# Patient Record
Sex: MALE | Race: WHITE | Employment: OTHER | URBAN - METROPOLITAN AREA
[De-identification: names, ages, dates, MRNs, and addresses within clinical notes are randomized per-mention and may not be internally consistent; named-entity substitution may affect disease eponyms.]

---

## 2017-02-03 RX ORDER — CANDESARTAN 4 MG/1
TABLET ORAL
Qty: 90 TAB | Refills: 0 | Status: SHIPPED | OUTPATIENT
Start: 2017-02-03 | End: 2017-05-02 | Stop reason: SDUPTHER

## 2017-03-06 RX ORDER — TRAZODONE HYDROCHLORIDE 50 MG/1
TABLET ORAL
Qty: 60 TAB | Refills: 0 | Status: SHIPPED | OUTPATIENT
Start: 2017-03-06 | End: 2017-05-30 | Stop reason: SDUPTHER

## 2017-05-03 RX ORDER — CANDESARTAN 4 MG/1
TABLET ORAL
Qty: 90 TAB | Refills: 0 | Status: SHIPPED | OUTPATIENT
Start: 2017-05-03 | End: 2017-08-01 | Stop reason: SDUPTHER

## 2017-05-30 RX ORDER — TRAZODONE HYDROCHLORIDE 50 MG/1
50-100 TABLET ORAL
Qty: 60 TAB | Refills: 0 | Status: SHIPPED | OUTPATIENT
Start: 2017-05-30 | End: 2017-08-01 | Stop reason: SDUPTHER

## 2017-05-30 NOTE — TELEPHONE ENCOUNTER
Orders Placed This Encounter    traZODone (DESYREL) 50 mg tablet     Sig: Take 1-2 Tabs by mouth nightly as needed for Sleep. Dispense:  60 Tab     Refill:  0     The above medication refills were approved via verbal order by Dr. Sarah Vincent III.

## 2017-06-20 RX ORDER — AMLODIPINE BESYLATE 10 MG/1
TABLET ORAL
Qty: 90 TAB | Refills: 0 | Status: SHIPPED | OUTPATIENT
Start: 2017-06-20 | End: 2017-08-31 | Stop reason: SDUPTHER

## 2017-08-02 RX ORDER — CANDESARTAN 4 MG/1
TABLET ORAL
Qty: 90 TAB | Refills: 0 | Status: SHIPPED | OUTPATIENT
Start: 2017-08-02 | End: 2017-09-27 | Stop reason: SDUPTHER

## 2017-08-02 RX ORDER — TRAZODONE HYDROCHLORIDE 50 MG/1
TABLET ORAL
Qty: 60 TAB | Refills: 0 | Status: SHIPPED | OUTPATIENT
Start: 2017-08-02 | End: 2017-09-27 | Stop reason: SDUPTHER

## 2017-08-31 RX ORDER — TERAZOSIN 10 MG/1
CAPSULE ORAL
Qty: 90 CAP | Refills: 4 | Status: SHIPPED | OUTPATIENT
Start: 2017-08-31 | End: 2018-11-26 | Stop reason: SDUPTHER

## 2017-08-31 RX ORDER — AMLODIPINE BESYLATE 10 MG/1
TABLET ORAL
Qty: 90 TAB | Refills: 0 | Status: SHIPPED | OUTPATIENT
Start: 2017-08-31 | End: 2017-12-03 | Stop reason: SDUPTHER

## 2017-09-27 RX ORDER — CANDESARTAN 4 MG/1
TABLET ORAL
Qty: 90 TAB | Refills: 0 | Status: SHIPPED | OUTPATIENT
Start: 2017-09-27 | End: 2017-10-16 | Stop reason: SDUPTHER

## 2017-09-27 RX ORDER — TRAZODONE HYDROCHLORIDE 50 MG/1
TABLET ORAL
Qty: 60 TAB | Refills: 0 | Status: SHIPPED | OUTPATIENT
Start: 2017-09-27 | End: 2017-11-28 | Stop reason: SDUPTHER

## 2017-10-16 ENCOUNTER — OFFICE VISIT (OUTPATIENT)
Dept: INTERNAL MEDICINE CLINIC | Age: 72
End: 2017-10-16

## 2017-10-16 VITALS
HEART RATE: 73 BPM | WEIGHT: 254 LBS | HEIGHT: 73 IN | RESPIRATION RATE: 18 BRPM | SYSTOLIC BLOOD PRESSURE: 110 MMHG | DIASTOLIC BLOOD PRESSURE: 70 MMHG | TEMPERATURE: 98.5 F | BODY MASS INDEX: 33.66 KG/M2 | OXYGEN SATURATION: 97 %

## 2017-10-16 DIAGNOSIS — J40 BRONCHITIS: Primary | ICD-10-CM

## 2017-10-16 RX ORDER — DIPHENHYDRAMINE HCL 25 MG
25 CAPSULE ORAL
COMMUNITY
End: 2018-09-06 | Stop reason: ALTCHOICE

## 2017-10-16 RX ORDER — AZITHROMYCIN 250 MG/1
TABLET, FILM COATED ORAL
Qty: 6 TAB | Refills: 0 | Status: SHIPPED | OUTPATIENT
Start: 2017-10-16 | End: 2018-09-06 | Stop reason: ALTCHOICE

## 2017-10-16 NOTE — PROGRESS NOTES
HISTORY OF PRESENT ILLNESS  Armen Rivero is a 81 y.o. male. HPI Returned from hunting trip in [facility]) last night. His roommate was sick and coughing the entire week and was diagnosed with pneumonia on return home. Patient reports that for the past  2-3 days ago he has developed chest and nasal congestion, with a minimally productive cough. He denies fevers or shortness of breath. He also denies sore throat, sinus pain or ear pain. He has not taken any over the counter medications. He is a non smoker. Past Medical History:   Diagnosis Date    Arthritis     BPH (benign prostatic hypertrophy) 7/18/2011    Cancer [facility])     prostate cancer    Chronic pain right    knee    ED (erectile dysfunction)     GERD (gastroesophageal reflux disease)     HTN (hypertension), benign     OA (osteoarthritis) of knee 7/18/2011    BOTH KNEES    Unspecified essential hypertension 7/18/2011     Current Outpatient Prescriptions on File Prior to Visit   Medication Sig Dispense Refill    traZODone (DESYREL) 50 mg tablet TAKE 1-2 TABLETS BY MOUTH NIGHTLY AS NEEDED FOR SLEEP 60 Tab 0    terazosin (HYTRIN) 10 mg capsule TAKE ONE CAPSULE BY MOUTH ONCE DAILY 90 Cap 4    amLODIPine (NORVASC) 10 mg tablet TAKE ONE TABLET BY MOUTH ONCE DAILY 90 Tab 0    candesartan (ATACAND) 4 mg tablet TAKE ONE TABLET BY MOUTH ONCE DAILY 90 Tab 0    ibuprofen (ADVIL) 200 mg tablet Take  by mouth. No current facility-administered medications on file prior to visit. Allergies   Allergen Reactions    Barbiturates Anxiety     Hyper         ROS  Per HPI  Physical Exam  Visit Vitals    /70 (BP 1 Location: Right arm, BP Patient Position: Sitting)    Pulse 73    Temp 98.5 °F (36.9 °C) (Oral)    Resp 18    Ht 6' 0.5\" (1.842 m)    Wt 254 lb (115.2 kg)    SpO2 97%    BMI 33.98 kg/m2   Patient is in no apparent distress   HEENT: normocephalic, extraocular movements intact, conjunctiva clear  Oropharynx: clear.  No erythema, exudate, or drainage  Nose: no drainage  Sinuses: nontender  Ears: tympanic membrane's and canals normal.  No erythema, fluid, drainage  Neck: no lymphadenopathy  Heart[de-identified] normal rate, regular rhythm, normal S1, S2, no murmurs, rubs, clicks or gallops. Chest: clear to auscultation, no wheezes, rales or rhonchi,   Extremities: no edema    ASSESSMENT and PLAN  Bronchitis Likely viral, but patient is very concerned as his hunting roommate was diagnosed with pneumonia. Will Rx with CATHY cisneros as directed  Mucinex DM as directed  Call or return to clinic if these symptoms worsen or fail to improve as anticipated. Follow-up Disposition:  Return if symptoms worsen or fail to improve. Advised to call back or return to office if symptoms worsen/change/persist.  Discussed expected course/resolution/complications of diagnosis in detail with patient. Medication risks/benefits/costs/interactions/alternatives discussed with patient. He was given an after visit summary which includes diagnoses, current medications, & vitals. He expressed understanding with the diagnosis and plan.

## 2017-10-16 NOTE — PATIENT INSTRUCTIONS
CATHY cisneros as directed  Mucinex DM twice daily   Call or return to clinic if these symptoms worsen or fail to improve as anticipated.

## 2017-10-16 NOTE — PROGRESS NOTES
Returned from [facility]) hunting trip last night, roommate from trip had pneumonia. Patient c/o of chest congestion, cough, nasal congestion, started a couple days ago.

## 2017-10-16 NOTE — MR AVS SNAPSHOT
Visit Information Date & Time Provider Department Dept. Phone Encounter #  
 10/16/2017 10:15 AM Hernan Crooks, [address] [facility] [phone] [id number] Upcoming Health Maintenance Date Due Hepatitis C Screening 1945 MEDICARE YEARLY EXAM 5/28/2012 COLONOSCOPY 7/18/2013 INFLUENZA AGE 9 TO ADULT 8/1/2017 GLAUCOMA SCREENING Q2Y 4/22/2018 DTaP/Tdap/Td series (2 - Td) 8/9/2026 Allergies as of 10/16/2017  Review Complete On: 10/16/2017 By: Sita Lam RN Severity Noted Reaction Type Reactions Barbiturates  01/12/2010    Anxiety Hyper Current Immunizations  Reviewed on 8/10/2016 Name Date Influenza High Dose Vaccine PF 10/17/2016, 9/28/2015 Pneumococcal Conjugate (PCV-13) 8/9/2016 Pneumococcal Polysaccharide (PPSV-23) 2/15/2013 Tdap 8/9/2016 Zoster 2/2/2012 Not reviewed this visit You Were Diagnosed With   
  
 Codes Comments Bronchitis    -  Primary ICD-10-CM: W51 ICD-9-CM: 918 Vitals BP Pulse Temp Resp Height(growth percentile) Weight(growth percentile) 110/70 (BP 1 Location: Right arm, BP Patient Position: Sitting) 73 98.5 °F (36.9 °C) (Oral) 18 6' 0.5\" (1.842 m) 254 lb (115.2 kg) SpO2 BMI Smoking Status 97% 33.98 kg/m2 Never Smoker BMI and BSA Data Body Mass Index Body Surface Area 33.98 kg/m 2 2.43 m 2 Preferred Pharmacy Pharmacy Name Phone [facility] [address] [address] Your Updated Medication List  
  
   
This list is accurate as of: 10/16/17 10:50 AM.  Always use your most recent med list. ADVIL 200 mg tablet Generic drug:  ibuprofen Take  by mouth. amLODIPine 10 mg tablet Commonly known as:  Viviane Citron TAKE ONE TABLET BY MOUTH ONCE DAILY  
  
 azithromycin 250 mg tablet Commonly known as:  Bucky Congress Take 2 tablets today, then take 1 tablet daily. BENADRYL 25 mg capsule Generic drug:  diphenhydrAMINE Take 25 mg by mouth every six (6) hours as needed. candesartan 4 mg tablet Commonly known as:  ATACAND TAKE ONE TABLET BY MOUTH ONCE DAILY  
  
 terazosin 10 mg capsule Commonly known as:  HYTRIN  
TAKE ONE CAPSULE BY MOUTH ONCE DAILY  
  
 traZODone 50 mg tablet Commonly known as:  DESYREL  
TAKE 1-2 TABLETS BY MOUTH NIGHTLY AS NEEDED FOR SLEEP Prescriptions Sent to Pharmacy Refills  
 azithromycin (ZITHROMAX) 250 mg tablet 0 Sig: Take 2 tablets today, then take 1 tablet daily. Class: Normal  
 Pharmacy: [facility] [address], [facility] [address] Ph #: [phone] Patient Instructions Z amelia as directed Mucinex DM twice daily Call or return to clinic if these symptoms worsen or fail to improve as anticipated. Introducing [facility]! Dear Erin Dire: 
Thank you for requesting a [employer] account. Our records indicate that you already have an active [employer] account. You can access your account anytime at [email or web address] Did you know that you can access your hospital and ER discharge instructions at any time in [employer]? You can also review all of your test results from your hospital stay or ER visit. Additional Information If you have questions, please visit the Frequently Asked Questions section of the [employer] website at [email or web address] Remember, [employer] is NOT to be used for urgent needs. For medical emergencies, dial 911. Now available from your iPhone and Android! Please provide this summary of care documentation to your next provider. Your primary care clinician is listed as Dat [id number] If you have any questions after today's visit, please call [phone].

## 2017-11-28 RX ORDER — TRAZODONE HYDROCHLORIDE 50 MG/1
TABLET ORAL
Qty: 60 TAB | Refills: 0 | Status: SHIPPED | OUTPATIENT
Start: 2017-11-28 | End: 2018-01-27 | Stop reason: SDUPTHER

## 2017-12-03 RX ORDER — AMLODIPINE BESYLATE 10 MG/1
TABLET ORAL
Qty: 90 TAB | Refills: 0 | Status: SHIPPED | OUTPATIENT
Start: 2017-12-03 | End: 2018-06-12 | Stop reason: SDUPTHER

## 2018-01-28 RX ORDER — TRAZODONE HYDROCHLORIDE 50 MG/1
TABLET ORAL
Qty: 60 TAB | Refills: 0 | Status: SHIPPED | OUTPATIENT
Start: 2018-01-28 | End: 2018-03-24 | Stop reason: SDUPTHER

## 2018-01-29 RX ORDER — CANDESARTAN 4 MG/1
TABLET ORAL
Qty: 90 TAB | Refills: 0 | Status: SHIPPED | OUTPATIENT
Start: 2018-01-29 | End: 2018-05-14 | Stop reason: SDUPTHER

## 2018-03-25 RX ORDER — TRAZODONE HYDROCHLORIDE 50 MG/1
TABLET ORAL
Qty: 60 TAB | Refills: 0 | Status: SHIPPED | OUTPATIENT
Start: 2018-03-25 | End: 2018-05-29 | Stop reason: SDUPTHER

## 2018-05-14 RX ORDER — CANDESARTAN 4 MG/1
TABLET ORAL
Qty: 90 TAB | Refills: 0 | Status: SHIPPED | OUTPATIENT
Start: 2018-05-14 | End: 2018-08-10 | Stop reason: SDUPTHER

## 2018-05-29 RX ORDER — TRAZODONE HYDROCHLORIDE 50 MG/1
TABLET ORAL
Qty: 60 TAB | Refills: 0 | Status: SHIPPED | OUTPATIENT
Start: 2018-05-29 | End: 2018-07-19 | Stop reason: SDUPTHER

## 2018-06-01 LAB — EF %, EXTERNAL: NORMAL

## 2018-06-12 RX ORDER — AMLODIPINE BESYLATE 10 MG/1
TABLET ORAL
Qty: 90 TAB | Refills: 0 | Status: SHIPPED | OUTPATIENT
Start: 2018-06-12 | End: 2018-09-07 | Stop reason: SDUPTHER

## 2018-07-19 RX ORDER — TRAZODONE HYDROCHLORIDE 50 MG/1
TABLET ORAL
Qty: 60 TAB | Refills: 0 | Status: SHIPPED | OUTPATIENT
Start: 2018-07-19 | End: 2018-09-19 | Stop reason: SDUPTHER

## 2018-08-10 DIAGNOSIS — I10 ESSENTIAL HYPERTENSION: Primary | ICD-10-CM

## 2018-08-10 RX ORDER — CANDESARTAN 4 MG/1
4 TABLET ORAL DAILY
Qty: 90 TAB | Refills: 0 | Status: SHIPPED | OUTPATIENT
Start: 2018-08-10 | End: 2018-09-06 | Stop reason: SDUPTHER

## 2018-08-10 NOTE — LETTER
8/10/2018 9:53 AM 
 
Mr. Rory Hill [address] [phone] Dear Mr. Mariana Webster missed you! Please call our office at [phone] and schedule a follow up appointment for your continued care. Sincerely, Brittany Mckeon MD

## 2018-08-10 NOTE — TELEPHONE ENCOUNTER
Chief Complaint   Patient presents with    Medication Refill     Last Appointment with Dr. Roper Paget:  Visit date not found  No future appointments. Patient LOV w/ partner Dr. Lexy Mancilla 10/16/17. LOV w/ PCP 6/9/2016. Patient is OVERDUE for f/u with PCP. Letter mailed. Orders Placed This Encounter    candesartan (ATACAND) 4 mg tablet     Sig: Take 1 Tab by mouth daily.  Indications: hypertension     Dispense:  90 Tab     Refill:  0

## 2018-09-06 ENCOUNTER — OFFICE VISIT (OUTPATIENT)
Dept: INTERNAL MEDICINE CLINIC | Age: 73
End: 2018-09-06

## 2018-09-06 VITALS
HEIGHT: 73 IN | BODY MASS INDEX: 31.14 KG/M2 | WEIGHT: 235 LBS | TEMPERATURE: 98.2 F | DIASTOLIC BLOOD PRESSURE: 72 MMHG | RESPIRATION RATE: 12 BRPM | HEART RATE: 76 BPM | OXYGEN SATURATION: 97 % | SYSTOLIC BLOOD PRESSURE: 114 MMHG

## 2018-09-06 DIAGNOSIS — N40.1 BENIGN PROSTATIC HYPERPLASIA WITH NOCTURIA: ICD-10-CM

## 2018-09-06 DIAGNOSIS — K40.91 UNILATERAL RECURRENT INGUINAL HERNIA WITHOUT OBSTRUCTION OR GANGRENE: ICD-10-CM

## 2018-09-06 DIAGNOSIS — R35.1 BENIGN PROSTATIC HYPERPLASIA WITH NOCTURIA: ICD-10-CM

## 2018-09-06 DIAGNOSIS — E78.2 MIXED HYPERLIPIDEMIA: ICD-10-CM

## 2018-09-06 DIAGNOSIS — I10 ESSENTIAL HYPERTENSION: Primary | ICD-10-CM

## 2018-09-07 RX ORDER — AMLODIPINE BESYLATE 10 MG/1
TABLET ORAL
Qty: 90 TAB | Refills: 0 | Status: SHIPPED | OUTPATIENT
Start: 2018-09-07 | End: 2018-12-04 | Stop reason: SDUPTHER

## 2018-09-07 NOTE — PROGRESS NOTES
HPI: 
Armen Rivero is a 72y.o. year old male who is here for a routine visit: 
 
Here for several issues. He has a questionable left inguinal hernia. He is noted over the last few months intermittent sharp pain there seems to be mostly when he is exerting himself particularly with intercourse. He has had some issues with ongoing erectile dysfunction and has been seeing urology for that. Wanted to discuss the possibility of a penile prosthesis. He is also been followed by cardiology recently. He has had a stress test and echocardiogram that were normal.  He has lost about 30 pounds intentionally and has had some episodes of lightheadedness or dizziness when standing. No chest pains or shortness of breath. His blood pressure has been on the low side. Past Medical History:  
Diagnosis Date  Arthritis  BPH (benign prostatic hypertrophy) 7/18/2011  Cancer [facility])   
 prostate cancer  Chronic pain right  
 knee  ED (erectile dysfunction)  GERD (gastroesophageal reflux disease)  HTN (hypertension), benign  OA (osteoarthritis) of knee 7/18/2011 BOTH KNEES  
 Unspecified essential hypertension 7/18/2011 Past Surgical History:  
Procedure Laterality Date  HX ACL RECONSTRUCTION    
 right x2  
[address] [place] [address]  HX GI  1/2015 Drainage of hemorrhoid  HX HERNIA REPAIR    
 left and right,  ventral  
 HX HIP REPLACEMENT  2000  
 left  HX HIP REPLACEMENT  2000  
 left  HX KNEE ARTHROSCOPY    
 right x3  
 HX ORTHOPAEDIC  6/2000 LEFT HIP REPLACEMENT  
 HX ORTHOPAEDIC  08/05/2014 Right TKR - Dr. Ty [facility]  HX PROSTATECTOMY prostate removed 11/7/2011  HX TONSILLECTOMY Prior to Admission medications Medication Sig Start Date End Date Taking?  Authorizing Provider  
traZODone (DESYREL) 50 mg tablet TAKE 1 TO 2 TABLETS BY MOUTH ONCE DAILY IN THE EVENING AS NEEDED FOR SLEEP 7/19/18  Yes Jerry Dahl MD  
 amLODIPine (NORVASC) 10 mg tablet TAKE ONE TABLET BY MOUTH ONCE DAILY 6/12/18  Yes Leander Rajan III, MD  
terazosin (HYTRIN) 10 mg capsule TAKE ONE CAPSULE BY MOUTH ONCE DAILY 8/31/17  Yes Leander Rajan III, MD  
candesartan (ATACAND) 4 mg tablet TAKE ONE TABLET BY MOUTH ONCE DAILY 8/11/16  Yes Leander Rajan III, MD  
ibuprofen (ADVIL) 200 mg tablet Take  by mouth. Yes Historical Provider Social History Social History  Marital status:  Spouse name: N/A  
 Number of children: N/A  
 Years of education: N/A Occupational History  Not on file. Social History Main Topics  Smoking status: Never Smoker  Smokeless tobacco: Never Used  Alcohol use No  
 Drug use: No  
 Sexual activity: Yes  
  Partners: Female Birth control/ protection: None Other Topics Concern  Not on file Social History Narrative ROS Per HPI Visit Vitals  /72  Pulse 76  Temp 98.2 °F (36.8 °C)  Resp 12  Ht 6' 0.5\" (1.842 m)  Wt 235 lb (106.6 kg)  SpO2 97%  BMI 31.43 kg/m2 Physical Exam  
Physical Examination: General appearance - alert, well appearing, and in no distress Chest - clear to auscultation, no wheezes, rales or rhonchi, symmetric air entry Heart - normal rate and regular rhythm Abdomen - soft, nontender, nondistended, no masses or organomegaly  Male -is tenderness with? Lateral hernia in the left groin. No redness or warmth. No mass. Extremities - peripheral pulses normal, no pedal edema, no clubbing or cyanosis Assessment/Plan: 
Diagnoses and all orders for this visit: 1. Essential hypertension  -appears tightly controlled. At this point will have him monitor his blood pressures and consider reducing his amlodipine if he has orthostasis. 2. Benign prostatic hyperplasia with nocturia -as well as treatment for prostate cancer.   At this point he will discuss prosthesis with urology but am supportive of that. 3. Mixed hyperlipidemia -had recent labs done with cardiology and will obtain those. 4. Unilateral recurrent inguinal hernia without obstruction or gangrene -refer to surgery for an evaluation prior to prosthesis consideration. 
-     REFERRAL TO SURGERY Follow-up Disposition: 
Return for Wellness Visit. Advised him to call back or return to office if symptoms worsen/change/persist. 
Discussed expected course/resolution/complications of diagnosis in detail with patient. Medication risks/benefits/costs/interactions/alternatives discussed with patient. He was given an after visit summary which includes diagnoses, current medications, & vitals. He expressed understanding with the diagnosis and plan.

## 2018-09-19 RX ORDER — TRAZODONE HYDROCHLORIDE 50 MG/1
TABLET ORAL
Qty: 60 TAB | Refills: 0 | Status: SHIPPED | OUTPATIENT
Start: 2018-09-19 | End: 2018-11-15 | Stop reason: SDUPTHER

## 2018-09-24 ENCOUNTER — OFFICE VISIT (OUTPATIENT)
Dept: SURGERY | Age: 73
End: 2018-09-24

## 2018-09-24 VITALS
WEIGHT: 235 LBS | BODY MASS INDEX: 31.83 KG/M2 | DIASTOLIC BLOOD PRESSURE: 80 MMHG | RESPIRATION RATE: 16 BRPM | SYSTOLIC BLOOD PRESSURE: 130 MMHG | OXYGEN SATURATION: 97 % | TEMPERATURE: 98.2 F | HEIGHT: 72 IN | HEART RATE: 67 BPM

## 2018-09-24 DIAGNOSIS — K40.91 RECURRENT LEFT INGUINAL HERNIA: Primary | ICD-10-CM

## 2018-09-24 NOTE — MR AVS SNAPSHOT
[address] [facility] [address] [id number] [phone] Patient: Daphney Ferrer MRN:  [id number] Visit Information Date & Time Provider Department Dept. Phone Encounter #  
 9/24/2018  3:20 PM Hero Ya, [address] [facility] [address] [phone] [id number] Your Appointments 1/11/2019  2:30 PM  
PHYSICAL with Jacqueline Ren MD  
[facility] [address] [facility]) Appt Note: cpe  
 [address] [facility] [address]  
[address] [facility] [address] Upcoming Health Maintenance Date Due Hepatitis C Screening 1945 Shingrix Vaccine Age 50> (1 of 2) 5/28/1997 COLONOSCOPY 7/18/2013 MEDICARE YEARLY EXAM 3/14/2018 GLAUCOMA SCREENING Q2Y 4/22/2018 Influenza Age 5 to Adult 8/1/2018 DTaP/Tdap/Td series (2 - Td) 8/9/2026 Allergies as of 9/24/2018  Review Complete On: 9/24/2018 By: Hero Ya MD  
  
 Severity Noted Reaction Type Reactions Barbiturates  01/12/2010    Anxiety Hyper Current Immunizations  Reviewed on 8/10/2016 Name Date Influenza High Dose Vaccine PF 10/17/2016, 9/28/2015 Pneumococcal Conjugate (PCV-13) 8/9/2016 Pneumococcal Polysaccharide (PPSV-23) 2/15/2013 Tdap 8/9/2016 Zoster 2/2/2012 Not reviewed this visit You Were Diagnosed With   
  
 Codes Comments Recurrent left inguinal hernia    -  Primary ICD-10-CM: K40.91 
ICD-9-CM: 550.91 Vitals BP Pulse Temp Resp Height(growth percentile) Weight(growth percentile) 130/80 (BP 1 Location: Left arm, BP Patient Position: Sitting) 67 98.2 °F (36.8 °C) (Oral) 16 6' 0.05\" (1.83 m) 235 lb (106.6 kg) SpO2 BMI Smoking Status 97% 31.83 kg/m2 Never Smoker BMI and BSA Data Body Mass Index Body Surface Area  
 31.83 kg/m 2 2.33 m 2 Preferred Pharmacy Pharmacy Name Phone [address] [facility], [address] [place] [address]  [phone] Your Updated Medication List  
  
   
This list is accurate as of 9/24/18  4:24 PM.  Always use your most recent med list. ADVIL 200 mg tablet Generic drug:  ibuprofen Take  by mouth. amLODIPine 10 mg tablet Commonly known as:  Wynne Royals TAKE 1 TABLET BY MOUTH ONCE DAILY  
  
 candesartan 4 mg tablet Commonly known as:  ATACAND TAKE ONE TABLET BY MOUTH ONCE DAILY  
  
 terazosin 10 mg capsule Commonly known as:  HYTRIN  
TAKE ONE CAPSULE BY MOUTH ONCE DAILY  
  
 traZODone 50 mg tablet Commonly known as:  DESYREL  
TAKE 1 TO 2 TABLETS BY MOUTH ONCE DAILY IN THE EVENING AS NEEDED FOR SLEEP Introducing \[facility]\"" [facility]! Dear Kasey Lopez: 
Thank you for requesting a [employer] account. Our records indicate that you already have an active [employer] account. You can access your account anytime at [email or web address] Did you know that you can access your hospital and ER discharge instructions at any time in [employer]? You can also review all of your test results from your hospital stay or ER visit. Additional Information If you have questions, please visit the Frequently Asked Questions section of the [employer] website at [email or web address] Remember, [employer] is NOT to be used for urgent needs. For medical emergencies, dial 911. Now available from your iPhone and Android! Please provide this summary of care documentation to your next provider. Your primary care clinician is listed as Dat [id number] If you have any questions after today's visit, please call [phone].

## 2018-09-24 NOTE — PROGRESS NOTES
Surgery Consult    Subjective:        Abhishek Wick is a 72 y.o.  male who was referred by Dr Cornelius Curtis III for evaluation of recurrent inguinal hernia. Once every now and then, if he is straining really hard, he can feel the hernia radiate towards his abdomen (he pointed to his left side). The pt is unsure whether or not the hernias have been getting bigger or not. The times when he is particularly straining (sexually), he can feel the left inguinal hernia the most.  It isn't painful per se but he definitely feels like it should be checked out professionally. 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 Additionally, another physician told him about getting an inflatable penile implant and the pt inquired about whether getting a hernia repair at this point would interfere with that implant. SHx:  The pt had an aggressive prostatectomy. The first hernia was repaired about 16-18 years ago--Dr Renu Constantino did the repair who has since retired.       Chief Complaint   Patient presents with    Inguinal Hernia     possible recurrent left inguinal hernia       Patient Active Problem List    Diagnosis Date Noted    Recurrent left inguinal hernia 09/24/2018    Advance directive on file 06/09/2016    Primary localized osteoarthrosis, lower leg 08/04/2014    Prostate cancer ([facility] [address] 02/15/2013    Essential hypertension 07/18/2011    Benign prostatic hyperplasia 07/18/2011    OA (osteoarthritis) of knee 07/18/2011     Past Medical History:   Diagnosis Date    Arthritis     BPH (benign prostatic hypertrophy) 7/18/2011    Cancer (HCC)     prostate cancer    Chronic pain right    knee    ED (erectile dysfunction)     GERD (gastroesophageal reflux disease)     HTN (hypertension), benign     OA (osteoarthritis) of knee 7/18/2011    BOTH KNEES    Unspecified essential hypertension 7/18/2011      Past Surgical History:   Procedure Laterality Date    HX ACL RECONSTRUCTION      right x2    HX APPENDECTOMY  1975    HX GI  1/2015    Drainage of hemorrhoid    HX HERNIA REPAIR      left and right,  ventral    HX HIP REPLACEMENT  2000    left    HX HIP REPLACEMENT  2000    left    HX KNEE ARTHROSCOPY      right x3    HX ORTHOPAEDIC  6/2000    LEFT HIP REPLACEMENT    HX ORTHOPAEDIC  08/05/2014    Right TKR - Dr. Otilia Ya    HX PROSTATECTOMY      prostate removed 11/7/2011    HX TONSILLECTOMY        Social History   Substance Use Topics    Smoking status: Never Smoker    Smokeless tobacco: Never Used    Alcohol use No      Family History   Problem Relation Age of Onset    Bleeding Prob Father      clotting disorder    Cancer Brother      colon CA 1/2 brother    Thyroid Disease Daughter     Heart Failure Mother     Heart Disease Mother     Anesth Problems Neg Hx       Current Outpatient Prescriptions   Medication Sig    traZODone (DESYREL) 50 mg tablet TAKE 1 TO 2 TABLETS BY MOUTH ONCE DAILY IN THE EVENING AS NEEDED FOR SLEEP    amLODIPine (NORVASC) 10 mg tablet TAKE 1 TABLET BY MOUTH ONCE DAILY    terazosin (HYTRIN) 10 mg capsule TAKE ONE CAPSULE BY MOUTH ONCE DAILY    candesartan (ATACAND) 4 mg tablet TAKE ONE TABLET BY MOUTH ONCE DAILY    ibuprofen (ADVIL) 200 mg tablet Take  by mouth. No current facility-administered medications for this visit. Allergies   Allergen Reactions    Barbiturates Anxiety     Hyper          Review of Systems:    A comprehensive review of systems was negative except for that written in the History of Present Illness.     Objective:     Visit Vitals    /80 (BP 1 Location: Left arm, BP Patient Position: Sitting)    Pulse 67    Temp 98.2 °F (36.8 °C) (Oral)    Resp 16    Ht 6' 0.05\" (1.83 m)    Wt 235 lb (106.6 kg)    SpO2 97%    BMI 31.83 kg/m2         Physical Exam:  General appearance: alert, cooperative, no distress, appears stated age  Head: Normocephalic, without obvious abnormality, atraumatic  Neurologic: Grossly normal  Male genitalia: Has a small bulge in the upper aspect of his previous repair. It may be an early recurrence of an indirect inguinal hernia but it is easily reducible. Assessment:       ICD-10-CM ICD-9-CM    1. Recurrent left inguinal hernia K40.91 550.91          Plan:     Pt will keep an eye on things and f/u if it gets to a point where it starts to interfere with his ADLs. Written by tucker Pelayo, as dictated by Dale Chacon MD.    Total face to face time with patient: 12 minutes. Greater than 50% of the time was spent in counseling. Signed By: Dale Chacon MD     September 24, 2018

## 2018-09-24 NOTE — PROGRESS NOTES
1. Have you been to the ER, urgent care clinic since your last visit? Hospitalized since your last visit? No    2. Have you seen or consulted any other health care providers outside of the [address] [place] [address] since your last visit? Include any pap smears or colon screening.  No

## 2018-11-05 DIAGNOSIS — I10 ESSENTIAL HYPERTENSION: ICD-10-CM

## 2018-11-05 RX ORDER — CANDESARTAN 4 MG/1
TABLET ORAL
Qty: 90 TAB | Refills: 0 | Status: SHIPPED | OUTPATIENT
Start: 2018-11-05 | End: 2018-12-17 | Stop reason: SDUPTHER

## 2018-11-15 RX ORDER — TRAZODONE HYDROCHLORIDE 50 MG/1
TABLET ORAL
Qty: 60 TAB | Refills: 0 | Status: SHIPPED | OUTPATIENT
Start: 2018-11-15 | End: 2019-01-10 | Stop reason: SDUPTHER

## 2018-11-26 RX ORDER — TERAZOSIN 10 MG/1
CAPSULE ORAL
Qty: 90 CAP | Refills: 4 | Status: SHIPPED | OUTPATIENT
Start: 2018-11-26 | End: 2020-02-17 | Stop reason: SDUPTHER

## 2018-12-04 RX ORDER — AMLODIPINE BESYLATE 10 MG/1
TABLET ORAL
Qty: 90 TAB | Refills: 0 | Status: SHIPPED | OUTPATIENT
Start: 2018-12-04 | End: 2019-03-05 | Stop reason: SDUPTHER

## 2018-12-17 DIAGNOSIS — I10 ESSENTIAL HYPERTENSION: ICD-10-CM

## 2018-12-17 RX ORDER — CANDESARTAN 4 MG/1
TABLET ORAL
Qty: 90 TAB | Refills: 0 | Status: SHIPPED | OUTPATIENT
Start: 2018-12-17 | End: 2019-01-11 | Stop reason: SDUPTHER

## 2019-01-10 RX ORDER — TRAZODONE HYDROCHLORIDE 50 MG/1
TABLET ORAL
Qty: 60 TAB | Refills: 0 | Status: SHIPPED | OUTPATIENT
Start: 2019-01-10 | End: 2019-03-09 | Stop reason: SDUPTHER

## 2019-01-11 ENCOUNTER — OFFICE VISIT (OUTPATIENT)
Dept: INTERNAL MEDICINE CLINIC | Age: 74
End: 2019-01-11

## 2019-01-11 VITALS
RESPIRATION RATE: 14 BRPM | TEMPERATURE: 97.8 F | OXYGEN SATURATION: 97 % | SYSTOLIC BLOOD PRESSURE: 117 MMHG | HEIGHT: 71 IN | WEIGHT: 230 LBS | BODY MASS INDEX: 32.2 KG/M2 | DIASTOLIC BLOOD PRESSURE: 76 MMHG | HEART RATE: 65 BPM

## 2019-01-11 DIAGNOSIS — Z12.5 PROSTATE CANCER SCREENING: ICD-10-CM

## 2019-01-11 DIAGNOSIS — E78.2 MIXED HYPERLIPIDEMIA: ICD-10-CM

## 2019-01-11 DIAGNOSIS — Z13.5 GLAUCOMA SCREENING: ICD-10-CM

## 2019-01-11 DIAGNOSIS — I10 ESSENTIAL HYPERTENSION: ICD-10-CM

## 2019-01-11 DIAGNOSIS — Z00.00 MEDICARE ANNUAL WELLNESS VISIT, SUBSEQUENT: Primary | ICD-10-CM

## 2019-01-11 DIAGNOSIS — Z11.59 NEED FOR HEPATITIS C SCREENING TEST: ICD-10-CM

## 2019-01-11 DIAGNOSIS — C61 PROSTATE CANCER (HCC): ICD-10-CM

## 2019-01-12 NOTE — PROGRESS NOTES
This is the Subsequent Medicare Annual Wellness Exam, performed 12 months or more after the Initial AWV or the last Subsequent AWV I have reviewed the patient's medical history in detail and updated the computerized patient record. As well as a follow-up of his health issues. He is been generally doing very well. He is planning to remarry in the next few months. He has seen the urologist for follow-up. Is considering a penile prosthesis. Denies headaches or dizziness. Denies chest pains or shortness of breath. Did not has cough or wheeze. Denies any change in bowel or bladder habits. History Past Medical History:  
Diagnosis Date  Arthritis  BPH (benign prostatic hypertrophy) 7/18/2011  Cancer [facility])   
 prostate cancer  Chronic pain right  
 knee  ED (erectile dysfunction)  GERD (gastroesophageal reflux disease)  HTN (hypertension), benign  OA (osteoarthritis) of knee 7/18/2011 BOTH KNEES  
 Unspecified essential hypertension 7/18/2011 Past Surgical History:  
Procedure Laterality Date  HX ACL RECONSTRUCTION    
 right x2  
[facility]  HX GI  1/2015 Drainage of hemorrhoid  HX HERNIA REPAIR    
 left and right,  ventral  
 HX HIP REPLACEMENT  2000  
 left  HX HIP REPLACEMENT  2000  
 left  HX KNEE ARTHROSCOPY    
 right x3  
 HX ORTHOPAEDIC  6/2000 LEFT HIP REPLACEMENT  
 HX ORTHOPAEDIC  08/05/2014 Right TKR - Dr. Bayron Ray  HX PROSTATECTOMY prostate removed 11/7/2011  HX TONSILLECTOMY Current Outpatient Medications Medication Sig Dispense Refill  varicella-zoster recombinant, PF, (SHINGRIX, PF,) 50 mcg/0.5 mL susr injection 0.5 mL by IntraMUSCular route once for 1 dose.  0.5 mL 1  
 traZODone (DESYREL) 50 mg tablet TAKE 1 TO 2 TABLETS BY MOUTH ONCE DAILY IN THE EVENING AS NEEDED FOR SLEEP 60 Tab 0  
 amLODIPine (NORVASC) 10 mg tablet TAKE 1 TABLET BY MOUTH ONCE DAILY 90 Tab 0  
  terazosin (HYTRIN) 10 mg capsule TAKE ONE CAPSULE BY MOUTH ONCE DAILY 90 Cap 4  
 candesartan (ATACAND) 4 mg tablet TAKE ONE TABLET BY MOUTH ONCE DAILY 90 Tab 0  ibuprofen (ADVIL) 200 mg tablet Take  by mouth. Allergies Allergen Reactions  Barbiturates Anxiety Hyper Family History Problem Relation Age of Onset  Bleeding Prob Father   
     clotting disorder  Cancer Brother   
     colon CA 1/2 brother  Thyroid Disease Daughter  Heart Failure Mother  Heart Disease Mother  Anesth Problems Neg Hx Social History Tobacco Use  Smoking status: Never Smoker  Smokeless tobacco: Never Used Substance Use Topics  Alcohol use: No  
 
Patient Active Problem List  
Diagnosis Code  Essential hypertension I10  Benign prostatic hyperplasia N40.0  OA (osteoarthritis) of knee M17.10  Prostate cancer ([facility] [address] C61  
 Primary localized osteoarthrosis, lower leg M17.10  Advance directive on file T13.2  Recurrent left inguinal hernia K40.91  
ROS - Per HPI Physical Examination: General appearance - alert, well appearing, and in no distress Eyes - pupils equal and reactive, extraocular eye movements intact Ears - bilateral TM's and external ear canals normal 
Nose - normal and patent, no erythema, discharge or polyps Mouth - mucous membranes moist, pharynx normal without lesions Neck - supple, no significant adenopathy Lymphatics - no palpable lymphadenopathy, no hepatosplenomegaly Chest - clear to auscultation, no wheezes, rales or rhonchi, symmetric air entry Heart - normal rate, regular rhythm, normal S1, S2, no murmurs, rubs, clicks or gallops Abdomen - soft, nontender, nondistended, no masses or organomegaly Back exam - full range of motion, no tenderness, palpable spasm or pain on motion Neurological - alert, oriented, normal speech, no focal findings or movement disorder noted Musculoskeletal - no joint tenderness, deformity or swelling Extremities - peripheral pulses normal, no pedal edema, no clubbing or cyanosis Depression Risk Factor Screening: PHQ over the last two weeks 1/11/2019 Little interest or pleasure in doing things Not at all Feeling down, depressed, irritable, or hopeless Not at all Total Score PHQ 2 0 Alcohol Risk Factor Screening: You do not drink alcohol or very rarely. Functional Ability and Level of Safety:  
Hearing Loss Hearing is good. Activities of Daily Living The home contains: no safety equipment. Patient does total self care Fall Risk Fall Risk Assessment, last 12 mths 1/11/2019 Able to walk? Yes Fall in past 12 months? No  
 
 
Abuse Screen Patient is not abused Cognitive Screening Evaluation of Cognitive Function: 
Has your family/caregiver stated any concerns about your memory: no 
 
 
Patient Care Team  
Patient Care Team: 
Mis Pierson MD as PCP - General Arabella Byers MD as Physician (Urology) Merritt Ham RN as Ambulatory Care Navigator Tj Curtis MD (Ophthalmology) Sudha Driscoll MD (General Surgery) Assessment/Plan Education and counseling provided: 
Are appropriate based on today's review and evaluation End-of-Life planning (with patient's consent) Prostate cancer screening tests (PSA, covered annually) Diabetes screening test 
 
Diagnoses and all orders for this visit: 1. Essential hypertension -blood pressure well controlled. Continue current meds. 2. Prostate cancer ([address] -in remission. Will check PSA for follow-up. -     PSA SCREENING (SCREENING) 3. Mixed hyperlipidemia -diet controlled. Check labs to be sure this is stable. -     CBC WITH AUTOMATED DIFF 
-     METABOLIC PANEL, COMPREHENSIVE 
-     LIPID PANEL 
-     TSH RFX ON ABNORMAL TO FREE T4 
 
4. Prostate cancer screening -     PSA SCREENING (SCREENING) 5. Need for hepatitis C screening test 
-     HEPATITIS C AB 
 
6. Glaucoma screening -     REFERRAL TO OPHTHALMOLOGY 7. Medicare annual wellness visit, subsequent Other orders 
-     varicella-zoster recombinant, PF, (SHINGRIX, PF,) 50 mcg/0.5 mL susr injection; 0.5 mL by IntraMUSCular route once for 1 dose. Follow-up in 12 months and as needed. Health Maintenance Due Topic Date Due  
 Hepatitis C Screening  1945  Shingrix Vaccine Age 50> (1 of 2) 05/28/1997  MEDICARE YEARLY EXAM  03/14/2018  GLAUCOMA SCREENING Q2Y  04/22/2018  Influenza Age 5 to Adult  08/01/2018

## 2019-01-12 NOTE — PATIENT INSTRUCTIONS
Medicare Wellness Visit, Male The best way to live healthy is to have a lifestyle where you eat a well-balanced diet, exercise regularly, limit alcohol use, and quit all forms of tobacco/nicotine, if applicable. Regular preventive services are another way to keep healthy. Preventive services (vaccines, screening tests, monitoring & exams) can help personalize your care plan, which helps you manage your own care. Screening tests can find health problems at the earliest stages, when they are easiest to treat. [facility] follows the current, evidence-based guidelines published by the [facility] ([facility]) when recommending preventive services for our patients. Because we follow these guidelines, sometimes recommendations change over time as research supports it. (For example, a prostate screening blood test is no longer routinely recommended for men with no symptoms.) Of course, you and your doctor may decide to screen more often for some diseases, based on your risk and co-morbidities (chronic disease you are already diagnosed with). Preventive services for you include: - Medicare offers their members a free annual wellness visit, which is time for you and your primary care provider to discuss and plan for your preventive service needs. Take advantage of this benefit every year! 
-All adults over age 72 should receive the recommended pneumonia vaccines. Current USPSTF guidelines recommend a series of two vaccines for the best pneumonia protection.  
-All adults should have a flu vaccine yearly and an ECG.  All adults age 61 and older should receive a shingles vaccine once in their lifetime.   
-All adults age 38-68 who are overweight should have a diabetes screening test once every three years.  
-Other screening tests & preventive services for persons with diabetes include: an eye exam to screen for diabetic retinopathy, a kidney function test, a foot exam, and stricter control over your cholesterol.  
-Cardiovascular screening for adults with routine risk involves an electrocardiogram (ECG) at intervals determined by the provider.  
-Colorectal cancer screening should be done for adults age 54-65 with no increased risk factors for colorectal cancer. There are a number of acceptable methods of screening for this type of cancer. Each test has its own benefits and drawbacks. Discuss with your provider what is most appropriate for you during your annual wellness visit. The different tests include: colonoscopy (considered the best screening method), a fecal occult blood test, a fecal DNA test, and sigmoidoscopy. 
-All adults born between [facility] should be screened once for Hepatitis C. 
-An Abdominal Aortic Aneurysm (AAA) Screening is recommended for men age 73-68 who has ever smoked in their lifetime. Here is a list of your current Health Maintenance items (your personalized list of preventive services) with a due date: 
Health Maintenance Due Topic Date Due  
 Hepatitis C Test  1945  Shingles Vaccine (1 of 2) 05/28/1997 [employer] Annual Well Visit  03/14/2018  Glaucoma Screening   04/22/2018  Flu Vaccine  08/01/2018

## 2019-01-17 ENCOUNTER — HOSPITAL ENCOUNTER (OUTPATIENT)
Dept: LAB | Age: 74
Discharge: HOME | End: 2019-01-17
Payer: MEDICARE

## 2019-01-17 PROCEDURE — 84443 ASSAY THYROID STIM HORMONE: CPT

## 2019-01-17 PROCEDURE — 84153 ASSAY OF PSA TOTAL: CPT

## 2019-01-17 PROCEDURE — 36415 COLL VENOUS BLD VENIPUNCTURE: CPT

## 2019-01-17 PROCEDURE — 80061 LIPID PANEL: CPT

## 2019-01-17 PROCEDURE — 80053 COMPREHEN METABOLIC PANEL: CPT

## 2019-01-17 PROCEDURE — 86803 HEPATITIS C AB TEST: CPT

## 2019-01-17 PROCEDURE — 85025 COMPLETE CBC W/AUTO DIFF WBC: CPT

## 2019-01-18 LAB
ALBUMIN SERPL-MCNC: 4 G/DL (ref 3.5–4.8)
ALBUMIN/GLOB SERPL: 2 {RATIO} (ref 1.2–2.2)
ALP SERPL-CCNC: 48 IU/L (ref 39–117)
ALT SERPL-CCNC: 14 IU/L (ref 0–44)
AST SERPL-CCNC: 13 IU/L (ref 0–40)
BASOPHILS # BLD AUTO: 0.1 X10E3/UL (ref 0–0.2)
BASOPHILS NFR BLD AUTO: 1 %
BILIRUB SERPL-MCNC: 1.2 MG/DL (ref 0–1.2)
BUN SERPL-MCNC: 20 MG/DL (ref 8–27)
BUN/CREAT SERPL: 16 (ref 10–24)
CALCIUM SERPL-MCNC: 8.8 MG/DL (ref 8.6–10.2)
CHLORIDE SERPL-SCNC: 106 MMOL/L (ref 96–106)
CHOLEST SERPL-MCNC: 193 MG/DL (ref 100–199)
CO2 SERPL-SCNC: 23 MMOL/L (ref 20–29)
CREAT SERPL-MCNC: 1.28 MG/DL (ref 0.76–1.27)
EOSINOPHIL # BLD AUTO: 0.1 X10E3/UL (ref 0–0.4)
EOSINOPHIL NFR BLD AUTO: 2 %
ERYTHROCYTE [DISTWIDTH] IN BLOOD BY AUTOMATED COUNT: 13.2 % (ref 12.3–15.4)
GLOBULIN SER CALC-MCNC: 2 G/DL (ref 1.5–4.5)
GLUCOSE SERPL-MCNC: 101 MG/DL (ref 65–99)
HCT VFR BLD AUTO: 43 % (ref 37.5–51)
HCV AB S/CO SERPL IA: <0.1 S/CO RATIO (ref 0–0.9)
HDLC SERPL-MCNC: 56 MG/DL
HGB BLD-MCNC: 14.1 G/DL (ref 13–17.7)
IMM GRANULOCYTES # BLD AUTO: 0 X10E3/UL (ref 0–0.1)
IMM GRANULOCYTES NFR BLD AUTO: 0 %
LDLC SERPL CALC-MCNC: 125 MG/DL (ref 0–99)
LYMPHOCYTES # BLD AUTO: 0.8 X10E3/UL (ref 0.7–3.1)
LYMPHOCYTES NFR BLD AUTO: 15 %
MCH RBC QN AUTO: 31.2 PG (ref 26.6–33)
MCHC RBC AUTO-ENTMCNC: 32.8 G/DL (ref 31.5–35.7)
MCV RBC AUTO: 95 FL (ref 79–97)
MONOCYTES # BLD AUTO: 0.4 X10E3/UL (ref 0.1–0.9)
MONOCYTES NFR BLD AUTO: 8 %
NEUTROPHILS # BLD AUTO: 4 X10E3/UL (ref 1.4–7)
NEUTROPHILS NFR BLD AUTO: 74 %
PLATELET # BLD AUTO: 168 X10E3/UL (ref 150–379)
POTASSIUM SERPL-SCNC: 3.8 MMOL/L (ref 3.5–5.2)
PROT SERPL-MCNC: 6 G/DL (ref 6–8.5)
PSA SERPL-MCNC: 13.8 NG/ML (ref 0–4)
RBC # BLD AUTO: 4.52 X10E6/UL (ref 4.14–5.8)
SODIUM SERPL-SCNC: 144 MMOL/L (ref 134–144)
TRIGL SERPL-MCNC: 58 MG/DL (ref 0–149)
TSH SERPL DL<=0.005 MIU/L-ACNC: 2.01 UIU/ML (ref 0.45–4.5)
VLDLC SERPL CALC-MCNC: 12 MG/DL (ref 5–40)
WBC # BLD AUTO: 5.3 X10E3/UL (ref 3.4–10.8)

## 2019-02-04 ENCOUNTER — HOSPITAL ENCOUNTER (OUTPATIENT)
Dept: NUCLEAR MEDICINE | Age: 74
Discharge: HOME | End: 2019-02-04
Attending: UROLOGY
Payer: MEDICARE

## 2019-02-04 ENCOUNTER — HOSPITAL ENCOUNTER (OUTPATIENT)
Dept: CT IMAGING | Age: 74
Discharge: HOME | End: 2019-02-04
Attending: UROLOGY
Payer: MEDICARE

## 2019-02-04 DIAGNOSIS — C61 MALIGNANT NEOPLASM OF PROSTATE (HCC): ICD-10-CM

## 2019-02-04 DIAGNOSIS — R97.20 ELEVATED PROSTATE SPECIFIC ANTIGEN (PSA): ICD-10-CM

## 2019-02-04 PROCEDURE — 74177 CT ABD & PELVIS W/CONTRAST: CPT

## 2019-02-04 PROCEDURE — 74011636320 HC RX REV CODE- 636/320: Performed by: UROLOGY

## 2019-02-04 PROCEDURE — 74011000258 HC RX REV CODE- 258: Performed by: UROLOGY

## 2019-02-04 PROCEDURE — 78306 BONE IMAGING WHOLE BODY: CPT

## 2019-02-04 RX ORDER — SODIUM CHLORIDE 0.9 % (FLUSH) 0.9 %
10 SYRINGE (ML) INJECTION
Status: COMPLETED | OUTPATIENT
Start: 2019-02-04 | End: 2019-02-04

## 2019-02-04 RX ADMIN — IOPAMIDOL 100 ML: 755 INJECTION, SOLUTION INTRAVENOUS at 12:30

## 2019-02-04 RX ADMIN — Medication 10 ML: at 12:30

## 2019-02-04 RX ADMIN — SODIUM CHLORIDE 100 ML: 900 INJECTION, SOLUTION INTRAVENOUS at 12:30

## 2019-02-12 ENCOUNTER — OFFICE VISIT (OUTPATIENT)
Dept: INTERNAL MEDICINE CLINIC | Age: 74
End: 2019-02-12

## 2019-02-12 VITALS
OXYGEN SATURATION: 98 % | RESPIRATION RATE: 18 BRPM | BODY MASS INDEX: 31.92 KG/M2 | HEIGHT: 71 IN | SYSTOLIC BLOOD PRESSURE: 96 MMHG | TEMPERATURE: 98.1 F | DIASTOLIC BLOOD PRESSURE: 60 MMHG | WEIGHT: 228 LBS | HEART RATE: 68 BPM

## 2019-02-12 DIAGNOSIS — C61 PROSTATE CANCER METASTATIC TO INTRAPELVIC LYMPH NODE (HCC): ICD-10-CM

## 2019-02-12 DIAGNOSIS — C77.5 PROSTATE CANCER METASTATIC TO INTRAPELVIC LYMPH NODE (HCC): ICD-10-CM

## 2019-02-12 DIAGNOSIS — J40 BRONCHITIS: Primary | ICD-10-CM

## 2019-02-12 RX ORDER — AZITHROMYCIN 250 MG/1
250 TABLET, FILM COATED ORAL SEE ADMIN INSTRUCTIONS
Qty: 6 TAB | Refills: 0 | Status: SHIPPED | OUTPATIENT
Start: 2019-02-12 | End: 2019-02-17

## 2019-02-12 RX ORDER — GUAIFENESIN AND DEXTROMETHORPHAN HYDROBROMIDE 1200; 60 MG/1; MG/1
1 TABLET, EXTENDED RELEASE ORAL AS NEEDED
COMMUNITY
Start: 2019-02-12 | End: 2020-06-02

## 2019-02-12 RX ORDER — BICALUTAMIDE 50 MG/1
50 TABLET, FILM COATED ORAL DAILY
COMMUNITY
End: 2020-06-01

## 2019-02-13 NOTE — PROGRESS NOTES
HPI: 
Jaja Wilson is a 72y.o. year old male who is here for a routine visit: 
 
Resents here with several issues. He is recently been diagnosed with recurrent prostate cancer after finding a PSA to be elevated. CT scan of the abdomen revealed abnormal pelvic lymph nodes. Bone scan was done revealing no evidence of osseous metastasis. There was some suggestion on the CT scan that he might have some bone metastases. He is scheduled to see urology tomorrow for follow-up. He has started Casodex and will get Lupron tomorrow. He is quite concerned about the potential side effects of this year. He is also still considering a penile prosthesis. Over the last few days he has developed an upper respiratory infection. He has cough productive of green sputum without any shortness of breath or wheeze. No fevers or chills. No nausea or vomiting. No change in bowel or bladder habits. Past Medical History:  
Diagnosis Date  Arthritis  BPH (benign prostatic hypertrophy) 7/18/2011  Cancer [facility])   
 prostate cancer  Chronic pain right  
 knee  ED (erectile dysfunction)  GERD (gastroesophageal reflux disease)  HTN (hypertension), benign  OA (osteoarthritis) of knee 7/18/2011 BOTH KNEES  
 Unspecified essential hypertension 7/18/2011 Past Surgical History:  
Procedure Laterality Date  HX ACL RECONSTRUCTION    
 right x2  
[address] [place] [address]  HX GI  1/2015 Drainage of hemorrhoid  HX HERNIA REPAIR    
 left and right,  ventral  
 HX HIP REPLACEMENT  2000  
 left  HX HIP REPLACEMENT  2000  
 left  HX KNEE ARTHROSCOPY    
 right x3  
 HX ORTHOPAEDIC  6/2000 LEFT HIP REPLACEMENT  
 HX ORTHOPAEDIC  08/05/2014 Right TKR - Dr. Dilia Sanchez  HX PROSTATECTOMY prostate removed 11/7/2011  HX TONSILLECTOMY Prior to Admission medications Medication Sig Start Date End Date Taking? Authorizing Provider bicalutamide (CASODEX) 50 mg tablet Take 50 mg by mouth daily. Yes Provider, Historical  
azithromycin (ZITHROMAX) 250 mg tablet Take 1 Tab by mouth See Admin Instructions for 5 days. 2/12/19 2/17/19 Yes Sean Mccord MD  
dextromethorphan-guaiFENesin [facility] DM) 60-1,200 mg Tb12 Take 1 Tab by mouth two (2) times daily as needed. 2/12/19  Yes Sean Mccord MD  
traZODone (DESYREL) 50 mg tablet TAKE 1 TO 2 TABLETS BY MOUTH ONCE DAILY IN THE EVENING AS NEEDED FOR SLEEP 1/10/19  Yes Dav Leung III, MD  
amLODIPine (NORVASC) 10 mg tablet TAKE 1 TABLET BY MOUTH ONCE DAILY 12/4/18  Yes Sean Mccord MD  
terazosin (HYTRIN) 10 mg capsule TAKE ONE CAPSULE BY MOUTH ONCE DAILY 11/26/18  Yes Sean Mccord MD  
candesartan (ATACAND) 4 mg tablet TAKE ONE TABLET BY MOUTH ONCE DAILY 8/11/16  Yes Dav Leung III, MD  
ibuprofen (ADVIL) 200 mg tablet Take  by mouth. Yes Provider, Historical  
 
 
Social History Socioeconomic History  Marital status:  Spouse name: Not on file  Number of children: Not on file  Years of education: Not on file  Highest education level: Not on file Social Needs  Financial resource strain: Not on file  Food insecurity - worry: Not on file  Food insecurity - inability: Not on file  Transportation needs - medical: Not on file  Transportation needs - non-medical: Not on file Occupational History  Not on file Tobacco Use  Smoking status: Never Smoker  Smokeless tobacco: Never Used Substance and Sexual Activity  Alcohol use: No  
 Drug use: No  
 Sexual activity: Yes  
  Partners: Female Birth control/protection: None Other Topics Concern  Not on file Social History Narrative  Not on file ROS Per HPI Visit Vitals BP 96/60 Pulse 68 Temp 98.1 °F (36.7 °C) (Oral) Resp 18 Ht 5' 11\" (1.803 m) Wt 228 lb (103.4 kg) SpO2 98% BMI 31.80 kg/m² Physical Exam  
 Physical Examination: General appearance - alert, well appearing, and in no distress Ears - bilateral TM's and external ear canals normal 
Nose - normal and patent, no erythema, discharge or polyps Mouth - mucous membranes moist, pharynx normal without lesions Neck - supple, no significant adenopathy Chest - clear to auscultation, no wheezes, rales or rhonchi, symmetric air entry Heart - normal rate, regular rhythm, normal S1, S2, no murmurs, rubs, clicks or gallops Abdomen - soft, nontender, nondistended, no masses or organomegaly Assessment/Plan: 
Diagnoses and all orders for this visit: 1. Bronchitis -we will treat with Mucinex DM as well as antibiotics. He will force fluids and let me know if not improving. 2. Prostate cancer metastatic to intrapelvic lymph node ([facility] [address] -recurrent. At this point I explained to him that any treatment option he had is likely to have some side effects. We did discuss the most common ones associated with Lupron and Casodex today. He will also discuss that tomorrow with his urologist. 
 
3.  EDhe will see his ED doctor to discuss the prosthesis. Other orders 
-     azithromycin (ZITHROMAX) 250 mg tablet; Take 1 Tab by mouth See Admin Instructions for 5 days. Follow-up Disposition: 
Return if symptoms worsen or fail to improve. Advised him to call back or return to office if symptoms worsen/change/persist. 
Discussed expected course/resolution/complications of diagnosis in detail with patient. Medication risks/benefits/costs/interactions/alternatives discussed with patient. He was given an after visit summary which includes diagnoses, current medications, & vitals. He expressed understanding with the diagnosis and plan.

## 2019-03-05 RX ORDER — AMLODIPINE BESYLATE 10 MG/1
TABLET ORAL
Qty: 90 TAB | Refills: 0 | Status: SHIPPED | OUTPATIENT
Start: 2019-03-05 | End: 2020-06-01

## 2019-03-10 RX ORDER — TRAZODONE HYDROCHLORIDE 50 MG/1
TABLET ORAL
Qty: 60 TAB | Refills: 0 | Status: SHIPPED | OUTPATIENT
Start: 2019-03-10 | End: 2019-05-13 | Stop reason: SDUPTHER

## 2019-05-13 DIAGNOSIS — I10 ESSENTIAL HYPERTENSION: ICD-10-CM

## 2019-05-13 RX ORDER — CANDESARTAN 4 MG/1
TABLET ORAL
Qty: 90 TAB | Refills: 0 | Status: SHIPPED | OUTPATIENT
Start: 2019-05-13 | End: 2019-08-03 | Stop reason: SDUPTHER

## 2019-05-13 RX ORDER — TRAZODONE HYDROCHLORIDE 50 MG/1
TABLET ORAL
Qty: 60 TAB | Refills: 0 | Status: SHIPPED | OUTPATIENT
Start: 2019-05-13 | End: 2019-07-03 | Stop reason: SDUPTHER

## 2019-07-03 RX ORDER — TRAZODONE HYDROCHLORIDE 50 MG/1
TABLET ORAL
Qty: 60 TAB | Refills: 0 | Status: SHIPPED | OUTPATIENT
Start: 2019-07-03 | End: 2019-09-01 | Stop reason: SDUPTHER

## 2019-08-03 DIAGNOSIS — I10 ESSENTIAL HYPERTENSION: ICD-10-CM

## 2019-08-04 RX ORDER — CANDESARTAN 4 MG/1
TABLET ORAL
Qty: 90 TAB | Refills: 0 | Status: SHIPPED | OUTPATIENT
Start: 2019-08-04 | End: 2019-11-04 | Stop reason: SDUPTHER

## 2019-09-02 RX ORDER — TRAZODONE HYDROCHLORIDE 50 MG/1
TABLET ORAL
Qty: 60 TAB | Refills: 0 | Status: SHIPPED | OUTPATIENT
Start: 2019-09-02 | End: 2019-11-04 | Stop reason: SDUPTHER

## 2019-11-04 DIAGNOSIS — I10 ESSENTIAL HYPERTENSION: ICD-10-CM

## 2019-11-04 RX ORDER — CANDESARTAN 4 MG/1
TABLET ORAL
Qty: 90 TAB | Refills: 0 | Status: SHIPPED | OUTPATIENT
Start: 2019-11-04 | End: 2020-02-03

## 2019-11-04 RX ORDER — TRAZODONE HYDROCHLORIDE 50 MG/1
TABLET ORAL
Qty: 60 TAB | Refills: 0 | Status: SHIPPED | OUTPATIENT
Start: 2019-11-04 | End: 2020-02-24

## 2020-02-03 DIAGNOSIS — I10 ESSENTIAL HYPERTENSION: ICD-10-CM

## 2020-02-03 RX ORDER — CANDESARTAN 4 MG/1
TABLET ORAL
Qty: 90 TAB | Refills: 0 | Status: SHIPPED | OUTPATIENT
Start: 2020-02-03 | End: 2020-04-24

## 2020-02-17 RX ORDER — TERAZOSIN 10 MG/1
10 CAPSULE ORAL DAILY
Qty: 90 CAP | Refills: 0 | Status: SHIPPED | OUTPATIENT
Start: 2020-02-17 | End: 2020-05-11

## 2020-02-17 NOTE — TELEPHONE ENCOUNTER
Orders Placed This Encounter    terazosin (HYTRIN) 10 mg capsule     Sig: Take 1 Cap by mouth daily. Dispense:  90 Cap     Refill:  0     The above orders were approved via [facility] per Dr. Rema Dubon III.

## 2020-02-24 RX ORDER — TRAZODONE HYDROCHLORIDE 50 MG/1
TABLET ORAL
Qty: 60 TAB | Refills: 0 | Status: SHIPPED | OUTPATIENT
Start: 2020-02-24 | End: 2020-04-24

## 2020-04-24 DIAGNOSIS — I10 ESSENTIAL HYPERTENSION: ICD-10-CM

## 2020-04-24 RX ORDER — TRAZODONE HYDROCHLORIDE 50 MG/1
TABLET ORAL
Qty: 60 TAB | Refills: 0 | Status: SHIPPED | OUTPATIENT
Start: 2020-04-24 | End: 2020-06-23

## 2020-04-24 RX ORDER — CANDESARTAN 4 MG/1
TABLET ORAL
Qty: 90 TAB | Refills: 0 | Status: SHIPPED | OUTPATIENT
Start: 2020-04-24 | End: 2020-06-01 | Stop reason: SDUPTHER

## 2020-05-11 RX ORDER — TERAZOSIN 10 MG/1
CAPSULE ORAL
Qty: 90 CAP | Refills: 0 | Status: SHIPPED | OUTPATIENT
Start: 2020-05-11 | End: 2020-08-04 | Stop reason: SDUPTHER

## 2020-05-26 ENCOUNTER — TELEPHONE (OUTPATIENT)
Dept: INTERNAL MEDICINE CLINIC | Age: 75
End: 2020-05-26

## 2020-05-29 NOTE — PERIOP NOTES
PATIENT CALLED AND MADE AWARE OF COVID-19 TESTING NEEDED TO BE DONE WITHIN 96 HOURS OF SURGERY. COVID-19 TESTING APPOINTMENT MADE FOR PATIENT. PATIENT INSTRUCTED ON SELF QUARANTINE BETWEEN TESTING AND ARRIVAL TIME DAY OF SURGERY. PT ALSO INFORMED TO NOT USE ANY NASAL SPRAYS OR NASAL OINTMENTS PRIOR TO NASAL SWAB.

## 2020-06-01 ENCOUNTER — OFFICE VISIT (OUTPATIENT)
Dept: INTERNAL MEDICINE CLINIC | Age: 75
End: 2020-06-01

## 2020-06-01 VITALS
HEIGHT: 71 IN | WEIGHT: 232 LBS | DIASTOLIC BLOOD PRESSURE: 88 MMHG | TEMPERATURE: 98.2 F | SYSTOLIC BLOOD PRESSURE: 152 MMHG | BODY MASS INDEX: 32.48 KG/M2 | OXYGEN SATURATION: 97 % | HEART RATE: 63 BPM | RESPIRATION RATE: 20 BRPM

## 2020-06-01 DIAGNOSIS — I10 ESSENTIAL HYPERTENSION: ICD-10-CM

## 2020-06-01 DIAGNOSIS — M17.12 ARTHRITIS OF KNEE, LEFT: Primary | ICD-10-CM

## 2020-06-01 DIAGNOSIS — Z01.818 PREOPERATIVE GENERAL PHYSICAL EXAMINATION: ICD-10-CM

## 2020-06-01 RX ORDER — AMLODIPINE BESYLATE 5 MG/1
5 TABLET ORAL DAILY
Qty: 30 TAB | Refills: 1 | Status: SHIPPED | OUTPATIENT
Start: 2020-06-01 | End: 2020-07-22

## 2020-06-01 NOTE — PROGRESS NOTES
Preoperative Evaluation    Date of Exam: 2020    Byron Freire is a 70 y.o. male (:1945) who presents for preoperative evaluation. Procedure/Surgery:Left Knee Replacement  Date of Procedure/Surgery: 2020  Surgeon: Gerson Benson MD  [facility]urgical [facility]  Primary Physician: Brianda Brown MD  Latex Allergy: no    Problem List:     Patient Active Problem List    Diagnosis Date Noted    Prostate cancer metastatic to intrapelvic lymph node ([facility] [address] 2019    Recurrent left inguinal hernia 2018    Advance directive on file 2016    Primary localized osteoarthrosis, lower leg 2014    Prostate cancer ([facility] [address] 02/15/2013    Essential hypertension 2011    Benign prostatic hyperplasia 2011    OA (osteoarthritis) of knee 2011     Medical History:     Past Medical History:   Diagnosis Date    Arthritis     BPH (benign prostatic hypertrophy) 2011    Cancer ([facility] [address]     prostate cancer    Chronic pain right    knee    ED (erectile dysfunction)     GERD (gastroesophageal reflux disease)     HTN (hypertension), benign     OA (osteoarthritis) of knee 2011    BOTH KNEES    Unspecified essential hypertension 2011     Allergies: Allergies   Allergen Reactions    Barbiturates Anxiety     Hyper        Medications:     Current Outpatient Medications   Medication Sig    leuprolide acetate (LUPRON DEPOT, 6 MONTH, IM) by IntraMUSCular route.  amLODIPine (NORVASC) 5 mg tablet Take 1 Tab by mouth daily.  terazosin (HYTRIN) 10 mg capsule Take 1 capsule by mouth once daily    traZODone (DESYREL) 50 mg tablet TAKE 1 TO 2 TABLETS BY MOUTH ONCE DAILY IN THE EVENING AS NEEDED FOR SLEEP    dextromethorphan-guaiFENesin (MUCINEX DM) 60-1,200 mg Tb12 Take 1 Tab by mouth two (2) times daily as needed.     candesartan (ATACAND) 4 mg tablet TAKE ONE TABLET BY MOUTH ONCE DAILY    ibuprofen (ADVIL) 200 mg tablet Take 400 mg by mouth every six (6) hours as needed for Pain. No current facility-administered medications for this visit. Surgical History:     Past Surgical History:   Procedure Laterality Date    HX ACL RECONSTRUCTION      right x2    HX APPENDECTOMY  1975    HX GI  1/2015    Drainage of hemorrhoid    HX HERNIA REPAIR      left and right,  ventral    HX HIP REPLACEMENT  2000    left    HX HIP REPLACEMENT  2000    left    HX KNEE ARTHROSCOPY      right x3    HX ORTHOPAEDIC  6/2000    LEFT HIP REPLACEMENT    HX ORTHOPAEDIC  08/05/2014    Right TKR - Dr. Rajendra Fernandes      prostate removed 11/7/2011    HX TONSILLECTOMY       Social History:     Social History     Socioeconomic History    Marital status:      Spouse name: Not on file    Number of children: Not on file    Years of education: Not on file    Highest education level: Not on file   Tobacco Use    Smoking status: Never Smoker    Smokeless tobacco: Never Used   Substance and Sexual Activity    Alcohol use: No    Drug use: No    Sexual activity: Yes     Partners: Female     Birth control/protection: None       Recent use of: No recent use of aspirin (ASA), NSAIDS or steroids    Anesthesia Complications: None  History of abnormal bleeding : None  History of Blood Transfusions: yes  Health Care Directive or Living Will: yes    REVIEW OF SYSTEMS:  A comprehensive review of systems was negative except for: Respiratory: positive for negative  Cardiovascular: positive for none  Musculoskeletal: positive for left knee pain.     EXAM:   Visit Vitals  /88   Pulse 63   Temp 98.2 °F (36.8 °C)   Resp 20   Ht 5' 11\" (1.803 m)   Wt 232 lb (105.2 kg)   SpO2 97%   BMI 32.36 kg/m²     General appearance - alert, well appearing, and in no distress  Ears - bilateral TM's and external ear canals normal  Mouth - mucous membranes moist, pharynx normal without lesions  Neck - supple, no significant adenopathy  Lymphatics - no palpable lymphadenopathy, no hepatosplenomegaly  Chest - clear to auscultation, no wheezes, rales or rhonchi, symmetric air entry  Heart - normal rate and regular rhythm  Abdomen - soft, nontender, nondistended, no masses or organomegaly  Neurological - alert, oriented, normal speech, no focal findings or movement disorder noted  Musculoskeletal - abnormal exam of left knee with crepitus and mild swelling. Extremities - peripheral pulses normal, no pedal edema, no clubbing or cyanosis      DIAGNOSTICS:   1. EKG: EKG FINDINGS - Pending  2. CXR: Pending  3. Labs: Pending    IMPRESSION:   Diagnoses and all orders for this visit:    1. Arthritis of knee, left - OK for surgery    2. Preoperative general physical examination    3. Essential hypertension - Will restart smaller dose of the norvasc and see how the BP is doing. Other orders  -     amLODIPine (NORVASC) 5 mg tablet; Take 1 Tab by mouth daily. Advised him to call back or return to office if symptoms worsen/change/persist.  Discussed expected course/resolution/complications of diagnosis in detail with patient. Medication risks/benefits/costs/interactions/alternatives discussed with patient. He was given an after visit summary which includes diagnoses, current medications, & vitals. He expressed understanding with the diagnosis and plan.      No contraindications to planned surgery    Ophelia Delgado MD   6/1/2020

## 2020-06-02 ENCOUNTER — HOSPITAL ENCOUNTER (OUTPATIENT)
Dept: PREADMISSION TESTING | Age: 75
Discharge: HOME | End: 2020-06-02
Payer: MEDICARE

## 2020-06-02 VITALS
HEIGHT: 72 IN | HEART RATE: 60 BPM | DIASTOLIC BLOOD PRESSURE: 90 MMHG | WEIGHT: 226.25 LBS | BODY MASS INDEX: 30.65 KG/M2 | TEMPERATURE: 97.5 F | SYSTOLIC BLOOD PRESSURE: 167 MMHG

## 2020-06-02 LAB
ABO + RH BLD: NORMAL
ANION GAP SERPL CALC-SCNC: 7 MMOL/L (ref 5–15)
APPEARANCE UR: CLEAR
BACTERIA URNS QL MICRO: NEGATIVE /HPF
BILIRUB UR QL: NEGATIVE
BLOOD GROUP ANTIBODIES SERPL: NORMAL
BUN SERPL-MCNC: 30 MG/DL (ref 6–20)
BUN/CREAT SERPL: 21 (ref 12–20)
CALCIUM SERPL-MCNC: 8.9 MG/DL (ref 8.5–10.1)
CHLORIDE SERPL-SCNC: 108 MMOL/L (ref 97–108)
CO2 SERPL-SCNC: 27 MMOL/L (ref 21–32)
COLOR UR: NORMAL
CREAT SERPL-MCNC: 1.44 MG/DL (ref 0.7–1.3)
EPITH CASTS URNS QL MICRO: NORMAL /LPF
ERYTHROCYTE [DISTWIDTH] IN BLOOD BY AUTOMATED COUNT: 12.2 % (ref 11.5–14.5)
EST. AVERAGE GLUCOSE BLD GHB EST-MCNC: 114 MG/DL
GLUCOSE SERPL-MCNC: 102 MG/DL (ref 65–100)
GLUCOSE UR STRIP.AUTO-MCNC: NEGATIVE MG/DL
HBA1C MFR BLD: 5.6 % (ref 4–5.6)
HCT VFR BLD AUTO: 37.8 % (ref 36.6–50.3)
HGB BLD-MCNC: 12.4 G/DL (ref 12.1–17)
HGB UR QL STRIP: NEGATIVE
HYALINE CASTS URNS QL MICRO: NORMAL /LPF (ref 0–5)
INR PPP: 1 (ref 0.9–1.1)
KETONES UR QL STRIP.AUTO: NEGATIVE MG/DL
LEUKOCYTE ESTERASE UR QL STRIP.AUTO: NEGATIVE
MCH RBC QN AUTO: 30.5 PG (ref 26–34)
MCHC RBC AUTO-ENTMCNC: 32.8 G/DL (ref 30–36.5)
MCV RBC AUTO: 93.1 FL (ref 80–99)
NITRITE UR QL STRIP.AUTO: NEGATIVE
NRBC # BLD: 0 K/UL (ref 0–0.01)
NRBC BLD-RTO: 0 PER 100 WBC
PH UR STRIP: 5.5 [PH] (ref 5–8)
PLATELET # BLD AUTO: 135 K/UL (ref 150–400)
PMV BLD AUTO: 11.4 FL (ref 8.9–12.9)
POTASSIUM SERPL-SCNC: 4.3 MMOL/L (ref 3.5–5.1)
PROT UR STRIP-MCNC: NEGATIVE MG/DL
PROTHROMBIN TIME: 10.6 SEC (ref 9–11.1)
RBC # BLD AUTO: 4.06 M/UL (ref 4.1–5.7)
RBC #/AREA URNS HPF: NORMAL /HPF (ref 0–5)
SODIUM SERPL-SCNC: 142 MMOL/L (ref 136–145)
SP GR UR REFRACTOMETRY: 1.02 (ref 1–1.03)
SPECIMEN EXP DATE BLD: NORMAL
UA: UC IF INDICATED,UAUC: NORMAL
UROBILINOGEN UR QL STRIP.AUTO: 0.2 EU/DL (ref 0.2–1)
WBC # BLD AUTO: 5.4 K/UL (ref 4.1–11.1)
WBC URNS QL MICRO: NORMAL /HPF (ref 0–4)

## 2020-06-02 PROCEDURE — 36415 COLL VENOUS BLD VENIPUNCTURE: CPT

## 2020-06-02 PROCEDURE — 93005 ELECTROCARDIOGRAM TRACING: CPT

## 2020-06-02 PROCEDURE — 85027 COMPLETE CBC AUTOMATED: CPT

## 2020-06-02 PROCEDURE — 81001 URINALYSIS AUTO W/SCOPE: CPT

## 2020-06-02 PROCEDURE — 83036 HEMOGLOBIN GLYCOSYLATED A1C: CPT

## 2020-06-02 PROCEDURE — 85610 PROTHROMBIN TIME: CPT

## 2020-06-02 PROCEDURE — 86900 BLOOD TYPING SEROLOGIC ABO: CPT

## 2020-06-02 PROCEDURE — 80048 BASIC METABOLIC PNL TOTAL CA: CPT

## 2020-06-03 LAB
ATRIAL RATE: 54 BPM
BACTERIA SPEC CULT: NORMAL
BACTERIA SPEC CULT: NORMAL
CALCULATED P AXIS, ECG09: 21 DEGREES
CALCULATED R AXIS, ECG10: -35 DEGREES
CALCULATED T AXIS, ECG11: -12 DEGREES
DIAGNOSIS, 93000: NORMAL
P-R INTERVAL, ECG05: 210 MS
Q-T INTERVAL, ECG07: 482 MS
QRS DURATION, ECG06: 92 MS
QTC CALCULATION (BEZET), ECG08: 457 MS
SERVICE CMNT-IMP: NORMAL
VENTRICULAR RATE, ECG03: 54 BPM

## 2020-06-04 RX ORDER — ACETAMINOPHEN 500 MG
1000 TABLET ORAL ONCE
Status: CANCELLED | OUTPATIENT
Start: 2020-06-04 | End: 2020-06-04

## 2020-06-04 RX ORDER — PREGABALIN 75 MG/1
75 CAPSULE ORAL ONCE
Status: CANCELLED | OUTPATIENT
Start: 2020-06-04 | End: 2020-06-04

## 2020-06-04 RX ORDER — CEFAZOLIN SODIUM/WATER 2 G/20 ML
2 SYRINGE (ML) INTRAVENOUS ONCE
Status: CANCELLED | OUTPATIENT
Start: 2020-06-09 | End: 2020-06-09

## 2020-06-04 RX ORDER — CELECOXIB 200 MG/1
200 CAPSULE ORAL ONCE
Status: CANCELLED | OUTPATIENT
Start: 2020-06-04 | End: 2020-06-04

## 2020-06-04 NOTE — PERIOP NOTES
Left message on voicemail for Vanna Verdugo at Dr. Kaia Sheppardk office and notified her nasal swab resulted + for MSSA. Results faxed to Dr. Kaia Sheppardk office.

## 2020-06-05 ENCOUNTER — HOSPITAL ENCOUNTER (OUTPATIENT)
Dept: PREADMISSION TESTING | Age: 75
Discharge: HOME | End: 2020-06-05
Payer: MEDICARE

## 2020-06-05 DIAGNOSIS — Z20.822 ENCOUNTER FOR LABORATORY TESTING FOR COVID-19 VIRUS: ICD-10-CM

## 2020-06-05 PROCEDURE — 87635 SARS-COV-2 COVID-19 AMP PRB: CPT

## 2020-06-06 LAB — SARS-COV-2, COV2NT: NOT DETECTED

## 2020-06-09 ENCOUNTER — HOSPITAL ENCOUNTER (OUTPATIENT)
Age: 75
Setting detail: OBSERVATION
Discharge: HOME HEALTH | End: 2020-06-10
Attending: ORTHOPAEDIC SURGERY | Admitting: ORTHOPAEDIC SURGERY
Payer: MEDICARE

## 2020-06-09 ENCOUNTER — ANESTHESIA (OUTPATIENT)
Dept: SURGERY | Age: 75
End: 2020-06-09
Payer: MEDICARE

## 2020-06-09 ENCOUNTER — ANESTHESIA EVENT (OUTPATIENT)
Dept: SURGERY | Age: 75
End: 2020-06-09
Payer: MEDICARE

## 2020-06-09 DIAGNOSIS — M17.12 LOCALIZED OSTEOARTHRITIS OF LEFT KNEE: Primary | ICD-10-CM

## 2020-06-09 LAB
GLUCOSE BLD STRIP.AUTO-MCNC: 103 MG/DL (ref 65–100)
SERVICE CMNT-IMP: ABNORMAL

## 2020-06-09 PROCEDURE — 74011250636 HC RX REV CODE- 250/636: Performed by: NURSE ANESTHETIST, CERTIFIED REGISTERED

## 2020-06-09 PROCEDURE — 77030000032 HC CUF TRNQT ZIMM -B: Performed by: ORTHOPAEDIC SURGERY

## 2020-06-09 PROCEDURE — 74011000250 HC RX REV CODE- 250: Performed by: ANESTHESIOLOGY

## 2020-06-09 PROCEDURE — 97116 GAIT TRAINING THERAPY: CPT

## 2020-06-09 PROCEDURE — 99218 HC RM OBSERVATION: CPT

## 2020-06-09 PROCEDURE — 77030031139 HC SUT VCRL2 J&J -A: Performed by: ORTHOPAEDIC SURGERY

## 2020-06-09 PROCEDURE — 74011250636 HC RX REV CODE- 250/636: Performed by: ORTHOPAEDIC SURGERY

## 2020-06-09 PROCEDURE — 74011250636 HC RX REV CODE- 250/636: Performed by: ANESTHESIOLOGY

## 2020-06-09 PROCEDURE — 77030018673: Performed by: ORTHOPAEDIC SURGERY

## 2020-06-09 PROCEDURE — 82962 GLUCOSE BLOOD TEST: CPT

## 2020-06-09 PROCEDURE — 77030033067 HC SUT PDO STRATFX SPIR J&J -B: Performed by: ORTHOPAEDIC SURGERY

## 2020-06-09 PROCEDURE — 77030039497 HC CST PAD STERILE CHCS -A: Performed by: ORTHOPAEDIC SURGERY

## 2020-06-09 PROCEDURE — 76060000036 HC ANESTHESIA 2.5 TO 3 HR: Performed by: ORTHOPAEDIC SURGERY

## 2020-06-09 PROCEDURE — 77030040922 HC BLNKT HYPOTHRM STRY -A

## 2020-06-09 PROCEDURE — 74011000250 HC RX REV CODE- 250: Performed by: PHYSICIAN ASSISTANT

## 2020-06-09 PROCEDURE — 74011250637 HC RX REV CODE- 250/637: Performed by: PHYSICIAN ASSISTANT

## 2020-06-09 PROCEDURE — 97161 PT EVAL LOW COMPLEX 20 MIN: CPT

## 2020-06-09 PROCEDURE — 77030002933 HC SUT MCRYL J&J -A: Performed by: ORTHOPAEDIC SURGERY

## 2020-06-09 PROCEDURE — 77030010507 HC ADH SKN DERMBND J&J -B: Performed by: ORTHOPAEDIC SURGERY

## 2020-06-09 PROCEDURE — 77030041397 HC DRSG PRIMASEAL AG MDII -B: Performed by: ORTHOPAEDIC SURGERY

## 2020-06-09 PROCEDURE — 77030040361 HC SLV COMPR DVT MDII -B

## 2020-06-09 PROCEDURE — 77030005513 HC CATH URETH FOL11 MDII -B: Performed by: ORTHOPAEDIC SURGERY

## 2020-06-09 PROCEDURE — 77030027138 HC INCENT SPIROMETER -A

## 2020-06-09 PROCEDURE — 77030007866 HC KT SPN ANES BBMI -B: Performed by: ANESTHESIOLOGY

## 2020-06-09 PROCEDURE — 74011000250 HC RX REV CODE- 250: Performed by: NURSE ANESTHETIST, CERTIFIED REGISTERED

## 2020-06-09 PROCEDURE — 77030011640 HC PAD GRND REM COVD -A: Performed by: ORTHOPAEDIC SURGERY

## 2020-06-09 PROCEDURE — 76010000172 HC OR TIME 2.5 TO 3 HR INTENSV-TIER 1: Performed by: ORTHOPAEDIC SURGERY

## 2020-06-09 PROCEDURE — 77030028907 HC WRP KNEE WO BGS SOLM -B

## 2020-06-09 PROCEDURE — 97530 THERAPEUTIC ACTIVITIES: CPT

## 2020-06-09 PROCEDURE — 77030018846 HC SOL IRR STRL H20 ICUM -A: Performed by: ORTHOPAEDIC SURGERY

## 2020-06-09 PROCEDURE — C1776 JOINT DEVICE (IMPLANTABLE): HCPCS | Performed by: ORTHOPAEDIC SURGERY

## 2020-06-09 PROCEDURE — C1713 ANCHOR/SCREW BN/BN,TIS/BN: HCPCS | Performed by: ORTHOPAEDIC SURGERY

## 2020-06-09 PROCEDURE — 74011250637 HC RX REV CODE- 250/637: Performed by: ORTHOPAEDIC SURGERY

## 2020-06-09 PROCEDURE — 77030006835 HC BLD SAW SAG STRY -B: Performed by: ORTHOPAEDIC SURGERY

## 2020-06-09 PROCEDURE — 77030012935 HC DRSG AQUACEL BMS -B: Performed by: ORTHOPAEDIC SURGERY

## 2020-06-09 PROCEDURE — 74011250636 HC RX REV CODE- 250/636: Performed by: PHYSICIAN ASSISTANT

## 2020-06-09 PROCEDURE — 77030018836 HC SOL IRR NACL ICUM -A: Performed by: ORTHOPAEDIC SURGERY

## 2020-06-09 PROCEDURE — 77030003601 HC NDL NRV BLK BBMI -A

## 2020-06-09 PROCEDURE — 76210000006 HC OR PH I REC 0.5 TO 1 HR: Performed by: ORTHOPAEDIC SURGERY

## 2020-06-09 PROCEDURE — 74011000250 HC RX REV CODE- 250: Performed by: ORTHOPAEDIC SURGERY

## 2020-06-09 PROCEDURE — 77030014077 HC TOWER MX CEM J&J -C: Performed by: ORTHOPAEDIC SURGERY

## 2020-06-09 DEVICE — DOMED TRI-PEG PATELLA, 38X9MM, E-PLUS
Type: IMPLANTABLE DEVICE | Site: KNEE | Status: FUNCTIONAL
Brand: DJO SURGICAL

## 2020-06-09 DEVICE — SMARTSET GMV HIGH PERFORMANCE GENTAMICIN MEDIUM VISCOSITY BONE CEMENT 40G
Type: IMPLANTABLE DEVICE | Site: KNEE | Status: FUNCTIONAL
Brand: SMARTSET

## 2020-06-09 DEVICE — TOTAL KNEE REPLACEMENT KIT PRIMARY E-PLUS: Type: IMPLANTABLE DEVICE | Status: FUNCTIONAL

## 2020-06-09 DEVICE — DJO EMPOWR KNEETM, FIN BP, NP, 10L
Type: IMPLANTABLE DEVICE | Site: KNEE | Status: FUNCTIONAL
Brand: DJO SURGICAL

## 2020-06-09 DEVICE — EMPOWR 3D KNEETM FEMUR, NP, 10L
Type: IMPLANTABLE DEVICE | Site: KNEE | Status: FUNCTIONAL
Brand: DJO SURGICAL

## 2020-06-09 DEVICE — EMPOWR 3D KNEETM INS, 10L 14MM, VE
Type: IMPLANTABLE DEVICE | Site: KNEE | Status: FUNCTIONAL
Brand: DJO SURGICAL

## 2020-06-09 RX ORDER — CEFAZOLIN SODIUM/WATER 2 G/20 ML
2 SYRINGE (ML) INTRAVENOUS ONCE
Status: COMPLETED | OUTPATIENT
Start: 2020-06-09 | End: 2020-06-09

## 2020-06-09 RX ORDER — KETAMINE HYDROCHLORIDE 10 MG/ML
INJECTION, SOLUTION INTRAMUSCULAR; INTRAVENOUS AS NEEDED
Status: DISCONTINUED | OUTPATIENT
Start: 2020-06-09 | End: 2020-06-09 | Stop reason: HOSPADM

## 2020-06-09 RX ORDER — ACETAMINOPHEN 500 MG
500 TABLET ORAL
Status: DISCONTINUED | OUTPATIENT
Start: 2020-06-09 | End: 2020-06-10 | Stop reason: HOSPADM

## 2020-06-09 RX ORDER — SODIUM CHLORIDE, SODIUM LACTATE, POTASSIUM CHLORIDE, CALCIUM CHLORIDE 600; 310; 30; 20 MG/100ML; MG/100ML; MG/100ML; MG/100ML
25 INJECTION, SOLUTION INTRAVENOUS CONTINUOUS
Status: DISCONTINUED | OUTPATIENT
Start: 2020-06-09 | End: 2020-06-09 | Stop reason: HOSPADM

## 2020-06-09 RX ORDER — ONDANSETRON 2 MG/ML
4 INJECTION INTRAMUSCULAR; INTRAVENOUS AS NEEDED
Status: DISCONTINUED | OUTPATIENT
Start: 2020-06-09 | End: 2020-06-09 | Stop reason: HOSPADM

## 2020-06-09 RX ORDER — MORPHINE SULFATE 10 MG/ML
2 INJECTION, SOLUTION INTRAMUSCULAR; INTRAVENOUS
Status: DISCONTINUED | OUTPATIENT
Start: 2020-06-09 | End: 2020-06-09 | Stop reason: HOSPADM

## 2020-06-09 RX ORDER — AMLODIPINE BESYLATE 5 MG/1
5 TABLET ORAL
Status: DISCONTINUED | OUTPATIENT
Start: 2020-06-09 | End: 2020-06-10 | Stop reason: HOSPADM

## 2020-06-09 RX ORDER — CELECOXIB 200 MG/1
200 CAPSULE ORAL ONCE
Status: COMPLETED | OUTPATIENT
Start: 2020-06-09 | End: 2020-06-09

## 2020-06-09 RX ORDER — HYDROMORPHONE HYDROCHLORIDE 1 MG/ML
0.2 INJECTION, SOLUTION INTRAMUSCULAR; INTRAVENOUS; SUBCUTANEOUS
Status: DISCONTINUED | OUTPATIENT
Start: 2020-06-09 | End: 2020-06-09 | Stop reason: HOSPADM

## 2020-06-09 RX ORDER — SODIUM CHLORIDE 0.9 % (FLUSH) 0.9 %
5-40 SYRINGE (ML) INJECTION EVERY 8 HOURS
Status: DISCONTINUED | OUTPATIENT
Start: 2020-06-09 | End: 2020-06-10 | Stop reason: HOSPADM

## 2020-06-09 RX ORDER — MIDAZOLAM HYDROCHLORIDE 1 MG/ML
1 INJECTION, SOLUTION INTRAMUSCULAR; INTRAVENOUS AS NEEDED
Status: DISCONTINUED | OUTPATIENT
Start: 2020-06-09 | End: 2020-06-09 | Stop reason: HOSPADM

## 2020-06-09 RX ORDER — DEXAMETHASONE SODIUM PHOSPHATE 4 MG/ML
INJECTION, SOLUTION INTRA-ARTICULAR; INTRALESIONAL; INTRAMUSCULAR; INTRAVENOUS; SOFT TISSUE AS NEEDED
Status: DISCONTINUED | OUTPATIENT
Start: 2020-06-09 | End: 2020-06-09 | Stop reason: HOSPADM

## 2020-06-09 RX ORDER — DOXAZOSIN 2 MG/1
10 TABLET ORAL
Status: DISCONTINUED | OUTPATIENT
Start: 2020-06-09 | End: 2020-06-10 | Stop reason: HOSPADM

## 2020-06-09 RX ORDER — OXYCODONE HYDROCHLORIDE 5 MG/1
2.5 TABLET ORAL
Status: DISCONTINUED | OUTPATIENT
Start: 2020-06-09 | End: 2020-06-10 | Stop reason: HOSPADM

## 2020-06-09 RX ORDER — TRAZODONE HYDROCHLORIDE 50 MG/1
50 TABLET ORAL
Status: DISCONTINUED | OUTPATIENT
Start: 2020-06-09 | End: 2020-06-10 | Stop reason: HOSPADM

## 2020-06-09 RX ORDER — ASPIRIN 81 MG/1
81 TABLET ORAL 2 TIMES DAILY
Qty: 60 TAB | Refills: 0 | Status: SHIPPED | OUTPATIENT
Start: 2020-06-09 | End: 2021-01-01

## 2020-06-09 RX ORDER — SODIUM CHLORIDE 0.9 % (FLUSH) 0.9 %
5-40 SYRINGE (ML) INJECTION EVERY 8 HOURS
Status: DISCONTINUED | OUTPATIENT
Start: 2020-06-09 | End: 2020-06-09 | Stop reason: HOSPADM

## 2020-06-09 RX ORDER — TRANEXAMIC ACID 100 MG/ML
INJECTION, SOLUTION INTRAVENOUS AS NEEDED
Status: DISCONTINUED | OUTPATIENT
Start: 2020-06-09 | End: 2020-06-09 | Stop reason: HOSPADM

## 2020-06-09 RX ORDER — OXYCODONE HYDROCHLORIDE 5 MG/1
5 TABLET ORAL
Status: DISCONTINUED | OUTPATIENT
Start: 2020-06-09 | End: 2020-06-10 | Stop reason: HOSPADM

## 2020-06-09 RX ORDER — CANDESARTAN 4 MG/1
4 TABLET ORAL
Status: DISCONTINUED | OUTPATIENT
Start: 2020-06-09 | End: 2020-06-09

## 2020-06-09 RX ORDER — HYDROMORPHONE HYDROCHLORIDE 1 MG/ML
0.5 INJECTION, SOLUTION INTRAMUSCULAR; INTRAVENOUS; SUBCUTANEOUS
Status: ACTIVE | OUTPATIENT
Start: 2020-06-09 | End: 2020-06-10

## 2020-06-09 RX ORDER — ACETAMINOPHEN 325 MG/1
650 TABLET ORAL ONCE
Status: DISCONTINUED | OUTPATIENT
Start: 2020-06-09 | End: 2020-06-09 | Stop reason: HOSPADM

## 2020-06-09 RX ORDER — AMOXICILLIN 250 MG
1 CAPSULE ORAL
Qty: 60 TAB | Refills: 0 | Status: SHIPPED | OUTPATIENT
Start: 2020-06-09 | End: 2021-01-01 | Stop reason: ALTCHOICE

## 2020-06-09 RX ORDER — ACETAMINOPHEN 500 MG
500 TABLET ORAL EVERY 4 HOURS
Qty: 100 TAB | Refills: 0 | Status: SHIPPED
Start: 2020-06-09 | End: 2021-01-01 | Stop reason: ALTCHOICE

## 2020-06-09 RX ORDER — PREGABALIN 75 MG/1
75 CAPSULE ORAL ONCE
Status: COMPLETED | OUTPATIENT
Start: 2020-06-09 | End: 2020-06-09

## 2020-06-09 RX ORDER — HYDROXYZINE HYDROCHLORIDE 10 MG/1
10 TABLET, FILM COATED ORAL
Status: DISCONTINUED | OUTPATIENT
Start: 2020-06-09 | End: 2020-06-10 | Stop reason: HOSPADM

## 2020-06-09 RX ORDER — CEFAZOLIN SODIUM/WATER 2 G/20 ML
2 SYRINGE (ML) INTRAVENOUS EVERY 8 HOURS
Status: COMPLETED | OUTPATIENT
Start: 2020-06-09 | End: 2020-06-10

## 2020-06-09 RX ORDER — SODIUM CHLORIDE 9 MG/ML
25 INJECTION, SOLUTION INTRAVENOUS CONTINUOUS
Status: DISCONTINUED | OUTPATIENT
Start: 2020-06-09 | End: 2020-06-09 | Stop reason: HOSPADM

## 2020-06-09 RX ORDER — FENTANYL CITRATE 50 UG/ML
50 INJECTION, SOLUTION INTRAMUSCULAR; INTRAVENOUS AS NEEDED
Status: COMPLETED | OUTPATIENT
Start: 2020-06-09 | End: 2020-06-09

## 2020-06-09 RX ORDER — ASPIRIN 81 MG/1
81 TABLET ORAL 2 TIMES DAILY
Status: DISCONTINUED | OUTPATIENT
Start: 2020-06-09 | End: 2020-06-10 | Stop reason: HOSPADM

## 2020-06-09 RX ORDER — FACIAL-BODY WIPES
10 EACH TOPICAL DAILY PRN
Status: DISCONTINUED | OUTPATIENT
Start: 2020-06-11 | End: 2020-06-10 | Stop reason: HOSPADM

## 2020-06-09 RX ORDER — ROPIVACAINE HYDROCHLORIDE 5 MG/ML
INJECTION, SOLUTION EPIDURAL; INFILTRATION; PERINEURAL
Status: COMPLETED | OUTPATIENT
Start: 2020-06-09 | End: 2020-06-09

## 2020-06-09 RX ORDER — LIDOCAINE HYDROCHLORIDE 10 MG/ML
0.1 INJECTION, SOLUTION EPIDURAL; INFILTRATION; INTRACAUDAL; PERINEURAL AS NEEDED
Status: DISCONTINUED | OUTPATIENT
Start: 2020-06-09 | End: 2020-06-09 | Stop reason: HOSPADM

## 2020-06-09 RX ORDER — SODIUM CHLORIDE 0.9 % (FLUSH) 0.9 %
5-40 SYRINGE (ML) INJECTION AS NEEDED
Status: DISCONTINUED | OUTPATIENT
Start: 2020-06-09 | End: 2020-06-10 | Stop reason: HOSPADM

## 2020-06-09 RX ORDER — NALOXONE HYDROCHLORIDE 0.4 MG/ML
0.4 INJECTION, SOLUTION INTRAMUSCULAR; INTRAVENOUS; SUBCUTANEOUS AS NEEDED
Status: DISCONTINUED | OUTPATIENT
Start: 2020-06-09 | End: 2020-06-10 | Stop reason: HOSPADM

## 2020-06-09 RX ORDER — SODIUM CHLORIDE 9 MG/ML
125 INJECTION, SOLUTION INTRAVENOUS CONTINUOUS
Status: DISPENSED | OUTPATIENT
Start: 2020-06-09 | End: 2020-06-10

## 2020-06-09 RX ORDER — SODIUM CHLORIDE 0.9 % (FLUSH) 0.9 %
5-40 SYRINGE (ML) INJECTION AS NEEDED
Status: DISCONTINUED | OUTPATIENT
Start: 2020-06-09 | End: 2020-06-09 | Stop reason: HOSPADM

## 2020-06-09 RX ORDER — PROPOFOL 10 MG/ML
INJECTION, EMULSION INTRAVENOUS AS NEEDED
Status: DISCONTINUED | OUTPATIENT
Start: 2020-06-09 | End: 2020-06-09 | Stop reason: HOSPADM

## 2020-06-09 RX ORDER — DIPHENHYDRAMINE HYDROCHLORIDE 50 MG/ML
12.5 INJECTION, SOLUTION INTRAMUSCULAR; INTRAVENOUS AS NEEDED
Status: DISCONTINUED | OUTPATIENT
Start: 2020-06-09 | End: 2020-06-09 | Stop reason: HOSPADM

## 2020-06-09 RX ORDER — ONDANSETRON 2 MG/ML
4 INJECTION INTRAMUSCULAR; INTRAVENOUS
Status: ACTIVE | OUTPATIENT
Start: 2020-06-09 | End: 2020-06-10

## 2020-06-09 RX ORDER — PROPOFOL 10 MG/ML
INJECTION, EMULSION INTRAVENOUS
Status: DISCONTINUED | OUTPATIENT
Start: 2020-06-09 | End: 2020-06-09 | Stop reason: HOSPADM

## 2020-06-09 RX ORDER — ONDANSETRON 2 MG/ML
INJECTION INTRAMUSCULAR; INTRAVENOUS AS NEEDED
Status: DISCONTINUED | OUTPATIENT
Start: 2020-06-09 | End: 2020-06-09 | Stop reason: HOSPADM

## 2020-06-09 RX ORDER — LIDOCAINE HYDROCHLORIDE 20 MG/ML
INJECTION, SOLUTION EPIDURAL; INFILTRATION; INTRACAUDAL; PERINEURAL AS NEEDED
Status: DISCONTINUED | OUTPATIENT
Start: 2020-06-09 | End: 2020-06-09 | Stop reason: HOSPADM

## 2020-06-09 RX ORDER — MIDAZOLAM HYDROCHLORIDE 1 MG/ML
0.5 INJECTION, SOLUTION INTRAMUSCULAR; INTRAVENOUS
Status: DISCONTINUED | OUTPATIENT
Start: 2020-06-09 | End: 2020-06-09 | Stop reason: HOSPADM

## 2020-06-09 RX ORDER — BUPIVACAINE HYDROCHLORIDE 5 MG/ML
INJECTION, SOLUTION EPIDURAL; INTRACAUDAL
Status: COMPLETED | OUTPATIENT
Start: 2020-06-09 | End: 2020-06-09

## 2020-06-09 RX ORDER — GLYCOPYRROLATE 0.2 MG/ML
INJECTION INTRAMUSCULAR; INTRAVENOUS AS NEEDED
Status: DISCONTINUED | OUTPATIENT
Start: 2020-06-09 | End: 2020-06-09 | Stop reason: HOSPADM

## 2020-06-09 RX ORDER — ACETAMINOPHEN 500 MG
1000 TABLET ORAL ONCE
Status: COMPLETED | OUTPATIENT
Start: 2020-06-09 | End: 2020-06-09

## 2020-06-09 RX ORDER — AMOXICILLIN 250 MG
1 CAPSULE ORAL 2 TIMES DAILY
Status: DISCONTINUED | OUTPATIENT
Start: 2020-06-09 | End: 2020-06-10 | Stop reason: HOSPADM

## 2020-06-09 RX ORDER — POLYETHYLENE GLYCOL 3350 17 G/17G
17 POWDER, FOR SOLUTION ORAL DAILY
Status: DISCONTINUED | OUTPATIENT
Start: 2020-06-09 | End: 2020-06-10 | Stop reason: HOSPADM

## 2020-06-09 RX ORDER — FENTANYL CITRATE 50 UG/ML
25 INJECTION, SOLUTION INTRAMUSCULAR; INTRAVENOUS
Status: DISCONTINUED | OUTPATIENT
Start: 2020-06-09 | End: 2020-06-09 | Stop reason: HOSPADM

## 2020-06-09 RX ORDER — OXYCODONE HYDROCHLORIDE 5 MG/1
5 TABLET ORAL AS NEEDED
Status: DISCONTINUED | OUTPATIENT
Start: 2020-06-09 | End: 2020-06-09 | Stop reason: HOSPADM

## 2020-06-09 RX ORDER — LOSARTAN POTASSIUM 25 MG/1
12.5 TABLET ORAL DAILY
Status: DISCONTINUED | OUTPATIENT
Start: 2020-06-10 | End: 2020-06-10 | Stop reason: HOSPADM

## 2020-06-09 RX ORDER — EPHEDRINE SULFATE/0.9% NACL/PF 50 MG/5 ML
SYRINGE (ML) INTRAVENOUS AS NEEDED
Status: DISCONTINUED | OUTPATIENT
Start: 2020-06-09 | End: 2020-06-09 | Stop reason: HOSPADM

## 2020-06-09 RX ORDER — OXYCODONE HYDROCHLORIDE 5 MG/1
2.5-5 TABLET ORAL
Qty: 42 TAB | Refills: 0 | Status: SHIPPED | OUTPATIENT
Start: 2020-06-09 | End: 2020-06-16

## 2020-06-09 RX ORDER — SODIUM CHLORIDE, SODIUM LACTATE, POTASSIUM CHLORIDE, CALCIUM CHLORIDE 600; 310; 30; 20 MG/100ML; MG/100ML; MG/100ML; MG/100ML
125 INJECTION, SOLUTION INTRAVENOUS CONTINUOUS
Status: DISCONTINUED | OUTPATIENT
Start: 2020-06-09 | End: 2020-06-09 | Stop reason: HOSPADM

## 2020-06-09 RX ADMIN — DEXAMETHASONE SODIUM PHOSPHATE 4 MG: 4 INJECTION, SOLUTION INTRAMUSCULAR; INTRAVENOUS at 07:42

## 2020-06-09 RX ADMIN — Medication 10 MG: at 07:52

## 2020-06-09 RX ADMIN — Medication 2 G: at 07:50

## 2020-06-09 RX ADMIN — FENTANYL CITRATE 50 MCG: 50 INJECTION INTRAMUSCULAR; INTRAVENOUS at 07:16

## 2020-06-09 RX ADMIN — KETAMINE HYDROCHLORIDE 10 MG: 10 INJECTION, SOLUTION INTRAMUSCULAR; INTRAVENOUS at 09:00

## 2020-06-09 RX ADMIN — AMLODIPINE BESYLATE 5 MG: 5 TABLET ORAL at 21:14

## 2020-06-09 RX ADMIN — LIDOCAINE HYDROCHLORIDE 40 MG: 20 INJECTION, SOLUTION EPIDURAL; INFILTRATION; INTRACAUDAL; PERINEURAL at 07:30

## 2020-06-09 RX ADMIN — PROPOFOL 50 MG: 10 INJECTION, EMULSION INTRAVENOUS at 08:45

## 2020-06-09 RX ADMIN — LIDOCAINE HYDROCHLORIDE 0.1 ML: 10 INJECTION, SOLUTION EPIDURAL; INFILTRATION; INTRACAUDAL; PERINEURAL at 07:00

## 2020-06-09 RX ADMIN — ACETAMINOPHEN 500 MG: 500 TABLET ORAL at 18:23

## 2020-06-09 RX ADMIN — BUPIVACAINE HYDROCHLORIDE 10 MG: 5 INJECTION, SOLUTION EPIDURAL; INTRACAUDAL; PERINEURAL at 07:35

## 2020-06-09 RX ADMIN — ONDANSETRON HYDROCHLORIDE 4 MG: 2 INJECTION, SOLUTION INTRAMUSCULAR; INTRAVENOUS at 07:42

## 2020-06-09 RX ADMIN — KETAMINE HYDROCHLORIDE 10 MG: 10 INJECTION, SOLUTION INTRAMUSCULAR; INTRAVENOUS at 08:30

## 2020-06-09 RX ADMIN — PHENYLEPHRINE HYDROCHLORIDE 40 MCG/MIN: 10 INJECTION INTRAVENOUS at 07:41

## 2020-06-09 RX ADMIN — SODIUM CHLORIDE, SODIUM LACTATE, POTASSIUM CHLORIDE, AND CALCIUM CHLORIDE: 600; 310; 30; 20 INJECTION, SOLUTION INTRAVENOUS at 08:29

## 2020-06-09 RX ADMIN — DOCUSATE SODIUM 50MG AND SENNOSIDES 8.6MG 1 TABLET: 8.6; 5 TABLET, FILM COATED ORAL at 18:23

## 2020-06-09 RX ADMIN — ACETAMINOPHEN 500 MG: 500 TABLET ORAL at 13:52

## 2020-06-09 RX ADMIN — SODIUM CHLORIDE, SODIUM LACTATE, POTASSIUM CHLORIDE, AND CALCIUM CHLORIDE 25 ML/HR: 600; 310; 30; 20 INJECTION, SOLUTION INTRAVENOUS at 07:00

## 2020-06-09 RX ADMIN — KETAMINE HYDROCHLORIDE 10 MG: 10 INJECTION, SOLUTION INTRAMUSCULAR; INTRAVENOUS at 07:30

## 2020-06-09 RX ADMIN — PROPOFOL 20 MG: 10 INJECTION, EMULSION INTRAVENOUS at 07:30

## 2020-06-09 RX ADMIN — ASPIRIN 81 MG: 81 TABLET, COATED ORAL at 13:52

## 2020-06-09 RX ADMIN — PROPOFOL 50 MCG/KG/MIN: 10 INJECTION, EMULSION INTRAVENOUS at 07:44

## 2020-06-09 RX ADMIN — PREGABALIN 75 MG: 75 CAPSULE ORAL at 06:52

## 2020-06-09 RX ADMIN — GLYCOPYRROLATE 0.2 MG: 0.2 INJECTION, SOLUTION INTRAMUSCULAR; INTRAVENOUS at 07:44

## 2020-06-09 RX ADMIN — ROPIVACAINE HYDROCHLORIDE 30 ML: 5 INJECTION, SOLUTION EPIDURAL; INFILTRATION; PERINEURAL at 07:20

## 2020-06-09 RX ADMIN — TRAZODONE HYDROCHLORIDE 50 MG: 50 TABLET ORAL at 21:14

## 2020-06-09 RX ADMIN — SODIUM CHLORIDE 125 ML/HR: 900 INJECTION, SOLUTION INTRAVENOUS at 20:51

## 2020-06-09 RX ADMIN — ACETAMINOPHEN 1000 MG: 500 TABLET ORAL at 06:52

## 2020-06-09 RX ADMIN — SODIUM CHLORIDE 125 ML/HR: 900 INJECTION, SOLUTION INTRAVENOUS at 10:44

## 2020-06-09 RX ADMIN — KETAMINE HYDROCHLORIDE 10 MG: 10 INJECTION, SOLUTION INTRAMUSCULAR; INTRAVENOUS at 09:26

## 2020-06-09 RX ADMIN — FENTANYL CITRATE 50 MCG: 50 INJECTION INTRAMUSCULAR; INTRAVENOUS at 07:20

## 2020-06-09 RX ADMIN — TRANEXAMIC ACID 1 G: 100 INJECTION, SOLUTION INTRAVENOUS at 07:50

## 2020-06-09 RX ADMIN — DOXAZOSIN 10 MG: 2 TABLET ORAL at 21:14

## 2020-06-09 RX ADMIN — LIDOCAINE HYDROCHLORIDE 60 MG: 20 INJECTION, SOLUTION EPIDURAL; INFILTRATION; INTRACAUDAL; PERINEURAL at 08:00

## 2020-06-09 RX ADMIN — Medication 10 MG: at 08:05

## 2020-06-09 RX ADMIN — ASPIRIN 81 MG: 81 TABLET, COATED ORAL at 18:23

## 2020-06-09 RX ADMIN — MIDAZOLAM HYDROCHLORIDE 2 MG: 2 INJECTION, SOLUTION INTRAMUSCULAR; INTRAVENOUS at 07:16

## 2020-06-09 RX ADMIN — CELECOXIB 200 MG: 200 CAPSULE ORAL at 06:52

## 2020-06-09 RX ADMIN — KETAMINE HYDROCHLORIDE 10 MG: 10 INJECTION, SOLUTION INTRAMUSCULAR; INTRAVENOUS at 08:00

## 2020-06-09 RX ADMIN — MIDAZOLAM HYDROCHLORIDE 2 MG: 2 INJECTION, SOLUTION INTRAMUSCULAR; INTRAVENOUS at 07:18

## 2020-06-09 RX ADMIN — DOCUSATE SODIUM 50MG AND SENNOSIDES 8.6MG 1 TABLET: 8.6; 5 TABLET, FILM COATED ORAL at 13:52

## 2020-06-09 RX ADMIN — CEFAZOLIN SODIUM 2 G: 300 INJECTION, POWDER, LYOPHILIZED, FOR SOLUTION INTRAVENOUS at 16:25

## 2020-06-09 NOTE — ANESTHESIA PROCEDURE NOTES
Spinal Block    Start time: 6/9/2020 7:30 AM  End time: 6/9/2020 7:36 AM  Performed by: Nenita Renner CRNA  Authorized by: Chance Soto MD     Pre-procedure:   Indications: primary anesthetic  Preanesthetic Checklist: patient identified, risks and benefits discussed, anesthesia consent, site marked, patient being monitored and timeout performed    Timeout Time: 07:29          Spinal Block:   Patient Position:  Seated  Prep Region:  Lumbar  Prep: Betadine      Location:  L3-4  Technique:  Single shot    Local Dose (mL):  2    Needle:   Needle Type:  Pencan  Needle Gauge:  24 G  Attempts:  1      Events: CSF confirmed, no blood with aspiration and no paresthesia        Assessment:  Insertion:  Uncomplicated  Patient tolerance:  Patient tolerated the procedure well with no immediate complications

## 2020-06-09 NOTE — ANESTHESIA PREPROCEDURE EVALUATION
Anesthetic History   No history of anesthetic complications            Review of Systems / Medical History  Patient summary reviewed, nursing notes reviewed and pertinent labs reviewed    Pulmonary  Within defined limits                 Neuro/Psych   Within defined limits           Cardiovascular    Hypertension              Exercise tolerance: >4 METS     GI/Hepatic/Renal     GERD           Endo/Other        Arthritis     Other Findings              Physical Exam    Airway  Mallampati: II  TM Distance: > 6 cm  Neck ROM: normal range of motion   Mouth opening: Normal     Cardiovascular  Regular rate and rhythm,  S1 and S2 normal,  no murmur, click, rub, or gallop             Dental  No notable dental hx       Pulmonary  Breath sounds clear to auscultation               Abdominal  GI exam deferred       Other Findings            Anesthetic Plan    ASA: 2  Anesthesia type: spinal      Post-op pain plan if not by surgeon: peripheral nerve block single    Induction: Intravenous  Anesthetic plan and risks discussed with: Patient

## 2020-06-09 NOTE — PROGRESS NOTES
Bedside and Verbal shift change report given to Qian Booker (oncoming nurse) by Fred Moore (offgoing nurse). Report included the following information SBAR, Kardex, Intake/Output, MAR and Recent Results.

## 2020-06-09 NOTE — PROGRESS NOTES
Ortho Post-Op Note    6/9/2020  2:59 PM    POD:  Day of Surgery  S/P:  Procedure(s):  LEFT TOTAL KNEE ARTHROPLASTY (ANES SPINAL WITH BLOCK)    Afebrile/VSS, NAD, sleepy  Doing well without complaints of nausea  Pain well controlled  Calves soft/NTTP Bilaterally  leg soft. Dressing clean and dry  Moving lower extremities well. Neurocirculatory exam intact and within normal range. Lab Results   Component Value Date/Time    HGB 12.4 06/02/2020 09:48 AM    INR 1.0 06/02/2020 09:48 AM     Recent Labs     06/02/20  0948   CREA 1.44*   BUN 30*     Estimated Creatinine Clearance: 56.2 mL/min (A) (by C-G formula based on SCr of 1.44 mg/dL (H)).     PLAN:  DVT prophylaxis: ASA 81 mg bid  WBAT with PT-mobilization  Pain Control: tylenol, oxycodone  Plan to D/C home with HH/PT likely tomorrow      NIR Babcock

## 2020-06-09 NOTE — PROGRESS NOTES
Problem: Knee Replacement: Day of Surgery/Unit  Goal: Activity/Safety  Outcome: Progressing Towards Goal  Goal: Consults, if ordered  Outcome: Progressing Towards Goal  Goal: Nutrition/Diet  Outcome: Progressing Towards Goal  Note: Pt tolerated a regular diet. Goal: Medications  Outcome: Progressing Towards Goal  Goal: Respiratory  Outcome: Progressing Towards Goal  Note: Pt demonstrated effective use of incentive spirometer. Goal: Psychosocial  Outcome: Progressing Towards Goal  Goal: *Initiate mobility  Outcome: Progressing Towards Goal  Note: Pt ambulated with physical therapy using a gait belt.

## 2020-06-09 NOTE — ANESTHESIA PROCEDURE NOTES
Peripheral Block    Start time: 6/9/2020 7:12 AM  End time: 6/9/2020 7:19 AM  Performed by: Leander Shore MD  Authorized by: Leander Shore MD       Pre-procedure: Indications: at surgeon's request and post-op pain management    Preanesthetic Checklist: patient identified, risks and benefits discussed, site marked, timeout performed, anesthesia consent given and patient being monitored    Timeout Time: 07:12          Block Type:   Block Type:   Adductor canal  Laterality:  Left  Monitoring:  Standard ASA monitoring, continuous pulse ox, frequent vital sign checks, heart rate, responsive to questions and oxygen  Injection Technique:  Single shot  Procedures: ultrasound guided    Patient Position: supine  Prep: chlorhexidine    Location:  Mid thigh  Needle Type:  Stimuplex  Needle Gauge:  22 G  Needle Localization:  Ultrasound guidance    Assessment:  Number of attempts:  1  Injection Assessment:  Incremental injection every 5 mL, local visualized surrounding nerve on ultrasound, negative aspiration for blood, no paresthesia, no intravascular symptoms and negative aspiration for CSF  Patient tolerance:  Patient tolerated the procedure well with no immediate complications

## 2020-06-09 NOTE — PROGRESS NOTES
Problem: Mobility Impaired (Adult and Pediatric)  Goal: *Acute Goals and Plan of Care (Insert Text)  Description: FUNCTIONAL STATUS PRIOR TO ADMISSION: Patient was independent and active without use of DME.    HOME SUPPORT PRIOR TO ADMISSION: The patient lived with wife but did not require assist.     Physical Therapy Goals  Initiated 6/9/2020    1. Patient will move from supine to sit and sit to supine , scoot up and down and roll side to side in bed with modified independence within 4 days. 2. Patient will perform sit to stand with modified independence within 4 days. 3. Patient will ambulate with modified independence for 150 feet with the least restrictive device within 4 days. 4. Patient will ascend/descend 12 stairs with cane and one handrail(s) with modified independence within 4 days. 5. Patient will perform home exercise program per protocol with independence within 4 days. 6. Patient will demonstrate AROM 0-90 degrees in operative joint within 4 days. Outcome: Progressing Towards Goal   PHYSICAL THERAPY EVALUATION  Patient: Kathy Tompkins (16 y.o. male)  Date: 6/9/2020  Primary Diagnosis: Localized osteoarthritis of left knee [M17.12]  Procedure(s) (LRB):  LEFT TOTAL KNEE ARTHROPLASTY (ANES SPINAL WITH BLOCK) (Left) Day of Surgery   Precautions:   Fall, WBAT      ASSESSMENT  Based on the objective data described below, the patient presents with . Current Level of Function Impacting Discharge (mobility/balance): Patient performed all mobility with contact guard assistance. Ambulated 72 ft with RW and contact guard assistance-slight buckling of LLE. Tomorrow wants to ambulate with forearm crutches (already in room) as he has these at home. Patient's mobility was on target for POD#0. Will address more exercises, increase gait distance, negotiate stairs and assess for discharge at am PT session tomorrow.  Patient instructed NOT to get up from bed, chair or commode without calling for assistance. Anticipate discharge after PT tomorrow. Functional Outcome Measure: The patient scored 65/100 on the Barthel outcome measure which is indicative of moderate impaired ability to care for basic self-needs/dependency on others. .      Other factors to consider for discharge: Motivated/A & O x 4/Supportive Family/Independent PLOF      Patient will benefit from skilled therapy intervention to address the above noted impairments. PLAN :  Recommendations and Planned Interventions: bed mobility training, transfer training, gait training, therapeutic exercises, patient and family training/education, and therapeutic activities      Frequency/Duration: Patient will be followed by physical therapy:  twice daily to address goals. Recommendation for discharge: (in order for the patient to meet his/her long term goals)  Physical therapy at least 2 days/week in the home     This discharge recommendation:  Has been made in collaboration with the attending provider and/or case management    IF patient discharges home will need the following DME: patient owns DME required for discharge         SUBJECTIVE:   Patient stated I am feeling pretty good.     OBJECTIVE DATA SUMMARY:   HISTORY:    Past Medical History:   Diagnosis Date    Arthritis     BPH (benign prostatic hypertrophy) 7/18/2011    Cancer (HCC)     prostate cancer    Chronic pain right    knee    ED (erectile dysfunction)     GERD (gastroesophageal reflux disease)     HTN (hypertension), benign     OA (osteoarthritis) of knee 7/18/2011    BOTH KNEES    Unspecified essential hypertension 7/18/2011     Past Surgical History:   Procedure Laterality Date    HX ACL RECONSTRUCTION Right     x2    HX APPENDECTOMY  1975    HX GI  1/2015    Drainage of hemorrhoid    HX HERNIA REPAIR      left and right,  ventral    HX HIP REPLACEMENT  2000    left    HX HIP REPLACEMENT  2000    left    HX KNEE ARTHROSCOPY Right     x3    HX ORTHOPAEDIC Left 6/2000    HIP REPLACEMENT    HX ORTHOPAEDIC Right 08/05/2014    TKR - Dr. Emily Carreno  11/07/2011    prostate removed     HX TONSILLECTOMY      CHILD       Personal factors and/or comorbidities impacting plan of care: Motivated/A & O x 4/Supportive Family/Independent PLOF     Home Situation  Home Environment: Private residence  # Steps to Enter: 4  Rails to Enter: Yes  Hand Rails : Right  Wheelchair Ramp: No  One/Two Story Residence: Two story, live on 1st floor  # of Interior Steps: 14(7 landing, 7 landing)  Interior Rails: Right  Lift Chair Available: No  Living Alone: No  Support Systems: Spouse/Significant Other/Partner  Patient Expects to be Discharged to[de-identified] Private residence  Current DME Used/Available at Home: Crutches(forearm)    EXAMINATION/PRESENTATION/DECISION MAKING:   Critical Behavior:  Neurologic State: Alert  Orientation Level: Oriented X4  Cognition: Appropriate for age attention/concentration    Range Of Motion:  AROM: Generally decreased, functional           PROM: Generally decreased, functional           Strength:    Strength: Generally decreased, functional                    Tone & Sensation:   Tone: Normal              Sensation: Intact               Coordination:  Coordination: Within functional limits  Vision:      Functional Mobility:  Bed Mobility:     Supine to Sit: Contact guard assistance  Sit to Supine: Contact guard assistance  Scooting: Contact guard assistance  Transfers:  Sit to Stand: Contact guard assistance  Stand to Sit: Contact guard assistance                       Balance:   Sitting: Intact  Standing: Impaired; Without support  Standing - Static: Good;Constant support  Standing - Dynamic : Fair;Constant support(slight buckling left knee)  Ambulation/Gait Training:  Distance (ft): 65 Feet (ft)  Assistive Device: Walker, rolling;Gait belt  Ambulation - Level of Assistance: Contact guard assistance        Gait Abnormalities: Antalgic; Step to gait     Left Side Weight Bearing: As tolerated  Base of Support: Shift to right  Stance: Left decreased  Speed/Tara: Slow  Step Length: Right shortened  Swing Pattern: Left asymmetrical            Functional Measure:  Barthel Index:    Bathin  Bladder: 10  Bowels: 10  Groomin  Dressin  Feeding: 10  Mobility: 10  Stairs: 0  Toilet Use: 5  Transfer (Bed to Chair and Back): 10  Total: 65/100       The Barthel ADL Index: Guidelines  1. The index should be used as a record of what a patient does, not as a record of what a patient could do. 2. The main aim is to establish degree of independence from any help, physical or verbal, however minor and for whatever reason. 3. The need for supervision renders the patient not independent. 4. A patient's performance should be established using the best available evidence. Asking the patient, friends/relatives and nurses are the usual sources, but direct observation and common sense are also important. However direct testing is not needed. 5. Usually the patient's performance over the preceding 24-48 hours is important, but occasionally longer periods will be relevant. 6. Middle categories imply that the patient supplies over 50 per cent of the effort. 7. Use of aids to be independent is allowed. Cindy New., Barthel, D.W. (1056). Functional evaluation: the Barthel Index. [address] [facility] (14)2. Ashely Adan tatiana ERICKA Cyr, Charline Cervantes.Danica., [place], [address] [place] [address] (). Measuring the change indisability after inpatient rehabilitation; comparison of the responsiveness of the Barthel Index and Functional [place] Measure. Journal of Neurology, Neurosurgery, and Psychiatry, 66(4), 133-730. Daysi Becker, N.J.A, TREY Cintron, & Ava Redmond MJohnA. (2004.) Assessment of post-stroke quality of life in cost-effectiveness studies: The usefulness of the Barthel Index and the EuroQoL-5D.  Quality of Life Research, 15, 248-64           Physical Therapy Evaluation Charge Determination History Examination Presentation Decision-Making   LOW Complexity : Zero comorbidities / personal factors that will impact the outcome / POC LOW Complexity : 1-2 Standardized tests and measures addressing body structure, function, activity limitation and / or participation in recreation  LOW Complexity : Stable, uncomplicated  LOW Complexity : FOTO score of       Based on the above components, the patient evaluation is determined to be of the following complexity level: LOW     Therapeutic Exercise: Ankle Pumps  Quad sets (5 second hold)  X 10 reps every hour   Heel slides x 10     Pain Ratin/10    Activity Tolerance:   Good  Vitals:    20 1146 20 1253 20 1339 20 1346   BP: 132/83 136/85 144/78 148/79   BP 1 Location:   Left arm Left arm   BP Patient Position:   At rest;Head of bed elevated (Comment degrees)  Comment: 30 degrees Sitting   Pulse: (!) 55 66 74 68   Resp: 12 12 15    Temp: 97.5 °F (36.4 °C) 97.5 °F (36.4 °C)  97.5 °F (36.4 °C)   SpO2: 99% 96% 100%    Weight:       Height:            After treatment patient left in no apparent distress:   Supine in bed, Call bell within reach, Side rails x 3, and nurse notified. COMMUNICATION/EDUCATION:   The patients plan of care was discussed with: Registered nurse and Kamlesh Flores. Fall prevention education was provided and the patient/caregiver indicated understanding., Patient/family have participated as able in goal setting and plan of care. , and Patient/family agree to work toward stated goals and plan of care.     Thank you for this referral.  Miguel Huffman   Time Calculation: 30 mins

## 2020-06-09 NOTE — PERIOP NOTES
TRANSFER - OUT REPORT:    Verbal report given to Mitch on Rosi Francisco  being transferred to room 555 for routine post - op       Report consisted of patients Situation, Background, Assessment and   Recommendations(SBAR). Time Pre op antibiotic given:2 gram ancef  Anesthesia Stop time: 6237  Ballesteros Present on Transfer to floor:  Order for Ballesteros on Chart:  Discharge Prescriptions with Chart:    Information from the following report(s) SBAR, OR Summary, Intake/Output and MAR was reviewed with the receiving nurse. Opportunity for questions and clarification was provided. Is the patient on 02? YES       L/Min 2       Other     Is the patient on a monitor? NO    Is the nurse transporting with the patient? NO    Surgical Waiting Area notified of patient's transfer from PACU?  NO      The following personal items collected during your admission accompanied patient upon transfer:   Dental Appliance: Dental Appliances: None  Vision:    Hearing Aid:    Jewelry:    Clothing: Clothing: (clothing (duffel) bag to pacu)  Other Valuables:    Valuables sent to safe:

## 2020-06-09 NOTE — BRIEF OP NOTE
Brief Postoperative Note    Patient: Rock Parmar  YOB: 1945  MRN: [id number]    Date of Procedure: 6/9/2020     Pre-Op Diagnosis: OA LEFT KNEE    Post-Op Diagnosis: Same as preoperative diagnosis. Procedure(s):  LEFT TOTAL KNEE ARTHROPLASTY (ANES SPINAL WITH BLOCK)    Surgeon(s):  Calvin Stokes MD    Surgical Assistant: None    Anesthesia: Spinal     Estimated Blood Loss (mL): 685    Complications: None    Specimens: * No specimens in log *     Implants:   Implant Name Type Inv.  Item Serial No.  Lot No. LRB No. Used Action   CEMENT BNE GENTAMC MV 40GM -- SMARTSET ENDURANCE - SNA  CEMENT BNE GENTAMC MV 40GM -- SMARTSET ENDURANCE NA [employer] [id number] Left 1 Implanted   INSERT TIB SZ10 14MM LT -- EMPOWR 3D KNEE E-PLUS - SN/A  INSERT TIB SZ10 14MM LT -- EMPOWR 3D KNEE E-PLUS N/A [facility] [id number] Left 1 Implanted   DOMED TRI-PEG PATELLA   N/A  [id number] Left 1 Implanted   FINNED BASEPLATE 10 LEFT   N/A  [id number] Left 1 Implanted   FEMUR, NONPOROUS   N/A  [id number] Left 1 Implanted       Drains: * No LDAs found *    Findings: left knee OA    Electronically Signed by Bhavya Madera PA-C on 6/9/2020 at 12:01 PM

## 2020-06-09 NOTE — PERIOP NOTES
7467: LT leg adductor canal nerve block done by Dr Deja Wetzel using 30cc of 0.5% ropivicaine with US guidance, with pt monitored, O2 @ 3l/m/nc and IV sedation given at direction of Dr Deja Wetzel. Pt stated \"I dont want to feel anything\" re: getting nerve block. VSS post block: 116/72-56-(8-10) with SaO2 88-93% on 6l/m/nc. Airway supported. See anesthesia note.

## 2020-06-09 NOTE — DISCHARGE SUMMARY
[address]     DISCHARGE SUMMARY     Name: Elver Frost       MR#: [id number]    : 1945  ADMIT DATE: 2020  DISCHARGE DATE: 6/10/2020     ADMISSION DIAGNOSIS: Localized osteoarthritis of left knee [M17.12]     DISCHARGE DIAGNOSIS: OA LEFT KNEE     PROCEDURE PERFORMED: Procedure(s):  LEFT TOTAL KNEE ARTHROPLASTY (ANES SPINAL WITH BLOCK)     CONSULTATIONS:  None.     HISTORY OF PRESENT ILLNESS: The patient is a 79-year-old gentleman with progressive left knee pain due to severe varus osteoarthritis. Symptoms have progressed despite comprehensive conservative treatment. He presents for left total knee replacement. Risks, benefits, alternatives of procedure were reviewed with him in detail and he desires to proceed.     HOSPITAL COURSE:  The patient underwent the aforementioned procedure on date of admission under spinal anesthesia with adductor canal block. There were no immediate postoperative complications. He was started on a multimodal pain regimen and DVT prophylaxis.     DISPOSITION: The patient made slow, steady progress with physical therapy and was appropriate for discharge to Home in stable condition on postoperative day 1. DISCHARGE MEDICATIONS:  Reinitiate preadmission medications. In addition, the patient will be on ASA for DVT prophylaxis and low dose oxycodone and Tylenol for pain. DISCHARGE INSTRUCTIONS:  Detailed printed instructions were provided to the patient. Follow up with Dr. Wilner giron in approximately 3 weeks. The patient will receive home health physical therapy in the meantime.     Signed by: Rashid Mittal PA-C  2020

## 2020-06-09 NOTE — DISCHARGE INSTRUCTIONS
Post-op Discharge Instructions Following Total Joint Replacement  Manuel Post MD  [address]  [phone], ext [phone]  Follow-up Office Visit   See Dr. Cosme Martinez approximately 3-4 weeks from date of surgery. Call [phone], ext [phone] to make an appointment. Activity   Use your walker for ambulation. Weight bearing as tolerated unless instructed otherwise by the physical therapist. Get up every hour you are awake and take a brief walk. Lengthen walking distance daily as your strength improves.  Continue using your walker until seen in the office for your first follow up visit.  Practice your exercises 3 times daily as instructed by the physical therapist. Larissa Ramsey for 20 minutes after exercising.  No driving until seen in the office for your first follow up visit. Incision Care   The light brown Aquacel surgical dressing is waterproof and is to remain on your incision for 7 days. On the 7th day, carefully lift the edge of the dressing to break the adhesive seal and gently peel it off.  If your Aquacel dressings comes loose or falls off before the 7th day, replace it with a dry sterile gauze dressing and change this dressing daily. Once there is no drainage on the bandage, you mean leave the incision open to air.  You may take a shower with the Aquacel dressing in place. After you remove the Aquacel dressing on day 7, you may continue to shower and get your incision wet in the shower. Do not submerge your incision under water in a bathtub, hot tub, swimming pool, etc. until after you have been evaluated at your first office visit. Medications   Blood Clot Prevention: Take medication as prescribed by your physician for 4 weeks postop.  Pain Management: Take pain medication as prescribed; wean yourself off of pain medication as your pain lessens. Take with food.  You make also take Tylenol every 4-6 hours as needed for pain. Do not exceed 3 grams (3000mg) per day.    Place an ice bag on or around the incision for 20 minutes on / 20 minutes off as needed throughout the day and night, especially after exercising.  Stool Softener: You may want to take a stool softener (such as Senokot-S or Colace) to prevent constipation while taking pain medication. If constipation occurs, you may also use a laxative (such as Dulcolax tablets, Miralax, or a suppository). Diet   Resume usual diet at home. Drink plenty of fluids. Eat foods high in fiber and protein. Calcium and Vitamin D supplements recommended. Avoid alcoholic beverages. No smoking. When to call your Orthopaedic Surgeon: If you call after 5pm or on a weekend, the on call physician will return your call   Pain that is not relieved by pain medication, ice, and activity modification   Signs of infection (red incision, continuous drainage from the incision, malodorous drainage, persistent fever greater than 101 degrees Fahrenheit)   Signs of a blood clot in your leg (calf pain, tenderness, redness, and/or swelling of the lower leg)  ?   When to call your Primary Care Physician   Concerns about your medical conditions such as diabetes, high blood pressure, asthma, congestive heart failure   Call if blood sugars are elevated, if you have a persistent headache or dizziness, coughing or congestion, constipation or diarrhea, burning with urination, abnormal heart rate (fast or slow)  When to call 911 and go to the nearest Emergency Room   Acute onset of chest pain, shortness of breath, difficulty breathing

## 2020-06-09 NOTE — ANESTHESIA POSTPROCEDURE EVALUATION
Post-Anesthesia Evaluation and Assessment    Patient: Elsy Monterroso MRN: [id number]  SSN: [id number]    YOB: 1945  Age: 70 y.o. Sex: male       Cardiovascular Function/Vital Signs  Visit Vitals  /59   Pulse (!) 55   Temp 36.1 °C (97 °F)   Resp 14   Ht 5' 11.5\" (1.816 m)   Wt 102.6 kg (226 lb 3.1 oz)   SpO2 98%   BMI 31.11 kg/m²       Patient is status post Spinal anesthesia for Procedure(s):  LEFT TOTAL KNEE ARTHROPLASTY (ANES SPINAL WITH BLOCK). Nausea/Vomiting: None    Postoperative hydration reviewed and adequate. 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 No concerns    Signed By: Yaa Su MD     June 9, 2020              Procedure(s):  LEFT TOTAL KNEE ARTHROPLASTY (ANES SPINAL WITH BLOCK). spinal    <BSHSIANPOST>    INITIAL Post-op Vital signs:   Vitals Value Taken Time   /62 6/9/2020 10:30 AM   Temp 36.1 °C (97 °F) 6/9/2020 10:06 AM   Pulse 61 6/9/2020 10:36 AM   Resp 19 6/9/2020 10:36 AM   SpO2 98 % 6/9/2020 10:36 AM   Vitals shown include unvalidated device data.

## 2020-06-09 NOTE — PROGRESS NOTES
Primary Nurse Nidia Genao and Richard Manley, RN performed a dual skin assessment on this patient No impairment noted  Mick score is 20

## 2020-06-10 VITALS
HEIGHT: 72 IN | OXYGEN SATURATION: 96 % | BODY MASS INDEX: 30.64 KG/M2 | RESPIRATION RATE: 16 BRPM | SYSTOLIC BLOOD PRESSURE: 152 MMHG | HEART RATE: 53 BPM | WEIGHT: 226.19 LBS | DIASTOLIC BLOOD PRESSURE: 79 MMHG | TEMPERATURE: 97.6 F

## 2020-06-10 LAB
ANION GAP SERPL CALC-SCNC: 4 MMOL/L (ref 5–15)
BUN SERPL-MCNC: 30 MG/DL (ref 6–20)
BUN/CREAT SERPL: 22 (ref 12–20)
CALCIUM SERPL-MCNC: 7.9 MG/DL (ref 8.5–10.1)
CHLORIDE SERPL-SCNC: 111 MMOL/L (ref 97–108)
CO2 SERPL-SCNC: 23 MMOL/L (ref 21–32)
CREAT SERPL-MCNC: 1.35 MG/DL (ref 0.7–1.3)
GLUCOSE SERPL-MCNC: 161 MG/DL (ref 65–100)
HGB BLD-MCNC: 9.8 G/DL (ref 12.1–17)
POTASSIUM SERPL-SCNC: 4 MMOL/L (ref 3.5–5.1)
SODIUM SERPL-SCNC: 138 MMOL/L (ref 136–145)

## 2020-06-10 PROCEDURE — 99218 HC RM OBSERVATION: CPT

## 2020-06-10 PROCEDURE — 85018 HEMOGLOBIN: CPT

## 2020-06-10 PROCEDURE — 36415 COLL VENOUS BLD VENIPUNCTURE: CPT

## 2020-06-10 PROCEDURE — 74011250637 HC RX REV CODE- 250/637: Performed by: ORTHOPAEDIC SURGERY

## 2020-06-10 PROCEDURE — 97116 GAIT TRAINING THERAPY: CPT | Performed by: PHYSICAL THERAPIST

## 2020-06-10 PROCEDURE — 74011000250 HC RX REV CODE- 250: Performed by: PHYSICIAN ASSISTANT

## 2020-06-10 PROCEDURE — 80048 BASIC METABOLIC PNL TOTAL CA: CPT

## 2020-06-10 PROCEDURE — 74011250637 HC RX REV CODE- 250/637: Performed by: PHYSICIAN ASSISTANT

## 2020-06-10 PROCEDURE — 74011250636 HC RX REV CODE- 250/636: Performed by: PHYSICIAN ASSISTANT

## 2020-06-10 RX ADMIN — ASPIRIN 81 MG: 81 TABLET, COATED ORAL at 08:13

## 2020-06-10 RX ADMIN — POLYETHYLENE GLYCOL 3350 17 G: 17 POWDER, FOR SOLUTION ORAL at 08:13

## 2020-06-10 RX ADMIN — LOSARTAN POTASSIUM 12.5 MG: 25 TABLET, FILM COATED ORAL at 08:13

## 2020-06-10 RX ADMIN — CEFAZOLIN SODIUM 2 G: 300 INJECTION, POWDER, LYOPHILIZED, FOR SOLUTION INTRAVENOUS at 00:21

## 2020-06-10 RX ADMIN — OXYCODONE 5 MG: 5 TABLET ORAL at 04:59

## 2020-06-10 RX ADMIN — ACETAMINOPHEN 500 MG: 500 TABLET ORAL at 09:44

## 2020-06-10 RX ADMIN — DOCUSATE SODIUM 50MG AND SENNOSIDES 8.6MG 1 TABLET: 8.6; 5 TABLET, FILM COATED ORAL at 08:13

## 2020-06-10 RX ADMIN — OXYCODONE 5 MG: 5 TABLET ORAL at 11:13

## 2020-06-10 RX ADMIN — ACETAMINOPHEN 500 MG: 500 TABLET ORAL at 04:59

## 2020-06-10 RX ADMIN — ACETAMINOPHEN 500 MG: 500 TABLET ORAL at 00:22

## 2020-06-10 RX ADMIN — OXYCODONE 5 MG: 5 TABLET ORAL at 08:13

## 2020-06-10 NOTE — NURSE NAVIGATOR
[address]  [facility]     FROM:                                TO: At [address]                                                      ([address] [place] [address] or Facility name)  [place] [address]  Dept: [address] [facility] [address]: [phone]                                      Room#:  555/01                                                       Nurse Navigator:  Amy Gabriel RN         SITUATION      ASAScore: ASA 2 - Patient with mild systemic disease with no functional limitations    Admitted:  6/9/2020  Hospital Day: 2      Attending Provider:  No att. providers found     Consultations:  None    PCP:  Jesse Sheldon MD   [phone]     Admitting Dx:  Localized osteoarthritis of left knee [M17.12]       Active Problems:    Localized osteoarthritis of left knee (6/9/2020)      1 Day Post-Op of   Procedure(s):  LEFT TOTAL KNEE ARTHROPLASTY (ANES SPINAL WITH BLOCK)   BY: Jaime De Los Santos MD             ON: 6/9/2020                  Code Status: Full Code             Advance Directive? Verified (Send w/patient)     Isolation:  There are currently no Active Isolations       MDRO: No current active infections    BACKGROUND     Allergies:   Allergies   Allergen Reactions    Barbiturates Anxiety     Hyper         Past Medical History:   Diagnosis Date    Arthritis     BPH (benign prostatic hypertrophy) 7/18/2011    Cancer [facility])     prostate cancer    Chronic pain right    knee    ED (erectile dysfunction)     GERD (gastroesophageal reflux disease)     HTN (hypertension), benign     OA (osteoarthritis) of knee 7/18/2011    BOTH KNEES    Unspecified essential hypertension 7/18/2011       Past Surgical History:   Procedure Laterality Date    HX ACL RECONSTRUCTION Right     x2    HX APPENDECTOMY  1975    HX GI  1/2015    Drainage of hemorrhoid    HX HERNIA REPAIR      left and right,  ventral    HX HIP REPLACEMENT     left    HX HIP REPLACEMENT      left    HX KNEE ARTHROSCOPY Right     x3    HX ORTHOPAEDIC Left 2000    HIP REPLACEMENT    HX ORTHOPAEDIC Right 2014    TKR - Dr. Avery Due HX PROSTATECTOMY  2011    prostate removed     HX TONSILLECTOMY      CHILD       Prior to Admission Medications   Prescriptions Last Dose Informant Patient Reported? Taking? amLODIPine (NORVASC) 5 mg tablet 2020 at Unknown time  No Yes   Sig: Take 1 Tab by mouth daily. Patient taking differently: Take 5 mg by mouth nightly. calcium carbonate/vitamin D3 (CALTRATE-600 PLUS VITAMIN D3 PO) 2020 at Unknown time  Yes Yes   Sig: Take 1 Tab by mouth nightly. candesartan (ATACAND) 4 mg tablet 2020 at Unknown time  No Yes   Sig: TAKE ONE TABLET BY MOUTH ONCE DAILY   Patient taking differently: Take 4 mg by mouth nightly. ibuprofen (ADVIL) 200 mg tablet 2020 at Unknown time  Yes No   Sig: Take 400 mg by mouth as needed for Pain.   leuprolide acetate (LUPRON DEPOT, 6 MONTH, IM) 2020  Yes No   Si Each by IntraMUSCular route every 6 months. terazosin (HYTRIN) 10 mg capsule 2020 at Unknown time  No Yes   Sig: Take 1 capsule by mouth once daily   Patient taking differently: Take 10 mg by mouth nightly. traZODone (DESYREL) 50 mg tablet 2020 at Unknown time  No Yes   Sig: TAKE 1 TO 2 TABLETS BY MOUTH ONCE DAILY IN THE EVENING AS NEEDED FOR SLEEP   Patient taking differently: Take 50 mg by mouth nightly. Facility-Administered Medications: None       Vaccinations:    Immunization History   Administered Date(s) Administered    Influenza High Dose Vaccine PF 2015, 10/17/2016, 01/15/2019    Pneumococcal Conjugate (PCV-13) 2016    Pneumococcal Polysaccharide (PPSV-23) 02/15/2013    Tdap 2016    Zoster 2012         ASSESSMENT   Age: 70 y.o.              Gender: male        Height: Height: 5' 11.5\" (181.6 cm)                    Weight:Weight: 102.6 kg (226 lb 3.1 oz)     Patient Vitals for the past 8 hrs:   Temp Pulse Resp BP SpO2   06/10/20 0809 97.6 °F (36.4 °C) (!) 53 16 152/79 96 %            Active Orders   Diet    DIET REGULAR       Orientation: Orientation Level: Appropriate for age    Active Lines/Drains:  (Peg Tube / [place] / CL or S/L?):no    Urinary Status: Voiding      Last BM: Last Bowel Movement Date: 06/09/20     Skin Integrity: Incision (comment)             Mobility: Slightly limited   Weight Bearing Status: WBAT (Weight Bearing as Tolerated)      Gait Training  Assistive Device: Gait belt, Walker, rolling  Ambulation - Level of Assistance: Contact guard assistance  Distance (ft): 150 Feet (ft)     On Anticoagulation? YES  Aspirin   81 mg BID                                       Pain Medications given:  Oxycodone 5 mg; Tylenol 500 mg q 6 hours                                   Lab Results   Component Value Date/Time    Glucose 161 (H) 06/10/2020 02:43 AM    Hemoglobin A1c 5.6 06/02/2020 09:48 AM    INR 1.0 06/02/2020 09:48 AM    INR 1.5 (H) 08/07/2014 03:12 AM    HGB 9.8 (L) 06/10/2020 02:43 AM    HGB 12.4 06/02/2020 09:48 AM    HGB 14.1 01/17/2019 07:31 AM    HGB 14.2 12/09/2015 07:59 AM       Readmission Risks:  Score:         RECOMMENDATION     See After Visit Summary (AVS) for:  · Discharge instructions  · After [address]   · Medication Reconciliation          [facility] Orthopaedic Nurse Navigator  BENSON Garcia, RN-BC       Office  [phone]  Cell      [phone]  Fax      [phone]  [email or web address]             . Phy

## 2020-06-10 NOTE — PROGRESS NOTES
BUSHRA:    RUR N/A    [facility]. Patient's wife will take her home at discharge. Care Management Interventions  PCP Verified by CM: Yes  Mode of Transport at Discharge: Other (see comment)(Wife will drive home.)  Transition of Care Consult (CM Consult): Discharge Planning  MyChart Signup: Yes  Occupational Therapy Consult: Yes  Current Support Network: Lives with Spouse  Confirm Follow Up Transport: Family  The Patient and/or Patient Representative was Provided with a Choice of Provider and Agrees with the Discharge Plan?: Yes  Name of the Patient Representative Who was Provided with a Choice of Provider and Agrees with the Discharge Plan: Rodrigo Space  Discharge Location  Discharge Placement: Home with home health    Reason for Admission:LEFT TOTAL KNEE ARTHROPLASTY                       RUR Score:    N/A                 Plan for utilizing home health:     [facility] offered. [facility] by [facility]. PCP: First and Last name:Patel Killian MD     Name of Practice:    Are you a current patient: Yes/No: Yes   Approximate date of last visit: 5/15/2020   Can you participate in a virtual visit with your PCP: Yes                    Current Advanced Directive/Advance Care Plan: On File           Transition of Care Plan:       CM met with patient to introduce CM role, verify demographics and begin discharge planning. Patient lives at home with his wife in a 2 story condo. His bedroom is on the 2nd floor. Patient has crutches that he plans to use as he recovers. Patient gets his prescriptions filled at [facility] on [facility] [facility] [address] Patient's insurance is verified as Medicare A&B and [employer] secondary. Patient's wife will take him home at discharge.     Joelle Wood RN/CRM

## 2020-06-10 NOTE — PROGRESS NOTES
I have reviewed discharge instructions with the patient. The patient verbalized understanding. All questions answered, scrips filled in OP pharmacy, extra dry dressings given, discharge video watched, signed copy of AVS placed on chart.

## 2020-06-10 NOTE — OP NOTES
[address] [facility]  OPERATIVE REPORT    Name:  Cassius Cho  MR#:  [id number]  :  1945  ACCOUNT #:  [de-identified]  DATE OF SERVICE:  2020    PREOPERATIVE DIAGNOSIS:  Osteoarthritis, left knee. POSTOPERATIVE DIAGNOSIS:  Osteoarthritis, left knee. PROCEDURE PERFORMED:  Left total knee arthroplasty. SURGEON:  Ar Mon MD    FIRST ASSISTANT:  Lizzeth Valle PA-C    ANESTHESIA:  Spinal sedation as well as adductor canal block. COMPLICATIONS:  None. SPECIMENS REMOVED:  None. IMPLANTS:  Components implanted, DonJoy Empowr 3-D size 10 femur, size 10 tibial tray with 14 mm polyethylene insert and 38 mm patella. ESTIMATED BLOOD LOSS:  100 mL. INDICATIONS:  The patient is a 71-year-old gentleman with progressive left knee pain due to severe varus osteoarthritis. Symptoms have progressed despite comprehensive conservative treatment. He presents for left total knee replacement. Risks, benefits, alternatives of procedure were reviewed with him in detail and he desires to proceed. PROCEDURE IN DETAIL:  Anesthesia team placed an adductor canal block in the left thigh before the patient was taken to the operating room. They also placed a spinal.  Preoperative IV antibiotics were administered. Padded pneumatic tourniquet was placed around left upper thigh. Left lower extremity was prepped and draped in usual sterile fashion. Tourniquet was inflated to 275. Through a midline anterior knee incision, I performed a medial parapatellar arthrotomy. Progressive medial release was performed with solitary exposure and soft tissue balance throughout the procedure. I took 10 mm off the distal femur using 5-degree distal femoral cutting block over a long intramedullary josé miguel. Proximal tibial resection was performed using an extramedullary alignment guide with a goal of 1-2 degrees of constitutional varus. Menisci were excised.   Progressive medial release was performed until there was a symmetrical extension gap with a 14-mm spacer block. Knee was placed in 90 degrees of flexion and gap  was inserted. Soft tissue tension was applied and block was adjusted to produce 14 mm flexion space. Femoral rotation was drilled and the femur was sized and prepared for a size 10 component utilizing appropriate jig. Remaining posterior osteophytes removed from the femur and subperiosteal posterior capsular release was performed. Trials were inserted, and with 14-mm insert in place the knee had full extension to gravity. Satisfactory coronal plane stability and soft tissue tension throughout arc of motion. Tibial tray was allowed to rotate to find its kinematic home and rotation was marked. Patella was prepared for 38 mm component and tracked centrally using no thumbs technique. Trials were removed and tibial preparation was completed. Bony surfaces were copiously irrigated by pulse lavage and dried before the real components were cemented into place using antibiotic impregnated cement. Excess cement was removed. The knee was reduced in full extension with the real insert in place during cement setup. Periarticular soft tissues were injected with solution containing 0.5% ropivacaine with epinephrine as well as clonidine and Toradol. Tourniquet was released and hemostasis obtained with the Bovie. Wound was irrigated. Arthrotomy was closed with a combination of heavy Vicryl sutures and a running #2 Stratafix suture. Skin and subcutaneous layers were closed in layered fashion with Vicryl and a running Monocryl subcuticular stitch. Wound was dressed with Dermabond and Aquacel occlusive dressing as well as sterile compressive dressing. The patient's bladder was decompressed with straight catheterization before he was transported to the postanesthesia care unit in stable condition. All counts were correct at the end of the procedure.     The physician assistant was critical throughout the case to assist with positioning, retraction, and closure. There were no other available residents, fellows, or surgical assistants available to assist during this procedure.         Bright Maguire MD      [id number]  D:  06/09/2020 21:43  T:  06/10/2020 0:54  JOB #:  [id number]  CC:  MD José Eaton MD

## 2020-06-10 NOTE — PROGRESS NOTES
Problem: Falls - Risk of  Goal: *Absence of Falls  Description: Document Chely Brooks Fall Risk and appropriate interventions in the flowsheet.   Outcome: Progressing Towards Goal  Note: Fall Risk Interventions:  Mobility Interventions: Patient to call before getting OOB, PT Consult for mobility concerns         Medication Interventions: Patient to call before getting OOB    Elimination Interventions: Call light in reach, Patient to call for help with toileting needs              Problem: Knee Replacement: Post-Op Day 1  Goal: *Optimal pain control at patient's stated goal  Outcome: Progressing Towards Goal  Goal: *Hemodynamically stable  Outcome: Progressing Towards Goal

## 2020-06-10 NOTE — PROGRESS NOTES
Problem: Mobility Impaired (Adult and Pediatric)  Goal: *Acute Goals and Plan of Care (Insert Text)  Description: FUNCTIONAL STATUS PRIOR TO ADMISSION: Patient was independent and active without use of DME.    HOME SUPPORT PRIOR TO ADMISSION: The patient lived with wife but did not require assist.     Physical Therapy Goals  Initiated 6/9/2020    1. Patient will move from supine to sit and sit to supine , scoot up and down and roll side to side in bed with modified independence within 4 days. 2. Patient will perform sit to stand with modified independence within 4 days. 3. Patient will ambulate with modified independence for 150 feet with the least restrictive device within 4 days. 4. Patient will ascend/descend 12 stairs with cane and one handrail(s) with modified independence within 4 days. 5. Patient will perform home exercise program per protocol with independence within 4 days. 6. Patient will demonstrate AROM 0-90 degrees in operative joint within 4 days. Outcome: Progressing Towards Goal   PHYSICAL THERAPY TREATMENT  Patient: Byron Freire (24 y.o. male)  Date: 6/10/2020  Diagnosis: Localized osteoarthritis of left knee [M17.12]   <principal problem not specified>  Procedure(s) (LRB):  LEFT TOTAL KNEE ARTHROPLASTY (ANES SPINAL WITH BLOCK) (Left) 1 Day Post-Op  Precautions: Fall, WBAT  Chart, physical therapy assessment, plan of care and goals were reviewed. ASSESSMENT  Patient continues with skilled PT services and is progressing towards goals. Patient limited by pain but otherwise moving well. Needing SBA for ambulation x 175 feet with no overt difficulty noted. Patient cleared stairs yesterday and did not feel that he needed to practice again today. Reviewed HEP, use of ice, and to amb 1x/hour when awake. Patient has met all goals and is safe to DC from a mobility standpoint. Will continue to follow if DC is delayed.     Patient is cleared for discharge from PT standpoint:  YES [x]     NO []         Other factors to consider for discharge: none         PLAN :  Patient continues to benefit from skilled intervention to address the above impairments. Continue treatment per established plan of care. to address goals. Recommendation for discharge: (in order for the patient to meet his/her long term goals)  Physical therapy at least 2 days/week in the home       IF patient discharges home will need the following DME: patient owns DME required for discharge       SUBJECTIVE:   Patient stated I feel good walking. This will be no problem.     OBJECTIVE DATA SUMMARY:   Critical Behavior:  Neurologic State: Appropriate for age  Orientation Level: Appropriate for age  Cognition: Appropriate for age attention/concentration     Functional Mobility Training:  Bed Mobility:     Supine to Sit: Contact guard assistance  Sit to Supine: Contact guard assistance  Scooting: Contact guard assistance        Transfers:  Sit to Stand: Contact guard assistance  Stand to Sit: Contact guard assistance                             Balance:  Sitting: Intact  Standing: Impaired; With support  Standing - Static: Constant support;Good  Standing - Dynamic : Constant support;Good  Ambulation/Gait Training:  Distance (ft): 150 Feet (ft)  Assistive Device: Gait belt;Walker, rolling  Ambulation - Level of Assistance: Contact guard assistance        Gait Abnormalities: Antalgic;Decreased step clearance     Left Side Weight Bearing: As tolerated  Base of Support: Shift to right  Stance: Left decreased  Speed/Tara: Pace decreased (<100 feet/min); Slow  Step Length: Left shortened;Right shortened         Therapeutic Exercises:   SUPINE  EXERCISES   Sets   Reps   Active Active Assist   Passive Self ROM   Comments   Ankle Pumps 1 10 []                                        []                                        []                                        []                                           Quad Sets 1 10 [] []                                        []                                        []                                           Heel Slides 1 10 []                                        []                                        []                                        []                                           Hip Abduction   []                                        []                                        []                                        []                                           Glut Sets 1 10 []                                        []                                        []                                        []                                              []                                        []                                        []                                        []                                              []                                        []                                        []                                        []                                             STANDING  EXERCISES   Sets   Reps   Active Active Assist   Passive Self ROM   Comments   Heel Raises   []                                        []                                        []                                        []                                           Hip Abduction   []                                        []                                        []                                        []                                              []                                        []                                        []                                        []                                              []                                        []                                        []                                        []                                             Pain Rating:  No c/o pain    Activity Tolerance:   Good and requires rest breaks  Please refer to the flowsheet for vital signs taken during this treatment. After treatment patient left in no apparent distress:   Sitting in chair, Call bell within reach, and Caregiver / family present    COMMUNICATION/COLLABORATION:   The patients plan of care was discussed with: Physical therapist, Occupational therapist, and Registered nurse.      Hermila Patton, PT, DPT   Time Calculation: 23 mins

## 2020-06-10 NOTE — ROUTINE PROCESS
Bedside shift change report given to Layla Puri (oncoming nurse) by Elvia Curling (offgoing nurse). Report included the following information [facility].

## 2020-06-10 NOTE — PROGRESS NOTES
Orthopaedics Daily Progress Note                            Date of Surgery:  6/9/2020      Patient: Singh Allen   YOB: 1945  Age: 70 y.o. SUBJECTIVE:   1 Day Post-Op following LEFT TOTAL KNEE ARTHROPLASTY (ANES SPINAL WITH BLOCK). The patient's post operative pain is controlled. No CP/SOB. No N/V. The patient's mobility will be evaluated today during PT sessions. OBJECTIVE:     Vital Signs:      Visit Vitals  /84 (BP 1 Location: Left arm, BP Patient Position: At rest)   Pulse (!) 59   Temp 97.8 °F (36.6 °C)   Resp 16   Ht 5' 11.5\" (1.816 m)   Wt 102.6 kg (226 lb 3.1 oz)   SpO2 99%   BMI 31.11 kg/m²       Physical Exam:  General: A&Ox3. The patient is cooperative, and in no acute distress. Respiratory: Respirations are unlabored. Surgical site(s): dressing clean, dry  Musculoskeletal: Calves are soft, supple, and non-tender upon palpation. Motor 5/5. Neurological:  Neurovascularly intact with good dorsi and plantar flexion. Pulses symmetrical.    Laboratory Values:             Recent Results (from the past 12 hour(s))   METABOLIC PANEL, BASIC    Collection Time: 06/10/20  2:43 AM   Result Value Ref Range    Sodium 138 136 - 145 mmol/L    Potassium 4.0 3.5 - 5.1 mmol/L    Chloride 111 (H) 97 - 108 mmol/L    CO2 23 21 - 32 mmol/L    Anion gap 4 (L) 5 - 15 mmol/L    Glucose 161 (H) 65 - 100 mg/dL    BUN 30 (H) 6 - 20 MG/DL    Creatinine 1.35 (H) 0.70 - 1.30 MG/DL    BUN/Creatinine ratio 22 (H) 12 - 20      GFR est AA >60 >60 ml/min/1.73m2    GFR est non-AA 52 (L) >60 ml/min/1.73m2    Calcium 7.9 (L) 8.5 - 10.1 MG/DL   HEMOGLOBIN    Collection Time: 06/10/20  2:43 AM   Result Value Ref Range    HGB 9.8 (L) 12.1 - 17.0 g/dL         PLAN:     S/P LEFT TOTAL KNEE ARTHROPLASTY (ANES SPINAL WITH BLOCK) -Continue WBAT. -Mobilize and continue with PT/OT until discharged     Hemodynamics Hgb today is 9.8. Acute blood loss anemia as expected. Patient asymptomatic.   Continue to monitor. Wound Monitor postop dressing; no postop dressing changes necessary. Reinforce PRN. Post Operative Pain Pain Control: stable, mild-to-moderate joint symptoms intermittently, reasonably well controlled by current meds. DVT Prophylaxis Continue with SCD'S, Ankle Pump Exercises. ASA 81mg BID     Discharge Disposition Discharge plan: Home with HHPT pending PT clearance.        Signed By: Lizbeth Soto PA-C  Vijaya 10, 2020 7:30 AM

## 2020-06-11 ENCOUNTER — PATIENT OUTREACH (OUTPATIENT)
Dept: CASE MANAGEMENT | Age: 75
End: 2020-06-11

## 2020-06-11 ENCOUNTER — PATIENT OUTREACH (OUTPATIENT)
Dept: CARDIOLOGY CLINIC | Age: 75
End: 2020-06-11

## 2020-06-11 NOTE — PROGRESS NOTES
This note will not be viewable in [address] Post Discharge Follow-up contact after Joint Replacement    Patient discharged on 6/10/20  By  Alethea Ashley   following  left knee Arthroplasty. Spoke with patient today, who reports they are \" walking pretty well - and I have the therapist here with me. \"  Denies Fever, Shortness of Breath or Chest Pain. Home Health has visited. Patient also reports:  Aquacel dressing clean, dry, intact  Calf is non-tender, operative extremity has moderate swelling. Pain is well managed. Discussed use of ice & elevation. Patient is progressing with therapy and is exercising independently. Taking Aspirin for anticoagulation, oxycodone for pain. Patient is not experiencing symptoms of constipation & urinating without difficulty. Discussed side effects of anticoagulants & pain medications (bleeding/bruising, constipation, lightheaded/dizziness)      Discussed calling surgeon Dr Ivone Dubon  for drainage, bleeding, swelling in operative extremity, fever or pain. Discussed calling PCP Dr Víctor Medina with other medical issues.

## 2020-06-11 NOTE — PROGRESS NOTES
Patient was admitted to [facility] on 20 and discharged on 6/10/20 for elective left TKR. Patient was contacted within one business days of discharge. Top Discharge Challenges to be reviewed by the provider   Additional needs identified to be addressed with provider yes  does not monitor BP values at home  Discussed COVID-19 related testing which was available at this time. Test results were negative. Patient informed of results, if available? NA   Method of communication with provider : none       Advance Care Planning:   Does patient have an Advance Directive:  reviewed and current - decision makers on file     Inpatient Readmission Risk score: NA  Was this a readmission? no   Patient stated reason for the admission: NA  Patients top risk factors for readmission: lack of knowledge about disease and medical condition  Interventions to address risk factors: CTN educated on reason for and ask that he obtain BP cuff and monitor BP values daily. Prior to taking prescribed meds. Care Transition Nurse (CTN) contacted the patient by telephone to perform post hospital discharge assessment. Verified name and  with patient as identifiers. Provided introduction to self, and explanation of the CTN role. CTN reviewed discharge instructions, medical action plan and red flags with patient who verbalized understanding. Patient given an opportunity to ask questions and does not have any further questions or concerns at this time. The patient agrees to contact the PCP office for questions related to their healthcare. Medication reconciliation was performed with patient, who verbalizes understanding of administration of home medications. Advised obtaining a 90-day supply of all daily and as-needed medications.    Referral to Pharm D needed: no     Home Health/Outpatient orders at discharge: PT- nursing was there at the time of my call   [address]: At [facility]  Date of initial visit: 6/11/20    Durable Medical Equipment ordered at discharge: none      Covid Risk Education    Patient has following risk factors of: HTN, previous Prostrate CA- s/p removal 2011. Education provided regarding infection prevention, and signs and symptoms of COVID-19 and when to seek medical attention with patient who verbalized understanding. Discussed exposure protocols and quarantine From CDC: Are you at higher risk for severe illness?  and given an opportunity for questions and concerns. The patient agrees to contact the COVID-19 hotline [phone] or PCP office for questions related to COVID-19. For more information on steps you can take to protect yourself, see CDC's How to Protect Yourself     Patient/family/caregiver given information for [employer] and agrees to enroll yes  Patient's preferred e-mail: [email or web address]  Patient's preferred phone number: [phone]    Discussed follow-up appointments. If no appointment was previously scheduled, appointment scheduling offered: [facility] follow up appointment(s):   Future Appointments   Date Time Provider Americo Redd   7/1/2020 10:00 AM Leonadr Velazquez MD [id number] [facility] follow up appointment(s):   Ortho- Dr. Jake Torres- office is calling to schedule  Plan for follow-up call in 10-14 days based on severity of symptoms and risk factors. CTN provided contact information for future needs. Goals Addressed                 This Visit's Progress       General     Reduce risk for hospitalization        6/11/20- spoke with Mr. Patel Astudillo, \"spouse\" [place] was in the background along with [facility], Juan Choe. He had okay sleep last night- middle of night local anesthesia wore off- he states he did take and is taking Q4H narcotics for pain control. Explained that he should consider taking acetaminophen on schedule. He is using ice- encouraged continuous until swelling decreased. PT coming this afternoon.    He has moved his bowels x2 this morning. He has HTN- does not have a BP cuff- encouraged him to obtain and monitor BP daily prior to taking meds.  Explained factors affect values, etc.            [employer]

## 2020-06-23 RX ORDER — TRAZODONE HYDROCHLORIDE 50 MG/1
TABLET ORAL
Qty: 60 TAB | Refills: 0 | Status: SHIPPED | OUTPATIENT
Start: 2020-06-23 | End: 2020-08-30

## 2020-07-06 ENCOUNTER — HOSPITAL ENCOUNTER (OUTPATIENT)
Dept: PHYSICAL THERAPY | Age: 75
Discharge: HOME | End: 2020-07-06
Payer: MEDICARE

## 2020-07-06 PROCEDURE — 97161 PT EVAL LOW COMPLEX 20 MIN: CPT | Performed by: PHYSICAL THERAPIST

## 2020-07-06 PROCEDURE — 97110 THERAPEUTIC EXERCISES: CPT | Performed by: PHYSICAL THERAPIST

## 2020-07-06 PROCEDURE — 97140 MANUAL THERAPY 1/> REGIONS: CPT | Performed by: PHYSICAL THERAPIST

## 2020-07-06 NOTE — PROGRESS NOTES
PT INITIAL EVALUATION NOTE - [facility] 2-15    Patient Name: Janine Mckeon  Date:2020  : 1945  [x]  Patient  Verified  Payor: Eliezer Allen / Plan: [place] MED[place] PART [place] & B / Product Type: Medicare /    In time:  12:25 pm  Out time: 1:35 pm  Total Treatment Time (min): 70  Total Timed Codes (min): 35  1:1 Treatment Time ( only): 70   Visit #: 1     Treatment Area: Left knee pain [M25.562]    SUBJECTIVE  Pain Level (0-10 scale): 2  Any medication changes, allergies to medications, adverse drug reactions, diagnosis change, or new procedure performed?: [] No    [x] Yes (see summary sheet for update)  Subjective:    Elective left TKR performed by Dr. Jason Diaz on 20. Received home health PT with good progress. Currently walking with 1 Lofstrand crutch. Complains of mild knee pain with prolonged standing and walking, stiffness at the end of extension, limited flexion ROM as compared to his right knee he had total knee replacement on. His goals are to increase his knee bend, increase leg strength, increase his stamina, and improve his balance. OBJECTIVE/EXAMINATION  Gait:  Ambulates with one Lofstrand crutch, decreased stance time left, decreased knee flexion in swing. Effusion:    Mid Patella: +3 cm     Supra Patella:  +3.5 cm    AROM:      Right Knee: +1 to 130 degrees      Left Knee: -3 to 105 degrees    PROM:  Left knee:  -1 to 115 degrees         Flexibility:   R   L  Hamstrings   Severe   Severe  Quads    Mod   Severe    STRENGTH: Not tested    Special Tests:  Krystle's sign:  Negative. Palpation:  Tenderness katia scar. Decreased superior and inferior patella mobility. Modality rationale: decrease edema, decrease inflammation and decrease pain to improve the patients ability to stand, walk, and bend.    Min Type Additional Details    [] Estim: []Att   []Unatt        []TENS instruct                  []IFC  []Premod   []NMES                     []Other:  []w/US   []w/ice []w/heat  Position:  Location:    []  Traction: [] Cervical       []Lumbar                       [] Prone          []Supine                       []Intermittent   []Continuous Lbs:  [] before manual  [] after manual  []w/heat    []  Ultrasound: []Continuous   [] Pulsed at:                           []1MHz   []3MHz Location:  W/cm2:    [] Paraffin         Location:   []w/heat   10 [x]  Ice     []  Heat  []  Ice massage Position:  Hook lying  Location:  Left knee    []  Laser  []  Other: Position:  Location:      []  Vasopneumatic Device Pressure:       [] lo [] med [] hi   Temperature:      [x] Skin assessment post-treatment:  [x]intact []redness- no adverse reaction    []redness  adverse reaction:     15 min Therapeutic Exercise:  [x] See flow sheet :   Rationale: increase ROM and increase strength to improve the patients ability to stand, walk, and bend. 20 min Manual Therapy:   Patella mobs  Becca-scar mobs  Knee mobs:  anterior and posterior glides in sitting  PROM extension in supine and flexion in sitting   Rationale: decrease pain, increase ROM and increase tissue extensibility to improve the patients ability to stand, walk, and bend. With   [x] TE   [] TA   [] neuro   [] other: Patient Education: [x] Review HEP/written handout.     [] Progressed/Changed HEP based on:   [] positioning   [] body mechanics   [] transfers   [] heat/ice application    [] other:      Other Objective/Functional Measures:     Pain Level (0-10 scale) post treatment: 0    ASSESSMENT/Changes in Function:     [x]  See Plan of Rusty Maher V, PT 7/6/2020

## 2020-07-06 NOTE — PROGRESS NOTES
[facility]  [address] [facility], [address] [place] [address]  Phone: [phone]  Fax: [phone]    Plan of Care/Statement of Necessity for Physical Therapy Services  2-15    Patient name: Norma Fermin  : 1945  Provider#: [id number]  Referral source: Jean Carlos Abdi MD      Medical/Treatment Diagnosis: Left knee pain [M25.562]     Prior Hospitalization: see medical history     Comorbidities: HTN, history of THR, right TKR, back pain. Prior Level of Function:Declining level of function prior to TKR. Medications: Verified on Patient Summary List  Start of Care: 2020      Onset Date: 2020   The Plan of Care and following information is based on the information from the initial evaluation. Assessment/ key information: This patient presents with left knee pain, effusion, decreased ROM, altered gait pattern, and impaired function. Problem List: pain affecting function, decrease ROM, decrease strength, impaired gait/ balance, decrease ADL/ functional abilitiies, decrease activity tolerance and decrease flexibility/ joint mobility   Treatment Plan may include any combination of the following: Therapeutic exercise, Therapeutic activities, Neuromuscular re-education, Physical agent/modality, Gait/balance training, Manual therapy, Patient education, Self Care training, Functional mobility training, Home safety training and Stair training  Patient / Family readiness to learn indicated by: asking questions, trying to perform skills and interest  Persons(s) to be included in education: patient (P)  Barriers to Learning/Limitations: None  Patient Goal (s): Increase bend and straightening, increase strength, balance, and stamina.   Patient Self Reported Health Status: good  Rehabilitation Potential: good    Short Term Goals: To be accomplished in 4 weeks:  1. Decrease effusion to +1.5 cm to improve ROM and decrease quad inhibition to improve gait pattern.   2.  AROM -1 to 115 degrees to improve functional bending. 3.  Ambulates without assistive device with mild gait deviations. Long Term Goals: To be accomplished in 6 weeks:  1. AROM left knee 0 to 125 degrees to improve functional bending. 2.  4+/5 strength of left quads and hip flexors, 5-/5 hamstrings to tolerate prolonged standing and walking. 3.  Ambulates with normal gait pattern. Frequency / Duration: Patient to be seen 3 times per week for 4-6 weeks. Patient/ Caregiver education and instruction: exercises    Certification Period: 7/6/2020-10/5/2020    Van Carrera PT 7/6/2020    ________________________________________________________________________    I certify that the above Therapy Services are being furnished while the patient is under my care. I agree with the treatment plan and certify that this therapy is necessary.     [de-identified] Signature:____________________  Date:____________Time: _________

## 2020-07-08 ENCOUNTER — HOSPITAL ENCOUNTER (OUTPATIENT)
Dept: PHYSICAL THERAPY | Age: 75
Discharge: HOME | End: 2020-07-08
Payer: MEDICARE

## 2020-07-08 PROCEDURE — 97016 VASOPNEUMATIC DEVICE THERAPY: CPT | Performed by: PHYSICAL THERAPIST

## 2020-07-08 PROCEDURE — 97110 THERAPEUTIC EXERCISES: CPT | Performed by: PHYSICAL THERAPIST

## 2020-07-08 PROCEDURE — 97140 MANUAL THERAPY 1/> REGIONS: CPT | Performed by: PHYSICAL THERAPIST

## 2020-07-08 NOTE — PROGRESS NOTES
PT DAILY TREATMENT NOTE - [facility] 2-15    Patient Name: Dulce Maria Olsen  Date:2020  : 1945  [x]  Patient  Verified  Payor: [place] MEDICARE / Plan: [place] MEDICARE PART A & B / Product Type: Medicare /    In time:  1:25 pm Out time: 2:35 pm  Total Treatment Time (min): 70  Total Timed Codes (min): 50  1:1 Treatment Time ( [address] only): 60   Visit #:  2    Treatment Area: Left knee pain [M25.562]    SUBJECTIVE  Pain Level (0-10 scale): 3 (LPB)  Any medication changes, allergies to medications, adverse drug reactions, diagnosis change, or new procedure performed?: [x] No    [] Yes (see summary sheet for update)  Subjective functional status/changes:   [] No changes reported  States knee doing well. Complains of right sided back pain due to altered gait pattern. Takes medication that makes him \"Down and out. \"    OBJECTIVE  Right ASIS low (possible right anterior innominate)    A/PROM:  100/117 degrees in sitting. Modality rationale: decrease edema, decrease inflammation and decrease pain to improve the patients ability to stand, walk, and bend.    Min Type Additional Details       [] Estim: []Att   []Unatt    []TENS instruct                  []IFC  []Premod   []NMES                     []Other:  []w/US   []w/ice   []w/heat  Position:  Location:       []  Traction: [] Cervical       []Lumbar                       [] Prone          []Supine                       []Intermittent   []Continuous Lbs:  [] before manual  [] after manual  []w/heat    []  Ultrasound: []Continuous   [] Pulsed                       at: []1MHz   []3MHz Location:  W/cm2:    [] Paraffin         Location:   []w/heat    []  Ice     []  Heat  []  Ice massage Position:  Location:    []  Laser  []  Other: Position:  Location:     15 [x]  Vasopneumatic Device Pressure:       [] lo [x] med [] hi   Temperature: 34 degrees     [x] Skin assessment post-treatment:  [x]intact []redness- no adverse reaction    []redness  adverse reaction:     30 min Therapeutic Exercise:  [] See flow sheet :   Rationale: increase ROM and increase strength to improve the patients ability to stand, walk, and bend.       20 min Manual Therapy:   Patella mobs  Becca-scar mobs  Knee mobs:  anterior and posterior glides in sitting  PROM extension in supine and flexion in supine  MET with right hamstring for right anterior innominate   Rationale: decrease pain, increase ROM and increase tissue extensibility to improve the patients ability to stand, walk, and bend.          With   [x] TE   [] TA   [] neuro   [] other: Patient Education: [x] Review HEP    [] Progressed/Changed HEP based on:   [] positioning   [] body mechanics   [] transfers   [] heat/ice application    [] other:      Other Objective/Functional Measures:      Pain Level (0-10 scale) post treatment: 0    ASSESSMENT/Changes in Function:  Decreased back pain with treatment. Increased knee ROM today. Patient will continue to benefit from skilled PT services to modify and progress therapeutic interventions, address functional mobility deficits, address ROM deficits, address strength deficits, analyze and address soft tissue restrictions, analyze and cue movement patterns, analyze and modify body mechanics/ergonomics and assess and modify postural abnormalities to attain remaining goals.      []  See Plan of Care  []  See progress note/recertification  []  See Discharge Summary         Progress towards goals / Updated goals:    PLAN  [x]  Upgrade activities as tolerated     [x]  Continue plan of care  []  Update interventions per flow sheet       []  Discharge due to:_  []  Other:_      Pk Roy, PT 7/8/2020

## 2020-07-10 ENCOUNTER — HOSPITAL ENCOUNTER (OUTPATIENT)
Dept: PHYSICAL THERAPY | Age: 75
Discharge: HOME | End: 2020-07-10
Payer: MEDICARE

## 2020-07-10 PROCEDURE — 97110 THERAPEUTIC EXERCISES: CPT | Performed by: PHYSICAL THERAPIST

## 2020-07-10 PROCEDURE — 97140 MANUAL THERAPY 1/> REGIONS: CPT | Performed by: PHYSICAL THERAPIST

## 2020-07-10 PROCEDURE — 97016 VASOPNEUMATIC DEVICE THERAPY: CPT | Performed by: PHYSICAL THERAPIST

## 2020-07-10 NOTE — PROGRESS NOTES
PT DAILY TREATMENT NOTE - [facility] 2-15    Patient Name: Dionne Pagan  Date:7/10/2020  : 1945  [x]  Patient  Verified  Payor: [place] MEDICARE / Plan: VA MEDICARE PART A & B / Product Type: Medicare /    In time:  900 am Out time: 1010 am  Total Treatment Time (min): 70  Total Timed Codes (min): 60  1:1 Treatment Time ([address] only): 60   Visit #:  3    Treatment Area: Left knee pain [M25.562]    SUBJECTIVE  Pain Level (0-10 scale): 5-6 (LPB)  Any medication changes, allergies to medications, adverse drug reactions, diagnosis change, or new procedure performed?: [x] No    [] Yes (see summary sheet for update)  Subjective functional status/changes:   [] No changes reported  Patient reports that his knee is doing well, but his back is hurting. His back felt much better after last therapy session. OBJECTIVE  Right ASIS low (possible right anterior innominate)    AROM L knee: 117 degrees supine     Modality rationale: decrease edema, decrease inflammation and decrease pain to improve the patients ability to stand, walk, and bend.    Min Type Additional Details       [] Estim: []Att   []Unatt    []TENS instruct                  []IFC  []Premod   []NMES                     []Other:  []w/US   []w/ice   []w/heat  Position:  Location:       []  Traction: [] Cervical       []Lumbar                       [] Prone          []Supine                       []Intermittent   []Continuous Lbs:  [] before manual  [] after manual  []w/heat    []  Ultrasound: []Continuous   [] Pulsed                       at: []1MHz   []3MHz Location:  W/cm2:    [] Paraffin         Location:   []w/heat    []  Ice     []  Heat  []  Ice massage Position:  Location:    []  Laser  []  Other: Position:  Location:     15 [x]  Vasopneumatic Device Pressure:       [] lo [x] med [] hi   Temperature: 34 degrees     [x] Skin assessment post-treatment:  [x]intact []redness- no adverse reaction    []redness  adverse reaction:     30 min Therapeutic Exercise:  [] See flow sheet :   Rationale: increase ROM and increase strength to improve the patients ability to stand, walk, and bend.       30 min Manual Therapy:   Patella mobs  Becca-scar mobs  PROM extension in supine and flexion in supine  MET with right hamstring for right anterior innominate   Rationale: decrease pain, increase ROM and increase tissue extensibility to improve the patients ability to stand, walk, and bend.          With   [x] TE   [] TA   [] neuro   [] other: Patient Education: [x] Review HEP    [] Progressed/Changed HEP based on:   [] positioning   [] body mechanics   [] transfers   [] heat/ice application    [] other:      Other Objective/Functional Measures:      Pain Level (0-10 scale) post treatment: 0    ASSESSMENT/Changes in Function:  Progressing well with increased L knee ROM. Decreased back pain at end of session. Patient will continue to benefit from skilled PT services to modify and progress therapeutic interventions, address functional mobility deficits, address ROM deficits, address strength deficits, analyze and address soft tissue restrictions, analyze and cue movement patterns, analyze and modify body mechanics/ergonomics and assess and modify postural abnormalities to attain remaining goals.      []  See Plan of Care  []  See progress note/recertification  []  See Discharge Summary         Progress towards goals / Updated goals:    PLAN  [x]  Upgrade activities as tolerated     [x]  Continue plan of care  []  Update interventions per flow sheet       []  Discharge due to:_  []  Other:_      Rosette Goldberg, PT 7/10/2020

## 2020-07-13 ENCOUNTER — HOSPITAL ENCOUNTER (OUTPATIENT)
Dept: PHYSICAL THERAPY | Age: 75
Discharge: HOME | End: 2020-07-13
Payer: MEDICARE

## 2020-07-13 PROCEDURE — 97140 MANUAL THERAPY 1/> REGIONS: CPT | Performed by: PHYSICAL THERAPIST

## 2020-07-13 PROCEDURE — 97016 VASOPNEUMATIC DEVICE THERAPY: CPT | Performed by: PHYSICAL THERAPIST

## 2020-07-13 PROCEDURE — 97110 THERAPEUTIC EXERCISES: CPT | Performed by: PHYSICAL THERAPIST

## 2020-07-13 NOTE — PROGRESS NOTES
PT DAILY TREATMENT NOTE - [facility] 2-15    Patient Name: Dale Kearney  Date:2020  : 1945  [x]  Patient  Verified  Payor: Alicia Duncan / Plan: [place] MEDICARE PART A & B / Product Type: Medicare /    In time:  839 am Out time: 10:25 am  Total Treatment Time (min): 70  Total Timed Codes (min): 50  1:1 Treatment Time ([facility] only): 50  Visit #:  4    Treatment Area: Left knee pain [M25.562]    SUBJECTIVE  Pain Level (0-10 scale): 5-6 (LPB)  Any medication changes, allergies to medications, adverse drug reactions, diagnosis change, or new procedure performed?: [x] No    [] Yes (see summary sheet for update)  Subjective functional status/changes:   [] No changes reported  \"The knee is fine. My back has been hurting. \"  Complains of right LBP, intermittent radiating pain into his groin and anterior hip. \"    OBJECTIVE  Right ASIS low (possible right anterior innominate)    Effusion:   Mid patella +2 cm    Supra patella +2 cm    PROM L knee: 127 degrees (seated)    Modality rationale: decrease edema, decrease inflammation and decrease pain to improve the patients ability to stand, walk, and bend.    Min Type Additional Details       [] Estim: []Att   []Unatt    []TENS instruct                  []IFC  []Premod   []NMES                     []Other:  []w/US   []w/ice   []w/heat  Position:  Location:       []  Traction: [] Cervical       []Lumbar                       [] Prone          []Supine                       []Intermittent   []Continuous Lbs:  [] before manual  [] after manual  []w/heat    []  Ultrasound: []Continuous   [] Pulsed                       at: []1MHz   []3MHz Location:  W/cm2:    [] Paraffin         Location:   []w/heat    []  Ice     []  Heat  []  Ice massage Position:  Location:    []  Laser  []  Other: Position:  Location:     15 [x]  Vasopneumatic Device Pressure:       [] lo [x] med [] hi   Temperature: 34 degrees     [x] Skin assessment post-treatment:  [x]intact []redness- no adverse reaction []redness  adverse reaction:     30 min Therapeutic Exercise:  [] See flow sheet :   Rationale: increase ROM and increase strength to improve the patients ability to stand, walk, and bend.       20 min Manual Therapy:   Patella mobs  PROM extension in supine and flexion in supine  MET with right hamstring for right anterior innominate   Rationale: decrease pain, increase ROM and increase tissue extensibility to improve the patients ability to stand, walk, and bend.          With   [x] TE   [] TA   [] neuro   [] other: Patient Education: [x] Review HEP    [] Progressed/Changed HEP based on:   [] positioning   [] body mechanics   [] transfers   [] heat/ice application    [] other:      Other Objective/Functional Measures:      Pain Level (0-10 scale) post treatment: 0    ASSESSMENT/Changes in Function:  Increased knee ROM and decreased effusion noted. Pelvic rotation due to possible deviated gait pattern causing LBP. Patient will continue to benefit from skilled PT services to modify and progress therapeutic interventions, address functional mobility deficits, address ROM deficits, address strength deficits, analyze and address soft tissue restrictions, analyze and cue movement patterns, analyze and modify body mechanics/ergonomics and assess and modify postural abnormalities to attain remaining goals.      []  See Plan of Care  []  See progress note/recertification  []  See Discharge Summary         Progress towards goals / Updated goals:    PLAN  [x]  Upgrade activities as tolerated     [x]  Continue plan of care  []  Update interventions per flow sheet       []  Discharge due to:_  []  Other:_      Thurman Angelucci, PT 7/13/2020

## 2020-07-22 ENCOUNTER — APPOINTMENT (OUTPATIENT)
Dept: PHYSICAL THERAPY | Age: 75
End: 2020-07-22
Payer: MEDICARE

## 2020-07-22 RX ORDER — AMLODIPINE BESYLATE 5 MG/1
5 TABLET ORAL
Qty: 90 TAB | Refills: 2 | Status: SHIPPED | OUTPATIENT
Start: 2020-07-22 | End: 2021-04-28

## 2020-07-24 ENCOUNTER — HOSPITAL ENCOUNTER (OUTPATIENT)
Dept: PHYSICAL THERAPY | Age: 75
Discharge: HOME | End: 2020-07-24
Payer: MEDICARE

## 2020-07-24 PROCEDURE — 97140 MANUAL THERAPY 1/> REGIONS: CPT | Performed by: PHYSICAL THERAPIST

## 2020-07-24 PROCEDURE — 97110 THERAPEUTIC EXERCISES: CPT | Performed by: PHYSICAL THERAPIST

## 2020-07-24 NOTE — PROGRESS NOTES
PT DAILY TREATMENT NOTE - [facility] 2-15    Patient Name: Sita West  Date:2020  : 1945  [x]  Patient  Verified  [place]or: [place] MED[place] / Plan: [place] MEDICARE PART A & B / Product Type: Medicare /    In time:  8:15 am Out time: 9:20 am  Total Treatment Time (min): 65  Total Timed Codes (min): 50  1:1 Treatment Time ([facility] only): 50  Visit #:  5    Treatment Area: Left knee pain [M25.562]    SUBJECTIVE  Pain Level (0-10 scale): 5-6 (Hip pain)  Any medication changes, allergies to medications, adverse drug reactions, diagnosis change, or new procedure performed?: [x] No    [] Yes (see summary sheet for update)  Subjective functional status/changes:   [] No changes reported  \"My knee is doing great, but this right hip has really been hurting. I think it may need replaced soon. \"    OBJECTIVE  Right ASIS low (possible right anterior innominate)    Effusion:   Mid patella +1 cm    Supra patella +1.5 cm    AROM:  0 to 130 degrees    Modality rationale: decrease edema, decrease inflammation and decrease pain to improve the patients ability to stand, walk, and bend.    Min Type Additional Details       [] Estim: []Att   []Unatt    []TENS instruct                  []IFC  []Premod   []NMES                     []Other:  []w/US   []w/ice   []w/heat  Position:  Location:       []  Traction: [] Cervical       []Lumbar                       [] Prone          []Supine                       []Intermittent   []Continuous Lbs:  [] before manual  [] after manual  []w/heat    []  Ultrasound: []Continuous   [] Pulsed                       at: []1MHz   []3MHz Location:  W/cm2:    [] Paraffin         Location:   []w/heat   10 [x]  Ice     []  Heat  []  Ice massage Position: Hook lying  Location:      []  Laser  []  Other: Position:  Location:      [x]  Vasopneumatic Device Pressure:       [] lo [x] med [] hi   Temperature: 34 degrees     [x] Skin assessment post-treatment:  [x]intact []redness- no adverse reaction    []redness  adverse reaction:     30 min Therapeutic Exercise:  [] See flow sheet :   Rationale: increase ROM and increase strength to improve the patients ability to stand, walk, and bend.       20 min Manual Therapy:   Patella mobs  PROM extension in supine and flexion in supine  MET with right hamstring for right anterior innominate   Rationale: decrease pain, increase ROM and increase tissue extensibility to improve the patients ability to stand, walk, and bend.          With   [x] TE   [] TA   [] neuro   [] other: Patient Education: [x] Review HEP    [] Progressed/Changed HEP based on:   [] positioning   [] body mechanics   [] transfers   [] heat/ice application    [] other:      Other Objective/Functional Measures:      Pain Level (0-10 scale) post treatment: 2    ASSESSMENT/Changes in Function:  Knee progressing well. Hip pain limiting function. Patient will continue to benefit from skilled PT services to modify and progress therapeutic interventions, address functional mobility deficits, address ROM deficits, address strength deficits, analyze and address soft tissue restrictions, analyze and cue movement patterns, analyze and modify body mechanics/ergonomics and assess and modify postural abnormalities to attain remaining goals.      []  See Plan of Care  []  See progress note/recertification  []  See Discharge Summary         Progress towards goals / Updated goals:    PLAN  [x]  Upgrade activities as tolerated     [x]  Continue plan of care  []  Update interventions per flow sheet       []  Discharge due to:_  []  Other:_      Henderson Lanes, PT 7/24/2020

## 2020-08-03 RX ORDER — CANDESARTAN 4 MG/1
4 TABLET ORAL
Qty: 90 TAB | Refills: 1 | Status: SHIPPED | OUTPATIENT
Start: 2020-08-03 | End: 2021-01-25

## 2020-08-04 RX ORDER — TERAZOSIN 10 MG/1
CAPSULE ORAL
Qty: 90 CAP | Refills: 0 | Status: SHIPPED | OUTPATIENT
Start: 2020-08-04 | End: 2020-11-12 | Stop reason: SDUPTHER

## 2020-08-05 ENCOUNTER — HOSPITAL ENCOUNTER (OUTPATIENT)
Dept: MRI IMAGING | Age: 75
Discharge: HOME | End: 2020-08-05
Attending: ORTHOPAEDIC SURGERY
Payer: MEDICARE

## 2020-08-05 DIAGNOSIS — S73.191A TEAR OF RIGHT ACETABULAR LABRUM: ICD-10-CM

## 2020-08-05 PROCEDURE — 73721 MRI JNT OF LWR EXTRE W/O DYE: CPT

## 2020-08-10 ENCOUNTER — TELEPHONE (OUTPATIENT)
Dept: INTERNAL MEDICINE CLINIC | Age: 75
End: 2020-08-10

## 2020-08-11 ENCOUNTER — HOSPITAL ENCOUNTER (OUTPATIENT)
Dept: MRI IMAGING | Age: 75
Discharge: HOME | End: 2020-08-11
Attending: ORTHOPAEDIC SURGERY
Payer: MEDICARE

## 2020-08-11 ENCOUNTER — TELEPHONE (OUTPATIENT)
Dept: INTERNAL MEDICINE CLINIC | Age: 75
End: 2020-08-11

## 2020-08-11 DIAGNOSIS — M54.16 RIGHT LUMBAR RADICULITIS: ICD-10-CM

## 2020-08-11 PROCEDURE — 72148 MRI LUMBAR SPINE W/O DYE: CPT

## 2020-08-11 NOTE — TELEPHONE ENCOUNTER
Left message with Dr. Neal Dan / Urology office for return call to Dr Leda Joseph (cell # provided) per Dr. Ruby Camarena request in regards to this patient.

## 2020-08-12 ENCOUNTER — TELEPHONE (OUTPATIENT)
Dept: INTERNAL MEDICINE CLINIC | Age: 75
End: 2020-08-12

## 2020-08-12 DIAGNOSIS — R19.00 PELVIC MASS: Primary | ICD-10-CM

## 2020-08-12 DIAGNOSIS — C61 PROSTATE CANCER METASTATIC TO INTRAPELVIC LYMPH NODE (HCC): ICD-10-CM

## 2020-08-12 DIAGNOSIS — C77.5 PROSTATE CANCER METASTATIC TO INTRAPELVIC LYMPH NODE (HCC): ICD-10-CM

## 2020-08-12 NOTE — TELEPHONE ENCOUNTER
Spoke with patient an advised order placed. Request  # sent via [employer] as he is driving at this time.

## 2020-08-12 NOTE — TELEPHONE ENCOUNTER
Orders Placed This Encounter    CT BX GUIDED NDL     Standing Status:   Future     Standing Expiration Date:   8/12/2021     Order Specific Question:   Specific Body Part     Answer:   right pelvic mass     The above orders were approved via [facility] per Dr. Darryl Millan, III.

## 2020-08-12 NOTE — TELEPHONE ENCOUNTER
Called patient about his recent MRI of hip with a 5 cm mass and some ? Invasion to the hip joint. Discussed case with his urologist Dr. Fausto Gallegos yesterday. He had a scan one year ago with smaller nodes in this area and high PSA with recurrence of CA. Now PSA is 0 and he is on Lupron. All agree he should have CT guided biopsy of the area to decide about treatment. He agrees and we will arrange.

## 2020-08-14 ENCOUNTER — HOSPITAL ENCOUNTER (OUTPATIENT)
Dept: PREADMISSION TESTING | Age: 75
Discharge: HOME | End: 2020-08-14
Payer: MEDICARE

## 2020-08-14 DIAGNOSIS — Z01.812 PRE-PROCEDURE LAB EXAM: ICD-10-CM

## 2020-08-14 PROCEDURE — 87635 SARS-COV-2 COVID-19 AMP PRB: CPT

## 2020-08-15 LAB — SARS-COV-2, COV2NT: NOT DETECTED

## 2020-08-18 ENCOUNTER — HOSPITAL ENCOUNTER (OUTPATIENT)
Dept: INTERVENTIONAL RADIOLOGY/VASCULAR | Age: 75
Discharge: HOME | End: 2020-08-18
Attending: ORTHOPAEDIC SURGERY | Admitting: ORTHOPAEDIC SURGERY
Payer: MEDICARE

## 2020-08-18 VITALS
HEIGHT: 72 IN | TEMPERATURE: 98.7 F | SYSTOLIC BLOOD PRESSURE: 148 MMHG | BODY MASS INDEX: 30.48 KG/M2 | DIASTOLIC BLOOD PRESSURE: 82 MMHG | HEART RATE: 59 BPM | RESPIRATION RATE: 18 BRPM | WEIGHT: 225 LBS | OXYGEN SATURATION: 98 %

## 2020-08-18 DIAGNOSIS — M54.16 RIGHT LUMBAR RADICULITIS: ICD-10-CM

## 2020-08-18 PROCEDURE — 74011636320 HC RX REV CODE- 636/320: Performed by: RADIOLOGY

## 2020-08-18 PROCEDURE — 74011000250 HC RX REV CODE- 250: Performed by: RADIOLOGY

## 2020-08-18 PROCEDURE — 74011250636 HC RX REV CODE- 250/636: Performed by: RADIOLOGY

## 2020-08-18 PROCEDURE — 64483 NJX AA&/STRD TFRM EPI L/S 1: CPT

## 2020-08-18 RX ORDER — LIDOCAINE HYDROCHLORIDE 10 MG/ML
5 INJECTION, SOLUTION EPIDURAL; INFILTRATION; INTRACAUDAL; PERINEURAL
Status: COMPLETED | OUTPATIENT
Start: 2020-08-18 | End: 2020-08-18

## 2020-08-18 RX ORDER — DEXAMETHASONE SODIUM PHOSPHATE 10 MG/ML
10 INJECTION INTRAMUSCULAR; INTRAVENOUS ONCE
Status: COMPLETED | OUTPATIENT
Start: 2020-08-18 | End: 2020-08-18

## 2020-08-18 RX ADMIN — IOHEXOL 1 ML: 180 INJECTION INTRAVENOUS at 07:59

## 2020-08-18 RX ADMIN — DEXAMETHASONE SODIUM PHOSPHATE 10 MG: 10 INJECTION, SOLUTION INTRAMUSCULAR; INTRAVENOUS at 08:00

## 2020-08-18 RX ADMIN — LIDOCAINE HYDROCHLORIDE 5 ML: 10 INJECTION, SOLUTION EPIDURAL; INFILTRATION; INTRACAUDAL; PERINEURAL at 07:59

## 2020-08-18 NOTE — PROGRESS NOTES
Pt arrives ambulatory to angio department accompanied by self for Angio procedure. All assessments completed and consent was reviewed. Education given was regarding procedure, no sedation, post-procedure care and  management/follow-up. Opportunity for questions was provided and all questions and concerns were addressed. Dr. Pam Pina in to talk with patient about procedure. Consent obtained and signed.

## 2020-08-18 NOTE — DISCHARGE INSTRUCTIONS
[address] [facility]  Department of Interventional Radiology  [phone]  Steroid Injection Discharge Instructions    General Information:   A steroid injection was performed today, placing a combination of a steroid and an anesthetic (numbing medicine) into the space around the nerves of your spine. This is done to treat back pain. It may take 7-10 days for the injection to reach its full potential.  This procedure can be done at any level of the spinal column, depending on where your pain is. Your doctor will have ordered the appropriate level to be treated prior to your coming in for the procedure. Home Care Instructions: You can resume your regular diet. Do not drink alcohol. You may notice that you have to use your pain medications less after your injection. Some people do not notice much of a change in their pain after the first injection. If that is the case, it is worth your time to have a second one done. This is why these injections are sometimes ordered in a series of three. Keep the puncture site clean and dry for 24 hours, and then you may remove the dressing. Showering is acceptable after the bandage is removed. Call If:   You should call your Physician and/or the Radiology Nurse if you have any bleeding other than a small spot on your bandage. Call if you have any signs of infection, fever, increased pain at the puncture site, confusion, or a headache that worsens when you stand and eases when lying flat. Follow-Up Instructions:  Please see your ordering doctor as he/she has requested. Let your doctor know if you have relief from your pain so they may schedule another injection for you if it is indicated. Steroidal Injection      Go home and rest.     No vigorous physical activity today. Be aware that numbness and/or tingling can occur up to 24 hours after the injection. No driving today.     Resume your previous diet.    Resume your previous medications. Depending on your job, you may return to work in 25 to 48 hours. It may take up to one week after the injection to see a change or an improvement in your symptoms. Be sure to follow up with your physician. Tell your physician if the injection helped with your symptoms or if the injection did nothing for your symptoms. For minor discomfort, you can take Tylenol, as directed on the label. Side effects of sedation medications and other medications used today have been reviewed. Notify us of nausea, itching, hives, dizziness, or anything else out of the ordinary. Should you experience any of these significant changes, please call [phone] between the hours of 7:30 am and 10 pm or [phone] after hours.  After hours, ask the  to page the [address] Technologist, and describe the problem to the technologist.          Patient Signature:  Date: 8/18/2020  Discharging Nurse: Oanh Joseph RN

## 2020-08-18 NOTE — PROGRESS NOTES
0820 am- Discharge instructions reviewed with patient with good understanding. Patient signed hard copy of discharge instructions and copy given to patient upon leaving. Band aid to right lower back remains dry and intact.

## 2020-08-20 ENCOUNTER — HOSPITAL ENCOUNTER (OUTPATIENT)
Dept: CT IMAGING | Age: 75
Discharge: HOME | End: 2020-08-20
Attending: INTERNAL MEDICINE
Payer: MEDICARE

## 2020-08-20 VITALS
RESPIRATION RATE: 16 BRPM | SYSTOLIC BLOOD PRESSURE: 155 MMHG | DIASTOLIC BLOOD PRESSURE: 61 MMHG | TEMPERATURE: 98.3 F | WEIGHT: 225 LBS | BODY MASS INDEX: 30.52 KG/M2 | HEART RATE: 57 BPM | OXYGEN SATURATION: 100 %

## 2020-08-20 PROCEDURE — 77030014115

## 2020-08-20 PROCEDURE — 74011000250 HC RX REV CODE- 250

## 2020-08-20 PROCEDURE — 77030003666 HC NDL SPINAL BD -A

## 2020-08-20 PROCEDURE — 77012 CT SCAN FOR NEEDLE BIOPSY: CPT

## 2020-08-20 PROCEDURE — 77030020268 HC MISC GENERAL SUPPLY

## 2020-08-20 PROCEDURE — 88342 IMHCHEM/IMCYTCHM 1ST ANTB: CPT

## 2020-08-20 PROCEDURE — 74011000258 HC RX REV CODE- 258: Performed by: RADIOLOGY

## 2020-08-20 PROCEDURE — 74011250636 HC RX REV CODE- 250/636: Performed by: RADIOLOGY

## 2020-08-20 PROCEDURE — 88333 PATH CONSLTJ SURG CYTO XM 1: CPT

## 2020-08-20 PROCEDURE — 88305 TISSUE EXAM BY PATHOLOGIST: CPT

## 2020-08-20 PROCEDURE — 88360 TUMOR IMMUNOHISTOCHEM/MANUAL: CPT

## 2020-08-20 PROCEDURE — 77030002996 HC SUT SLK J&J -A

## 2020-08-20 PROCEDURE — 88377 M/PHMTRC ALYS ISHQUANT/SEMIQ: CPT

## 2020-08-20 PROCEDURE — 38221 DX BONE MARROW BIOPSIES: CPT

## 2020-08-20 PROCEDURE — 88341 IMHCHEM/IMCYTCHM EA ADD ANTB: CPT

## 2020-08-20 RX ORDER — SODIUM CHLORIDE 9 MG/ML
50 INJECTION, SOLUTION INTRAVENOUS CONTINUOUS
Status: DISCONTINUED | OUTPATIENT
Start: 2020-08-20 | End: 2020-08-20

## 2020-08-20 RX ORDER — MIDAZOLAM HYDROCHLORIDE 1 MG/ML
5 INJECTION, SOLUTION INTRAMUSCULAR; INTRAVENOUS
Status: DISCONTINUED | OUTPATIENT
Start: 2020-08-20 | End: 2020-08-20

## 2020-08-20 RX ORDER — LIDOCAINE HYDROCHLORIDE 10 MG/ML
INJECTION, SOLUTION EPIDURAL; INFILTRATION; INTRACAUDAL; PERINEURAL
Status: COMPLETED
Start: 2020-08-20 | End: 2020-08-20

## 2020-08-20 RX ORDER — FENTANYL CITRATE 50 UG/ML
100 INJECTION, SOLUTION INTRAMUSCULAR; INTRAVENOUS
Status: DISCONTINUED | OUTPATIENT
Start: 2020-08-20 | End: 2020-08-20

## 2020-08-20 RX ADMIN — LIDOCAINE HYDROCHLORIDE 18 ML: 10 INJECTION, SOLUTION EPIDURAL; INFILTRATION; INTRACAUDAL; PERINEURAL at 11:50

## 2020-08-20 RX ADMIN — MIDAZOLAM HYDROCHLORIDE 0.5 MG: 1 INJECTION, SOLUTION INTRAMUSCULAR; INTRAVENOUS at 11:20

## 2020-08-20 RX ADMIN — SODIUM CHLORIDE 50 ML/HR: 900 INJECTION, SOLUTION INTRAVENOUS at 11:00

## 2020-08-20 RX ADMIN — FENTANYL CITRATE 12.5 MCG: 50 INJECTION INTRAMUSCULAR; INTRAVENOUS at 11:24

## 2020-08-20 RX ADMIN — FENTANYL CITRATE 12.5 MCG: 50 INJECTION INTRAMUSCULAR; INTRAVENOUS at 11:20

## 2020-08-20 NOTE — ROUTINE PROCESS
Pt arrives ambulatory to angio department accompanied by wife for CT guided pelvic bone mass biopsy procedure. All assessments completed and consent was reviewed. Education given was regarding procedure, conscious sedation, post-procedure care and  management/follow-up. Opportunity for questions was provided and all questions and concerns were addressed.

## 2020-08-20 NOTE — PROGRESS NOTES
Pt discharge instructions were given to pt and pt wife byRN. Opportunities for questions and concerns were provided. Pt verbalized understanding of medication use and follow-up. IV dc'd, pt wheeled off unit in no signs of distress, steady gait noted. Pt was counseled on follow-up with cardiology regarding bradycardia.

## 2020-08-20 NOTE — H&P
Radiology History and Physical    Patient: Tom Campos 70 y.o. male       Chief Complaint: No chief complaint on file. History of Present Illness: pelvic mass for CT guided biopsy. Hisory of prostate cancer.     History:    Past Medical History:   Diagnosis Date    Arthritis     BPH (benign prostatic hypertrophy) 7/18/2011    Cancer (HCC)     prostate cancer    Chronic pain right    knee    ED (erectile dysfunction)     GERD (gastroesophageal reflux disease)     HTN (hypertension), benign     OA (osteoarthritis) of knee 7/18/2011    BOTH KNEES    Unspecified essential hypertension 7/18/2011     Family History   Problem Relation Age of Onset    Bleeding Prob Father         clotting disorder    Cancer Brother         colon CA 1/2 brother    Thyroid Disease Daughter     Heart Failure Mother     Heart Disease Mother     Anesth Problems Neg Hx      Social History     Socioeconomic History    Marital status:      Spouse name: Not on file    Number of children: Not on file    Years of education: Not on file    Highest education level: Not on file   Occupational History    Not on file   Social Needs    Financial resource strain: Not on file    Food insecurity     Worry: Not on file     Inability: Not on file    Transportation needs     Medical: Not on file     Non-medical: Not on file   Tobacco Use    Smoking status: Never Smoker    Smokeless tobacco: Never Used   Substance and Sexual Activity    Alcohol use: No    Drug use: No    Sexual activity: Yes     Partners: Female     Birth control/protection: None   Lifestyle    Physical activity     Days per week: Not on file     Minutes per session: Not on file    Stress: Not on file   Relationships    Social connections     Talks on phone: Not on file     Gets together: Not on file     Attends Zoroastrian service: Not on file     Active member of club or organization: Not on file     Attends meetings of clubs or organizations: Not on file     Relationship status: Not on file    Intimate partner violence     Fear of current or ex partner: Not on file     Emotionally abused: Not on file     Physically abused: Not on file     Forced sexual activity: Not on file   Other Topics Concern    Not on file   Social History Narrative    Not on file       Allergies: Allergies   Allergen Reactions    Barbiturates Anxiety     Hyper         Current Medications:  Current Outpatient Medications   Medication Sig    terazosin (HYTRIN) 10 mg capsule Take 1 capsule by mouth once daily    candesartan (ATACAND) 4 mg tablet Take 1 Tab by mouth nightly.  amLODIPine (NORVASC) 5 mg tablet Take 1 Tab by mouth nightly.  traZODone (DESYREL) 50 mg tablet TAKE 1 TO 2 TABLETS BY MOUTH ONCE DAILY IN THE EVENING AS NEEDED FOR SLEEP    aspirin delayed-release 81 mg tablet Take 1 Tab by mouth two (2) times a day. Indications: blood clot prevention    senna-docusate (PERICOLACE) 8.6-50 mg per tablet Take 1 Tab by mouth two (2) times daily as needed for Constipation.  acetaminophen (TYLENOL) 500 mg tablet Take 1 Tab by mouth every four (4) hours.  calcium carbonate/vitamin D3 (CALTRATE-600 PLUS VITAMIN D3 PO) Take 1 Tab by mouth nightly.  leuprolide acetate (LUPRON DEPOT, 6 MONTH, IM) 1 Each by IntraMUSCular route every 6 months. No current facility-administered medications for this encounter. Physical Exam:  Blood pressure 164/65, pulse (!) 47, temperature 98.7 °F (37.1 °C), resp. rate 14, weight 102.1 kg (225 lb), SpO2 100 %. GENERAL: alert, cooperative, no distress, appears stated age, LUNG: clear to auscultation bilaterally, HEART: regular rate and rhythm      Alerts:    Hospital Problems  Date Reviewed: 6/9/2020    None          Laboratory:    No results for input(s): HGB, HCT, WBC, PLT, INR, BUN, CREA, K, CRCLT, HGBEXT, HCTEXT, PLTEXT, INREXT in the last 72 hours.     No lab exists for component: PTT, PT      Plan of Care/Planned Procedure:  Risks, benefits, and alternatives reviewed with patient and he agrees to proceed with the procedure.        Kanchan Mcintyre MD

## 2020-08-20 NOTE — DISCHARGE INSTRUCTIONS
Patient Education        Bone Biopsy: What to Expect at Home  Your Recovery  A bone biopsy is a procedure in which a small sample of bone is taken from the body and looked at under a microscope for cancer, infection, or other bone disorders. There are two types of bone biopsies: closed (or needle) biopsy and open biopsy. In either case, be sure to have someone drive you home afterwards. If you had a closed or needle biopsy, a needle was inserted through the skin and into the bone. You may go home shortly after the procedure. If you got a sedative, you may need to stay longer. The biopsy site may be sore and tender for up to a week. If you had an open biopsy, a cut (incision) was made through the skin to expose an area of the bone. You may need to stay in the hospital overnight. The biopsy site may be sore and tender for up to a week. This care sheet gives you a general idea about how long it will take for you to recover. But each person recovers at a different pace. Follow the steps below to get better as quickly as possible. How can you care for yourself at home? Activity  · Rest when you feel tired. · You can do your normal activities when it feels okay to do so. · Be active. Walking is a good choice. · Your doctor may have you avoid certain activities for a while based on where the biopsy was done and whether it was open or closed. Diet  · You can eat your normal diet. If your stomach is upset, try bland, low-fat foods like plain rice, broiled chicken, toast, and yogurt. · If your bowel movements are not regular right after the procedure, try to avoid constipation and straining. Drink plenty of water. Your doctor may suggest fiber, a stool softener, or a mild laxative. Medicines  · Be safe with medicines. Read and follow all instructions on the label. ? If the doctor gave you a prescription medicine for pain, take it as prescribed.   ? If you are not taking a prescription pain medicine, ask your doctor if you can take an over-the-counter medicine. · If you take aspirin or some other blood thinner, ask your doctor if and when to start taking it again. Make sure that you understand exactly what your doctor wants you to do. · Your doctor will tell you if and when you can restart your medicines. He or she will also give you instructions about taking any new medicines. Incision care  · You will have a dressing over the biopsy site. A dressing helps the site heal and protects it. Your doctor will tell you how to take care of this. · If you have strips of tape on the biopsy site, leave the tape on for a week or until it falls off. · If you had stitches, your doctor will tell you when to come back to have them removed. · Change the bandage every day. · Wash the biopsy site daily with warm, soapy water, and pat it dry. Don't use hydrogen peroxide or alcohol. They can slow healing. · You may shower 24 to 48 hours after the procedure. Pat the biopsy site dry. Don't swim or take a bath for the first 2 weeks, or until your doctor tells you it is okay. Follow-up care is a key part of your treatment and safety. Be sure to make and go to all appointments, and call your doctor if you are having problems. It's also a good idea to know your test results and keep a list of the medicines you take. When should you call for help? [id number] anytime you think you may need emergency care. For example, call if:  · You passed out (lost consciousness). Call your doctor now or seek immediate medical care if:  · You are bleeding through your dressing. A small amount of blood is normal.  · You have symptoms of infection, such as:  ? Increased pain, swelling, warmth, or redness. ? Red streaks leading from the biopsy site. ? Pus draining from the site. ? A fever. Watch closely for changes in your health, and be sure to contact your doctor if:  · Your biopsy site comes open. · You are not getting better as expected.   Where can you learn more? Go to http://tabitha-makayla.info/  Enter H876 in the search box to learn more about \"Bone Biopsy: What to Expect at Home. \"  Current as of: December 9, 2019               Content Version: 12.5  © 6790-9592 [employer]. Care instructions adapted under license by [employer] (which disclaims liability or warranty for this information). If you have questions about a medical condition or this instruction, always ask your healthcare professional. [address] any warranty or liability for your use of this information. You have received sedation medications today. YOU SHOULD NOT DRIVE FOR 24 HOURS, DO NOT OPERATE HEAVY MACHINERY, DO NOT MAKE ANY LEGAL DECISIONS OR SIGN LEGAL DOCUMENTS FOR 24 HOURS. DO NOT DRINK ALCOHOL, TAKE ANY MEDICATIONS UNLESS PRESCRIBED BY YOUR DOCTOR. IF YOU ARE A CAREGIVER, SOMEONE SHOULD TAKE THAT ROLE FOR 24 HOURS. Side effects of sedation medications and other medications used today have been reviewed  Those side effects can include but are not limited to: dizziness, drowsiness, poor balance, fatigue, sleepiness. Take precautions at home to prevent falls, such as assistance with walking or stairs if allowed and /or when needed or position changes. Allergic or adverse reactions could include nausea, itching, hives, dizziness, or anything else out of the ordinary. Should you experience any of these significant changes, please call [phone] between the hours of 7:30 am and 10 pm or [phone] after hours. After hours, ask the  to page the X-ray Technologist, and describe the problem to the technologist. If you are experiencing chest pain, shortness of breath, altered mental state, unusual bleeding or any other emergent symptom you should call 911 immediately.

## 2020-08-20 NOTE — PROGRESS NOTES
RN notified Dr Gevena Sacks of Bradycardia, Dr Gevena Sacks at bedside to discuss procedure options.

## 2020-08-24 ENCOUNTER — OFFICE VISIT (OUTPATIENT)
Dept: INTERNAL MEDICINE CLINIC | Age: 75
End: 2020-08-24
Payer: MEDICARE

## 2020-08-24 VITALS
SYSTOLIC BLOOD PRESSURE: 116 MMHG | WEIGHT: 226 LBS | RESPIRATION RATE: 18 BRPM | HEART RATE: 69 BPM | DIASTOLIC BLOOD PRESSURE: 72 MMHG | OXYGEN SATURATION: 97 % | TEMPERATURE: 96.4 F | BODY MASS INDEX: 30.61 KG/M2 | HEIGHT: 72 IN

## 2020-08-24 PROCEDURE — G8419 CALC BMI OUT NRM PARAM NOF/U: HCPCS | Performed by: INTERNAL MEDICINE

## 2020-08-24 PROCEDURE — G8752 SYS BP LESS 140: HCPCS | Performed by: INTERNAL MEDICINE

## 2020-08-24 PROCEDURE — G8427 DOCREV CUR MEDS BY ELIG CLIN: HCPCS | Performed by: INTERNAL MEDICINE

## 2020-08-24 PROCEDURE — 1101F PT FALLS ASSESS-DOCD LE1/YR: CPT | Performed by: INTERNAL MEDICINE

## 2020-08-24 PROCEDURE — G8432 DEP SCR NOT DOC, RNG: HCPCS | Performed by: INTERNAL MEDICINE

## 2020-08-24 PROCEDURE — 3017F COLORECTAL CA SCREEN DOC REV: CPT | Performed by: INTERNAL MEDICINE

## 2020-08-24 PROCEDURE — G8754 DIAS BP LESS 90: HCPCS | Performed by: INTERNAL MEDICINE

## 2020-08-24 PROCEDURE — G0463 HOSPITAL OUTPT CLINIC VISIT: HCPCS | Performed by: INTERNAL MEDICINE

## 2020-08-24 PROCEDURE — G8536 NO DOC ELDER MAL SCRN: HCPCS | Performed by: INTERNAL MEDICINE

## 2020-08-24 PROCEDURE — 99214 OFFICE O/P EST MOD 30 MIN: CPT | Performed by: INTERNAL MEDICINE

## 2020-08-24 RX ORDER — IBUPROFEN 200 MG
TABLET ORAL
COMMUNITY
End: 2021-01-01

## 2020-08-24 NOTE — PROGRESS NOTES
HPI:  Saul Bennett is a 70y.o. year old male who is here for a follow-up visit after recent biopsy of pelvic lymph nodes. 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 He has a history of recurrent prostate cancer but recently his PSAs have been 0. He has had recent knee replacement surgery. This was complicated by right hip and leg pain. MRI of the pelvis revealed lymph nodes that were abnormal prompting this biopsy. He had a back injection done last week which has significantly helped his back and right leg pain. He is otherwise been feeling well. No prior history of cancer other than the above. Past Medical History:   Diagnosis Date    Arthritis     BPH (benign prostatic hypertrophy) 7/18/2011    Cancer [facility])     prostate cancer    Chronic pain right    knee    ED (erectile dysfunction)     GERD (gastroesophageal reflux disease)     HTN (hypertension), benign     OA (osteoarthritis) of knee 7/18/2011    BOTH KNEES    Unspecified essential hypertension 7/18/2011       Past Surgical History:   Procedure Laterality Date    HX ACL RECONSTRUCTION Right     x2    HX APPENDECTOMY  1975    HX GI  1/2015    Drainage of hemorrhoid    HX HERNIA REPAIR      left and right,  ventral    HX HIP REPLACEMENT  2000    left    HX HIP REPLACEMENT  2000    left    HX KNEE ARTHROSCOPY Right     x3    HX ORTHOPAEDIC Left 6/2000    HIP REPLACEMENT    HX ORTHOPAEDIC Right 08/05/2014    TKR - Dr. Patricia Austin  11/07/2011    prostate removed     HX TONSILLECTOMY      CHILD    IR INJ FORAMIN EPID LUMB ANES/STER SNGL  8/18/2020       Prior to Admission medications    Medication Sig Start Date End Date Taking? Authorizing Provider   ibuprofen (AdviL) 200 mg tablet Take  by mouth.    Yes Provider, Historical   terazosin (HYTRIN) 10 mg capsule Take 1 capsule by mouth once daily 8/4/20  Yes Teddy ARNOLD, NP   candesartan (ATACAND) 4 mg tablet Take 1 Tab by mouth nightly. 8/3/20  Yes Suzy Sen MD   amLODIPine (NORVASC) 5 mg tablet Take 1 Tab by mouth nightly. 7/22/20  Yes Suzy Sen MD   traZODone (DESYREL) 50 mg tablet TAKE 1 TO 2 TABLETS BY MOUTH ONCE DAILY IN THE EVENING AS NEEDED FOR SLEEP 6/23/20  Yes Mariaelena Palomo III, MD   calcium carbonate/vitamin D3 (CALTRATE-600 PLUS VITAMIN D3 PO) Take 1 Tab by mouth nightly. Yes Provider, Historical   leuprolide acetate (LUPRON DEPOT, 6 MONTH, IM) 1 Each by IntraMUSCular route every 6 months. Yes Provider, Historical   aspirin delayed-release 81 mg tablet Take 1 Tab by mouth two (2) times a day. Indications: blood clot prevention 6/9/20   Johann MCGINNIS PA-C   senna-docusate (PERICOLACE) 8.6-50 mg per tablet Take 1 Tab by mouth two (2) times daily as needed for Constipation. 6/9/20   Johann Martin PA-C   acetaminophen (TYLENOL) 500 mg tablet Take 1 Tab by mouth every four (4) hours.  6/9/20   Johann Martin PA-C       Social History     Socioeconomic History    Marital status:      Spouse name: Not on file    Number of children: Not on file    Years of education: Not on file    Highest education level: Not on file   Occupational History    Not on file   Social Needs    Financial resource strain: Not on file    Food insecurity     Worry: Not on file     Inability: Not on file    Transportation needs     Medical: Not on file     Non-medical: Not on file   Tobacco Use    Smoking status: Never Smoker    Smokeless tobacco: Never Used   Substance and Sexual Activity    Alcohol use: No    Drug use: No    Sexual activity: Yes     Partners: Female     Birth control/protection: None   Lifestyle    Physical activity     Days per week: Not on file     Minutes per session: Not on file    Stress: Not on file   Relationships    Social connections     Talks on phone: Not on file     Gets together: Not on file     Attends Taoist service: Not on file     Active member of club or organization: Not on file     Attends meetings of clubs or organizations: Not on file     Relationship status: Not on file    Intimate partner violence     Fear of current or ex partner: Not on file     Emotionally abused: Not on file     Physically abused: Not on file     Forced sexual activity: Not on file   Other Topics Concern    Not on file   Social History Narrative    Not on file          ROS  Per HPI    Visit Vitals  /72 (BP 1 Location: Left arm)   Pulse 69   Temp (!) 96.4 °F (35.8 °C) (Temporal)   Resp 18   Ht 6' (1.829 m)   Wt 226 lb (102.5 kg)   SpO2 97%   BMI 30.65 kg/m²         Physical Exam   Physical Examination: General appearance - alert, well appearing, and in no distress      Assessment/Plan:  Diagnoses and all orders for this visit:    1. Metastatic squamous cell carcinoma (HCC)we will refer to medical oncology for evaluation and treatment. He has previous experience with Dr. Jerson De La Garza and will refer to her. 2.  Prostate cancerappears in remission. 3.  Lumbar radiculopathyimproved with injection and will continue to follow-up with orthopedics. Advised him to call back or return to office if symptoms worsen/change/persist.  Discussed expected course/resolution/complications of diagnosis in detail with patient. Medication risks/benefits/costs/interactions/alternatives discussed with patient. He was given an after visit summary which includes diagnoses, current medications, & vitals. He expressed understanding with the diagnosis and plan.

## 2020-08-25 ENCOUNTER — DOCUMENTATION ONLY (OUTPATIENT)
Dept: ONCOLOGY | Age: 75
End: 2020-08-25

## 2020-08-25 NOTE — PROGRESS NOTES
Case d/w PCP   of a prior pt of ours  Pt has hx of prostate cancer   Now has pelvic LAD and biopsy of this shows squamous cell cancer/ not prostate  Pt does not have any other known cancers  Will do PET or body CTs  Needs to see us for new patient appt  Can add him on wed at noon or Friday if he wants to come in then or next week  Whatever works for him

## 2020-08-26 DIAGNOSIS — R19.00 PELVIC MASS: ICD-10-CM

## 2020-08-26 DIAGNOSIS — C61 PROSTATE CANCER METASTATIC TO INTRAPELVIC LYMPH NODE (HCC): ICD-10-CM

## 2020-08-26 DIAGNOSIS — C77.5 PROSTATE CANCER METASTATIC TO INTRAPELVIC LYMPH NODE (HCC): ICD-10-CM

## 2020-08-30 RX ORDER — TRAZODONE HYDROCHLORIDE 50 MG/1
TABLET ORAL
Qty: 60 TAB | Refills: 0 | Status: SHIPPED | OUTPATIENT
Start: 2020-08-30 | End: 2020-10-20

## 2020-08-31 ENCOUNTER — DOCUMENTATION ONLY (OUTPATIENT)
Dept: ONCOLOGY | Age: 75
End: 2020-08-31

## 2020-08-31 ENCOUNTER — OFFICE VISIT (OUTPATIENT)
Dept: ONCOLOGY | Age: 75
End: 2020-08-31
Payer: MEDICARE

## 2020-08-31 ENCOUNTER — HOSPITAL ENCOUNTER (OUTPATIENT)
Dept: LAB | Age: 75
Discharge: HOME | End: 2020-08-31
Payer: MEDICARE

## 2020-08-31 VITALS
BODY MASS INDEX: 31.23 KG/M2 | SYSTOLIC BLOOD PRESSURE: 149 MMHG | HEART RATE: 70 BPM | OXYGEN SATURATION: 97 % | HEIGHT: 72 IN | TEMPERATURE: 96 F | DIASTOLIC BLOOD PRESSURE: 82 MMHG | WEIGHT: 230.6 LBS

## 2020-08-31 DIAGNOSIS — R05.9 COUGH IN ADULT: ICD-10-CM

## 2020-08-31 DIAGNOSIS — M25.551 RIGHT HIP PAIN: ICD-10-CM

## 2020-08-31 DIAGNOSIS — C61 PROSTATE CANCER METASTATIC TO INTRAPELVIC LYMPH NODE (HCC): ICD-10-CM

## 2020-08-31 DIAGNOSIS — C77.5 PROSTATE CANCER METASTATIC TO INTRAPELVIC LYMPH NODE (HCC): ICD-10-CM

## 2020-08-31 PROCEDURE — 80053 COMPREHEN METABOLIC PANEL: CPT

## 2020-08-31 PROCEDURE — 36415 COLL VENOUS BLD VENIPUNCTURE: CPT

## 2020-08-31 PROCEDURE — 3017F COLORECTAL CA SCREEN DOC REV: CPT | Performed by: INTERNAL MEDICINE

## 2020-08-31 PROCEDURE — 1101F PT FALLS ASSESS-DOCD LE1/YR: CPT | Performed by: INTERNAL MEDICINE

## 2020-08-31 PROCEDURE — G8536 NO DOC ELDER MAL SCRN: HCPCS | Performed by: INTERNAL MEDICINE

## 2020-08-31 PROCEDURE — 99205 OFFICE O/P NEW HI 60 MIN: CPT | Performed by: INTERNAL MEDICINE

## 2020-08-31 PROCEDURE — G8432 DEP SCR NOT DOC, RNG: HCPCS | Performed by: INTERNAL MEDICINE

## 2020-08-31 PROCEDURE — 85025 COMPLETE CBC W/AUTO DIFF WBC: CPT

## 2020-08-31 PROCEDURE — G8427 DOCREV CUR MEDS BY ELIG CLIN: HCPCS | Performed by: INTERNAL MEDICINE

## 2020-08-31 PROCEDURE — G0463 HOSPITAL OUTPT CLINIC VISIT: HCPCS | Performed by: INTERNAL MEDICINE

## 2020-08-31 PROCEDURE — G8419 CALC BMI OUT NRM PARAM NOF/U: HCPCS | Performed by: INTERNAL MEDICINE

## 2020-08-31 PROCEDURE — G8754 DIAS BP LESS 90: HCPCS | Performed by: INTERNAL MEDICINE

## 2020-08-31 PROCEDURE — G8753 SYS BP > OR = 140: HCPCS | Performed by: INTERNAL MEDICINE

## 2020-08-31 NOTE — PROGRESS NOTES
This note will not be viewable in [address] Oncology Navigator  Psychosocial Assessment    Reason for Assessment:    []Depression  []Anxiety  []Caregiver [place]  []Maladaptive Coping with Serious Illness   [] Social Work Referral [x] Initial Assessment  [] Other     Sources of Information:    [x]Patient  [x]Family  []Staff  []Medical Record    Advance Care Planning:  Advance Care Planning 6/10/2020   Confirm Advance Directive Yes, on file   Does the patient have other document types -   Patient has an AMD on file.       Mental Status:    [x]Alert  []Lethargic  []Unresponsive   [] Unable to assess   Oriented to:  [x]Person  [x]Place  [x]Time  [x]Situation      Barriers to Learning:    []Language  []Developmental  []Cognitive  []Altered Mental Status  []Visual/Hearing Impairment  []Unable to Read/Write  []Motivational   [x]No Barriers Identified  []Other:    Relationship Status:  []Single  [x]  []Significant Other/Life Partner  []  []  [x]      Living Circumstances:  []Lives Alone  [x]Family/Significant Other in Household  []Roommates  []Children in the Home  []Paid Caregivers  []Assisted Living Facility/Group Home  []Skilled [address]  []Homeless  []Incarcerated  []Environmental/Care Concerns  []other:    Employment Status:  []Employed Full-time []Employed Part-time []Homemaker [] Disabled  [x] Retired []Other:    Support System:    [x]Strong  []Fair  []Limited    Financial/Legal Concerns:    []Uninsured  []Limited Income/Resources  []Non-Citizen  [x]No Concerns Identified  []Financial POA:    []Other:    [place]/Spiritual/Existential:  []Strong Sense of Spirituality  []Involved in [facility]  []Request  Visit  []Expressing David Carline  [x]No Concerns Identified    Coping with Illness:         Patient: Family/Caregiver:   Understanding and Acceptance of Illness/Prognosis  [x] [x]   Strong Sense of Resilience [x] [x]   Self Reflection [x] [x] Engaged Support System [x] [x]   Does not Readily Discuss Illness [] []   Denial of Terminal Status [] []   Anger [] []   Depression [] []   Anxiety/Fear [] []   Bargaining [] []   Recent Diagnosis/Prognosis [] []   Difficulties with Body Image [] []   Loss of Identity [] []   Excessive Substance Use [] []   Mental Health History [] []   Enmeshed Relationships [] []   History of Loss [] []   Anticipatory Grief [] []   Concern for Complicated Grief [] []   Suicidal Ideation or Plan [] []   Unable to assess [] []            Narrative:   Met with the patient and his wife Lee Yanes during his initial office visit today. Patient has a history of recurrent prostate cancer. He is being seen for pelvic mass. Patient lives with his wife. He explained that Wendy Montes De Oca is his second wife. His first wife passed away from a GBM a few years ago, and was a patient of this practice. He has two daughters who live locally and are supportive. The patient is retired. The patient has a PCP - Dr. Max Carreno III. He has a Urologist - Dr. Leidy Lou. Provided this 's contact information as well as information regarding the complementary services provided by the [facility], explaining that the center is currently closed due to COVID-19 restrictions. Explained that meditation and music therapy are both being offered virtually. Assessment/Action:   1. Introduced self and role of this  in the [address] [place] [address] Dr. 2.  Informed the patient of the [facility] and available resources there. 3.  Continue to meet with the patient when he returns to the clinic for ongoing assessment of the patients adjustment to his diagnosis and treatment. 4.  Ongoing psychosocial support as desired by patient.       Plan/Referral:   Complementary therapies referral  Allen Abraham LCSW

## 2020-08-31 NOTE — PROGRESS NOTES
[facility] [place] [facility] [place] [address], [address] [address], [address] [place] [address] Merle Brown Pal: [phone]  F: [phone]    Reason for Visit:   Charly Garcia is a 70 y.o. male who is seen in consultation at the request of Dr. Charles Sierra for evaluation of pelvic mass    Treatment History:   None from us    History of Present Illness:     Pt seen today for new patient office consult during [facility] for met SCC primary site unclear. Hx of recurrent prostate cancer 2 years ago but PSA 0. On lupron currently. Hx of left knee surgery. Pt had an MRI done due to RIGHT hip pain. MRI:   Soft tissue mass: Right pelvic sidewall adenopathy has enlarged. Cristi mass now  measures 5.4 by 5.7 x 5.6 cm. This abuts the undersurface of the external iliac  vein. There is slight bony erosion along the medial wall of the acetabulum. There is invasion of the adjacent obturator internus muscle body. Tumor extends  medially into the pelvis projecting towards the mid sigmoid colon. There is  diverticulosis of the sigmoid colon. Left pelvic sidewall adenopathy has  improved. Pt had FNA biopsy:   CYTOLOGIC INTERPRETATION:   Pelvic Mass, Core biopsy with touch preparation:   Squamous cell carcinoma     No hx of SCC. Hx of prostate cancer adenocarcinoma  but path states does not look like prior path. Hx of prostate surgery in 2011. CT A/P 2/19  IMPRESSION:   1. Recurrent/metastatic pelvic lymph nodes. 2. Subcentimeter, sclerotic, osseous oligometastases    Bone scan 2/19 negative. No current CTs. Pt states he feels well overall. Active. Has a cough. No SOB. COVID negative x 2. Pt is here today to discuss dx and treatment options. Pt's wife was a patient here years ago. Pt is now remarried.        Past Medical History:   Diagnosis Date    Arthritis     BPH (benign prostatic hypertrophy) 7/18/2011    Cancer [facility])     prostate cancer    Chronic pain right    knee    ED (erectile dysfunction)     GERD (gastroesophageal reflux disease)     HTN (hypertension), benign     OA (osteoarthritis) of knee 7/18/2011    BOTH KNEES    Unspecified essential hypertension 7/18/2011      Past Surgical History:   Procedure Laterality Date    HX ACL RECONSTRUCTION Right     x2    HX APPENDECTOMY  1975    HX GI  1/2015    Drainage of hemorrhoid    HX HERNIA REPAIR      left and right,  ventral    HX HIP REPLACEMENT  2000    left    HX HIP REPLACEMENT  2000    left    HX KNEE ARTHROSCOPY Right     x3    HX ORTHOPAEDIC Left 6/2000    HIP REPLACEMENT    HX ORTHOPAEDIC Right 08/05/2014    TKR - Dr. Amanda Sharma HX PROSTATECTOMY  11/07/2011    prostate removed     HX TONSILLECTOMY      CHILD    IR INJ FORAMIN EPID LUMB ANES/STER SNGL  8/18/2020      Social History     Tobacco Use    Smoking status: Never Smoker    Smokeless tobacco: Never Used   Substance Use Topics    Alcohol use: No      Family History   Problem Relation Age of Onset    Bleeding Prob Father         clotting disorder    Cancer Brother         colon CA 1/2 brother    Thyroid Disease Daughter     Heart Failure Mother     Heart Disease Mother     Anesth Problems Neg Hx      Current Outpatient Medications   Medication Sig    traZODone (DESYREL) 50 mg tablet TAKE 1 TO 2 TABLETS BY MOUTH ONCE DAILY IN THE EVENING AS NEEDED FOR SLEEP    ibuprofen (AdviL) 200 mg tablet Take  by mouth.  terazosin (HYTRIN) 10 mg capsule Take 1 capsule by mouth once daily    candesartan (ATACAND) 4 mg tablet Take 1 Tab by mouth nightly.  amLODIPine (NORVASC) 5 mg tablet Take 1 Tab by mouth nightly.  aspirin delayed-release 81 mg tablet Take 1 Tab by mouth two (2) times a day. Indications: blood clot prevention    senna-docusate (PERICOLACE) 8.6-50 mg per tablet Take 1 Tab by mouth two (2) times daily as needed for Constipation.  acetaminophen (TYLENOL) 500 mg tablet Take 1 Tab by mouth every four (4) hours.     calcium carbonate/vitamin D3 (CALTRATE-600 PLUS VITAMIN D3 PO) Take 1 Tab by mouth nightly.  leuprolide acetate (LUPRON DEPOT, 6 MONTH, IM) 1 Each by IntraMUSCular route every 6 months. No current facility-administered medications for this visit. Allergies   Allergen Reactions    Barbiturates Anxiety     Hyper          Review of Systems: A complete review of systems was obtained, negative except as described above. Physical Exam:     Visit Vitals  /82 (BP 1 Location: Left arm, BP Patient Position: Sitting)   Pulse 70   Temp (!) 96 °F (35.6 °C) (Temporal)   Ht 6' (1.829 m)   Wt 230 lb 9.6 oz (104.6 kg)   SpO2 97%   BMI 31.27 kg/m²     ECOG PS: 1  General: No distress  Eyes: PERRLA, anicteric sclerae  HENT: Atraumatic, mask on  Neck: Supple  Respiratory: CTAB, normal respiratory effort  CV: Normal rate, regular rhythm, no murmurs, no peripheral edema  GI: Soft, nontender, nondistended  MS: Normal gait and station. Digits without clubbing or cyanosis. Skin: No rashes, ecchymoses, or petechiae. Normal temperature, turgor, and texture. Psych: Alert, oriented, appropriate affect, normal judgment/insight      Results:     Lab Results   Component Value Date/Time    WBC 5.4 06/02/2020 09:48 AM    HGB 9.8 (L) 06/10/2020 02:43 AM    HCT 37.8 06/02/2020 09:48 AM    PLATELET 988 (L) 08/11/2298 09:48 AM    MCV 93.1 06/02/2020 09:48 AM    ABS.  NEUTROPHILS 4.0 01/17/2019 07:31 AM     Lab Results   Component Value Date/Time    Sodium 138 06/10/2020 02:43 AM    Potassium 4.0 06/10/2020 02:43 AM    Chloride 111 (H) 06/10/2020 02:43 AM    CO2 23 06/10/2020 02:43 AM    Glucose 161 (H) 06/10/2020 02:43 AM    BUN 30 (H) 06/10/2020 02:43 AM    Creatinine 1.35 (H) 06/10/2020 02:43 AM    GFR est AA >60 06/10/2020 02:43 AM    GFR est non-AA 52 (L) 06/10/2020 02:43 AM    Calcium 7.9 (L) 06/10/2020 02:43 AM    Glucose (POC) 103 (H) 06/09/2020 07:03 AM     Lab Results   Component Value Date/Time    Bilirubin, total 1.2 01/17/2019 07:31 AM    ALT (SGPT) 14 01/17/2019 07:31 AM    Alk. phosphatase 48 01/17/2019 07:31 AM    Protein, total 6.0 01/17/2019 07:31 AM    Albumin 4.0 01/17/2019 07:31 AM    Globulin 2.5 10/20/2011 10:35 AM         Records reviewed and summarized above. Pathology report(s) reviewed above. Radiology report(s) reviewed above. Assessment/PLAN:     1)  Metastatic squamous cell cancer by biopsy of pelvic/ hip mas 8/20/20. Path is different than prostate which is adenocarcinoma from 2011. Primary site of SCC unclear. Will need to d/w path and review at cancer conference. Last CT 2/19 which showed LAD and now is progressive. Discussed need for staging now with scans. Creat is up so will do PET. Pt agrees and ordered. Needs labs and ordered. Pt clinically is healthy overall. Discussed treatment options of chemo +/- local radiation. Can consider XRT to mass and will review with Rad/onc at cancer conference. Discussed palliative chemo options of a taxane such as weekly carbo/ taxol vs taxotere. both would work for prostate cancer also. Will do PET and labs then re eval.   Pt is open to all treatment options. 2) hx of recurrent prostate cancer seeing urology. PSA 0 on lupron. Does not look like prostate cancer on path. Will get urology records. 3) RIGHT hip pain due to cancer. Not taking any meds. Seeing chiropractor. Taking advil prn      4) HTN/ arthritis post LEFT knee/ GERD. Per PCP. 5) psychosocial.  Mood good. Great support from wife. SW support today. Call if questions  F/u here in 7-10 days. I appreciate the opportunity to participate in Mr. Lakshmi sinclair.     Signed By: Elsa Yarbrough DO

## 2020-08-31 NOTE — PROGRESS NOTES
Jet Hawk is a 70 y.o. male  Chief Complaint   Patient presents with    New Patient     squamous cell cancer   1. Have you been to the ER, urgent care clinic since your last visit? Hospitalized since your last visit? Yes, pt was in the ER/Hospital for knee replacement, having a biopsy, and having CT scans done. 2. Have you seen or consulted any other health care providers outside of the [address] [place] [address] since your last visit? Include any pap smears or colon screening. This is the patients first time here in this office.

## 2020-08-31 NOTE — LETTER
8/31/20 Patient: Norma Fermin YOB: 1945 Date of Visit: 8/31/2020 Magnus Roberts MD 
[address] [place] [address] [address] [place] [id number] VIA In Basket Dear Magnus Roberts MD, Thank you for referring Mr. Alejo Lisa to [address] [place] [address] for evaluation. My notes for this consultation are attached. If you have questions, please do not hesitate to call me. I look forward to following your patient along with you. Sincerely, Ruth Russo, DO

## 2020-09-01 LAB
ALBUMIN SERPL-MCNC: 3.9 G/DL (ref 3.7–4.7)
ALBUMIN/GLOB SERPL: 2 {RATIO} (ref 1.2–2.2)
ALP SERPL-CCNC: 60 IU/L (ref 39–117)
ALT SERPL-CCNC: 16 IU/L (ref 0–44)
AST SERPL-CCNC: 14 IU/L (ref 0–40)
BASOPHILS # BLD AUTO: 0.1 X10E3/UL (ref 0–0.2)
BASOPHILS NFR BLD AUTO: 1 %
BILIRUB SERPL-MCNC: 0.5 MG/DL (ref 0–1.2)
BUN SERPL-MCNC: 23 MG/DL (ref 8–27)
BUN/CREAT SERPL: 15 (ref 10–24)
CALCIUM SERPL-MCNC: 8.9 MG/DL (ref 8.6–10.2)
CHLORIDE SERPL-SCNC: 105 MMOL/L (ref 96–106)
CO2 SERPL-SCNC: 25 MMOL/L (ref 20–29)
CREAT SERPL-MCNC: 1.49 MG/DL (ref 0.76–1.27)
EOSINOPHIL # BLD AUTO: 0.2 X10E3/UL (ref 0–0.4)
EOSINOPHIL NFR BLD AUTO: 3 %
ERYTHROCYTE [DISTWIDTH] IN BLOOD BY AUTOMATED COUNT: 13.8 % (ref 11.6–15.4)
GLOBULIN SER CALC-MCNC: 2 G/DL (ref 1.5–4.5)
GLUCOSE SERPL-MCNC: 86 MG/DL (ref 65–99)
HCT VFR BLD AUTO: 34.7 % (ref 37.5–51)
HGB BLD-MCNC: 11.5 G/DL (ref 13–17.7)
IMM GRANULOCYTES # BLD AUTO: 0 X10E3/UL (ref 0–0.1)
IMM GRANULOCYTES NFR BLD AUTO: 0 %
LYMPHOCYTES # BLD AUTO: 1.1 X10E3/UL (ref 0.7–3.1)
LYMPHOCYTES NFR BLD AUTO: 16 %
MCH RBC QN AUTO: 30.3 PG (ref 26.6–33)
MCHC RBC AUTO-ENTMCNC: 33.1 G/DL (ref 31.5–35.7)
MCV RBC AUTO: 91 FL (ref 79–97)
MONOCYTES # BLD AUTO: 0.6 X10E3/UL (ref 0.1–0.9)
MONOCYTES NFR BLD AUTO: 9 %
NEUTROPHILS # BLD AUTO: 4.9 X10E3/UL (ref 1.4–7)
NEUTROPHILS NFR BLD AUTO: 71 %
PLATELET # BLD AUTO: 209 X10E3/UL (ref 150–450)
POTASSIUM SERPL-SCNC: 3.8 MMOL/L (ref 3.5–5.2)
PROT SERPL-MCNC: 5.9 G/DL (ref 6–8.5)
RBC # BLD AUTO: 3.8 X10E6/UL (ref 4.14–5.8)
SODIUM SERPL-SCNC: 142 MMOL/L (ref 134–144)
WBC # BLD AUTO: 6.9 X10E3/UL (ref 3.4–10.8)

## 2020-09-08 ENCOUNTER — HOSPITAL ENCOUNTER (OUTPATIENT)
Dept: PET IMAGING | Age: 75
Discharge: HOME | End: 2020-09-08
Attending: INTERNAL MEDICINE
Payer: MEDICARE

## 2020-09-08 ENCOUNTER — DOCUMENTATION ONLY (OUTPATIENT)
Dept: ONCOLOGY | Age: 75
End: 2020-09-08

## 2020-09-08 VITALS — BODY MASS INDEX: 30.88 KG/M2 | HEIGHT: 72 IN | WEIGHT: 228 LBS

## 2020-09-08 DIAGNOSIS — C77.5 PROSTATE CANCER METASTATIC TO INTRAPELVIC LYMPH NODE (HCC): ICD-10-CM

## 2020-09-08 DIAGNOSIS — M25.551 RIGHT HIP PAIN: ICD-10-CM

## 2020-09-08 DIAGNOSIS — R05.9 COUGH IN ADULT: ICD-10-CM

## 2020-09-08 DIAGNOSIS — C61 PROSTATE CANCER METASTATIC TO INTRAPELVIC LYMPH NODE (HCC): ICD-10-CM

## 2020-09-08 PROCEDURE — A9552 F18 FDG: HCPCS

## 2020-09-08 RX ORDER — SODIUM CHLORIDE 0.9 % (FLUSH) 0.9 %
10 SYRINGE (ML) INJECTION
Status: DISCONTINUED | OUTPATIENT
Start: 2020-09-08 | End: 2020-09-09 | Stop reason: HOSPADM

## 2020-09-08 RX ORDER — SODIUM CHLORIDE 0.9 % (FLUSH) 0.9 %
10 SYRINGE (ML) INJECTION
Status: COMPLETED | OUTPATIENT
Start: 2020-09-08 | End: 2020-09-08

## 2020-09-08 RX ADMIN — Medication 10 ML: at 15:13

## 2020-09-08 NOTE — PROGRESS NOTES
Case discussed at cancer conference today  For PET today  Unclear site of origin of squamous cell  Not prostate cancer  Needs to see rad/onc for pelvic mass XRT  Refer to Rad/onc  Will need markers on SCC  Possible chemo pending site of origin  If PET does not reveal site, send TYPE ID on path

## 2020-09-09 NOTE — PROGRESS NOTES
No primary site of cancer  PDL1 is 80%  Send cancer TYPE ID  Likely will start with radiation and keytruda  For [facility] primary site unknown

## 2020-09-10 ENCOUNTER — DOCUMENTATION ONLY (OUTPATIENT)
Dept: ONCOLOGY | Age: 75
End: 2020-09-10

## 2020-09-10 ENCOUNTER — OFFICE VISIT (OUTPATIENT)
Dept: ONCOLOGY | Age: 75
End: 2020-09-10
Payer: MEDICARE

## 2020-09-10 VITALS
BODY MASS INDEX: 31.29 KG/M2 | SYSTOLIC BLOOD PRESSURE: 146 MMHG | HEART RATE: 66 BPM | DIASTOLIC BLOOD PRESSURE: 75 MMHG | TEMPERATURE: 96.7 F | HEIGHT: 72 IN | OXYGEN SATURATION: 96 % | WEIGHT: 231 LBS

## 2020-09-10 DIAGNOSIS — M25.551 RIGHT HIP PAIN: ICD-10-CM

## 2020-09-10 DIAGNOSIS — C77.5 PROSTATE CANCER METASTATIC TO INTRAPELVIC LYMPH NODE (HCC): ICD-10-CM

## 2020-09-10 DIAGNOSIS — G89.29 CHRONIC BACK PAIN, UNSPECIFIED BACK LOCATION, UNSPECIFIED BACK PAIN LATERALITY: ICD-10-CM

## 2020-09-10 DIAGNOSIS — C61 PROSTATE CANCER METASTATIC TO INTRAPELVIC LYMPH NODE (HCC): ICD-10-CM

## 2020-09-10 DIAGNOSIS — M54.9 CHRONIC BACK PAIN, UNSPECIFIED BACK LOCATION, UNSPECIFIED BACK PAIN LATERALITY: ICD-10-CM

## 2020-09-10 PROCEDURE — G0463 HOSPITAL OUTPT CLINIC VISIT: HCPCS | Performed by: INTERNAL MEDICINE

## 2020-09-10 PROCEDURE — G8753 SYS BP > OR = 140: HCPCS | Performed by: INTERNAL MEDICINE

## 2020-09-10 PROCEDURE — 1101F PT FALLS ASSESS-DOCD LE1/YR: CPT | Performed by: INTERNAL MEDICINE

## 2020-09-10 PROCEDURE — G8419 CALC BMI OUT NRM PARAM NOF/U: HCPCS | Performed by: INTERNAL MEDICINE

## 2020-09-10 PROCEDURE — G8536 NO DOC ELDER MAL SCRN: HCPCS | Performed by: INTERNAL MEDICINE

## 2020-09-10 PROCEDURE — 99215 OFFICE O/P EST HI 40 MIN: CPT | Performed by: INTERNAL MEDICINE

## 2020-09-10 PROCEDURE — G8510 SCR DEP NEG, NO PLAN REQD: HCPCS | Performed by: INTERNAL MEDICINE

## 2020-09-10 PROCEDURE — G8754 DIAS BP LESS 90: HCPCS | Performed by: INTERNAL MEDICINE

## 2020-09-10 PROCEDURE — G8427 DOCREV CUR MEDS BY ELIG CLIN: HCPCS | Performed by: INTERNAL MEDICINE

## 2020-09-10 PROCEDURE — 3017F COLORECTAL CA SCREEN DOC REV: CPT | Performed by: INTERNAL MEDICINE

## 2020-09-10 NOTE — LETTER
9/13/20 Patient: Nicole Sicard YOB: 1945 Date of Visit: 9/10/2020 Mariama De Anda MD 
[address] [place] [address] [address] [facility] [address] [id number] VIA In Basket Dear Mariama De Anda MD, Thank you for referring Mr. Eli Armenta to [address] [place] [address] for evaluation. My notes for this consultation are attached. If you have questions, please do not hesitate to call me. I look forward to following your patient along with you. Sincerely, Dilia Parks, DO

## 2020-09-10 NOTE — PROGRESS NOTES
Pharmacy Note- Immunotherapy Education    Gertrudis Mendoza is a  70 y.o.male  diagnosed with cancer of unknown primary here today for chemotherapy counseling and consent. Mr. Sanna Galvez is being treated with pembrolizumab. Mr. Sanna Galvez requested to start with every 3 week dosing and increase to every 6 weeks if he tolerates pembrolizumab. Mr. Sanna Galvez was provided information regarding the risks of immune-mediated adverse reactions secondary to pembrolizumab that may require interruption/delay of treatment and possible use of corticosteroids. These reactions include, but are not limited to: colitis (diarrhea or severe abdominal pain); hepatitis (jaundice, severe nausea, or vomiting, easy bruising, and/or bleeding); hypophysitis (persistent or unusual headache, extreme weakness, dizziness/fainting, and/or vision changes); dermatitis (skin rash, mouth sores, skin blisters, and/or skin peeling); ocular toxicity (uveitis, iritis, and/or episcleritis); neuropathy (motor or sensory); hyper/hypothyroidism. Patient given ways to manage these side effects and when to contact office. [address] Handout of medications provided to patient. Mr. Sanna Galvez verbalized understanding of the information presented and all of the patient's questions were answered.     Tayla Mullins, PharmD, BCPS, BCOP    CLINICAL PHARMACY CONSULT: MED RECONCILIATION/REVIEW ADDENDUM    For Pharmacy Admin Tracking Only    PHSO: PHSO Patient?: Yes  Total # of Interventions Recommended: Count: 1    Total Interventions Accepted: 1  Time Spent (min): 20

## 2020-09-10 NOTE — PROGRESS NOTES
[facility] [place] [address] [address]  W: [phone]  F: [phone]    Reason for Visit:   Thierry Duffy is a 70 y.o. male who is seen in consultation at the request of Dr. Rosa Maria Eaton for evaluation of pelvic mass    Treatment History:   None from us    History of Present Illness:     Pt seen today for f/u of metastatic SCC primary site unclear. Molecular testing done and PDL1 80%  PET:  IMPRESSION: Large hypermetabolic right pelvic sidewall lymph node. Lytic lesion  right anterior acetabulum is hypermetabolic and compatible with metastatic  Disease. Case discussed at cancer conference. Pt feeling fine overall. Pt here today with wife for discussion of treatment option. Last visit:  new patient office consult during [facility] for met SCC primary site unclear. Hx of recurrent prostate cancer 2 years ago but PSA 0. On lupron currently. Hx of left knee surgery. Pt had an MRI done due to RIGHT hip pain. MRI:   Soft tissue mass: Right pelvic sidewall adenopathy has enlarged. Cristi mass now  measures 5.4 by 5.7 x 5.6 cm. This abuts the undersurface of the external iliac  vein. There is slight bony erosion along the medial wall of the acetabulum. There is invasion of the adjacent obturator internus muscle body. Tumor extends  medially into the pelvis projecting towards the mid sigmoid colon. There is  diverticulosis of the sigmoid colon. Left pelvic sidewall adenopathy has  improved. Pt had FNA biopsy:   CYTOLOGIC INTERPRETATION:   Pelvic Mass, Core biopsy with touch preparation:   Squamous cell carcinoma     No hx of SCC. Hx of prostate cancer adenocarcinoma  but path states does not look like prior path. Hx of prostate surgery in 2011. CT A/P 2/19  IMPRESSION:   1. Recurrent/metastatic pelvic lymph nodes. 2. Subcentimeter, sclerotic, osseous oligometastases    Bone scan 2/19 negative. No current CTs.    Pt states he feels well overall. Active. Has a cough. No SOB. COVID negative x 2. Pt is here today to discuss dx and treatment options. Pt's wife was a patient here years ago. Pt is now remarried. Past Medical History:   Diagnosis Date    Arthritis     BPH (benign prostatic hypertrophy) 7/18/2011    Cancer [facility])     prostate cancer    Chronic pain right    knee    ED (erectile dysfunction)     GERD (gastroesophageal reflux disease)     HTN (hypertension), benign     OA (osteoarthritis) of knee 7/18/2011    BOTH KNEES    Unspecified essential hypertension 7/18/2011      Past Surgical History:   Procedure Laterality Date    HX ACL RECONSTRUCTION Right     x2    HX APPENDECTOMY  1975    HX GI  1/2015    Drainage of hemorrhoid    HX HERNIA REPAIR      left and right,  ventral    HX HIP REPLACEMENT  2000    left    HX HIP REPLACEMENT  2000    left    HX KNEE ARTHROSCOPY Right     x3    HX ORTHOPAEDIC Left 6/2000    HIP REPLACEMENT    HX ORTHOPAEDIC Right 08/05/2014    TKR - Dr. Herminio Lowe    HX PROSTATECTOMY  11/07/2011    prostate removed     HX TONSILLECTOMY      CHILD    IR INJ Arva Vanessa EPID LUMB ANES/STER SNGL  8/18/2020      Social History     Tobacco Use    Smoking status: Never Smoker    Smokeless tobacco: Never Used   Substance Use Topics    Alcohol use: No      Family History   Problem Relation Age of Onset    Bleeding Prob Father         clotting disorder    Cancer Brother         colon CA 1/2 brother    Thyroid Disease Daughter     Heart Failure Mother     Heart Disease Mother     Anesth Problems Neg Hx      Current Outpatient Medications   Medication Sig    traZODone (DESYREL) 50 mg tablet TAKE 1 TO 2 TABLETS BY MOUTH ONCE DAILY IN THE EVENING AS NEEDED FOR SLEEP    ibuprofen (AdviL) 200 mg tablet Take  by mouth.  terazosin (HYTRIN) 10 mg capsule Take 1 capsule by mouth once daily    candesartan (ATACAND) 4 mg tablet Take 1 Tab by mouth nightly.     amLODIPine (NORVASC) 5 mg tablet Take 1 Tab by mouth nightly.  aspirin delayed-release 81 mg tablet Take 1 Tab by mouth two (2) times a day. Indications: blood clot prevention    senna-docusate (PERICOLACE) 8.6-50 mg per tablet Take 1 Tab by mouth two (2) times daily as needed for Constipation.  acetaminophen (TYLENOL) 500 mg tablet Take 1 Tab by mouth every four (4) hours.  calcium carbonate/vitamin D3 (CALTRATE-600 PLUS VITAMIN D3 PO) Take 1 Tab by mouth nightly.  leuprolide acetate (LUPRON DEPOT, 6 MONTH, IM) 1 Each by IntraMUSCular route every 6 months. No current facility-administered medications for this visit. Allergies   Allergen Reactions    Barbiturates Anxiety     Hyper          Review of Systems: A complete review of systems was obtained, negative except as described above. Physical Exam:     Visit Vitals  /75   Pulse 66   Temp (!) 96.7 °F (35.9 °C)   Ht 6' (1.829 m)   Wt 231 lb (104.8 kg)   SpO2 96%   BMI 31.33 kg/m²     ECOG PS: 1  General: No distress  Eyes: PERRLA, anicteric sclerae  HENT: Atraumatic, mask on  Neck: Supple  MS: Normal gait and station. Digits without clubbing or cyanosis. Skin: No rashes, ecchymoses, or petechiae. Normal temperature, turgor, and texture. Psych: Alert, oriented, appropriate affect, normal judgment/insight      Results:     Lab Results   Component Value Date/Time    WBC 6.9 08/31/2020 04:29 PM    HGB 11.5 (L) 08/31/2020 04:29 PM    HCT 34.7 (L) 08/31/2020 04:29 PM    PLATELET 393 43/08/4441 04:29 PM    MCV 91 08/31/2020 04:29 PM    ABS.  NEUTROPHILS 4.9 08/31/2020 04:29 PM     Lab Results   Component Value Date/Time    Sodium 142 08/31/2020 04:29 PM    Potassium 3.8 08/31/2020 04:29 PM    Chloride 105 08/31/2020 04:29 PM    CO2 25 08/31/2020 04:29 PM    Glucose 86 08/31/2020 04:29 PM    BUN 23 08/31/2020 04:29 PM    Creatinine 1.49 (H) 08/31/2020 04:29 PM    GFR est AA 53 (L) 08/31/2020 04:29 PM    GFR est non-AA 46 (L) 08/31/2020 04:29 PM    Calcium 8.9 08/31/2020 04:29 PM    Glucose (POC) 103 (H) 06/09/2020 07:03 AM     Lab Results   Component Value Date/Time    Bilirubin, total 0.5 08/31/2020 04:29 PM    ALT (SGPT) 16 08/31/2020 04:29 PM    Alk. phosphatase 60 08/31/2020 04:29 PM    Protein, total 5.9 (L) 08/31/2020 04:29 PM    Albumin 3.9 08/31/2020 04:29 PM    Globulin 2.5 10/20/2011 10:35 AM         Records reviewed and summarized above. Pathology report(s) reviewed above. Radiology report(s) reviewed above. Assessment/PLAN:     1)  Metastatic squamous cell cancer PDL1 80% by biopsy of pelvic/ hip mas 8/20/20. Path is different than prostate which is adenocarcinoma from 2011. Primary site of SCC unclear. Last CT 2/19 which showed LAD and now is progressive. Case discussed at cancer conference    PET negative except for pelvic mass. Unclear site of origin of squamous cell. Will send TYPE ID on path. Not prostate cancer  Pt has hip pain. Refer to RAD/onc for pelvic mass XRT  Reviewed molecular markers today. Negative EGFR/ ROS/ ALK. PDL1 80%. Discussed significance of this marker today. Discussed we can do pembrolizumab/ immunotherapy single agent. Reviewed risks, benefits and alternatives of pembro today. Pharmacy for teaching today. Pt agreeable to immunotherapy after radiation done. Pt clinically healthy overall. 2) hx of recurrent prostate cancer seeing urology. PSA 0 on lupron. Does not look like prostate cancer on path. Will get urology records. 3) RIGHT hip pain due to cancer. Not taking any meds. Seeing chiropractor. Taking advil prn   Refer to Rad/onc for treatment. 4) HTN/ arthritis post LEFT knee/ GERD. Per PCP. 5) psychosocial.  Mood good. Great support from wife. SW support today. Call if questions  F/u here in 2 weeks. I appreciate the opportunity to participate in Mr. Mabel Huynh care.     Signed By: Yanira Reid DO

## 2020-09-10 NOTE — PROGRESS NOTES
Wilder Gregorio is a 70 y.o. male  Chief Complaint   Patient presents with    Follow-up     squamous cell cancer     1. Have you been to the ER, urgent care clinic since your last visit? Hospitalized since your last visit? no    2. Have you seen or consulted any other health care providers outside of the [facility] since your last visit? Include any pap smears or colon screening.  No

## 2020-09-16 ENCOUNTER — DOCUMENTATION ONLY (OUTPATIENT)
Dept: ONCOLOGY | Age: 75
End: 2020-09-16

## 2020-09-16 ENCOUNTER — HOSPITAL ENCOUNTER (OUTPATIENT)
Dept: RADIATION THERAPY | Age: 75
Discharge: HOME | End: 2020-09-16

## 2020-09-16 VITALS
BODY MASS INDEX: 31.15 KG/M2 | WEIGHT: 230 LBS | SYSTOLIC BLOOD PRESSURE: 130 MMHG | HEART RATE: 64 BPM | DIASTOLIC BLOOD PRESSURE: 84 MMHG | HEIGHT: 72 IN

## 2020-09-16 NOTE — PROGRESS NOTES
Keytruda orders entered into [facility]. Has patient started XRT yet? Will start immunotherapy with Keytruda after XRT.

## 2020-09-17 NOTE — PROGRESS NOTES
0170  09/17/2020 Call to patient, HIPAA verified. Patient saw Dr. Aziza Pittman yesterday. Per patient, Rad Onc \"may need to look at 1 or 2 more tests to put together final plan. \"  Hopes to hear from Bryce Clemons late on 9/18/20. States treatment will be short course of radiation over 2-3 weeks. Encouraged patient to contact RN at MD office with update of start date, if not available to request Triage RN. States will either call or MyChart with updates.

## 2020-09-21 ENCOUNTER — HOSPITAL ENCOUNTER (OUTPATIENT)
Dept: LAB | Age: 75
Discharge: HOME | End: 2020-09-21

## 2020-09-21 ENCOUNTER — DOCUMENTATION ONLY (OUTPATIENT)
Dept: ONCOLOGY | Age: 75
End: 2020-09-21

## 2020-09-23 ENCOUNTER — DOCUMENTATION ONLY (OUTPATIENT)
Dept: ONCOLOGY | Age: 75
End: 2020-09-23

## 2020-09-24 NOTE — PROGRESS NOTES
Pt does not have a f/u appt  Not sure why  Also need TYPE ID report scanned in  ? In path as put into path report    TYPE ID showed H+N/ skin cancer  p16 negative  Pt is seeing Rad/onc and is for radiation  D/w rad/onc   Pt is to see urology and CR surgery  ?  Pt going to see us after radiation  Check in with pt

## 2020-09-24 NOTE — PROGRESS NOTES
Left VM on self identifying line to contact RN at MD office with update regarding start/stop dates for XRT. 1600 Call to patient, HIPAA verified. Patient states he is still doing treatment planning with Dr. Aziza Pittman. He had cystoscopy with Dr. Jyoti Gaitan at [facility] today as part of this, states results were \"normal.\"  Has Anoscopy scheduled with Dr. Jose Armando Tao for 10/1/20. States that after that test, Dr. Aziza Pittman will start XRT which patient states will be 1-2 weeks. States that he will contact RN to update with XRT start date as soon as he knows, so that [facility] appts can be scheduled. Thanked RN for call.

## 2020-09-28 RX ORDER — SODIUM CHLORIDE 9 MG/ML
25 INJECTION, SOLUTION INTRAVENOUS CONTINUOUS
Status: CANCELLED | OUTPATIENT
Start: 2020-12-15

## 2020-09-28 RX ORDER — SODIUM CHLORIDE 0.9 % (FLUSH) 0.9 %
10 SYRINGE (ML) INJECTION AS NEEDED
Status: CANCELLED | OUTPATIENT
Start: 2020-12-15

## 2020-09-28 RX ORDER — HEPARIN 100 UNIT/ML
300-500 SYRINGE INTRAVENOUS AS NEEDED
Status: CANCELLED
Start: 2020-12-15

## 2020-09-28 RX ORDER — HYDROCORTISONE SODIUM SUCCINATE 100 MG/2ML
100 INJECTION, POWDER, FOR SOLUTION INTRAMUSCULAR; INTRAVENOUS AS NEEDED
Status: CANCELLED | OUTPATIENT
Start: 2020-12-15

## 2020-09-28 RX ORDER — SODIUM CHLORIDE 9 MG/ML
10 INJECTION INTRAMUSCULAR; INTRAVENOUS; SUBCUTANEOUS AS NEEDED
Status: CANCELLED | OUTPATIENT
Start: 2020-12-15

## 2020-09-28 RX ORDER — ONDANSETRON 2 MG/ML
8 INJECTION INTRAMUSCULAR; INTRAVENOUS AS NEEDED
Status: CANCELLED | OUTPATIENT
Start: 2020-12-15

## 2020-09-28 RX ORDER — ACETAMINOPHEN 325 MG/1
650 TABLET ORAL AS NEEDED
Status: CANCELLED
Start: 2020-12-15

## 2020-09-28 RX ORDER — DIPHENHYDRAMINE HYDROCHLORIDE 50 MG/ML
50 INJECTION, SOLUTION INTRAMUSCULAR; INTRAVENOUS AS NEEDED
Status: CANCELLED
Start: 2020-12-15

## 2020-09-28 RX ORDER — EPINEPHRINE 1 MG/ML
0.3 INJECTION, SOLUTION, CONCENTRATE INTRAVENOUS AS NEEDED
Status: CANCELLED | OUTPATIENT
Start: 2020-12-15

## 2020-09-28 RX ORDER — DIPHENHYDRAMINE HYDROCHLORIDE 50 MG/ML
25 INJECTION, SOLUTION INTRAMUSCULAR; INTRAVENOUS AS NEEDED
Status: CANCELLED
Start: 2020-12-15

## 2020-09-28 RX ORDER — ALBUTEROL SULFATE 0.83 MG/ML
2.5 SOLUTION RESPIRATORY (INHALATION) AS NEEDED
Status: CANCELLED
Start: 2020-12-15

## 2020-09-30 ENCOUNTER — TELEPHONE (OUTPATIENT)
Dept: ONCOLOGY | Age: 75
End: 2020-09-30

## 2020-09-30 NOTE — TELEPHONE ENCOUNTER
Returned patient call, HIPAA verified. States realized RN call to him was an old message, reports no new issues, has appt with Dr. Yuriy Griffin tomorrow and should have update with XRT start time soon. Thanked RN for Francisco Matos.

## 2020-10-05 ENCOUNTER — HOSPITAL ENCOUNTER (OUTPATIENT)
Dept: RADIATION THERAPY | Age: 75
Discharge: HOME | End: 2020-10-05

## 2020-10-13 ENCOUNTER — HOSPITAL ENCOUNTER (OUTPATIENT)
Dept: RADIATION THERAPY | Age: 75
Discharge: HOME | End: 2020-10-13

## 2020-10-15 ENCOUNTER — HOSPITAL ENCOUNTER (OUTPATIENT)
Dept: RADIATION THERAPY | Age: 75
Discharge: HOME | End: 2020-10-15

## 2020-10-15 DIAGNOSIS — C79.51 SECONDARY MALIGNANT NEOPLASM OF BONE AND BONE MARROW (HCC): ICD-10-CM

## 2020-10-15 DIAGNOSIS — C77.5 SECONDARY AND UNSPECIFIED MALIGNANT NEOPLASM OF INTRAPELVIC LYMPH NODES (HCC): ICD-10-CM

## 2020-10-15 DIAGNOSIS — C79.52 SECONDARY MALIGNANT NEOPLASM OF BONE AND BONE MARROW (HCC): ICD-10-CM

## 2020-10-15 RX ORDER — HYDROCODONE BITARTRATE AND ACETAMINOPHEN 5; 325 MG/1; MG/1
1 TABLET ORAL
Qty: 18 TAB | Refills: 0 | Status: SHIPPED | OUTPATIENT
Start: 2020-10-15 | End: 2020-10-18

## 2020-10-19 ENCOUNTER — HOSPITAL ENCOUNTER (OUTPATIENT)
Dept: RADIATION THERAPY | Age: 75
Discharge: HOME | End: 2020-10-19

## 2020-10-20 RX ORDER — TRAZODONE HYDROCHLORIDE 50 MG/1
TABLET ORAL
Qty: 60 TAB | Refills: 0 | Status: SHIPPED | OUTPATIENT
Start: 2020-10-20 | End: 2020-12-18

## 2020-10-21 ENCOUNTER — HOSPITAL ENCOUNTER (OUTPATIENT)
Dept: RADIATION THERAPY | Age: 75
Discharge: HOME | End: 2020-10-21

## 2020-10-23 ENCOUNTER — HOSPITAL ENCOUNTER (OUTPATIENT)
Dept: RADIATION THERAPY | Age: 75
Discharge: HOME | End: 2020-10-23

## 2020-11-12 ENCOUNTER — OFFICE VISIT (OUTPATIENT)
Dept: ONCOLOGY | Age: 75
End: 2020-11-12
Payer: MEDICARE

## 2020-11-12 VITALS
TEMPERATURE: 96.5 F | DIASTOLIC BLOOD PRESSURE: 77 MMHG | WEIGHT: 227.4 LBS | SYSTOLIC BLOOD PRESSURE: 114 MMHG | HEART RATE: 66 BPM | HEIGHT: 72 IN | OXYGEN SATURATION: 97 % | BODY MASS INDEX: 30.8 KG/M2

## 2020-11-12 PROCEDURE — G8419 CALC BMI OUT NRM PARAM NOF/U: HCPCS | Performed by: INTERNAL MEDICINE

## 2020-11-12 PROCEDURE — G8427 DOCREV CUR MEDS BY ELIG CLIN: HCPCS | Performed by: INTERNAL MEDICINE

## 2020-11-12 PROCEDURE — 1101F PT FALLS ASSESS-DOCD LE1/YR: CPT | Performed by: INTERNAL MEDICINE

## 2020-11-12 PROCEDURE — G8510 SCR DEP NEG, NO PLAN REQD: HCPCS | Performed by: INTERNAL MEDICINE

## 2020-11-12 PROCEDURE — G8752 SYS BP LESS 140: HCPCS | Performed by: INTERNAL MEDICINE

## 2020-11-12 PROCEDURE — 99214 OFFICE O/P EST MOD 30 MIN: CPT | Performed by: INTERNAL MEDICINE

## 2020-11-12 PROCEDURE — G8536 NO DOC ELDER MAL SCRN: HCPCS | Performed by: INTERNAL MEDICINE

## 2020-11-12 PROCEDURE — 3017F COLORECTAL CA SCREEN DOC REV: CPT | Performed by: INTERNAL MEDICINE

## 2020-11-12 PROCEDURE — G8754 DIAS BP LESS 90: HCPCS | Performed by: INTERNAL MEDICINE

## 2020-11-12 PROCEDURE — G0463 HOSPITAL OUTPT CLINIC VISIT: HCPCS | Performed by: INTERNAL MEDICINE

## 2020-11-12 RX ORDER — TERAZOSIN 10 MG/1
CAPSULE ORAL
Qty: 90 CAP | Refills: 0 | Status: SHIPPED | OUTPATIENT
Start: 2020-11-12 | End: 2021-02-08

## 2020-11-12 NOTE — PROGRESS NOTES
[facility] [place] [address] [address], [address]  W: [phone]  F: [phone]    Reason for Visit:   Ju Client is a 70 y.o. male who is seen for office fu for metastatic SCC    Treatment History:   None from us    History of Present Illness:     Pt seen today for 2 month f/u of metastatic SCC primary site unclear PDL1 80%,  States pain is better at site of XRT. Tolerated XRT well. Saw urology and bladder/ penis negative. Saw CR sugery and anoscopy negative. TYPE ID + SCC/ H+N vs skin. Saw derm and skin negative. Pt feeling fine overall. Pt here today with wife for discussion of treatment option. Pt is not sure he is ready to start immunotherapy yet.    No F/C/CP/SOB/N/V/D/pain      Past Medical History:   Diagnosis Date    Arthritis     BPH (benign prostatic hypertrophy) 7/18/2011    Cancer [facility])     prostate cancer    Chronic pain right    knee    ED (erectile dysfunction)     GERD (gastroesophageal reflux disease)     HTN (hypertension), benign     Metastatic squamous cell carcinoma ([facility] [address] 9/16/2020    OA (osteoarthritis) of knee 7/18/2011    BOTH KNEES    Unspecified essential hypertension 7/18/2011      Past Surgical History:   Procedure Laterality Date    HX ACL RECONSTRUCTION Right     x2    HX APPENDECTOMY  1975    HX GI  1/2015    Drainage of hemorrhoid    HX HERNIA REPAIR      left and right,  ventral    HX HIP REPLACEMENT  2000    left    HX HIP REPLACEMENT  2000    left    HX KNEE ARTHROSCOPY Right     x3    HX ORTHOPAEDIC Left 6/2000    HIP REPLACEMENT    HX ORTHOPAEDIC Right 08/05/2014    TKR - Dr. Braydon Ortez    HX PROSTATECTOMY  11/07/2011    prostate removed     HX TONSILLECTOMY      CHILD    IR INJ Rudolm Filter EPID LUMB ANES/STER SNGL  8/18/2020      Social History     Tobacco Use    Smoking status: Never Smoker    Smokeless tobacco: Never Used   Substance Use Topics    Alcohol use: No      Family History Problem Relation Age of Onset    Bleeding Prob Father         clotting disorder    Cancer Brother         colon CA 1/2 brother    Thyroid Disease Daughter     Heart Failure Mother     Heart Disease Mother     Anesth Problems Neg Hx      Current Outpatient Medications   Medication Sig    traZODone (DESYREL) 50 mg tablet TAKE 1 TO 2 TABLETS BY MOUTH ONCE DAILY IN THE EVENING AS NEEDED FOR SLEEP    ibuprofen (AdviL) 200 mg tablet Take  by mouth.  terazosin (HYTRIN) 10 mg capsule Take 1 capsule by mouth once daily    candesartan (ATACAND) 4 mg tablet Take 1 Tab by mouth nightly.  amLODIPine (NORVASC) 5 mg tablet Take 1 Tab by mouth nightly.  aspirin delayed-release 81 mg tablet Take 1 Tab by mouth two (2) times a day. Indications: blood clot prevention    senna-docusate (PERICOLACE) 8.6-50 mg per tablet Take 1 Tab by mouth two (2) times daily as needed for Constipation.  acetaminophen (TYLENOL) 500 mg tablet Take 1 Tab by mouth every four (4) hours.  calcium carbonate/vitamin D3 (CALTRATE-600 PLUS VITAMIN D3 PO) Take 1 Tab by mouth nightly.  leuprolide acetate (LUPRON DEPOT, 6 MONTH, IM) 1 Each by IntraMUSCular route every 6 months. No current facility-administered medications for this visit. Allergies   Allergen Reactions    Barbiturates Anxiety     Hyper          A complete review of systems was obtained, negative except as described above and as reported on ROS sheet scanned into system. Physical Exam:     Visit Vitals  /77   Pulse 66   Temp (!) 96.5 °F (35.8 °C)   Ht 6' (1.829 m)   Wt 227 lb 6.4 oz (103.1 kg)   SpO2 97%   BMI 30.84 kg/m²     ECOG PS: 1  General: No distress  Eyes: PERRLA, anicteric sclerae  HENT: Atraumatic, mask on  Neck: Supple  MS: Normal gait and station. Digits without clubbing or cyanosis. Skin: No rashes, ecchymoses, or petechiae. Normal temperature, turgor, and texture.   Psych: Alert, oriented, appropriate affect, normal judgment/insight      Results:     Lab Results   Component Value Date/Time    WBC 6.9 08/31/2020 04:29 PM    HGB 11.5 (L) 08/31/2020 04:29 PM    HCT 34.7 (L) 08/31/2020 04:29 PM    PLATELET 710 19/56/6922 04:29 PM    MCV 91 08/31/2020 04:29 PM    ABS. NEUTROPHILS 4.9 08/31/2020 04:29 PM     Lab Results   Component Value Date/Time    Sodium 142 08/31/2020 04:29 PM    Potassium 3.8 08/31/2020 04:29 PM    Chloride 105 08/31/2020 04:29 PM    CO2 25 08/31/2020 04:29 PM    Glucose 86 08/31/2020 04:29 PM    BUN 23 08/31/2020 04:29 PM    Creatinine 1.49 (H) 08/31/2020 04:29 PM    GFR est AA 53 (L) 08/31/2020 04:29 PM    GFR est non-AA 46 (L) 08/31/2020 04:29 PM    Calcium 8.9 08/31/2020 04:29 PM    Glucose (POC) 103 (H) 06/09/2020 07:03 AM     Lab Results   Component Value Date/Time    Bilirubin, total 0.5 08/31/2020 04:29 PM    ALT (SGPT) 16 08/31/2020 04:29 PM    Alk. phosphatase 60 08/31/2020 04:29 PM    Protein, total 5.9 (L) 08/31/2020 04:29 PM    Albumin 3.9 08/31/2020 04:29 PM    Globulin 2.5 10/20/2011 10:35 AM         Records reviewed and summarized above. Pathology report(s) reviewed above. Radiology report(s) reviewed above. Assessment/PLAN:     1)  Metastatic squamous cell cancer  Negative EGFR/ ROS/ ALK. PDL1 80% by biopsy of pelvic/ hip mas 8/20/20. Path is different than prostate which is adenocarcinoma from 2011. Primary site of SCC unclear. Last CT 2/19 which showed LAD and now is progressive. Case discussed at cancer conference    PET negative except for pelvic mass. Unclear site of origin of squamous cell. TYPE ID sent and SCC H+N vs skin. Saw urology and bladder/ penis negative. Saw CR sugery and anoscopy negative. Saw derm and skin negative. has hip pain. Seen by RAD/onc for pelvic mass XRT  Did well with this and has less pain now. Here again for treatment discussion. Discussed we can do pembrolizumab/ immunotherapy single agent.    Reviewed risks, benefits and alternatives of pembro today. Pt agreeable to immunotherapy but not sure when he wants to start treatment. Feels he may want to do a fu CT prior to starting. Reviewed no immediate rush to start therapy as local disease only and pt is feeling well. COVID pandemic continues. Pt is thinking he may start mid Dec.  Will set up [facility] appt for then. Pt clinically healthy overall. 2) hx of recurrent prostate cancer seeing urology. PSA 0 on lupron. Does not look like prostate cancer on path. Will get urology records. 3) RIGHT hip pain due to cancer. Not taking any meds. Seeing chiropractor. Taking advil prn   Post radiation which helped. 4) HTN/ arthritis post LEFT knee/ GERD. Stable. Per PCP. 5) psychosocial.  Mood good. Great support from wife. SW support today. Call if questions  F/u here at [facility] start. I appreciate the opportunity to participate in Mr. Josue sinclair.     Signed By: Antonio Brown DO

## 2020-11-12 NOTE — PROGRESS NOTES
Fabiola Lynne is a 70 y.o. male  Chief Complaint   Patient presents with    Follow-up     squamous cell cancer     1. Have you been to the ER, urgent care clinic since your last visit? Hospitalized since your last visit? No    2. Have you seen or consulted any other health care providers outside of the [address] [place] [address] since your last visit? Include any pap smears or colon screening.  No

## 2020-11-30 ENCOUNTER — HOSPITAL ENCOUNTER (OUTPATIENT)
Dept: RADIATION THERAPY | Age: 75
Discharge: HOME | End: 2020-11-30

## 2020-12-02 ENCOUNTER — TRANSCRIBE ORDER (OUTPATIENT)
Dept: SCHEDULING | Age: 75
End: 2020-12-02

## 2020-12-02 DIAGNOSIS — C79.52 SECONDARY MALIGNANT NEOPLASM OF BONE AND BONE MARROW (HCC): ICD-10-CM

## 2020-12-02 DIAGNOSIS — C79.51 SECONDARY MALIGNANT NEOPLASM OF BONE AND BONE MARROW (HCC): ICD-10-CM

## 2020-12-02 DIAGNOSIS — I42.0 DILATED CARDIOMYOPATHY SECONDARY TO MALIGNANCY (HCC): Primary | ICD-10-CM

## 2020-12-02 DIAGNOSIS — C80.1 DILATED CARDIOMYOPATHY SECONDARY TO MALIGNANCY (HCC): Primary | ICD-10-CM

## 2020-12-02 DIAGNOSIS — C61 MALIGNANT NEOPLASM OF PROSTATE (HCC): ICD-10-CM

## 2020-12-11 ENCOUNTER — APPOINTMENT (OUTPATIENT)
Dept: PHYSICAL THERAPY | Age: 75
End: 2020-12-11

## 2020-12-11 ENCOUNTER — TELEPHONE (OUTPATIENT)
Dept: ONCOLOGY | Age: 75
End: 2020-12-11

## 2020-12-15 ENCOUNTER — OFFICE VISIT (OUTPATIENT)
Dept: ONCOLOGY | Age: 75
End: 2020-12-15
Payer: MEDICARE

## 2020-12-15 ENCOUNTER — DOCUMENTATION ONLY (OUTPATIENT)
Dept: ONCOLOGY | Age: 75
End: 2020-12-15

## 2020-12-15 ENCOUNTER — HOSPITAL ENCOUNTER (OUTPATIENT)
Dept: INFUSION THERAPY | Age: 75
Discharge: HOME | End: 2020-12-15
Payer: MEDICARE

## 2020-12-15 VITALS
TEMPERATURE: 96.9 F | HEART RATE: 53 BPM | DIASTOLIC BLOOD PRESSURE: 79 MMHG | RESPIRATION RATE: 16 BRPM | SYSTOLIC BLOOD PRESSURE: 134 MMHG

## 2020-12-15 VITALS
OXYGEN SATURATION: 96 % | SYSTOLIC BLOOD PRESSURE: 134 MMHG | TEMPERATURE: 96.7 F | HEART RATE: 60 BPM | DIASTOLIC BLOOD PRESSURE: 77 MMHG | WEIGHT: 233.9 LBS | BODY MASS INDEX: 31.72 KG/M2

## 2020-12-15 DIAGNOSIS — C61 PROSTATE CANCER (HCC): ICD-10-CM

## 2020-12-15 DIAGNOSIS — Z51.12 ENCOUNTER FOR ANTINEOPLASTIC IMMUNOTHERAPY: ICD-10-CM

## 2020-12-15 DIAGNOSIS — M19.90 ARTHRITIS: ICD-10-CM

## 2020-12-15 DIAGNOSIS — I10 ESSENTIAL HYPERTENSION: ICD-10-CM

## 2020-12-15 DIAGNOSIS — K21.9 GASTROESOPHAGEAL REFLUX DISEASE WITHOUT ESOPHAGITIS: ICD-10-CM

## 2020-12-15 PROBLEM — Z85.46 HISTORY OF PROSTATE CANCER: Status: ACTIVE | Noted: 2020-12-15

## 2020-12-15 LAB
ALBUMIN SERPL-MCNC: 3.2 G/DL (ref 3.5–5)
ALBUMIN/GLOB SERPL: 1.3 {RATIO} (ref 1.1–2.2)
ALP SERPL-CCNC: 55 U/L (ref 45–117)
ALT SERPL-CCNC: 24 U/L (ref 12–78)
ANION GAP SERPL CALC-SCNC: 6 MMOL/L (ref 5–15)
AST SERPL-CCNC: 22 U/L (ref 15–37)
BASOPHILS # BLD: 0.1 K/UL (ref 0–0.1)
BASOPHILS NFR BLD: 2 % (ref 0–1)
BILIRUB SERPL-MCNC: 0.7 MG/DL (ref 0.2–1)
BUN SERPL-MCNC: 32 MG/DL (ref 6–20)
BUN/CREAT SERPL: 20 (ref 12–20)
CALCIUM SERPL-MCNC: 8.4 MG/DL (ref 8.5–10.1)
CHLORIDE SERPL-SCNC: 110 MMOL/L (ref 97–108)
CO2 SERPL-SCNC: 25 MMOL/L (ref 21–32)
CREAT SERPL-MCNC: 1.59 MG/DL (ref 0.7–1.3)
DIFFERENTIAL METHOD BLD: ABNORMAL
EOSINOPHIL # BLD: 0.1 K/UL (ref 0–0.4)
EOSINOPHIL NFR BLD: 5 % (ref 0–7)
ERYTHROCYTE [DISTWIDTH] IN BLOOD BY AUTOMATED COUNT: 13.9 % (ref 11.5–14.5)
GLOBULIN SER CALC-MCNC: 2.5 G/DL (ref 2–4)
GLUCOSE SERPL-MCNC: 124 MG/DL (ref 65–100)
HCT VFR BLD AUTO: 33 % (ref 36.6–50.3)
HGB BLD-MCNC: 10.7 G/DL (ref 12.1–17)
IMM GRANULOCYTES # BLD AUTO: 0 K/UL (ref 0–0.04)
IMM GRANULOCYTES NFR BLD AUTO: 0 % (ref 0–0.5)
LYMPHOCYTES # BLD: 0.5 K/UL (ref 0.8–3.5)
LYMPHOCYTES NFR BLD: 18 % (ref 12–49)
MCH RBC QN AUTO: 30.5 PG (ref 26–34)
MCHC RBC AUTO-ENTMCNC: 32.4 G/DL (ref 30–36.5)
MCV RBC AUTO: 94 FL (ref 80–99)
MONOCYTES # BLD: 0.3 K/UL (ref 0–1)
MONOCYTES NFR BLD: 12 % (ref 5–13)
NEUTS SEG # BLD: 1.6 K/UL (ref 1.8–8)
NEUTS SEG NFR BLD: 63 % (ref 32–75)
NRBC # BLD: 0 K/UL (ref 0–0.01)
NRBC BLD-RTO: 0 PER 100 WBC
PLATELET # BLD AUTO: 95 K/UL (ref 150–400)
PMV BLD AUTO: 10.7 FL (ref 8.9–12.9)
POTASSIUM SERPL-SCNC: 3.7 MMOL/L (ref 3.5–5.1)
PROT SERPL-MCNC: 5.7 G/DL (ref 6.4–8.2)
RBC # BLD AUTO: 3.51 M/UL (ref 4.1–5.7)
RBC MORPH BLD: ABNORMAL
SODIUM SERPL-SCNC: 141 MMOL/L (ref 136–145)
TSH SERPL DL<=0.05 MIU/L-ACNC: 0.93 UIU/ML (ref 0.36–3.74)
WBC # BLD AUTO: 2.6 K/UL (ref 4.1–11.1)

## 2020-12-15 PROCEDURE — 36415 COLL VENOUS BLD VENIPUNCTURE: CPT

## 2020-12-15 PROCEDURE — 3017F COLORECTAL CA SCREEN DOC REV: CPT | Performed by: INTERNAL MEDICINE

## 2020-12-15 PROCEDURE — G0463 HOSPITAL OUTPT CLINIC VISIT: HCPCS | Performed by: NURSE PRACTITIONER

## 2020-12-15 PROCEDURE — 99214 OFFICE O/P EST MOD 30 MIN: CPT | Performed by: INTERNAL MEDICINE

## 2020-12-15 PROCEDURE — 74011250636 HC RX REV CODE- 250/636: Performed by: INTERNAL MEDICINE

## 2020-12-15 PROCEDURE — 1101F PT FALLS ASSESS-DOCD LE1/YR: CPT | Performed by: INTERNAL MEDICINE

## 2020-12-15 PROCEDURE — 96413 CHEMO IV INFUSION 1 HR: CPT

## 2020-12-15 PROCEDURE — 80053 COMPREHEN METABOLIC PANEL: CPT

## 2020-12-15 PROCEDURE — G8752 SYS BP LESS 140: HCPCS | Performed by: INTERNAL MEDICINE

## 2020-12-15 PROCEDURE — G8417 CALC BMI ABV UP PARAM F/U: HCPCS | Performed by: INTERNAL MEDICINE

## 2020-12-15 PROCEDURE — G8510 SCR DEP NEG, NO PLAN REQD: HCPCS | Performed by: INTERNAL MEDICINE

## 2020-12-15 PROCEDURE — G8536 NO DOC ELDER MAL SCRN: HCPCS | Performed by: INTERNAL MEDICINE

## 2020-12-15 PROCEDURE — 84443 ASSAY THYROID STIM HORMONE: CPT

## 2020-12-15 PROCEDURE — 74011000258 HC RX REV CODE- 258: Performed by: INTERNAL MEDICINE

## 2020-12-15 PROCEDURE — G8427 DOCREV CUR MEDS BY ELIG CLIN: HCPCS | Performed by: INTERNAL MEDICINE

## 2020-12-15 PROCEDURE — 85025 COMPLETE CBC W/AUTO DIFF WBC: CPT

## 2020-12-15 PROCEDURE — G8754 DIAS BP LESS 90: HCPCS | Performed by: INTERNAL MEDICINE

## 2020-12-15 RX ORDER — SODIUM CHLORIDE 9 MG/ML
25 INJECTION, SOLUTION INTRAVENOUS CONTINUOUS
Status: DISCONTINUED | OUTPATIENT
Start: 2020-12-15 | End: 2020-12-16 | Stop reason: HOSPADM

## 2020-12-15 RX ADMIN — SODIUM CHLORIDE 25 ML/HR: 900 INJECTION, SOLUTION INTRAVENOUS at 16:00

## 2020-12-15 RX ADMIN — SODIUM CHLORIDE 200 MG: 9 INJECTION, SOLUTION INTRAVENOUS at 16:36

## 2020-12-15 NOTE — PROGRESS NOTES
Pharmacy Note- Immunotherapy Education     Robyn Gonzalez is a  70 y.o.male  diagnosed with metastatic SCC here today for Cycle 1, Day 1 chemotherapy counseling . Mr. Dwayne Jiménez is being treated with pembrolizumab.         Reviewed with Mr. Dwayne Jiménez information regarding the risks of immune-mediated adverse reactions secondary to pembrolizumab that may require interruption/delay of treatment and possible use of corticosteroids. These reactions include, but are not limited to: colitis (diarrhea or severe abdominal pain); hepatitis (jaundice, severe nausea, or vomiting, easy bruising, and/or bleeding); hypophysitis (persistent or unusual headache, extreme weakness, dizziness/fainting, and/or vision changes); dermatitis (skin rash, mouth sores, skin blisters, and/or skin peeling); ocular toxicity (uveitis, iritis, and/or episcleritis); neuropathy (motor or sensory); hyper/hypothyroidism.     Patient given ways to manage these side effects and when to contact office.      Mr. Dwayne Jiménez verbalized understanding of the information presented and all of the patient's questions were answered.     CLINICAL PHARMACY CONSULT: MED RECONCILIATION/REVIEW ADDENDUM    For Pharmacy Admin Tracking Only    PHSO: PHSO Patient?: Yes  Total # of Interventions Recommended: Count: 1    Total Interventions Accepted: 1  Time Spent (min): 15

## 2020-12-15 NOTE — PROGRESS NOTES
Mitchell Celeste is a 70 y.o. male  Chief Complaint   Patient presents with    Chemotherapy     metastatic SCC     1. Have you been to the ER, urgent care clinic since your last visit? Hospitalized since your last visit? No.    2. Have you seen or consulted any other health care providers outside of the [address] [place] [address] since your last visit? Include any pap smears or colon screening.  Yes, patient went to see his Dermatologist Vernel Babinski, Radiation Oncologist Merlene Knowles

## 2020-12-15 NOTE — PROGRESS NOTES
[facility] [place] [address] [address], [address], [place]: [phone]  F: [phone]    Reason for Visit:   Aaron Lovell is a 70 y.o. male who is seen today in office for follow up of metastatic SCC to start Pembrolizumab. Treatment History:   · MRI Right Hip 8/5/20: Enlarging right pelvic sidewall adenopathy with adjacent muscular and bony invasion. Degenerative changes of the right hip. Small bony metastatic deposit anterior wall of the right acetabulum  · MRI Lumbar Spine 8/11/20: L3-L4 moderate central spinal canal and right foraminal stenoses. L5-S1 moderate bilateral foraminal stenosis  · 8/20/20 Pelvic Mass, Core biopsy with touch preparation - CYTOLOGIC INTERPRETATION: Pelvic Mass, Core biopsy with touch preparation: Squamous cell carcinoma  · TYPE ID + SCC/H+N vs skin. · PET 9/8/20: Large hypermetabolic right pelvic sidewall lymph node. Lytic lesion right anterior acetabulum is hypermetabolic and compatible with metastatic disease   · XRT to Right Pelvic Mass 10/13/20 - 10/22/20 with Dr. Aron Thomson  · Pembrolizumab 12/15/20    History of Present Illness:   Aaron Lovell is a 70 y.o. male seen today in office for follow up of metastatic SCC primary site unclear PDL1 80%. He completed XRT to pelvis mass on 10/22/20 and right hip/leg pain has since resolved. He is here today to start treatment with Pembrolizumab. He reports that he feels well overall today. He denies fever, chills, cough, SOB, CP, nausea, vomiting, diarrhea, and constipation. He denies pain today. CBC, CMP and TSh are still pending today. He is ready to start treatment today. He remains active, enjoys hunting and fishing. His supportive wife is here today.      Past Medical History:   Diagnosis Date    Arthritis     BPH (benign prostatic hypertrophy) 7/18/2011    [facility])     prostate cancer    Chronic pain right    knee    ED (erectile dysfunction)     GERD (gastroesophageal reflux disease)     HTN (hypertension), benign     Metastatic squamous cell carcinoma ([facility] [address] 9/16/2020    OA (osteoarthritis) of knee 7/18/2011    BOTH KNEES    Unspecified essential hypertension 7/18/2011      Past Surgical History:   Procedure Laterality Date    HX ACL RECONSTRUCTION Right     x2    HX APPENDECTOMY  1975    HX GI  1/2015    Drainage of hemorrhoid    HX HERNIA REPAIR      left and right,  ventral    HX HIP REPLACEMENT  2000    left    HX HIP REPLACEMENT  2000    left    HX KNEE ARTHROSCOPY Right     x3    HX ORTHOPAEDIC Left 6/2000    HIP REPLACEMENT    HX ORTHOPAEDIC Right 08/05/2014    [facility] - Dr. Sharyn Noel HX PROSTATECTOMY  11/07/2011    prostate removed     HX TONSILLECTOMY      CHILD    IR INJ FORAMIN EPID LUMB ANES/STER SNGL  8/18/2020      Social History     Tobacco Use    Smoking status: Never Smoker    Smokeless tobacco: Never Used   Substance Use Topics    Alcohol use: No      Family History   Problem Relation Age of Onset    Bleeding Prob Father         clotting disorder    Cancer Brother         colon CA 1/2 brother    Thyroid Disease Daughter     Heart Failure Mother     Heart Disease Mother     Anesth Problems Neg Hx      Current Outpatient Medications   Medication Sig    terazosin (HYTRIN) 10 mg capsule Take 1 capsule by mouth once daily    traZODone (DESYREL) 50 mg tablet TAKE 1 TO 2 TABLETS BY MOUTH ONCE DAILY IN THE EVENING AS NEEDED FOR SLEEP    ibuprofen (AdviL) 200 mg tablet Take  by mouth.  candesartan (ATACAND) 4 mg tablet Take 1 Tab by mouth nightly.  amLODIPine (NORVASC) 5 mg tablet Take 1 Tab by mouth nightly.  aspirin delayed-release 81 mg tablet Take 1 Tab by mouth two (2) times a day. Indications: blood clot prevention    senna-docusate (PERICOLACE) 8.6-50 mg per tablet Take 1 Tab by mouth two (2) times daily as needed for Constipation.  acetaminophen (TYLENOL) 500 mg tablet Take 1 Tab by mouth every four (4) hours.     calcium carbonate/vitamin D3 (CALTRATE-600 PLUS VITAMIN D3 PO) Take 1 Tab by mouth nightly.  leuprolide acetate (LUPRON DEPOT, 6 MONTH, IM) 1 Each by IntraMUSCular route every 6 months. No current facility-administered medications for this visit. Allergies   Allergen Reactions    Barbiturates Anxiety     Hyper        A complete review of systems was obtained, negative except as described above and as reported on ROS sheet scanned into system. Physical Exam:     Visit Vitals  /77 (BP 1 Location: Left arm, BP Patient Position: Sitting)   Pulse 60   Temp (!) 96.7 °F (35.9 °C) (Temporal)   Wt 233 lb 14.4 oz (106.1 kg)   SpO2 96%   BMI 31.72 kg/m²     ECOG PS: 1  General: No distress  Eyes: PERRLA, anicteric sclerae  HENT: Atraumatic, wearing a mask  Neck: Supple  Resp: Normal respiratory effort   MS: Normal gait and station. Digits without clubbing or cyanosis. Skin: No rashes, ecchymoses, or petechiae. Normal temperature, turgor, and texture. Psych: Alert, oriented, appropriate affect, normal judgment/insight    Results:     Lab Results   Component Value Date/Time    WBC 2.6 (L) 12/15/2020 01:48 PM    HGB 10.7 (L) 12/15/2020 01:48 PM    HCT 33.0 (L) 12/15/2020 01:48 PM    PLATELET 95 (L) 81/62/2326 01:48 PM    MCV 94.0 12/15/2020 01:48 PM    ABS. NEUTROPHILS PENDING 12/15/2020 01:48 PM     Lab Results   Component Value Date/Time    Sodium 141 12/15/2020 01:48 PM    Potassium 3.7 12/15/2020 01:48 PM    Chloride 110 (H) 12/15/2020 01:48 PM    CO2 25 12/15/2020 01:48 PM    Glucose 124 (H) 12/15/2020 01:48 PM    BUN 32 (H) 12/15/2020 01:48 PM    Creatinine 1.59 (H) 12/15/2020 01:48 PM    GFR est AA 52 (L) 12/15/2020 01:48 PM    GFR est non-AA 43 (L) 12/15/2020 01:48 PM    Calcium 8.4 (L) 12/15/2020 01:48 PM    Glucose (POC) 103 (H) 06/09/2020 07:03 AM     Lab Results   Component Value Date/Time    Bilirubin, total 0.7 12/15/2020 01:48 PM    ALT (SGPT) 24 12/15/2020 01:48 PM    Alk.  phosphatase 55 12/15/2020 01:48 PM    Protein, total 5.7 (L) 12/15/2020 01:48 PM    Albumin 3.2 (L) 12/15/2020 01:48 PM    Globulin 2.5 12/15/2020 01:48 PM     MRI Right Hip 8/5/20  IMPRESSION:   1. Enlarging right pelvic sidewall adenopathy with adjacent muscular and bony  invasion  2. Degenerative changes of the right hip  3. Small bony metastatic deposit anterior wall of the right acetabulum    MRI Lumbar Spine 8/11/20  IMPRESSION:  1. L3-L4 moderate central spinal canal and right foraminal stenoses. 2. L5-S1 moderate bilateral foraminal stenosis. 8/20/20  Specimen Source   1: Pelvic Mass, Core biopsy with touch preparation   CYTOLOGIC INTERPRETATION:   Pelvic Mass, Core biopsy with touch preparation:   Squamous cell carcinoma    PET 9/8/20  IMPRESSION: Large hypermetabolic right pelvic sidewall lymph node. Lytic lesion  right anterior acetabulum is hypermetabolic and compatible with metastatic  disease    Records reviewed and summarized above. Pathology report(s) reviewed above. Radiology report(s) reviewed above. Assessment/PLAN:     1) Metastatic Squamous Cell Cancer    EGFR/ ROS/ ALK negative. PDL1 80% by biopsy of pelvic/hip mas 8/20/20. Path is different than prostate which is adenocarcinoma from 2011. Unclear site of origin of squamous cell. TYPE ID sent and SCC H+N vs skin. CT 2/19 which showed LAD and now is progressive. Case discussed at cancer conference   PET 9/8/20 negative except for pelvic mass. Saw Urology and bladder/penis negative. Saw CR sugery and anoscopy negative. Saw Dermatology and skin negative. Seen by Rad/Onc for pelvic mass XRT  Completed XRT to right pelvic mass on 10/22/20. Right hip pain has esolved since completing XRT. Discussed we can do Pembrolizumab/immunotherapy single agent. Reviewed risks, benefits and alternatives of Pembrolizumab today. He has has teaching and consent with pharmacy. He is here today for Cycle 1 of Pembrolizumab.   He is clinically stable today - no new issues today. CBC, CMP and TSH are still pending. He is ready for treatment pending labs. Follow up PET scheduled for 1/12/21 per Rad/Onc. Follow up in 3 weeks with Cycle 2. Patient agrees with plan. 2) Recurrent Prostate Cancer   PSA 0 on Lupron. Management per Urology. 3) Right Hip Pain   Resolved after XRT. 4) HTN/Arthritis/GERD  Management per PCP. 5) Psychosocial  Mood good, coping well. He has great support from his wife. SW support as needed. Call if questions  Follow up in 3 weeks with Cycle 2. This patient was seen in conjunction with Sun Fajardo NP. I personally reviewed the history and all points in the assessment and plan with the patient, and performed the key points on the exam.   I appreciate the opportunity to participate in Mr. Tirso sinclair.     Signed By: Westley Langley DO

## 2020-12-16 ENCOUNTER — TELEPHONE (OUTPATIENT)
Dept: ONCOLOGY | Age: 75
End: 2020-12-16

## 2020-12-16 NOTE — PROGRESS NOTES
LISSETTE Verified. Called pt on mobile phone number listed. Patient stated \"this is nothing new for me, I went to see someone for my creatinine levels but they told me it seems to be something that is not a concern as of now\" because for patient it has been this way for a while. Patient did verbalize understanding and expressed no concerns.   Nick Leiva

## 2020-12-16 NOTE — PROGRESS NOTES
[facility] - Chemotherapy Progress Note    6556 Pt admit to [address] [place] [address] for [facility] ambulatory in stable condition. Assessment completed. No new concerns voiced. Pt oriented to [address] [place] [address]; all questions answered and concerns addressed. PIV access established in L arm with positive blood return. Labs drawn per order and sent. Line flushed, clamped, Curos Cap applied to end clave. Pt upstairs to MD appointment. 1520 Return to [address] [place] [address]; line flushed, NS infusing; MD aware of platelets, 95, okay to proceed    Patient denies SOB, fever, cough, general not feeling well. Patient denies recent exposure to someone who has tested positive for COVID-19. Patient  denies having contact with anyone who has a pending COVID test.     Visit Vitals  /79   Pulse (!) 53   Temp 96.9 °F (36.1 °C)   Resp 16       Medications Administered     0.9% sodium chloride infusion     Admin Date  12/15/2020 Action  New Bag Dose  25 mL/hr Rate  25 mL/hr Route  IntraVENous Administered By  Chelsie Brooks RN          pembrolizumab [facility]) 200 mg in 0.9% sodium chloride 100 mL, overfill volume 10 mL IVPB     Admin Date  12/15/2020 Action  New Bag Dose  200 mg Rate  236 mL/hr Route  IntraVENous Administered By  Chelsie Brooks RN                  0803 Pt tolerated treatment well. PIV removed at discharge. D/c home ambulatory in no distress. Pt aware of next [facility] appointment scheduled for 01/05/2021.     Recent Results (from the past 12 hour(s))   CBC WITH AUTOMATED DIFF    Collection Time: 12/15/20  1:48 PM   Result Value Ref Range    WBC 2.6 (L) 4.1 - 11.1 K/uL    RBC 3.51 (L) 4.10 - 5.70 M/uL    HGB 10.7 (L) 12.1 - 17.0 g/dL    HCT 33.0 (L) 36.6 - 50.3 %    MCV 94.0 80.0 - 99.0 FL    MCH 30.5 26.0 - 34.0 PG    MCHC 32.4 30.0 - 36.5 g/dL    RDW 13.9 11.5 - 14.5 %    PLATELET 95 (L) 537 - 400 K/uL    MPV 10.7 8.9 - 12.9 FL    NRBC 0.0 0  WBC    ABSOLUTE NRBC 0.00 0.00 - 0.01 K/uL    NEUTROPHILS 63 32 - 75 % LYMPHOCYTES 18 12 - 49 %    MONOCYTES 12 5 - 13 %    EOSINOPHILS 5 0 - 7 %    BASOPHILS 2 (H) 0 - 1 %    IMMATURE GRANULOCYTES 0 0.0 - 0.5 %    ABS. NEUTROPHILS 1.6 (L) 1.8 - 8.0 K/UL    ABS. LYMPHOCYTES 0.5 (L) 0.8 - 3.5 K/UL    ABS. MONOCYTES 0.3 0.0 - 1.0 K/UL    ABS. EOSINOPHILS 0.1 0.0 - 0.4 K/UL    ABS. BASOPHILS 0.1 0.0 - 0.1 K/UL    ABS. IMM. GRANS. 0.0 0.00 - 0.04 K/UL    DF SMEAR SCANNED      RBC COMMENTS NORMOCYTIC, NORMOCHROMIC     METABOLIC PANEL, COMPREHENSIVE    Collection Time: 12/15/20  1:48 PM   Result Value Ref Range    Sodium 141 136 - 145 mmol/L    Potassium 3.7 3.5 - 5.1 mmol/L    Chloride 110 (H) 97 - 108 mmol/L    CO2 25 21 - 32 mmol/L    Anion gap 6 5 - 15 mmol/L    Glucose 124 (H) 65 - 100 mg/dL    BUN 32 (H) 6 - 20 MG/DL    Creatinine 1.59 (H) 0.70 - 1.30 MG/DL    BUN/Creatinine ratio 20 12 - 20      GFR est AA 52 (L) >60 ml/min/1.73m2    GFR est non-AA 43 (L) >60 ml/min/1.73m2    Calcium 8.4 (L) 8.5 - 10.1 MG/DL    Bilirubin, total 0.7 0.2 - 1.0 MG/DL    ALT (SGPT) 24 12 - 78 U/L    AST (SGOT) 22 15 - 37 U/L    Alk.  phosphatase 55 45 - 117 U/L    Protein, total 5.7 (L) 6.4 - 8.2 g/dL    Albumin 3.2 (L) 3.5 - 5.0 g/dL    Globulin 2.5 2.0 - 4.0 g/dL    A-G Ratio 1.3 1.1 - 2.2     TSH 3RD GENERATION    Collection Time: 12/15/20  1:48 PM   Result Value Ref Range    TSH 0.93 0.36 - 3.74 uIU/mL

## 2020-12-16 NOTE — TELEPHONE ENCOUNTER
Call to patient, HIPAA verified. S/P C 1, D 1 Keytruda. States that other than \"some weariness this morning\" he feels \"pretty good. \"  States treatment went well yesterday and that he has no additional questions at this time. Encouraged patient to rest when needed, maintain hydration, contact MD office with questions/concerns.

## 2020-12-18 RX ORDER — TRAZODONE HYDROCHLORIDE 50 MG/1
TABLET ORAL
Qty: 60 TAB | Refills: 0 | Status: SHIPPED | OUTPATIENT
Start: 2020-12-18 | End: 2021-02-16

## 2020-12-30 RX ORDER — DIPHENHYDRAMINE HYDROCHLORIDE 50 MG/ML
50 INJECTION, SOLUTION INTRAMUSCULAR; INTRAVENOUS AS NEEDED
Status: CANCELLED
Start: 2021-01-26

## 2020-12-30 RX ORDER — HYDROCORTISONE SODIUM SUCCINATE 100 MG/2ML
100 INJECTION, POWDER, FOR SOLUTION INTRAMUSCULAR; INTRAVENOUS AS NEEDED
Status: CANCELLED | OUTPATIENT
Start: 2021-01-05

## 2020-12-30 RX ORDER — ONDANSETRON 2 MG/ML
8 INJECTION INTRAMUSCULAR; INTRAVENOUS AS NEEDED
Status: CANCELLED | OUTPATIENT
Start: 2021-01-05

## 2020-12-30 RX ORDER — SODIUM CHLORIDE 0.9 % (FLUSH) 0.9 %
10 SYRINGE (ML) INJECTION AS NEEDED
Status: CANCELLED | OUTPATIENT
Start: 2021-01-26

## 2020-12-30 RX ORDER — SODIUM CHLORIDE 9 MG/ML
25 INJECTION, SOLUTION INTRAVENOUS CONTINUOUS
Status: CANCELLED | OUTPATIENT
Start: 2021-01-26

## 2020-12-30 RX ORDER — ALBUTEROL SULFATE 0.83 MG/ML
2.5 SOLUTION RESPIRATORY (INHALATION) AS NEEDED
Status: CANCELLED
Start: 2021-01-26

## 2020-12-30 RX ORDER — EPINEPHRINE 1 MG/ML
0.3 INJECTION, SOLUTION, CONCENTRATE INTRAVENOUS AS NEEDED
Status: CANCELLED | OUTPATIENT
Start: 2021-02-16

## 2020-12-30 RX ORDER — SODIUM CHLORIDE 9 MG/ML
25 INJECTION, SOLUTION INTRAVENOUS CONTINUOUS
Status: CANCELLED | OUTPATIENT
Start: 2021-01-05

## 2020-12-30 RX ORDER — SODIUM CHLORIDE 9 MG/ML
10 INJECTION INTRAMUSCULAR; INTRAVENOUS; SUBCUTANEOUS AS NEEDED
Status: CANCELLED | OUTPATIENT
Start: 2021-01-26

## 2020-12-30 RX ORDER — SODIUM CHLORIDE 9 MG/ML
25 INJECTION, SOLUTION INTRAVENOUS CONTINUOUS
Status: CANCELLED | OUTPATIENT
Start: 2021-02-16

## 2020-12-30 RX ORDER — DIPHENHYDRAMINE HYDROCHLORIDE 50 MG/ML
25 INJECTION, SOLUTION INTRAMUSCULAR; INTRAVENOUS AS NEEDED
Status: CANCELLED
Start: 2021-02-16

## 2020-12-30 RX ORDER — SODIUM CHLORIDE 0.9 % (FLUSH) 0.9 %
10 SYRINGE (ML) INJECTION AS NEEDED
Status: CANCELLED | OUTPATIENT
Start: 2021-02-16

## 2020-12-30 RX ORDER — ACETAMINOPHEN 325 MG/1
650 TABLET ORAL AS NEEDED
Status: CANCELLED
Start: 2021-02-16

## 2020-12-30 RX ORDER — ACETAMINOPHEN 325 MG/1
650 TABLET ORAL AS NEEDED
Status: CANCELLED
Start: 2021-01-26

## 2020-12-30 RX ORDER — HYDROCORTISONE SODIUM SUCCINATE 100 MG/2ML
100 INJECTION, POWDER, FOR SOLUTION INTRAMUSCULAR; INTRAVENOUS AS NEEDED
Status: CANCELLED | OUTPATIENT
Start: 2021-01-26

## 2020-12-30 RX ORDER — SODIUM CHLORIDE 9 MG/ML
10 INJECTION INTRAMUSCULAR; INTRAVENOUS; SUBCUTANEOUS AS NEEDED
Status: CANCELLED | OUTPATIENT
Start: 2021-01-05

## 2020-12-30 RX ORDER — ACETAMINOPHEN 325 MG/1
650 TABLET ORAL AS NEEDED
Status: CANCELLED
Start: 2021-01-05

## 2020-12-30 RX ORDER — ALBUTEROL SULFATE 0.83 MG/ML
2.5 SOLUTION RESPIRATORY (INHALATION) AS NEEDED
Status: CANCELLED
Start: 2021-01-05

## 2020-12-30 RX ORDER — SODIUM CHLORIDE 9 MG/ML
10 INJECTION INTRAMUSCULAR; INTRAVENOUS; SUBCUTANEOUS AS NEEDED
Status: CANCELLED | OUTPATIENT
Start: 2021-02-16

## 2020-12-30 RX ORDER — DIPHENHYDRAMINE HYDROCHLORIDE 50 MG/ML
25 INJECTION, SOLUTION INTRAMUSCULAR; INTRAVENOUS AS NEEDED
Status: CANCELLED
Start: 2021-01-05

## 2020-12-30 RX ORDER — HEPARIN 100 UNIT/ML
300-500 SYRINGE INTRAVENOUS AS NEEDED
Status: CANCELLED
Start: 2021-01-05

## 2020-12-30 RX ORDER — EPINEPHRINE 1 MG/ML
0.3 INJECTION, SOLUTION, CONCENTRATE INTRAVENOUS AS NEEDED
Status: CANCELLED | OUTPATIENT
Start: 2021-01-26

## 2020-12-30 RX ORDER — ALBUTEROL SULFATE 0.83 MG/ML
2.5 SOLUTION RESPIRATORY (INHALATION) AS NEEDED
Status: CANCELLED
Start: 2021-02-16

## 2020-12-30 RX ORDER — HEPARIN 100 UNIT/ML
300-500 SYRINGE INTRAVENOUS AS NEEDED
Status: CANCELLED
Start: 2021-02-16

## 2020-12-30 RX ORDER — ONDANSETRON 2 MG/ML
8 INJECTION INTRAMUSCULAR; INTRAVENOUS AS NEEDED
Status: CANCELLED | OUTPATIENT
Start: 2021-02-16

## 2020-12-30 RX ORDER — ONDANSETRON 2 MG/ML
8 INJECTION INTRAMUSCULAR; INTRAVENOUS AS NEEDED
Status: CANCELLED | OUTPATIENT
Start: 2021-01-26

## 2020-12-30 RX ORDER — SODIUM CHLORIDE 0.9 % (FLUSH) 0.9 %
10 SYRINGE (ML) INJECTION AS NEEDED
Status: CANCELLED | OUTPATIENT
Start: 2021-01-05

## 2020-12-30 RX ORDER — EPINEPHRINE 1 MG/ML
0.3 INJECTION, SOLUTION, CONCENTRATE INTRAVENOUS AS NEEDED
Status: CANCELLED | OUTPATIENT
Start: 2021-01-05

## 2020-12-30 RX ORDER — HYDROCORTISONE SODIUM SUCCINATE 100 MG/2ML
100 INJECTION, POWDER, FOR SOLUTION INTRAMUSCULAR; INTRAVENOUS AS NEEDED
Status: CANCELLED | OUTPATIENT
Start: 2021-02-16

## 2020-12-30 RX ORDER — HEPARIN 100 UNIT/ML
300-500 SYRINGE INTRAVENOUS AS NEEDED
Status: CANCELLED
Start: 2021-01-26

## 2020-12-30 RX ORDER — DIPHENHYDRAMINE HYDROCHLORIDE 50 MG/ML
25 INJECTION, SOLUTION INTRAMUSCULAR; INTRAVENOUS AS NEEDED
Status: CANCELLED
Start: 2021-01-26

## 2020-12-30 RX ORDER — DIPHENHYDRAMINE HYDROCHLORIDE 50 MG/ML
50 INJECTION, SOLUTION INTRAMUSCULAR; INTRAVENOUS AS NEEDED
Status: CANCELLED
Start: 2021-01-05

## 2020-12-30 RX ORDER — DIPHENHYDRAMINE HYDROCHLORIDE 50 MG/ML
50 INJECTION, SOLUTION INTRAMUSCULAR; INTRAVENOUS AS NEEDED
Status: CANCELLED
Start: 2021-02-16

## 2021-01-01 ENCOUNTER — DOCUMENTATION ONLY (OUTPATIENT)
Dept: ONCOLOGY | Age: 76
End: 2021-01-01

## 2021-01-01 ENCOUNTER — OFFICE VISIT (OUTPATIENT)
Dept: ONCOLOGY | Age: 76
End: 2021-01-01
Payer: MEDICARE

## 2021-01-01 ENCOUNTER — TELEPHONE (OUTPATIENT)
Dept: ONCOLOGY | Age: 76
End: 2021-01-01

## 2021-01-01 ENCOUNTER — HOSPITAL ENCOUNTER (OUTPATIENT)
Dept: INFUSION THERAPY | Age: 76
Discharge: HOME | End: 2021-10-15
Payer: MEDICARE

## 2021-01-01 ENCOUNTER — HOSPITAL ENCOUNTER (OUTPATIENT)
Dept: INFUSION THERAPY | Age: 76
Discharge: HOME | End: 2021-06-18
Payer: MEDICARE

## 2021-01-01 ENCOUNTER — APPOINTMENT (OUTPATIENT)
Dept: INFUSION THERAPY | Age: 76
End: 2021-01-01

## 2021-01-01 ENCOUNTER — HOSPITAL ENCOUNTER (OUTPATIENT)
Dept: INFUSION THERAPY | Age: 76
Discharge: HOME | End: 2021-07-30
Payer: MEDICARE

## 2021-01-01 ENCOUNTER — HOSPITAL ENCOUNTER (OUTPATIENT)
Dept: INFUSION THERAPY | Age: 76
Discharge: HOME | End: 2021-09-23
Payer: MEDICARE

## 2021-01-01 ENCOUNTER — HOSPITAL ENCOUNTER (OUTPATIENT)
Dept: PET IMAGING | Age: 76
Discharge: HOME | End: 2021-11-22
Attending: NURSE PRACTITIONER
Payer: MEDICARE

## 2021-01-01 ENCOUNTER — HOSPITAL ENCOUNTER (OUTPATIENT)
Dept: PET IMAGING | Age: 76
Discharge: HOME | End: 2021-09-14
Attending: NURSE PRACTITIONER
Payer: MEDICARE

## 2021-01-01 ENCOUNTER — HOSPITAL ENCOUNTER (OUTPATIENT)
Dept: INFUSION THERAPY | Age: 76
End: 2021-01-01

## 2021-01-01 ENCOUNTER — HOSPITAL ENCOUNTER (OUTPATIENT)
Dept: INFUSION THERAPY | Age: 76
Discharge: HOME | End: 2021-09-24
Payer: MEDICARE

## 2021-01-01 ENCOUNTER — HOSPITAL ENCOUNTER (OUTPATIENT)
Dept: INFUSION THERAPY | Age: 76
Discharge: HOME | End: 2021-10-14
Payer: MEDICARE

## 2021-01-01 ENCOUNTER — HOSPITAL ENCOUNTER (OUTPATIENT)
Dept: ULTRASOUND IMAGING | Age: 76
Discharge: HOME | End: 2021-07-16
Attending: INTERNAL MEDICINE
Payer: MEDICARE

## 2021-01-01 ENCOUNTER — PATIENT MESSAGE (OUTPATIENT)
Dept: INTERNAL MEDICINE CLINIC | Age: 76
End: 2021-01-01

## 2021-01-01 ENCOUNTER — HOSPITAL ENCOUNTER (EMERGENCY)
Age: 76
Discharge: HOME | End: 2021-07-16
Attending: EMERGENCY MEDICINE
Payer: MEDICARE

## 2021-01-01 ENCOUNTER — HOSPITAL ENCOUNTER (OUTPATIENT)
Dept: INFUSION THERAPY | Age: 76
Discharge: HOME | End: 2021-08-20
Payer: MEDICARE

## 2021-01-01 ENCOUNTER — HOSPITAL ENCOUNTER (OUTPATIENT)
Dept: INFUSION THERAPY | Age: 76
Discharge: HOME | End: 2021-07-09
Payer: MEDICARE

## 2021-01-01 ENCOUNTER — HOSPITAL ENCOUNTER (OUTPATIENT)
Dept: INFUSION THERAPY | Age: 76
Discharge: HOME | End: 2021-06-17
Payer: MEDICARE

## 2021-01-01 ENCOUNTER — OFFICE VISIT (OUTPATIENT)
Dept: INTERNAL MEDICINE CLINIC | Age: 76
End: 2021-01-01
Payer: MEDICARE

## 2021-01-01 ENCOUNTER — HOSPITAL ENCOUNTER (OUTPATIENT)
Dept: INFUSION THERAPY | Age: 76
Discharge: HOME | End: 2021-07-08
Payer: MEDICARE

## 2021-01-01 ENCOUNTER — HOSPITAL ENCOUNTER (OUTPATIENT)
Dept: INFUSION THERAPY | Age: 76
Discharge: HOME | End: 2021-07-29
Payer: MEDICARE

## 2021-01-01 ENCOUNTER — HOSPITAL ENCOUNTER (OUTPATIENT)
Dept: INFUSION THERAPY | Age: 76
Discharge: HOME | End: 2021-08-19
Payer: MEDICARE

## 2021-01-01 VITALS
TEMPERATURE: 96.2 F | OXYGEN SATURATION: 93 % | WEIGHT: 235.1 LBS | DIASTOLIC BLOOD PRESSURE: 68 MMHG | HEART RATE: 68 BPM | SYSTOLIC BLOOD PRESSURE: 110 MMHG | HEIGHT: 72 IN | BODY MASS INDEX: 31.84 KG/M2

## 2021-01-01 VITALS
HEART RATE: 63 BPM | TEMPERATURE: 97 F | SYSTOLIC BLOOD PRESSURE: 159 MMHG | RESPIRATION RATE: 18 BRPM | DIASTOLIC BLOOD PRESSURE: 86 MMHG

## 2021-01-01 VITALS
HEIGHT: 72 IN | SYSTOLIC BLOOD PRESSURE: 156 MMHG | TEMPERATURE: 97.8 F | SYSTOLIC BLOOD PRESSURE: 132 MMHG | HEART RATE: 67 BPM | BODY MASS INDEX: 31.97 KG/M2 | BODY MASS INDEX: 32.29 KG/M2 | HEART RATE: 75 BPM | HEIGHT: 72 IN | TEMPERATURE: 98.2 F | OXYGEN SATURATION: 94 % | OXYGEN SATURATION: 98 % | WEIGHT: 238.4 LBS | WEIGHT: 236 LBS | RESPIRATION RATE: 18 BRPM | DIASTOLIC BLOOD PRESSURE: 79 MMHG | DIASTOLIC BLOOD PRESSURE: 87 MMHG

## 2021-01-01 VITALS
OXYGEN SATURATION: 95 % | SYSTOLIC BLOOD PRESSURE: 161 MMHG | TEMPERATURE: 98.4 F | HEART RATE: 65 BPM | HEIGHT: 72 IN | BODY MASS INDEX: 32.07 KG/M2 | WEIGHT: 236.8 LBS | RESPIRATION RATE: 18 BRPM | DIASTOLIC BLOOD PRESSURE: 88 MMHG

## 2021-01-01 VITALS
HEIGHT: 72 IN | BODY MASS INDEX: 32.21 KG/M2 | TEMPERATURE: 97 F | RESPIRATION RATE: 16 BRPM | DIASTOLIC BLOOD PRESSURE: 72 MMHG | SYSTOLIC BLOOD PRESSURE: 116 MMHG | WEIGHT: 237.8 LBS | HEART RATE: 72 BPM

## 2021-01-01 VITALS
HEART RATE: 73 BPM | WEIGHT: 230.1 LBS | TEMPERATURE: 97.4 F | SYSTOLIC BLOOD PRESSURE: 128 MMHG | DIASTOLIC BLOOD PRESSURE: 75 MMHG | OXYGEN SATURATION: 96 % | HEIGHT: 72 IN | BODY MASS INDEX: 31.17 KG/M2

## 2021-01-01 VITALS
HEART RATE: 71 BPM | HEIGHT: 72 IN | SYSTOLIC BLOOD PRESSURE: 106 MMHG | TEMPERATURE: 97.6 F | WEIGHT: 245.6 LBS | DIASTOLIC BLOOD PRESSURE: 64 MMHG | BODY MASS INDEX: 33.26 KG/M2 | OXYGEN SATURATION: 95 %

## 2021-01-01 VITALS
RESPIRATION RATE: 18 BRPM | HEART RATE: 85 BPM | SYSTOLIC BLOOD PRESSURE: 165 MMHG | TEMPERATURE: 97.3 F | DIASTOLIC BLOOD PRESSURE: 92 MMHG

## 2021-01-01 VITALS
OXYGEN SATURATION: 97 % | WEIGHT: 236.77 LBS | SYSTOLIC BLOOD PRESSURE: 157 MMHG | HEART RATE: 61 BPM | BODY MASS INDEX: 32.11 KG/M2 | TEMPERATURE: 97 F | RESPIRATION RATE: 18 BRPM | DIASTOLIC BLOOD PRESSURE: 86 MMHG

## 2021-01-01 VITALS
BODY MASS INDEX: 31.91 KG/M2 | HEART RATE: 68 BPM | WEIGHT: 235.6 LBS | TEMPERATURE: 98.2 F | OXYGEN SATURATION: 97 % | RESPIRATION RATE: 16 BRPM | DIASTOLIC BLOOD PRESSURE: 68 MMHG | SYSTOLIC BLOOD PRESSURE: 105 MMHG | HEIGHT: 72 IN

## 2021-01-01 VITALS
OXYGEN SATURATION: 98 % | HEIGHT: 72 IN | TEMPERATURE: 96.2 F | WEIGHT: 241 LBS | SYSTOLIC BLOOD PRESSURE: 114 MMHG | DIASTOLIC BLOOD PRESSURE: 70 MMHG | HEART RATE: 72 BPM | BODY MASS INDEX: 32.64 KG/M2

## 2021-01-01 VITALS
DIASTOLIC BLOOD PRESSURE: 74 MMHG | HEIGHT: 72 IN | BODY MASS INDEX: 31.15 KG/M2 | TEMPERATURE: 98.2 F | RESPIRATION RATE: 16 BRPM | HEART RATE: 64 BPM | OXYGEN SATURATION: 98 % | WEIGHT: 230 LBS | SYSTOLIC BLOOD PRESSURE: 135 MMHG

## 2021-01-01 VITALS
SYSTOLIC BLOOD PRESSURE: 145 MMHG | DIASTOLIC BLOOD PRESSURE: 84 MMHG | OXYGEN SATURATION: 96 % | RESPIRATION RATE: 18 BRPM | TEMPERATURE: 97.7 F | HEART RATE: 62 BPM

## 2021-01-01 VITALS
OXYGEN SATURATION: 96 % | HEIGHT: 72 IN | WEIGHT: 233.4 LBS | BODY MASS INDEX: 31.61 KG/M2 | TEMPERATURE: 96.9 F | DIASTOLIC BLOOD PRESSURE: 88 MMHG | SYSTOLIC BLOOD PRESSURE: 155 MMHG | HEART RATE: 58 BPM

## 2021-01-01 VITALS
WEIGHT: 230.1 LBS | HEART RATE: 62 BPM | BODY MASS INDEX: 31.17 KG/M2 | SYSTOLIC BLOOD PRESSURE: 154 MMHG | RESPIRATION RATE: 16 BRPM | HEIGHT: 72 IN | DIASTOLIC BLOOD PRESSURE: 87 MMHG

## 2021-01-01 VITALS
DIASTOLIC BLOOD PRESSURE: 72 MMHG | TEMPERATURE: 97 F | SYSTOLIC BLOOD PRESSURE: 129 MMHG | HEART RATE: 63 BPM | RESPIRATION RATE: 18 BRPM | OXYGEN SATURATION: 98 %

## 2021-01-01 VITALS
BODY MASS INDEX: 32.21 KG/M2 | HEART RATE: 76 BPM | WEIGHT: 237.8 LBS | DIASTOLIC BLOOD PRESSURE: 76 MMHG | SYSTOLIC BLOOD PRESSURE: 126 MMHG | TEMPERATURE: 96.6 F | HEIGHT: 72 IN | OXYGEN SATURATION: 96 %

## 2021-01-01 VITALS
TEMPERATURE: 97.1 F | DIASTOLIC BLOOD PRESSURE: 87 MMHG | HEART RATE: 52 BPM | RESPIRATION RATE: 18 BRPM | SYSTOLIC BLOOD PRESSURE: 169 MMHG | OXYGEN SATURATION: 97 %

## 2021-01-01 VITALS
HEART RATE: 62 BPM | BODY MASS INDEX: 31.83 KG/M2 | HEIGHT: 72 IN | SYSTOLIC BLOOD PRESSURE: 162 MMHG | DIASTOLIC BLOOD PRESSURE: 82 MMHG | TEMPERATURE: 98 F | RESPIRATION RATE: 16 BRPM | WEIGHT: 235 LBS

## 2021-01-01 VITALS
HEART RATE: 72 BPM | TEMPERATURE: 96.9 F | OXYGEN SATURATION: 97 % | BODY MASS INDEX: 32.07 KG/M2 | DIASTOLIC BLOOD PRESSURE: 86 MMHG | RESPIRATION RATE: 20 BRPM | WEIGHT: 236.77 LBS | HEIGHT: 72 IN | SYSTOLIC BLOOD PRESSURE: 150 MMHG

## 2021-01-01 VITALS
TEMPERATURE: 97.1 F | HEART RATE: 58 BPM | RESPIRATION RATE: 20 BRPM | SYSTOLIC BLOOD PRESSURE: 160 MMHG | DIASTOLIC BLOOD PRESSURE: 89 MMHG

## 2021-01-01 DIAGNOSIS — K21.9 GASTROESOPHAGEAL REFLUX DISEASE WITHOUT ESOPHAGITIS: ICD-10-CM

## 2021-01-01 DIAGNOSIS — D69.6 THROMBOCYTOPENIA (HCC): ICD-10-CM

## 2021-01-01 DIAGNOSIS — Z51.12 ENCOUNTER FOR ANTINEOPLASTIC IMMUNOTHERAPY: ICD-10-CM

## 2021-01-01 DIAGNOSIS — Z98.890 HISTORY OF KNEE SURGERY: ICD-10-CM

## 2021-01-01 DIAGNOSIS — I10 ESSENTIAL HYPERTENSION: ICD-10-CM

## 2021-01-01 DIAGNOSIS — I82.4Y1 ACUTE DEEP VEIN THROMBOSIS (DVT) OF PROXIMAL VEIN OF RIGHT LOWER EXTREMITY (HCC): ICD-10-CM

## 2021-01-01 DIAGNOSIS — C79.51 BONE METASTASES (HCC): ICD-10-CM

## 2021-01-01 DIAGNOSIS — R79.89 ELEVATED SERUM CREATININE: ICD-10-CM

## 2021-01-01 DIAGNOSIS — C78.02 BILATERAL PULMONARY METASTASES (HCC): ICD-10-CM

## 2021-01-01 DIAGNOSIS — T45.1X5A CHEMOTHERAPY-INDUCED NEUROPATHY (HCC): ICD-10-CM

## 2021-01-01 DIAGNOSIS — C78.01 BILATERAL PULMONARY METASTASES (HCC): ICD-10-CM

## 2021-01-01 DIAGNOSIS — C61 PROSTATE CANCER (HCC): ICD-10-CM

## 2021-01-01 DIAGNOSIS — Z51.12 ENCOUNTER FOR ANTINEOPLASTIC IMMUNOTHERAPY: Primary | ICD-10-CM

## 2021-01-01 DIAGNOSIS — L27.0 DRUG-INDUCED SKIN RASH: ICD-10-CM

## 2021-01-01 DIAGNOSIS — G62.0 CHEMOTHERAPY-INDUCED NEUROPATHY (HCC): ICD-10-CM

## 2021-01-01 DIAGNOSIS — M19.90 ARTHRITIS: ICD-10-CM

## 2021-01-01 DIAGNOSIS — Z00.00 MEDICARE ANNUAL WELLNESS VISIT, SUBSEQUENT: Primary | ICD-10-CM

## 2021-01-01 DIAGNOSIS — R79.89 LOW THYROID STIMULATING HORMONE (TSH) LEVEL: ICD-10-CM

## 2021-01-01 DIAGNOSIS — R22.41 LOCALIZED SWELLING OF RIGHT LOWER EXTREMITY: ICD-10-CM

## 2021-01-01 DIAGNOSIS — Z86.718 HISTORY OF DVT (DEEP VEIN THROMBOSIS): ICD-10-CM

## 2021-01-01 DIAGNOSIS — C61 PROSTATE CANCER METASTATIC TO INTRAPELVIC LYMPH NODE (HCC): ICD-10-CM

## 2021-01-01 DIAGNOSIS — I82.4Z1 ACUTE DEEP VEIN THROMBOSIS (DVT) OF DISTAL VEIN OF RIGHT LOWER EXTREMITY (HCC): ICD-10-CM

## 2021-01-01 DIAGNOSIS — Z09 CHEMOTHERAPY FOLLOW-UP EXAMINATION: ICD-10-CM

## 2021-01-01 DIAGNOSIS — N40.1 BENIGN PROSTATIC HYPERPLASIA WITH NOCTURIA: ICD-10-CM

## 2021-01-01 DIAGNOSIS — I82.4Y1 ACUTE DEEP VEIN THROMBOSIS (DVT) OF PROXIMAL VEIN OF RIGHT LOWER EXTREMITY (HCC): Primary | ICD-10-CM

## 2021-01-01 DIAGNOSIS — C77.5 PROSTATE CANCER METASTATIC TO INTRAPELVIC LYMPH NODE (HCC): ICD-10-CM

## 2021-01-01 DIAGNOSIS — G62.9 NEUROPATHY: Primary | ICD-10-CM

## 2021-01-01 DIAGNOSIS — R35.1 BENIGN PROSTATIC HYPERPLASIA WITH NOCTURIA: ICD-10-CM

## 2021-01-01 DIAGNOSIS — G62.0 CHEMOTHERAPY-INDUCED NEUROPATHY (HCC): Primary | ICD-10-CM

## 2021-01-01 DIAGNOSIS — T45.1X5A CHEMOTHERAPY-INDUCED NEUROPATHY (HCC): Primary | ICD-10-CM

## 2021-01-01 LAB
ALBUMIN SERPL-MCNC: 2.8 G/DL (ref 3.5–5)
ALBUMIN SERPL-MCNC: 3 G/DL (ref 3.5–5)
ALBUMIN SERPL-MCNC: 3 G/DL (ref 3.5–5)
ALBUMIN SERPL-MCNC: 3.1 G/DL (ref 3.5–5)
ALBUMIN SERPL-MCNC: 3.2 G/DL (ref 3.5–5)
ALBUMIN/GLOB SERPL: 0.9 {RATIO} (ref 1.1–2.2)
ALBUMIN/GLOB SERPL: 1 {RATIO} (ref 1.1–2.2)
ALBUMIN/GLOB SERPL: 1.1 {RATIO} (ref 1.1–2.2)
ALBUMIN/GLOB SERPL: 1.1 {RATIO} (ref 1.1–2.2)
ALBUMIN/GLOB SERPL: 1.2 {RATIO} (ref 1.1–2.2)
ALP SERPL-CCNC: 54 U/L (ref 45–117)
ALP SERPL-CCNC: 55 U/L (ref 45–117)
ALP SERPL-CCNC: 56 U/L (ref 45–117)
ALP SERPL-CCNC: 58 U/L (ref 45–117)
ALP SERPL-CCNC: 61 U/L (ref 45–117)
ALP SERPL-CCNC: 62 U/L (ref 45–117)
ALP SERPL-CCNC: 65 U/L (ref 45–117)
ALT SERPL-CCNC: 13 U/L (ref 12–78)
ALT SERPL-CCNC: 14 U/L (ref 12–78)
ALT SERPL-CCNC: 15 U/L (ref 12–78)
ALT SERPL-CCNC: 17 U/L (ref 12–78)
ALT SERPL-CCNC: 17 U/L (ref 12–78)
ALT SERPL-CCNC: 18 U/L (ref 12–78)
ALT SERPL-CCNC: 28 U/L (ref 12–78)
ANION GAP SERPL CALC-SCNC: 5 MMOL/L (ref 5–15)
ANION GAP SERPL CALC-SCNC: 5 MMOL/L (ref 5–15)
ANION GAP SERPL CALC-SCNC: 6 MMOL/L (ref 5–15)
ANION GAP SERPL CALC-SCNC: 7 MMOL/L (ref 5–15)
ANION GAP SERPL CALC-SCNC: 8 MMOL/L (ref 5–15)
AST SERPL-CCNC: 10 U/L (ref 15–37)
AST SERPL-CCNC: 13 U/L (ref 15–37)
AST SERPL-CCNC: 16 U/L (ref 15–37)
AST SERPL-CCNC: 19 U/L (ref 15–37)
BASOPHILS # BLD: 0 K/UL (ref 0–0.1)
BASOPHILS # BLD: 0.1 K/UL (ref 0–0.1)
BASOPHILS NFR BLD: 1 % (ref 0–1)
BASOPHILS NFR BLD: 2 % (ref 0–1)
BILIRUB SERPL-MCNC: 0.4 MG/DL (ref 0.2–1)
BILIRUB SERPL-MCNC: 0.4 MG/DL (ref 0.2–1)
BILIRUB SERPL-MCNC: 0.5 MG/DL (ref 0.2–1)
BILIRUB SERPL-MCNC: 0.6 MG/DL (ref 0.2–1)
BILIRUB SERPL-MCNC: 0.7 MG/DL (ref 0.2–1)
BUN SERPL-MCNC: 27 MG/DL (ref 6–20)
BUN SERPL-MCNC: 27 MG/DL (ref 6–20)
BUN SERPL-MCNC: 33 MG/DL (ref 6–20)
BUN SERPL-MCNC: 34 MG/DL (ref 6–20)
BUN SERPL-MCNC: 35 MG/DL (ref 6–20)
BUN SERPL-MCNC: 38 MG/DL (ref 6–20)
BUN SERPL-MCNC: 40 MG/DL (ref 6–20)
BUN/CREAT SERPL: 18 (ref 12–20)
BUN/CREAT SERPL: 19 (ref 12–20)
BUN/CREAT SERPL: 19 (ref 12–20)
BUN/CREAT SERPL: 23 (ref 12–20)
BUN/CREAT SERPL: 23 (ref 12–20)
BUN/CREAT SERPL: 25 (ref 12–20)
BUN/CREAT SERPL: 27 (ref 12–20)
CALCIUM SERPL-MCNC: 8.3 MG/DL (ref 8.5–10.1)
CALCIUM SERPL-MCNC: 8.4 MG/DL (ref 8.5–10.1)
CALCIUM SERPL-MCNC: 8.5 MG/DL (ref 8.5–10.1)
CALCIUM SERPL-MCNC: 8.5 MG/DL (ref 8.5–10.1)
CALCIUM SERPL-MCNC: 8.6 MG/DL (ref 8.5–10.1)
CHLORIDE SERPL-SCNC: 105 MMOL/L (ref 97–108)
CHLORIDE SERPL-SCNC: 108 MMOL/L (ref 97–108)
CHLORIDE SERPL-SCNC: 108 MMOL/L (ref 97–108)
CHLORIDE SERPL-SCNC: 109 MMOL/L (ref 97–108)
CHLORIDE SERPL-SCNC: 110 MMOL/L (ref 97–108)
CHLORIDE SERPL-SCNC: 111 MMOL/L (ref 97–108)
CHLORIDE SERPL-SCNC: 112 MMOL/L (ref 97–108)
CO2 SERPL-SCNC: 22 MMOL/L (ref 21–32)
CO2 SERPL-SCNC: 24 MMOL/L (ref 21–32)
CO2 SERPL-SCNC: 25 MMOL/L (ref 21–32)
CO2 SERPL-SCNC: 25 MMOL/L (ref 21–32)
CO2 SERPL-SCNC: 26 MMOL/L (ref 21–32)
CREAT SERPL-MCNC: 1.39 MG/DL (ref 0.7–1.3)
CREAT SERPL-MCNC: 1.44 MG/DL (ref 0.7–1.3)
CREAT SERPL-MCNC: 1.45 MG/DL (ref 0.7–1.3)
CREAT SERPL-MCNC: 1.46 MG/DL (ref 0.7–1.3)
CREAT SERPL-MCNC: 1.47 MG/DL (ref 0.7–1.3)
CREAT SERPL-MCNC: 1.52 MG/DL (ref 0.7–1.3)
CREAT SERPL-MCNC: 1.81 MG/DL (ref 0.7–1.3)
DIFFERENTIAL METHOD BLD: ABNORMAL
EOSINOPHIL # BLD: 0.1 K/UL (ref 0–0.4)
EOSINOPHIL # BLD: 0.2 K/UL (ref 0–0.4)
EOSINOPHIL NFR BLD: 3 % (ref 0–7)
EOSINOPHIL NFR BLD: 4 % (ref 0–7)
EOSINOPHIL NFR BLD: 5 % (ref 0–7)
ERYTHROCYTE [DISTWIDTH] IN BLOOD BY AUTOMATED COUNT: 13 % (ref 11.5–14.5)
ERYTHROCYTE [DISTWIDTH] IN BLOOD BY AUTOMATED COUNT: 13.1 % (ref 11.5–14.5)
ERYTHROCYTE [DISTWIDTH] IN BLOOD BY AUTOMATED COUNT: 13.2 % (ref 11.5–14.5)
ERYTHROCYTE [DISTWIDTH] IN BLOOD BY AUTOMATED COUNT: 14.3 % (ref 11.5–14.5)
ERYTHROCYTE [DISTWIDTH] IN BLOOD BY AUTOMATED COUNT: 15.6 % (ref 11.5–14.5)
ERYTHROCYTE [DISTWIDTH] IN BLOOD BY AUTOMATED COUNT: 15.8 % (ref 11.5–14.5)
ERYTHROCYTE [DISTWIDTH] IN BLOOD BY AUTOMATED COUNT: 17 % (ref 11.5–14.5)
GLOBULIN SER CALC-MCNC: 2.7 G/DL (ref 2–4)
GLOBULIN SER CALC-MCNC: 2.9 G/DL (ref 2–4)
GLOBULIN SER CALC-MCNC: 2.9 G/DL (ref 2–4)
GLOBULIN SER CALC-MCNC: 3 G/DL (ref 2–4)
GLOBULIN SER CALC-MCNC: 3.2 G/DL (ref 2–4)
GLOBULIN SER CALC-MCNC: 3.3 G/DL (ref 2–4)
GLOBULIN SER CALC-MCNC: 3.6 G/DL (ref 2–4)
GLUCOSE BLD STRIP.AUTO-MCNC: 110 MG/DL (ref 65–117)
GLUCOSE BLD STRIP.AUTO-MCNC: 94 MG/DL (ref 65–117)
GLUCOSE BLD STRIP.AUTO-MCNC: 99 MG/DL (ref 65–117)
GLUCOSE SERPL-MCNC: 100 MG/DL (ref 65–100)
GLUCOSE SERPL-MCNC: 101 MG/DL (ref 65–100)
GLUCOSE SERPL-MCNC: 106 MG/DL (ref 65–100)
GLUCOSE SERPL-MCNC: 130 MG/DL (ref 65–100)
GLUCOSE SERPL-MCNC: 93 MG/DL (ref 65–100)
GLUCOSE SERPL-MCNC: 94 MG/DL (ref 65–100)
GLUCOSE SERPL-MCNC: 98 MG/DL (ref 65–100)
HCT VFR BLD AUTO: 27.9 % (ref 36.6–50.3)
HCT VFR BLD AUTO: 29.2 % (ref 36.6–50.3)
HCT VFR BLD AUTO: 29.7 % (ref 36.6–50.3)
HCT VFR BLD AUTO: 30 % (ref 36.6–50.3)
HCT VFR BLD AUTO: 30.8 % (ref 36.6–50.3)
HCT VFR BLD AUTO: 30.9 % (ref 36.6–50.3)
HCT VFR BLD AUTO: 34.3 % (ref 36.6–50.3)
HGB BLD-MCNC: 10 G/DL (ref 12.1–17)
HGB BLD-MCNC: 10.2 G/DL (ref 12.1–17)
HGB BLD-MCNC: 11.3 G/DL (ref 12.1–17)
HGB BLD-MCNC: 9.2 G/DL (ref 12.1–17)
HGB BLD-MCNC: 9.5 G/DL (ref 12.1–17)
HGB BLD-MCNC: 9.6 G/DL (ref 12.1–17)
HGB BLD-MCNC: 9.7 G/DL (ref 12.1–17)
IMM GRANULOCYTES # BLD AUTO: 0 K/UL (ref 0–0.04)
IMM GRANULOCYTES # BLD AUTO: 0.1 K/UL (ref 0–0.04)
IMM GRANULOCYTES # BLD AUTO: 0.1 K/UL (ref 0–0.04)
IMM GRANULOCYTES NFR BLD AUTO: 0 % (ref 0–0.5)
IMM GRANULOCYTES NFR BLD AUTO: 1 % (ref 0–0.5)
IMM GRANULOCYTES NFR BLD AUTO: 2 % (ref 0–0.5)
INR PPP: 1.1 (ref 0.9–1.1)
LYMPHOCYTES # BLD: 0.5 K/UL (ref 0.8–3.5)
LYMPHOCYTES # BLD: 0.7 K/UL (ref 0.8–3.5)
LYMPHOCYTES # BLD: 0.7 K/UL (ref 0.8–3.5)
LYMPHOCYTES # BLD: 0.8 K/UL (ref 0.8–3.5)
LYMPHOCYTES # BLD: 0.9 K/UL (ref 0.8–3.5)
LYMPHOCYTES NFR BLD: 11 % (ref 12–49)
LYMPHOCYTES NFR BLD: 13 % (ref 12–49)
LYMPHOCYTES NFR BLD: 15 % (ref 12–49)
LYMPHOCYTES NFR BLD: 16 % (ref 12–49)
LYMPHOCYTES NFR BLD: 16 % (ref 12–49)
LYMPHOCYTES NFR BLD: 19 % (ref 12–49)
LYMPHOCYTES NFR BLD: 20 % (ref 12–49)
MCH RBC QN AUTO: 30.7 PG (ref 26–34)
MCH RBC QN AUTO: 30.8 PG (ref 26–34)
MCH RBC QN AUTO: 30.9 PG (ref 26–34)
MCH RBC QN AUTO: 31 PG (ref 26–34)
MCH RBC QN AUTO: 31.3 PG (ref 26–34)
MCH RBC QN AUTO: 32.1 PG (ref 26–34)
MCH RBC QN AUTO: 32.9 PG (ref 26–34)
MCHC RBC AUTO-ENTMCNC: 32 G/DL (ref 30–36.5)
MCHC RBC AUTO-ENTMCNC: 32.4 G/DL (ref 30–36.5)
MCHC RBC AUTO-ENTMCNC: 32.5 G/DL (ref 30–36.5)
MCHC RBC AUTO-ENTMCNC: 32.7 G/DL (ref 30–36.5)
MCHC RBC AUTO-ENTMCNC: 32.9 G/DL (ref 30–36.5)
MCHC RBC AUTO-ENTMCNC: 33 G/DL (ref 30–36.5)
MCHC RBC AUTO-ENTMCNC: 33.1 G/DL (ref 30–36.5)
MCV RBC AUTO: 93 FL (ref 80–99)
MCV RBC AUTO: 93.7 FL (ref 80–99)
MCV RBC AUTO: 94.3 FL (ref 80–99)
MCV RBC AUTO: 95.4 FL (ref 80–99)
MCV RBC AUTO: 97.7 FL (ref 80–99)
MCV RBC AUTO: 99 FL (ref 80–99)
MCV RBC AUTO: 99.4 FL (ref 80–99)
MONOCYTES # BLD: 0.2 K/UL (ref 0–1)
MONOCYTES # BLD: 0.4 K/UL (ref 0–1)
MONOCYTES # BLD: 0.5 K/UL (ref 0–1)
MONOCYTES # BLD: 0.5 K/UL (ref 0–1)
MONOCYTES # BLD: 0.6 K/UL (ref 0–1)
MONOCYTES NFR BLD: 10 % (ref 5–13)
MONOCYTES NFR BLD: 10 % (ref 5–13)
MONOCYTES NFR BLD: 11 % (ref 5–13)
MONOCYTES NFR BLD: 11 % (ref 5–13)
MONOCYTES NFR BLD: 12 % (ref 5–13)
MONOCYTES NFR BLD: 12 % (ref 5–13)
MONOCYTES NFR BLD: 4 % (ref 5–13)
NEUTS SEG # BLD: 2.5 K/UL (ref 1.8–8)
NEUTS SEG # BLD: 2.7 K/UL (ref 1.8–8)
NEUTS SEG # BLD: 2.9 K/UL (ref 1.8–8)
NEUTS SEG # BLD: 3.2 K/UL (ref 1.8–8)
NEUTS SEG # BLD: 3.5 K/UL (ref 1.8–8)
NEUTS SEG # BLD: 3.8 K/UL (ref 1.8–8)
NEUTS SEG # BLD: 4.4 K/UL (ref 1.8–8)
NEUTS SEG NFR BLD: 61 % (ref 32–75)
NEUTS SEG NFR BLD: 64 % (ref 32–75)
NEUTS SEG NFR BLD: 67 % (ref 32–75)
NEUTS SEG NFR BLD: 67 % (ref 32–75)
NEUTS SEG NFR BLD: 70 % (ref 32–75)
NEUTS SEG NFR BLD: 70 % (ref 32–75)
NEUTS SEG NFR BLD: 80 % (ref 32–75)
NRBC # BLD: 0 K/UL (ref 0–0.01)
NRBC BLD-RTO: 0 PER 100 WBC
PLATELET # BLD AUTO: 119 K/UL (ref 150–400)
PLATELET # BLD AUTO: 127 K/UL (ref 150–400)
PLATELET # BLD AUTO: 153 K/UL (ref 150–400)
PLATELET # BLD AUTO: 156 K/UL (ref 150–400)
PLATELET # BLD AUTO: 163 K/UL (ref 150–400)
PLATELET # BLD AUTO: 178 K/UL (ref 150–400)
PLATELET # BLD AUTO: 213 K/UL (ref 150–400)
PMV BLD AUTO: 10 FL (ref 8.9–12.9)
PMV BLD AUTO: 10.3 FL (ref 8.9–12.9)
PMV BLD AUTO: 10.4 FL (ref 8.9–12.9)
PMV BLD AUTO: 10.5 FL (ref 8.9–12.9)
PMV BLD AUTO: 10.6 FL (ref 8.9–12.9)
PMV BLD AUTO: 10.9 FL (ref 8.9–12.9)
PMV BLD AUTO: 11 FL (ref 8.9–12.9)
POTASSIUM SERPL-SCNC: 3.9 MMOL/L (ref 3.5–5.1)
POTASSIUM SERPL-SCNC: 4 MMOL/L (ref 3.5–5.1)
POTASSIUM SERPL-SCNC: 4.1 MMOL/L (ref 3.5–5.1)
POTASSIUM SERPL-SCNC: 4.2 MMOL/L (ref 3.5–5.1)
POTASSIUM SERPL-SCNC: 4.2 MMOL/L (ref 3.5–5.1)
POTASSIUM SERPL-SCNC: 4.5 MMOL/L (ref 3.5–5.1)
POTASSIUM SERPL-SCNC: 4.8 MMOL/L (ref 3.5–5.1)
PROT SERPL-MCNC: 5.9 G/DL (ref 6.4–8.2)
PROT SERPL-MCNC: 5.9 G/DL (ref 6.4–8.2)
PROT SERPL-MCNC: 6 G/DL (ref 6.4–8.2)
PROT SERPL-MCNC: 6 G/DL (ref 6.4–8.2)
PROT SERPL-MCNC: 6.2 G/DL (ref 6.4–8.2)
PROT SERPL-MCNC: 6.3 G/DL (ref 6.4–8.2)
PROT SERPL-MCNC: 6.8 G/DL (ref 6.4–8.2)
PROTHROMBIN TIME: 10.4 SEC (ref 9–11.1)
RBC # BLD AUTO: 3 M/UL (ref 4.1–5.7)
RBC # BLD AUTO: 3.06 M/UL (ref 4.1–5.7)
RBC # BLD AUTO: 3.07 M/UL (ref 4.1–5.7)
RBC # BLD AUTO: 3.1 M/UL (ref 4.1–5.7)
RBC # BLD AUTO: 3.12 M/UL (ref 4.1–5.7)
RBC # BLD AUTO: 3.15 M/UL (ref 4.1–5.7)
RBC # BLD AUTO: 3.66 M/UL (ref 4.1–5.7)
RBC MORPH BLD: ABNORMAL
SERVICE CMNT-IMP: NORMAL
SODIUM SERPL-SCNC: 138 MMOL/L (ref 136–145)
SODIUM SERPL-SCNC: 140 MMOL/L (ref 136–145)
SODIUM SERPL-SCNC: 140 MMOL/L (ref 136–145)
SODIUM SERPL-SCNC: 141 MMOL/L (ref 136–145)
SODIUM SERPL-SCNC: 141 MMOL/L (ref 136–145)
TSH SERPL DL<=0.05 MIU/L-ACNC: 1.69 UIU/ML (ref 0.36–3.74)
TSH SERPL DL<=0.05 MIU/L-ACNC: 1.82 UIU/ML (ref 0.36–3.74)
TSH SERPL DL<=0.05 MIU/L-ACNC: 1.88 UIU/ML (ref 0.36–3.74)
TSH SERPL DL<=0.05 MIU/L-ACNC: 2.03 UIU/ML (ref 0.36–3.74)
WBC # BLD AUTO: 4 K/UL (ref 4.1–11.1)
WBC # BLD AUTO: 4.1 K/UL (ref 4.1–11.1)
WBC # BLD AUTO: 4.3 K/UL (ref 4.1–11.1)
WBC # BLD AUTO: 4.3 K/UL (ref 4.1–11.1)
WBC # BLD AUTO: 4.9 K/UL (ref 4.1–11.1)
WBC # BLD AUTO: 5.5 K/UL (ref 4.1–11.1)
WBC # BLD AUTO: 6.2 K/UL (ref 4.1–11.1)

## 2021-01-01 PROCEDURE — G8510 SCR DEP NEG, NO PLAN REQD: HCPCS | Performed by: INTERNAL MEDICINE

## 2021-01-01 PROCEDURE — 85025 COMPLETE CBC W/AUTO DIFF WBC: CPT

## 2021-01-01 PROCEDURE — G8432 DEP SCR NOT DOC, RNG: HCPCS | Performed by: INTERNAL MEDICINE

## 2021-01-01 PROCEDURE — A9552 F18 FDG: HCPCS

## 2021-01-01 PROCEDURE — G8752 SYS BP LESS 140: HCPCS | Performed by: INTERNAL MEDICINE

## 2021-01-01 PROCEDURE — G8427 DOCREV CUR MEDS BY ELIG CLIN: HCPCS | Performed by: INTERNAL MEDICINE

## 2021-01-01 PROCEDURE — 1101F PT FALLS ASSESS-DOCD LE1/YR: CPT | Performed by: INTERNAL MEDICINE

## 2021-01-01 PROCEDURE — 74011000250 HC RX REV CODE- 250: Performed by: NURSE PRACTITIONER

## 2021-01-01 PROCEDURE — 74011250636 HC RX REV CODE- 250/636: Performed by: INTERNAL MEDICINE

## 2021-01-01 PROCEDURE — 96413 CHEMO IV INFUSION 1 HR: CPT

## 2021-01-01 PROCEDURE — 3017F COLORECTAL CA SCREEN DOC REV: CPT | Performed by: INTERNAL MEDICINE

## 2021-01-01 PROCEDURE — G8417 CALC BMI ABV UP PARAM F/U: HCPCS | Performed by: INTERNAL MEDICINE

## 2021-01-01 PROCEDURE — 3017F COLORECTAL CA SCREEN DOC REV: CPT | Performed by: NURSE PRACTITIONER

## 2021-01-01 PROCEDURE — G8536 NO DOC ELDER MAL SCRN: HCPCS | Performed by: INTERNAL MEDICINE

## 2021-01-01 PROCEDURE — 36415 COLL VENOUS BLD VENIPUNCTURE: CPT

## 2021-01-01 PROCEDURE — G8754 DIAS BP LESS 90: HCPCS | Performed by: INTERNAL MEDICINE

## 2021-01-01 PROCEDURE — 74011000258 HC RX REV CODE- 258: Performed by: INTERNAL MEDICINE

## 2021-01-01 PROCEDURE — 96415 CHEMO IV INFUSION ADDL HR: CPT

## 2021-01-01 PROCEDURE — 80053 COMPREHEN METABOLIC PANEL: CPT

## 2021-01-01 PROCEDURE — 96372 THER/PROPH/DIAG INJ SC/IM: CPT

## 2021-01-01 PROCEDURE — G0463 HOSPITAL OUTPT CLINIC VISIT: HCPCS | Performed by: INTERNAL MEDICINE

## 2021-01-01 PROCEDURE — 77030016057 HC NDL HUBR APOL -B

## 2021-01-01 PROCEDURE — 74011250636 HC RX REV CODE- 250/636: Performed by: EMERGENCY MEDICINE

## 2021-01-01 PROCEDURE — 96417 CHEMO IV INFUS EACH ADDL SEQ: CPT

## 2021-01-01 PROCEDURE — 96367 TX/PROPH/DG ADDL SEQ IV INF: CPT

## 2021-01-01 PROCEDURE — G0463 HOSPITAL OUTPT CLINIC VISIT: HCPCS | Performed by: NURSE PRACTITIONER

## 2021-01-01 PROCEDURE — 99214 OFFICE O/P EST MOD 30 MIN: CPT | Performed by: INTERNAL MEDICINE

## 2021-01-01 PROCEDURE — 96375 TX/PRO/DX INJ NEW DRUG ADDON: CPT

## 2021-01-01 PROCEDURE — G0439 PPPS, SUBSEQ VISIT: HCPCS | Performed by: INTERNAL MEDICINE

## 2021-01-01 PROCEDURE — 74011250636 HC RX REV CODE- 250/636: Performed by: NURSE PRACTITIONER

## 2021-01-01 PROCEDURE — 1101F PT FALLS ASSESS-DOCD LE1/YR: CPT | Performed by: NURSE PRACTITIONER

## 2021-01-01 PROCEDURE — G8427 DOCREV CUR MEDS BY ELIG CLIN: HCPCS | Performed by: NURSE PRACTITIONER

## 2021-01-01 PROCEDURE — G8752 SYS BP LESS 140: HCPCS | Performed by: NURSE PRACTITIONER

## 2021-01-01 PROCEDURE — 74011000250 HC RX REV CODE- 250: Performed by: INTERNAL MEDICINE

## 2021-01-01 PROCEDURE — 99215 OFFICE O/P EST HI 40 MIN: CPT | Performed by: INTERNAL MEDICINE

## 2021-01-01 PROCEDURE — 84443 ASSAY THYROID STIM HORMONE: CPT

## 2021-01-01 PROCEDURE — G8536 NO DOC ELDER MAL SCRN: HCPCS | Performed by: NURSE PRACTITIONER

## 2021-01-01 PROCEDURE — G8432 DEP SCR NOT DOC, RNG: HCPCS | Performed by: NURSE PRACTITIONER

## 2021-01-01 PROCEDURE — G8417 CALC BMI ABV UP PARAM F/U: HCPCS | Performed by: NURSE PRACTITIONER

## 2021-01-01 PROCEDURE — G8754 DIAS BP LESS 90: HCPCS | Performed by: NURSE PRACTITIONER

## 2021-01-01 PROCEDURE — 93971 EXTREMITY STUDY: CPT

## 2021-01-01 PROCEDURE — 99283 EMERGENCY DEPT VISIT LOW MDM: CPT

## 2021-01-01 PROCEDURE — G8753 SYS BP > OR = 140: HCPCS | Performed by: INTERNAL MEDICINE

## 2021-01-01 PROCEDURE — 99212 OFFICE O/P EST SF 10 MIN: CPT | Performed by: NURSE PRACTITIONER

## 2021-01-01 PROCEDURE — 74011000258 HC RX REV CODE- 258: Performed by: NURSE PRACTITIONER

## 2021-01-01 PROCEDURE — 85610 PROTHROMBIN TIME: CPT

## 2021-01-01 RX ORDER — PALONOSETRON 0.05 MG/ML
0.25 INJECTION, SOLUTION INTRAVENOUS ONCE
Status: COMPLETED | OUTPATIENT
Start: 2021-01-01 | End: 2021-01-01

## 2021-01-01 RX ORDER — TRAZODONE HYDROCHLORIDE 50 MG/1
TABLET ORAL
Qty: 60 TABLET | Refills: 0 | Status: SHIPPED | OUTPATIENT
Start: 2021-01-01 | End: 2021-01-01

## 2021-01-01 RX ORDER — EPINEPHRINE 1 MG/ML
0.3 INJECTION, SOLUTION, CONCENTRATE INTRAVENOUS AS NEEDED
Status: CANCELLED | OUTPATIENT
Start: 2021-01-01

## 2021-01-01 RX ORDER — ACETAMINOPHEN 325 MG/1
650 TABLET ORAL AS NEEDED
Status: CANCELLED
Start: 2021-01-01

## 2021-01-01 RX ORDER — PALONOSETRON 0.05 MG/ML
0.25 INJECTION, SOLUTION INTRAVENOUS ONCE
Status: CANCELLED | OUTPATIENT
Start: 2021-01-01 | End: 2021-01-01

## 2021-01-01 RX ORDER — DIPHENHYDRAMINE HYDROCHLORIDE 50 MG/ML
25 INJECTION, SOLUTION INTRAMUSCULAR; INTRAVENOUS AS NEEDED
Status: CANCELLED
Start: 2021-01-01

## 2021-01-01 RX ORDER — DIPHENHYDRAMINE HYDROCHLORIDE 50 MG/ML
50 INJECTION, SOLUTION INTRAMUSCULAR; INTRAVENOUS ONCE
Status: COMPLETED | OUTPATIENT
Start: 2021-01-01 | End: 2021-01-01

## 2021-01-01 RX ORDER — HEPARIN 100 UNIT/ML
300-500 SYRINGE INTRAVENOUS AS NEEDED
Status: CANCELLED
Start: 2021-01-01

## 2021-01-01 RX ORDER — HYDROCORTISONE SODIUM SUCCINATE 100 MG/2ML
100 INJECTION, POWDER, FOR SOLUTION INTRAMUSCULAR; INTRAVENOUS AS NEEDED
Status: CANCELLED | OUTPATIENT
Start: 2021-01-01

## 2021-01-01 RX ORDER — ONDANSETRON 2 MG/ML
8 INJECTION INTRAMUSCULAR; INTRAVENOUS AS NEEDED
Status: CANCELLED | OUTPATIENT
Start: 2021-01-01

## 2021-01-01 RX ORDER — SODIUM CHLORIDE 0.9 % (FLUSH) 0.9 %
10 SYRINGE (ML) INJECTION AS NEEDED
Status: DISPENSED | OUTPATIENT
Start: 2021-01-01 | End: 2021-01-01

## 2021-01-01 RX ORDER — ALBUTEROL SULFATE 0.83 MG/ML
2.5 SOLUTION RESPIRATORY (INHALATION) AS NEEDED
Status: CANCELLED
Start: 2021-01-01

## 2021-01-01 RX ORDER — SODIUM CHLORIDE 9 MG/ML
25 INJECTION, SOLUTION INTRAVENOUS CONTINUOUS
Status: CANCELLED | OUTPATIENT
Start: 2021-01-01

## 2021-01-01 RX ORDER — SODIUM CHLORIDE 9 MG/ML
10 INJECTION INTRAMUSCULAR; INTRAVENOUS; SUBCUTANEOUS AS NEEDED
Status: ACTIVE | OUTPATIENT
Start: 2021-01-01 | End: 2021-01-01

## 2021-01-01 RX ORDER — CANDESARTAN 4 MG/1
TABLET ORAL
Qty: 90 TABLET | Refills: 0 | Status: SHIPPED | OUTPATIENT
Start: 2021-01-01 | End: 2022-01-01

## 2021-01-01 RX ORDER — DIPHENHYDRAMINE HYDROCHLORIDE 50 MG/ML
50 INJECTION, SOLUTION INTRAMUSCULAR; INTRAVENOUS ONCE
Status: CANCELLED | OUTPATIENT
Start: 2021-01-01 | End: 2021-01-01

## 2021-01-01 RX ORDER — APIXABAN 5 MG/1
TABLET, FILM COATED ORAL
Qty: 60 TABLET | Refills: 0 | Status: SHIPPED | OUTPATIENT
Start: 2021-01-01 | End: 2021-01-01

## 2021-01-01 RX ORDER — TERAZOSIN 10 MG/1
CAPSULE ORAL
Qty: 90 CAPSULE | Refills: 0 | Status: SHIPPED | OUTPATIENT
Start: 2021-01-01 | End: 2021-01-01

## 2021-01-01 RX ORDER — SODIUM CHLORIDE 9 MG/ML
25 INJECTION, SOLUTION INTRAVENOUS CONTINUOUS
Status: DISPENSED | OUTPATIENT
Start: 2021-01-01 | End: 2021-01-01

## 2021-01-01 RX ORDER — APIXABAN 5 MG (74)
KIT ORAL
Qty: 1 DOSE PACK | Refills: 0 | Status: SHIPPED | OUTPATIENT
Start: 2021-01-01 | End: 2021-01-01 | Stop reason: DRUGHIGH

## 2021-01-01 RX ORDER — SODIUM CHLORIDE 9 MG/ML
10 INJECTION INTRAMUSCULAR; INTRAVENOUS; SUBCUTANEOUS AS NEEDED
Status: CANCELLED | OUTPATIENT
Start: 2021-01-01

## 2021-01-01 RX ORDER — SODIUM CHLORIDE 0.9 % (FLUSH) 0.9 %
10 SYRINGE (ML) INJECTION AS NEEDED
Status: CANCELLED | OUTPATIENT
Start: 2021-01-01

## 2021-01-01 RX ORDER — DIPHENHYDRAMINE HYDROCHLORIDE 50 MG/ML
50 INJECTION, SOLUTION INTRAMUSCULAR; INTRAVENOUS AS NEEDED
Status: CANCELLED
Start: 2021-01-01

## 2021-01-01 RX ORDER — HEPARIN 100 UNIT/ML
300-500 SYRINGE INTRAVENOUS AS NEEDED
Status: ACTIVE | OUTPATIENT
Start: 2021-01-01 | End: 2021-01-01

## 2021-01-01 RX ORDER — AMLODIPINE BESYLATE 5 MG/1
TABLET ORAL
Qty: 90 TABLET | Refills: 0 | Status: SHIPPED | OUTPATIENT
Start: 2021-01-01 | End: 2021-01-01

## 2021-01-01 RX ORDER — HEPARIN 100 UNIT/ML
300-500 SYRINGE INTRAVENOUS AS NEEDED
Status: CANCELLED | OUTPATIENT
Start: 2021-01-01

## 2021-01-01 RX ORDER — GABAPENTIN 300 MG/1
CAPSULE ORAL
Qty: 30 CAPSULE | Refills: 0 | Status: SHIPPED | OUTPATIENT
Start: 2021-01-01 | End: 2021-01-01 | Stop reason: SDUPTHER

## 2021-01-01 RX ORDER — APIXABAN 5 MG/1
TABLET, FILM COATED ORAL
Qty: 60 TABLET | Refills: 0 | Status: SHIPPED | OUTPATIENT
Start: 2021-01-01 | End: 2021-01-01 | Stop reason: SDUPTHER

## 2021-01-01 RX ORDER — GABAPENTIN 300 MG/1
300 CAPSULE ORAL
Qty: 30 CAPSULE | Refills: 0 | Status: SHIPPED | OUTPATIENT
Start: 2021-01-01 | End: 2021-01-01

## 2021-01-01 RX ORDER — DIPHENHYDRAMINE HYDROCHLORIDE 50 MG/ML
50 INJECTION, SOLUTION INTRAMUSCULAR; INTRAVENOUS ONCE
Status: CANCELLED
Start: 2021-01-01 | End: 2021-01-01

## 2021-01-01 RX ORDER — FLUDEOXYGLUCOSE F-18 200 MCI/ML
10 INJECTION INTRAVENOUS ONCE
Status: COMPLETED | OUTPATIENT
Start: 2021-01-01 | End: 2021-01-01

## 2021-01-01 RX ORDER — AMLODIPINE BESYLATE 5 MG/1
TABLET ORAL
Qty: 90 TABLET | Refills: 0 | Status: SHIPPED | OUTPATIENT
Start: 2021-01-01 | End: 2022-01-01

## 2021-01-01 RX ORDER — APIXABAN 5 MG/1
TABLET, FILM COATED ORAL
Qty: 60 TABLET | Refills: 0 | Status: SHIPPED | OUTPATIENT
Start: 2021-01-01 | End: 2022-01-01

## 2021-01-01 RX ORDER — SODIUM CHLORIDE 0.9 % (FLUSH) 0.9 %
10 SYRINGE (ML) INJECTION
Status: COMPLETED | OUTPATIENT
Start: 2021-01-01 | End: 2021-01-01

## 2021-01-01 RX ORDER — TRAZODONE HYDROCHLORIDE 50 MG/1
TABLET ORAL
Qty: 60 TABLET | Refills: 0 | Status: SHIPPED | OUTPATIENT
Start: 2021-01-01 | End: 2022-01-01

## 2021-01-01 RX ORDER — CANDESARTAN 4 MG/1
TABLET ORAL
Qty: 90 TABLET | Refills: 0 | Status: SHIPPED | OUTPATIENT
Start: 2021-01-01 | End: 2021-01-01

## 2021-01-01 RX ORDER — TERAZOSIN 10 MG/1
CAPSULE ORAL
Qty: 90 CAPSULE | Refills: 0 | Status: SHIPPED | OUTPATIENT
Start: 2021-01-01 | End: 2022-01-01

## 2021-01-01 RX ORDER — GABAPENTIN 300 MG/1
300 CAPSULE ORAL 2 TIMES DAILY
Qty: 60 CAPSULE | Refills: 0 | Status: SHIPPED | OUTPATIENT
Start: 2021-01-01 | End: 2021-01-01

## 2021-01-01 RX ORDER — ENOXAPARIN SODIUM 100 MG/ML
1 INJECTION SUBCUTANEOUS
Status: COMPLETED | OUTPATIENT
Start: 2021-01-01 | End: 2021-01-01

## 2021-01-01 RX ORDER — APIXABAN 5 MG (74)
KIT ORAL
Qty: 1 DOSE PACK | Refills: 0 | Status: SHIPPED | OUTPATIENT
Start: 2021-01-01 | End: 2021-01-01 | Stop reason: SDUPTHER

## 2021-01-01 RX ORDER — GABAPENTIN 300 MG/1
CAPSULE ORAL
Qty: 60 CAPSULE | Refills: 0 | Status: SHIPPED | OUTPATIENT
Start: 2021-01-01 | End: 2022-01-01

## 2021-01-01 RX ORDER — SODIUM CHLORIDE 0.9 % (FLUSH) 0.9 %
5-10 SYRINGE (ML) INJECTION ONCE
Status: DISCONTINUED | OUTPATIENT
Start: 2021-01-01 | End: 2021-01-01 | Stop reason: HOSPADM

## 2021-01-01 RX ADMIN — Medication 10 ML: at 15:29

## 2021-01-01 RX ADMIN — SODIUM CHLORIDE 150 MG: 900 INJECTION, SOLUTION INTRAVENOUS at 10:52

## 2021-01-01 RX ADMIN — PALONOSETRON 0.25 MG: 0.25 INJECTION, SOLUTION INTRAVENOUS at 09:38

## 2021-01-01 RX ADMIN — CARBOPLATIN 551 MG: 10 INJECTION, SOLUTION INTRAVENOUS at 14:56

## 2021-01-01 RX ADMIN — FAMOTIDINE 20 MG: 10 INJECTION INTRAVENOUS at 10:22

## 2021-01-01 RX ADMIN — DEXAMETHASONE SODIUM PHOSPHATE 12 MG: 4 INJECTION, SOLUTION INTRA-ARTICULAR; INTRALESIONAL; INTRAMUSCULAR; INTRAVENOUS; SOFT TISSUE at 10:27

## 2021-01-01 RX ADMIN — CARBOPLATIN 560 MG: 10 INJECTION, SOLUTION INTRAVENOUS at 14:20

## 2021-01-01 RX ADMIN — PACLITAXEL 401 MG: 6 INJECTION, SOLUTION INTRAVENOUS at 12:24

## 2021-01-01 RX ADMIN — FOSAPREPITANT 150 MG: 150 INJECTION, POWDER, LYOPHILIZED, FOR SOLUTION INTRAVENOUS at 12:42

## 2021-01-01 RX ADMIN — PACLITAXEL 401 MG: 6 INJECTION, SOLUTION INTRAVENOUS at 11:26

## 2021-01-01 RX ADMIN — PACLITAXEL 401 MG: 6 INJECTION, SOLUTION INTRAVENOUS at 11:16

## 2021-01-01 RX ADMIN — PACLITAXEL 361 MG: 6 INJECTION, SOLUTION INTRAVENOUS at 13:20

## 2021-01-01 RX ADMIN — PACLITAXEL 361 MG: 6 INJECTION, SOLUTION INTRAVENOUS at 11:24

## 2021-01-01 RX ADMIN — FAMOTIDINE 20 MG: 10 INJECTION INTRAVENOUS at 11:51

## 2021-01-01 RX ADMIN — PACLITAXEL 401 MG: 6 INJECTION, SOLUTION INTRAVENOUS at 11:15

## 2021-01-01 RX ADMIN — FOSAPREPITANT 150 MG: 150 INJECTION, POWDER, LYOPHILIZED, FOR SOLUTION INTRAVENOUS at 09:45

## 2021-01-01 RX ADMIN — CARBOPLATIN 563 MG: 10 INJECTION, SOLUTION INTRAVENOUS at 15:22

## 2021-01-01 RX ADMIN — CARBOPLATIN 551 MG: 10 INJECTION, SOLUTION INTRAVENOUS at 14:55

## 2021-01-01 RX ADMIN — CARBOPLATIN 551 MG: 10 INJECTION, SOLUTION INTRAVENOUS at 14:31

## 2021-01-01 RX ADMIN — SODIUM CHLORIDE 200 MG: 9 INJECTION, SOLUTION INTRAVENOUS at 09:05

## 2021-01-01 RX ADMIN — DIPHENHYDRAMINE HYDROCHLORIDE 50 MG: 50 INJECTION INTRAMUSCULAR; INTRAVENOUS at 11:03

## 2021-01-01 RX ADMIN — DIPHENHYDRAMINE HYDROCHLORIDE 50 MG: 50 INJECTION INTRAMUSCULAR; INTRAVENOUS at 12:20

## 2021-01-01 RX ADMIN — SODIUM CHLORIDE 25 ML/HR: 900 INJECTION, SOLUTION INTRAVENOUS at 09:05

## 2021-01-01 RX ADMIN — FLUDEOXYGLUCOSE F-18 10 MILLICURIE: 200 INJECTION INTRAVENOUS at 10:08

## 2021-01-01 RX ADMIN — FAMOTIDINE 20 MG: 10 INJECTION INTRAVENOUS at 10:56

## 2021-01-01 RX ADMIN — SODIUM CHLORIDE 150 MG: 900 INJECTION, SOLUTION INTRAVENOUS at 10:43

## 2021-01-01 RX ADMIN — SODIUM CHLORIDE 25 ML/HR: 900 INJECTION, SOLUTION INTRAVENOUS at 08:17

## 2021-01-01 RX ADMIN — DIPHENHYDRAMINE HYDROCHLORIDE 50 MG: 50 INJECTION INTRAMUSCULAR; INTRAVENOUS at 11:52

## 2021-01-01 RX ADMIN — SODIUM CHLORIDE 200 MG: 9 INJECTION, SOLUTION INTRAVENOUS at 11:37

## 2021-01-01 RX ADMIN — Medication 10 ML: at 10:08

## 2021-01-01 RX ADMIN — SODIUM CHLORIDE 150 MG: 900 INJECTION, SOLUTION INTRAVENOUS at 10:46

## 2021-01-01 RX ADMIN — SODIUM CHLORIDE 200 MG: 9 INJECTION, SOLUTION INTRAVENOUS at 10:24

## 2021-01-01 RX ADMIN — SODIUM CHLORIDE 25 ML/HR: 900 INJECTION, SOLUTION INTRAVENOUS at 11:36

## 2021-01-01 RX ADMIN — SODIUM CHLORIDE 150 MG: 900 INJECTION, SOLUTION INTRAVENOUS at 09:53

## 2021-01-01 RX ADMIN — SODIUM CHLORIDE 25 ML/HR: 900 INJECTION, SOLUTION INTRAVENOUS at 10:15

## 2021-01-01 RX ADMIN — SODIUM CHLORIDE 200 MG: 9 INJECTION, SOLUTION INTRAVENOUS at 09:47

## 2021-01-01 RX ADMIN — SODIUM CHLORIDE 200 MG: 9 INJECTION, SOLUTION INTRAVENOUS at 09:02

## 2021-01-01 RX ADMIN — DEXAMETHASONE SODIUM PHOSPHATE 12 MG: 4 INJECTION, SOLUTION INTRA-ARTICULAR; INTRALESIONAL; INTRAMUSCULAR; INTRAVENOUS; SOFT TISSUE at 10:30

## 2021-01-01 RX ADMIN — DIPHENHYDRAMINE HYDROCHLORIDE 50 MG: 50 INJECTION INTRAMUSCULAR; INTRAVENOUS at 09:54

## 2021-01-01 RX ADMIN — SODIUM CHLORIDE 200 MG: 9 INJECTION, SOLUTION INTRAVENOUS at 10:16

## 2021-01-01 RX ADMIN — DEXAMETHASONE SODIUM PHOSPHATE 12 MG: 4 INJECTION, SOLUTION INTRA-ARTICULAR; INTRALESIONAL; INTRAMUSCULAR; INTRAVENOUS; SOFT TISSUE at 12:22

## 2021-01-01 RX ADMIN — FAMOTIDINE 20 MG: 10 INJECTION INTRAVENOUS at 12:16

## 2021-01-01 RX ADMIN — SODIUM CHLORIDE 1000 ML: 9 INJECTION, SOLUTION INTRAVENOUS at 14:17

## 2021-01-01 RX ADMIN — ENOXAPARIN SODIUM 100 MG: 100 INJECTION SUBCUTANEOUS at 15:21

## 2021-01-01 RX ADMIN — FLUDEOXYGLUCOSE F-18 10.6 MILLICURIE: 200 INJECTION INTRAVENOUS at 14:16

## 2021-01-01 RX ADMIN — Medication 10 ML: at 08:15

## 2021-01-01 RX ADMIN — Medication 10 ML: at 16:00

## 2021-01-01 RX ADMIN — SODIUM CHLORIDE 10 ML: 9 INJECTION, SOLUTION INTRAMUSCULAR; INTRAVENOUS; SUBCUTANEOUS at 09:40

## 2021-01-01 RX ADMIN — SODIUM CHLORIDE 25 ML/HR: 900 INJECTION, SOLUTION INTRAVENOUS at 08:37

## 2021-01-01 RX ADMIN — PALONOSETRON 0.25 MG: 0.25 INJECTION, SOLUTION INTRAVENOUS at 11:35

## 2021-01-01 RX ADMIN — CARBOPLATIN 478 MG: 10 INJECTION, SOLUTION INTRAVENOUS at 16:23

## 2021-01-01 RX ADMIN — DIPHENHYDRAMINE HYDROCHLORIDE 50 MG: 50 INJECTION INTRAMUSCULAR; INTRAVENOUS at 10:24

## 2021-01-01 RX ADMIN — DEXAMETHASONE SODIUM PHOSPHATE 12 MG: 4 INJECTION, SOLUTION INTRA-ARTICULAR; INTRALESIONAL; INTRAMUSCULAR; INTRAVENOUS; SOFT TISSUE at 10:16

## 2021-01-01 RX ADMIN — SODIUM CHLORIDE 25 ML/HR: 900 INJECTION, SOLUTION INTRAVENOUS at 09:23

## 2021-01-01 RX ADMIN — FAMOTIDINE 20 MG: 10 INJECTION INTRAVENOUS at 09:52

## 2021-01-01 RX ADMIN — PALONOSETRON HYDROCHLORIDE 0.25 MG: 0.25 INJECTION, SOLUTION INTRAVENOUS at 10:21

## 2021-01-01 RX ADMIN — Medication 10 ML: at 08:13

## 2021-01-01 RX ADMIN — DIPHENHYDRAMINE HYDROCHLORIDE 50 MG: 50 INJECTION INTRAMUSCULAR; INTRAVENOUS at 09:40

## 2021-01-01 RX ADMIN — FAMOTIDINE 20 MG: 10 INJECTION INTRAVENOUS at 09:45

## 2021-01-01 RX ADMIN — PALONOSETRON 0.25 MG: 0.25 INJECTION, SOLUTION INTRAVENOUS at 09:49

## 2021-01-01 RX ADMIN — PALONOSETRON 0.25 MG: 0.05 INJECTION, SOLUTION INTRAVENOUS at 12:15

## 2021-01-01 RX ADMIN — DEXAMETHASONE SODIUM PHOSPHATE 12 MG: 4 INJECTION, SOLUTION INTRA-ARTICULAR; INTRALESIONAL; INTRAMUSCULAR; INTRAVENOUS; SOFT TISSUE at 11:15

## 2021-01-01 RX ADMIN — DEXAMETHASONE SODIUM PHOSPHATE 12 MG: 4 INJECTION, SOLUTION INTRA-ARTICULAR; INTRALESIONAL; INTRAMUSCULAR; INTRAVENOUS; SOFT TISSUE at 09:54

## 2021-01-01 RX ADMIN — PALONOSETRON 0.25 MG: 0.05 INJECTION, SOLUTION INTRAVENOUS at 10:56

## 2021-01-05 ENCOUNTER — HOSPITAL ENCOUNTER (OUTPATIENT)
Dept: INFUSION THERAPY | Age: 76
Discharge: HOME | End: 2021-01-05
Payer: MEDICARE

## 2021-01-05 ENCOUNTER — OFFICE VISIT (OUTPATIENT)
Dept: ONCOLOGY | Age: 76
End: 2021-01-05
Payer: MEDICARE

## 2021-01-05 VITALS
HEART RATE: 66 BPM | WEIGHT: 235.2 LBS | TEMPERATURE: 97.8 F | BODY MASS INDEX: 31.9 KG/M2 | DIASTOLIC BLOOD PRESSURE: 80 MMHG | OXYGEN SATURATION: 95 % | SYSTOLIC BLOOD PRESSURE: 145 MMHG

## 2021-01-05 VITALS
DIASTOLIC BLOOD PRESSURE: 72 MMHG | HEART RATE: 60 BPM | RESPIRATION RATE: 18 BRPM | TEMPERATURE: 97.3 F | SYSTOLIC BLOOD PRESSURE: 132 MMHG

## 2021-01-05 DIAGNOSIS — L27.0 DRUG-INDUCED SKIN RASH: ICD-10-CM

## 2021-01-05 DIAGNOSIS — K21.9 GASTROESOPHAGEAL REFLUX DISEASE WITHOUT ESOPHAGITIS: ICD-10-CM

## 2021-01-05 DIAGNOSIS — M19.90 ARTHRITIS: ICD-10-CM

## 2021-01-05 DIAGNOSIS — Z51.12 ENCOUNTER FOR ANTINEOPLASTIC IMMUNOTHERAPY: ICD-10-CM

## 2021-01-05 DIAGNOSIS — C61 PROSTATE CANCER (HCC): ICD-10-CM

## 2021-01-05 DIAGNOSIS — I10 ESSENTIAL HYPERTENSION: ICD-10-CM

## 2021-01-05 LAB
ALBUMIN SERPL-MCNC: 3.1 G/DL (ref 3.5–5)
ALBUMIN/GLOB SERPL: 1 {RATIO} (ref 1.1–2.2)
ALP SERPL-CCNC: 52 U/L (ref 45–117)
ALT SERPL-CCNC: 19 U/L (ref 12–78)
ANION GAP SERPL CALC-SCNC: 6 MMOL/L (ref 5–15)
AST SERPL-CCNC: 16 U/L (ref 15–37)
BASOPHILS # BLD: 0 K/UL (ref 0–0.1)
BASOPHILS NFR BLD: 1 % (ref 0–1)
BILIRUB SERPL-MCNC: 0.6 MG/DL (ref 0.2–1)
BUN SERPL-MCNC: 36 MG/DL (ref 6–20)
BUN/CREAT SERPL: 24 (ref 12–20)
CALCIUM SERPL-MCNC: 8.7 MG/DL (ref 8.5–10.1)
CHLORIDE SERPL-SCNC: 107 MMOL/L (ref 97–108)
CO2 SERPL-SCNC: 24 MMOL/L (ref 21–32)
CREAT SERPL-MCNC: 1.53 MG/DL (ref 0.7–1.3)
DIFFERENTIAL METHOD BLD: ABNORMAL
EOSINOPHIL # BLD: 0.3 K/UL (ref 0–0.4)
EOSINOPHIL NFR BLD: 6 % (ref 0–7)
ERYTHROCYTE [DISTWIDTH] IN BLOOD BY AUTOMATED COUNT: 14.2 % (ref 11.5–14.5)
GLOBULIN SER CALC-MCNC: 3 G/DL (ref 2–4)
GLUCOSE SERPL-MCNC: 92 MG/DL (ref 65–100)
HCT VFR BLD AUTO: 33.9 % (ref 36.6–50.3)
HGB BLD-MCNC: 11.2 G/DL (ref 12.1–17)
IMM GRANULOCYTES # BLD AUTO: 0 K/UL (ref 0–0.04)
IMM GRANULOCYTES NFR BLD AUTO: 0 % (ref 0–0.5)
LYMPHOCYTES # BLD: 0.6 K/UL (ref 0.8–3.5)
LYMPHOCYTES NFR BLD: 14 % (ref 12–49)
MCH RBC QN AUTO: 30.3 PG (ref 26–34)
MCHC RBC AUTO-ENTMCNC: 33 G/DL (ref 30–36.5)
MCV RBC AUTO: 91.6 FL (ref 80–99)
MONOCYTES # BLD: 0.5 K/UL (ref 0–1)
MONOCYTES NFR BLD: 10 % (ref 5–13)
NEUTS SEG # BLD: 3.2 K/UL (ref 1.8–8)
NEUTS SEG NFR BLD: 69 % (ref 32–75)
NRBC # BLD: 0 K/UL (ref 0–0.01)
NRBC BLD-RTO: 0 PER 100 WBC
PLATELET # BLD AUTO: 139 K/UL (ref 150–400)
PMV BLD AUTO: 10.9 FL (ref 8.9–12.9)
POTASSIUM SERPL-SCNC: 3.8 MMOL/L (ref 3.5–5.1)
PROT SERPL-MCNC: 6.1 G/DL (ref 6.4–8.2)
RBC # BLD AUTO: 3.7 M/UL (ref 4.1–5.7)
RBC MORPH BLD: ABNORMAL
SODIUM SERPL-SCNC: 137 MMOL/L (ref 136–145)
WBC # BLD AUTO: 4.6 K/UL (ref 4.1–11.1)

## 2021-01-05 PROCEDURE — 85025 COMPLETE CBC W/AUTO DIFF WBC: CPT

## 2021-01-05 PROCEDURE — G8427 DOCREV CUR MEDS BY ELIG CLIN: HCPCS | Performed by: INTERNAL MEDICINE

## 2021-01-05 PROCEDURE — 1101F PT FALLS ASSESS-DOCD LE1/YR: CPT | Performed by: INTERNAL MEDICINE

## 2021-01-05 PROCEDURE — G8417 CALC BMI ABV UP PARAM F/U: HCPCS | Performed by: INTERNAL MEDICINE

## 2021-01-05 PROCEDURE — G8754 DIAS BP LESS 90: HCPCS | Performed by: INTERNAL MEDICINE

## 2021-01-05 PROCEDURE — 74011250636 HC RX REV CODE- 250/636: Performed by: NURSE PRACTITIONER

## 2021-01-05 PROCEDURE — 74011000258 HC RX REV CODE- 258: Performed by: NURSE PRACTITIONER

## 2021-01-05 PROCEDURE — G8752 SYS BP LESS 140: HCPCS | Performed by: INTERNAL MEDICINE

## 2021-01-05 PROCEDURE — 80053 COMPREHEN METABOLIC PANEL: CPT

## 2021-01-05 PROCEDURE — G8536 NO DOC ELDER MAL SCRN: HCPCS | Performed by: INTERNAL MEDICINE

## 2021-01-05 PROCEDURE — 36415 COLL VENOUS BLD VENIPUNCTURE: CPT

## 2021-01-05 PROCEDURE — G8510 SCR DEP NEG, NO PLAN REQD: HCPCS | Performed by: INTERNAL MEDICINE

## 2021-01-05 PROCEDURE — 3017F COLORECTAL CA SCREEN DOC REV: CPT | Performed by: INTERNAL MEDICINE

## 2021-01-05 PROCEDURE — G0463 HOSPITAL OUTPT CLINIC VISIT: HCPCS | Performed by: NURSE PRACTITIONER

## 2021-01-05 PROCEDURE — 99214 OFFICE O/P EST MOD 30 MIN: CPT | Performed by: INTERNAL MEDICINE

## 2021-01-05 PROCEDURE — 96413 CHEMO IV INFUSION 1 HR: CPT

## 2021-01-05 RX ORDER — SODIUM CHLORIDE 9 MG/ML
10 INJECTION INTRAMUSCULAR; INTRAVENOUS; SUBCUTANEOUS AS NEEDED
Status: DISCONTINUED | OUTPATIENT
Start: 2021-01-05 | End: 2021-01-06 | Stop reason: HOSPADM

## 2021-01-05 RX ORDER — SODIUM CHLORIDE 0.9 % (FLUSH) 0.9 %
10 SYRINGE (ML) INJECTION AS NEEDED
Status: DISCONTINUED | OUTPATIENT
Start: 2021-01-05 | End: 2021-01-06 | Stop reason: HOSPADM

## 2021-01-05 RX ORDER — SODIUM CHLORIDE 9 MG/ML
25 INJECTION, SOLUTION INTRAVENOUS CONTINUOUS
Status: DISCONTINUED | OUTPATIENT
Start: 2021-01-05 | End: 2021-01-06 | Stop reason: HOSPADM

## 2021-01-05 RX ORDER — HEPARIN 100 UNIT/ML
300-500 SYRINGE INTRAVENOUS AS NEEDED
Status: DISCONTINUED | OUTPATIENT
Start: 2021-01-05 | End: 2021-01-06 | Stop reason: HOSPADM

## 2021-01-05 RX ADMIN — SODIUM CHLORIDE 200 MG: 9 INJECTION, SOLUTION INTRAVENOUS at 15:45

## 2021-01-05 RX ADMIN — Medication 10 ML: at 13:45

## 2021-01-05 RX ADMIN — SODIUM CHLORIDE 25 ML/HR: 900 INJECTION, SOLUTION INTRAVENOUS at 15:41

## 2021-01-05 NOTE — PROGRESS NOTES
[facility] VISIT NOTE    1330  Pt arrived at [facility] ambulatory and in no distress for C2 of Keytruda. Assessment completed, pt complains of rash post previous infusion with itching and fatigue. Pt to discuss with MD at appointment. No other complaints at this time. Pt denies all COVID symptoms, recent exposure, and recent travel. PIV started in [address] [place] [address] with no difficulty. Positive blood return noted and labs drawn. Recent Results (from the past 12 hour(s))   CBC WITH AUTOMATED DIFF    Collection Time: 01/05/21  1:46 PM   Result Value Ref Range    WBC 4.6 4.1 - 11.1 K/uL    RBC 3.70 (L) 4.10 - 5.70 M/uL    HGB 11.2 (L) 12.1 - 17.0 g/dL    HCT 33.9 (L) 36.6 - 50.3 %    MCV 91.6 80.0 - 99.0 FL    MCH 30.3 26.0 - 34.0 PG    MCHC 33.0 30.0 - 36.5 g/dL    RDW 14.2 11.5 - 14.5 %    PLATELET 612 (L) 944 - 400 K/uL    MPV 10.9 8.9 - 12.9 FL    NRBC 0.0 0  WBC    ABSOLUTE NRBC 0.00 0.00 - 0.01 K/uL    NEUTROPHILS 69 32 - 75 %    LYMPHOCYTES 14 12 - 49 %    MONOCYTES 10 5 - 13 %    EOSINOPHILS 6 0 - 7 %    BASOPHILS 1 0 - 1 %    IMMATURE GRANULOCYTES 0 0.0 - 0.5 %    ABS. NEUTROPHILS 3.2 1.8 - 8.0 K/UL    ABS. LYMPHOCYTES 0.6 (L) 0.8 - 3.5 K/UL    ABS. MONOCYTES 0.5 0.0 - 1.0 K/UL    ABS. EOSINOPHILS 0.3 0.0 - 0.4 K/UL    ABS. BASOPHILS 0.0 0.0 - 0.1 K/UL    ABS. IMM.  GRANS. 0.0 0.00 - 0.04 K/UL    DF SMEAR SCANNED      RBC COMMENTS NORMOCYTIC, NORMOCHROMIC     METABOLIC PANEL, COMPREHENSIVE    Collection Time: 01/05/21  1:46 PM   Result Value Ref Range    Sodium 137 136 - 145 mmol/L    Potassium 3.8 3.5 - 5.1 mmol/L    Chloride 107 97 - 108 mmol/L    CO2 24 21 - 32 mmol/L    Anion gap 6 5 - 15 mmol/L    Glucose 92 65 - 100 mg/dL    BUN 36 (H) 6 - 20 MG/DL    Creatinine 1.53 (H) 0.70 - 1.30 MG/DL    BUN/Creatinine ratio 24 (H) 12 - 20      GFR est AA 54 (L) >60 ml/min/1.73m2    GFR est non-AA 45 (L) >60 ml/min/1.73m2    Calcium 8.7 8.5 - 10.1 MG/DL    Bilirubin, total 0.6 0.2 - 1.0 MG/DL    ALT (SGPT) 19 12 - 78 U/L    AST (SGOT) 16 15 - 37 U/L    Alk. phosphatase 52 45 - 117 U/L    Protein, total 6.1 (L) 6.4 - 8.2 g/dL    Albumin 3.1 (L) 3.5 - 5.0 g/dL    Globulin 3.0 2.0 - 4.0 g/dL    A-G Ratio 1.0 (L) 1.1 - 2.2       Medications received:  NS at [facility] IV    Patient Vitals for the past 12 hrs:   Temp Pulse Resp BP   01/05/21 1617  60  132/72   01/05/21 1331 97.3 °F (36.3 °C) (!) 55 18 130/85     Tolerated treatment well, no adverse reaction noted. PIV removed per protocol. 1625  D/C'd from [facility] ambulatory and in no distress. Next appointment is 1/25/21.

## 2021-01-05 NOTE — PROGRESS NOTES
[facility] [place] at [address], [address] [address], [place]: [phone]  F: [phone]    Reason for Visit:   Samuel Parrish is a 70 y.o. male who is seen today in office for follow up of metastatic SCC on Pembrolizumab. Treatment History:   · MRI Right Hip 8/5/20: Enlarging right pelvic sidewall adenopathy with adjacent muscular and bony invasion. Degenerative changes of the right hip. Small bony metastatic deposit anterior wall of the right acetabulum  · MRI Lumbar Spine 8/11/20: L3-L4 moderate central spinal canal and right foraminal stenoses. L5-S1 moderate bilateral foraminal stenosis  · 8/20/20 Pelvic Mass, Core biopsy with touch preparation - CYTOLOGIC INTERPRETATION: Pelvic Mass, Core biopsy with touch preparation: Squamous cell carcinoma  · TYPE ID + SCC/H+N vs skin. · PET 9/8/20: Large hypermetabolic right pelvic sidewall lymph node. Lytic lesion right anterior acetabulum is hypermetabolic and compatible with metastatic disease   · XRT to Right Pelvic Mass 10/13/20 - 10/22/20 with Dr. Du Serrano  · Pembrolizumab 12/15/20 - Current     History of Present Illness:   Samuel Parrish is a 70 y.o. male seen today in office for follow up of metastatic SCC primary site unclear PDL1 80%. He completed XRT to pelvis mass on 10/22/20 and right hip/leg pain has since resolved. He started Pembrolizumab on 12/15/20. He is here today for Cycle 2 of Pembrolizumab. He reports that he feels well overall today. He tolerated first treatment well overall with some fatigue and some scattered area of itchy rash. He has not used anything topically to treat the rash yet. He denies fever, chills, cough, SOB, CP, nausea, vomiting, diarrhea, and constipation. He denies pain today. CBC and CMP are still pending today. He is ready for treatment today. He remains active, enjoys hunting and fishing. He is here alone today.      Past Medical History:   Diagnosis Date    Arthritis     BPH (benign prostatic hypertrophy) 7/18/2011    Cancer [facility])     prostate cancer    Chronic pain right    knee    ED (erectile dysfunction)     GERD (gastroesophageal reflux disease)     HTN (hypertension), benign     Metastatic squamous cell carcinoma ([facility] [address] 9/16/2020    OA (osteoarthritis) of knee 7/18/2011    BOTH KNEES    Unspecified essential hypertension 7/18/2011      Past Surgical History:   Procedure Laterality Date    HX ACL RECONSTRUCTION Right     x2    HX APPENDECTOMY  1975    HX GI  1/2015    Drainage of hemorrhoid    HX HERNIA REPAIR      left and right,  ventral    HX HIP REPLACEMENT  2000    left    HX HIP REPLACEMENT  2000    left    HX KNEE ARTHROSCOPY Right     x3    HX ORTHOPAEDIC Left 6/2000    HIP REPLACEMENT    HX ORTHOPAEDIC Right 08/05/2014    TKR - Dr. Gerson Plascencia    HX PROSTATECTOMY  11/07/2011    prostate removed     HX TONSILLECTOMY      CHILD    IR INJ [facility] EPID LUMB ANES/STER SNGL  8/18/2020      Social History     Tobacco Use    Smoking status: Never Smoker    Smokeless tobacco: Never Used   Substance Use Topics    Alcohol use: No      Family History   Problem Relation Age of Onset    Bleeding Prob Father         clotting disorder    Cancer Brother         colon CA 1/2 brother    Thyroid Disease Daughter     Heart Failure Mother     Heart Disease Mother     Anesth Problems Neg Hx      Current Outpatient Medications   Medication Sig    traZODone (DESYREL) 50 mg tablet TAKE 1 TO 2 TABLETS BY MOUTH ONCE DAILY IN THE EVENING AS NEEDED FOR SLEEP    terazosin (HYTRIN) 10 mg capsule Take 1 capsule by mouth once daily    ibuprofen (AdviL) 200 mg tablet Take  by mouth.  candesartan (ATACAND) 4 mg tablet Take 1 Tab by mouth nightly.  amLODIPine (NORVASC) 5 mg tablet Take 1 Tab by mouth nightly.  aspirin delayed-release 81 mg tablet Take 1 Tab by mouth two (2) times a day.  Indications: blood clot prevention    senna-docusate (PERICOLACE) 8.6-50 mg per tablet Take 1 Tab by mouth two (2) times daily as needed for Constipation.  acetaminophen (TYLENOL) 500 mg tablet Take 1 Tab by mouth every four (4) hours.  calcium carbonate/vitamin D3 (CALTRATE-600 PLUS VITAMIN D3 PO) Take 1 Tab by mouth nightly.  leuprolide acetate (LUPRON DEPOT, 6 MONTH, IM) 1 Each by IntraMUSCular route every 6 months. No current facility-administered medications for this visit. Allergies   Allergen Reactions    Barbiturates Anxiety     Hyper        A complete review of systems was obtained, negative except as described above and as reported on ROS sheet scanned into system. Physical Exam:     Visit Vitals  BP (!) 145/80 (BP 1 Location: Left arm, BP Patient Position: Sitting)   Pulse 66   Temp 97.8 °F (36.6 °C) (Temporal)   Wt 235 lb 3.2 oz (106.7 kg)   SpO2 95%   BMI 31.90 kg/m²     ECOG PS: 1  General: No distress  Eyes: PERRLA, anicteric sclerae  HENT: Atraumatic, wearing a mask  Neck: Supple  Resp: Normal respiratory effort   MS: Normal gait and station. Digits without clubbing or cyanosis. Skin: No ecchymoses, or petechiae. Normal temperature, turgor, and texture. Scattered areas of rash   Psych: Alert, oriented, appropriate affect, normal judgment/insight    Results:     Lab Results   Component Value Date/Time    WBC 4.6 01/05/2021 01:46 PM    HGB 11.2 (L) 01/05/2021 01:46 PM    HCT 33.9 (L) 01/05/2021 01:46 PM    PLATELET 223 (L) 06/97/4923 01:46 PM    MCV 91.6 01/05/2021 01:46 PM    ABS.  NEUTROPHILS 3.2 01/05/2021 01:46 PM     Lab Results   Component Value Date/Time    Sodium 137 01/05/2021 01:46 PM    Potassium 3.8 01/05/2021 01:46 PM    Chloride 107 01/05/2021 01:46 PM    CO2 24 01/05/2021 01:46 PM    Glucose 92 01/05/2021 01:46 PM    BUN 36 (H) 01/05/2021 01:46 PM    Creatinine 1.53 (H) 01/05/2021 01:46 PM    GFR est AA 54 (L) 01/05/2021 01:46 PM    GFR est non-AA 45 (L) 01/05/2021 01:46 PM    Calcium 8.7 01/05/2021 01:46 PM    Glucose (POC) 103 (H) 06/09/2020 07:03 AM     Lab Results   Component Value Date/Time    Bilirubin, total 0.6 01/05/2021 01:46 PM    ALT (SGPT) 19 01/05/2021 01:46 PM    Alk. phosphatase 52 01/05/2021 01:46 PM    Protein, total 6.1 (L) 01/05/2021 01:46 PM    Albumin 3.1 (L) 01/05/2021 01:46 PM    Globulin 3.0 01/05/2021 01:46 PM     MRI Right Hip 8/5/20  IMPRESSION:   1. Enlarging right pelvic sidewall adenopathy with adjacent muscular and bony  invasion  2. Degenerative changes of the right hip  3. Small bony metastatic deposit anterior wall of the right acetabulum    MRI Lumbar Spine 8/11/20  IMPRESSION:  1. L3-L4 moderate central spinal canal and right foraminal stenoses. 2. L5-S1 moderate bilateral foraminal stenosis. 8/20/20  Specimen Source   1: Pelvic Mass, Core biopsy with touch preparation   CYTOLOGIC INTERPRETATION:   Pelvic Mass, Core biopsy with touch preparation:   Squamous cell carcinoma    PET 9/8/20  IMPRESSION: Large hypermetabolic right pelvic sidewall lymph node. Lytic lesion  right anterior acetabulum is hypermetabolic and compatible with metastatic  disease    Records reviewed and summarized above. Pathology report(s) reviewed above. Radiology report(s) reviewed above. Assessment/PLAN:     1) Metastatic Squamous Cell Cancer    EGFR/ROS/ALK negative. PDL1 80% by biopsy of pelvic/hip mas 8/20/20. Path is different than prostate which is adenocarcinoma from 2011. Unclear site of origin of squamous cell. TYPE ID sent and SCC H+N vs skin. CT 2/19 which showed LAD and now is progressive. Case discussed at cancer conference   PET 9/8/20 negative except for pelvic mass. Saw Urology and bladder/penis negative. Saw CR sugery and anoscopy negative. Saw Dermatology and skin negative. Seen by Rad/Onc for pelvic mass XRT  Completed XRT to right pelvic mass on 10/22/20. Right hip pain has esolved since completing XRT. Discussed we can do Pembrolizumab/immunotherapy single agent.    He had teaching and consent with pharmacy. He started Pembrolizumab on 12/15/20. He is here today for Cycle 2 of Pembrolizumab. He tolerated first treatment well overall with some fatigue and rash. He is clinically stable today - feels well overall. CBC and CMP are still pending today. He is ready for treatment pending labs. Follow up PET scheduled for 1/12/21 per Rad/Onc. Follow up in 3 weeks with Cycle 2. Patient agrees with plan. 2) Recurrent Prostate Cancer   PSA 0 on Lupron. Management per Urology. 3) Right Hip Pain   Resolved after XRT. 4) Rash  Mild, scattered areas. Advised to treat topically and let us know if worsens. 5) HTN/Arthritis/GERD  Management per PCP. 6) Psychosocial  Mood good, coping well. He has great support from his wife. SW support as needed. He is here alone today. Call if questions. Follow up in 3 weeks with Cycle 3. This patient was seen in conjunction with Madyson Aguirre NP. I personally reviewed the history and all points in the assessment and plan with the patient, and performed the key points on the exam.   I appreciate the opportunity to participate in Mr. Dima Aquino care.     Signed By: Olga Bazan DO

## 2021-01-05 NOTE — PROGRESS NOTES
Yanick Almanzar is a 70 y.o. male]  Chief Complaint   Patient presents with    Chemotherapy     metastatic SCC     1. Have you been to the ER, urgent care clinic since your last visit? Hospitalized since your last visit? No.  2. Have you seen or consulted any other health care providers outside of the [address] [place] [address] since your last visit? Include any pap smears or colon screening.  No.

## 2021-01-12 ENCOUNTER — TELEPHONE (OUTPATIENT)
Dept: ONCOLOGY | Age: 76
End: 2021-01-12

## 2021-01-12 ENCOUNTER — HOSPITAL ENCOUNTER (OUTPATIENT)
Dept: PET IMAGING | Age: 76
Discharge: HOME | End: 2021-01-12
Attending: RADIOLOGY
Payer: MEDICARE

## 2021-01-12 VITALS — WEIGHT: 230 LBS | HEIGHT: 72 IN | BODY MASS INDEX: 31.15 KG/M2

## 2021-01-12 DIAGNOSIS — C61 MALIGNANT NEOPLASM OF PROSTATE (HCC): ICD-10-CM

## 2021-01-12 DIAGNOSIS — I42.0 DILATED CARDIOMYOPATHY SECONDARY TO MALIGNANCY (HCC): ICD-10-CM

## 2021-01-12 DIAGNOSIS — C79.52 SECONDARY MALIGNANT NEOPLASM OF BONE AND BONE MARROW (HCC): ICD-10-CM

## 2021-01-12 DIAGNOSIS — C80.1 DILATED CARDIOMYOPATHY SECONDARY TO MALIGNANCY (HCC): ICD-10-CM

## 2021-01-12 DIAGNOSIS — C79.51 SECONDARY MALIGNANT NEOPLASM OF BONE AND BONE MARROW (HCC): ICD-10-CM

## 2021-01-12 LAB
GLUCOSE BLD STRIP.AUTO-MCNC: 108 MG/DL (ref 65–100)
SERVICE CMNT-IMP: ABNORMAL

## 2021-01-12 PROCEDURE — A9552 F18 FDG: HCPCS

## 2021-01-12 RX ORDER — SODIUM CHLORIDE 0.9 % (FLUSH) 0.9 %
10 SYRINGE (ML) INJECTION
Status: COMPLETED | OUTPATIENT
Start: 2021-01-12 | End: 2021-01-12

## 2021-01-12 RX ADMIN — Medication 10 ML: at 10:22

## 2021-01-12 NOTE — TELEPHONE ENCOUNTER
Patient called and stated that his recent PET scan results have been posted to [facility] and he is really concerned and would like a call back as soon as possible    [phone]

## 2021-01-12 NOTE — TELEPHONE ENCOUNTER
Returned patient call, HIPAA verified. Understands MD is out of the office and has not yet seen results. States Dr. Madalyn Dykes is out of the office til end of the week, \"and does not want to go crazy worrying about this thing. \"  Reassured patient that RN would contact as soon as Provider had reviewed scan and updated team with results. Understanding verbalized.

## 2021-01-13 NOTE — TELEPHONE ENCOUNTER
Call to patient, HIPAA verified. Provider message relayed, understanding verbalized. [facility] has phone update with Dr. Long Suazo on 1/15/21 at 0930.

## 2021-01-15 ENCOUNTER — HOSPITAL ENCOUNTER (OUTPATIENT)
Dept: RADIATION THERAPY | Age: 76
Discharge: HOME | End: 2021-01-15

## 2021-01-19 ENCOUNTER — DOCUMENTATION ONLY (OUTPATIENT)
Dept: ONCOLOGY | Age: 76
End: 2021-01-19

## 2021-01-19 NOTE — PROGRESS NOTES
Call to patient, HIPAA verified. Provider message relayed to patient, understanding verbalized. Delighted conference confirmed info that patient had from last visit with Dr. Himanshu Chen. Patient feels well, walked 3 miles yesterday, is increasing his endurance, states less rash/itching with treatment #2. Reminded of next appointment for [facility]/MD, understanding verbalized of all.

## 2021-01-19 NOTE — PROGRESS NOTES
Case discussed at thoracic cancer conference today  Has multiple tiny pulmonary nodules now  Groin site good  Plan is to continue with immunotherapy  No further XRT at present.

## 2021-01-25 ENCOUNTER — HOSPITAL ENCOUNTER (OUTPATIENT)
Dept: INFUSION THERAPY | Age: 76
Discharge: HOME | End: 2021-01-25
Payer: MEDICARE

## 2021-01-25 VITALS
SYSTOLIC BLOOD PRESSURE: 140 MMHG | HEART RATE: 62 BPM | OXYGEN SATURATION: 98 % | RESPIRATION RATE: 18 BRPM | DIASTOLIC BLOOD PRESSURE: 78 MMHG | TEMPERATURE: 97.8 F

## 2021-01-25 LAB
ALBUMIN SERPL-MCNC: 3.2 G/DL (ref 3.5–5)
ALBUMIN/GLOB SERPL: 1.1 {RATIO} (ref 1.1–2.2)
ALP SERPL-CCNC: 50 U/L (ref 45–117)
ALT SERPL-CCNC: 22 U/L (ref 12–78)
ANION GAP SERPL CALC-SCNC: 7 MMOL/L (ref 5–15)
AST SERPL-CCNC: 17 U/L (ref 15–37)
BASOPHILS # BLD: 0.1 K/UL (ref 0–0.1)
BASOPHILS NFR BLD: 1 % (ref 0–1)
BILIRUB SERPL-MCNC: 0.6 MG/DL (ref 0.2–1)
BUN SERPL-MCNC: 36 MG/DL (ref 6–20)
BUN/CREAT SERPL: 23 (ref 12–20)
CALCIUM SERPL-MCNC: 8.7 MG/DL (ref 8.5–10.1)
CHLORIDE SERPL-SCNC: 108 MMOL/L (ref 97–108)
CO2 SERPL-SCNC: 25 MMOL/L (ref 21–32)
CREAT SERPL-MCNC: 1.58 MG/DL (ref 0.7–1.3)
DIFFERENTIAL METHOD BLD: ABNORMAL
EOSINOPHIL # BLD: 0.2 K/UL (ref 0–0.4)
EOSINOPHIL NFR BLD: 3 % (ref 0–7)
ERYTHROCYTE [DISTWIDTH] IN BLOOD BY AUTOMATED COUNT: 13.5 % (ref 11.5–14.5)
GLOBULIN SER CALC-MCNC: 2.9 G/DL (ref 2–4)
GLUCOSE SERPL-MCNC: 105 MG/DL (ref 65–100)
HCT VFR BLD AUTO: 35.1 % (ref 36.6–50.3)
HGB BLD-MCNC: 11.8 G/DL (ref 12.1–17)
IMM GRANULOCYTES # BLD AUTO: 0 K/UL (ref 0–0.04)
IMM GRANULOCYTES NFR BLD AUTO: 0 % (ref 0–0.5)
LYMPHOCYTES # BLD: 0.6 K/UL (ref 0.8–3.5)
LYMPHOCYTES NFR BLD: 10 % (ref 12–49)
MCH RBC QN AUTO: 31.1 PG (ref 26–34)
MCHC RBC AUTO-ENTMCNC: 33.6 G/DL (ref 30–36.5)
MCV RBC AUTO: 92.4 FL (ref 80–99)
MONOCYTES # BLD: 0.5 K/UL (ref 0–1)
MONOCYTES NFR BLD: 9 % (ref 5–13)
NEUTS SEG # BLD: 4.5 K/UL (ref 1.8–8)
NEUTS SEG NFR BLD: 77 % (ref 32–75)
NRBC # BLD: 0 K/UL (ref 0–0.01)
NRBC BLD-RTO: 0 PER 100 WBC
PLATELET # BLD AUTO: 119 K/UL (ref 150–400)
PMV BLD AUTO: 11.2 FL (ref 8.9–12.9)
POTASSIUM SERPL-SCNC: 3.8 MMOL/L (ref 3.5–5.1)
PROT SERPL-MCNC: 6.1 G/DL (ref 6.4–8.2)
RBC # BLD AUTO: 3.8 M/UL (ref 4.1–5.7)
RBC MORPH BLD: ABNORMAL
SODIUM SERPL-SCNC: 140 MMOL/L (ref 136–145)
TSH SERPL DL<=0.05 MIU/L-ACNC: 0.38 UIU/ML (ref 0.36–3.74)
WBC # BLD AUTO: 5.9 K/UL (ref 4.1–11.1)

## 2021-01-25 PROCEDURE — 84443 ASSAY THYROID STIM HORMONE: CPT

## 2021-01-25 PROCEDURE — 36415 COLL VENOUS BLD VENIPUNCTURE: CPT

## 2021-01-25 PROCEDURE — 80053 COMPREHEN METABOLIC PANEL: CPT

## 2021-01-25 PROCEDURE — 85025 COMPLETE CBC W/AUTO DIFF WBC: CPT

## 2021-01-25 RX ORDER — CANDESARTAN 4 MG/1
TABLET ORAL
Qty: 90 TAB | Refills: 0 | Status: SHIPPED | OUTPATIENT
Start: 2021-01-25 | End: 2021-04-28

## 2021-01-25 NOTE — PROGRESS NOTES
[facility] Lab Visit:        6123  Pt arrived ambulatory to [facility] in stable condition, for lab draw. Labs drawn peripherally from Left AC arm and sent fro processing. Pt tolerated well. Visit Vitals  BP (!) 140/78 (BP 1 Location: Left arm, BP Patient Position: Sitting)   Pulse 62   Temp 97.8 °F (36.6 °C)   Resp 18   SpO2 98%          1645 No new concerns voiced. D/cd home ambulatory in no distress. Pt aware of next [facility] appointment scheduled. Labs available in CC once resulted.

## 2021-01-26 ENCOUNTER — OFFICE VISIT (OUTPATIENT)
Dept: ONCOLOGY | Age: 76
End: 2021-01-26
Payer: MEDICARE

## 2021-01-26 ENCOUNTER — HOSPITAL ENCOUNTER (OUTPATIENT)
Dept: INFUSION THERAPY | Age: 76
Discharge: HOME | End: 2021-01-26
Payer: MEDICARE

## 2021-01-26 VITALS
RESPIRATION RATE: 97 BRPM | WEIGHT: 229.2 LBS | BODY MASS INDEX: 31.09 KG/M2 | SYSTOLIC BLOOD PRESSURE: 146 MMHG | DIASTOLIC BLOOD PRESSURE: 85 MMHG | TEMPERATURE: 96.9 F | HEART RATE: 63 BPM

## 2021-01-26 VITALS — SYSTOLIC BLOOD PRESSURE: 135 MMHG | RESPIRATION RATE: 18 BRPM | DIASTOLIC BLOOD PRESSURE: 79 MMHG | HEART RATE: 62 BPM

## 2021-01-26 DIAGNOSIS — C61 PROSTATE CANCER (HCC): ICD-10-CM

## 2021-01-26 DIAGNOSIS — I10 ESSENTIAL HYPERTENSION: ICD-10-CM

## 2021-01-26 DIAGNOSIS — M19.90 ARTHRITIS: ICD-10-CM

## 2021-01-26 DIAGNOSIS — Z51.12 ENCOUNTER FOR ANTINEOPLASTIC IMMUNOTHERAPY: ICD-10-CM

## 2021-01-26 DIAGNOSIS — K21.9 GASTROESOPHAGEAL REFLUX DISEASE WITHOUT ESOPHAGITIS: ICD-10-CM

## 2021-01-26 DIAGNOSIS — L27.0 DRUG-INDUCED SKIN RASH: ICD-10-CM

## 2021-01-26 PROCEDURE — G8427 DOCREV CUR MEDS BY ELIG CLIN: HCPCS | Performed by: NURSE PRACTITIONER

## 2021-01-26 PROCEDURE — 99215 OFFICE O/P EST HI 40 MIN: CPT | Performed by: NURSE PRACTITIONER

## 2021-01-26 PROCEDURE — 74011000258 HC RX REV CODE- 258: Performed by: NURSE PRACTITIONER

## 2021-01-26 PROCEDURE — 3017F COLORECTAL CA SCREEN DOC REV: CPT | Performed by: NURSE PRACTITIONER

## 2021-01-26 PROCEDURE — G8510 SCR DEP NEG, NO PLAN REQD: HCPCS | Performed by: NURSE PRACTITIONER

## 2021-01-26 PROCEDURE — G8754 DIAS BP LESS 90: HCPCS | Performed by: NURSE PRACTITIONER

## 2021-01-26 PROCEDURE — 74011250636 HC RX REV CODE- 250/636: Performed by: NURSE PRACTITIONER

## 2021-01-26 PROCEDURE — G8417 CALC BMI ABV UP PARAM F/U: HCPCS | Performed by: NURSE PRACTITIONER

## 2021-01-26 PROCEDURE — G8536 NO DOC ELDER MAL SCRN: HCPCS | Performed by: NURSE PRACTITIONER

## 2021-01-26 PROCEDURE — 96413 CHEMO IV INFUSION 1 HR: CPT

## 2021-01-26 PROCEDURE — G0463 HOSPITAL OUTPT CLINIC VISIT: HCPCS | Performed by: NURSE PRACTITIONER

## 2021-01-26 PROCEDURE — 1101F PT FALLS ASSESS-DOCD LE1/YR: CPT | Performed by: NURSE PRACTITIONER

## 2021-01-26 PROCEDURE — G8752 SYS BP LESS 140: HCPCS | Performed by: NURSE PRACTITIONER

## 2021-01-26 RX ORDER — SODIUM CHLORIDE 0.9 % (FLUSH) 0.9 %
10 SYRINGE (ML) INJECTION AS NEEDED
Status: DISCONTINUED | OUTPATIENT
Start: 2021-01-26 | End: 2021-01-27 | Stop reason: HOSPADM

## 2021-01-26 RX ORDER — SODIUM CHLORIDE 9 MG/ML
25 INJECTION, SOLUTION INTRAVENOUS CONTINUOUS
Status: DISCONTINUED | OUTPATIENT
Start: 2021-01-26 | End: 2021-01-27 | Stop reason: HOSPADM

## 2021-01-26 RX ADMIN — SODIUM CHLORIDE 200 MG: 9 INJECTION, SOLUTION INTRAVENOUS at 14:45

## 2021-01-26 RX ADMIN — SODIUM CHLORIDE 25 ML/HR: 900 INJECTION, SOLUTION INTRAVENOUS at 14:45

## 2021-01-26 RX ADMIN — Medication 10 ML: at 15:20

## 2021-01-26 NOTE — PROGRESS NOTES
[facility] [place] at [place] [address] [address], [address], [place]: [phone]  F: [phone]    Reason for Visit:   Enriqueta Lechuga is a 70 y.o. male who is seen today in office for follow up of metastatic SCC on Pembrolizumab. Treatment History:   · MRI Right Hip 8/5/20: Enlarging right pelvic sidewall adenopathy with adjacent muscular and bony invasion. Degenerative changes of the right hip. Small bony metastatic deposit anterior wall of the right acetabulum  · MRI Lumbar Spine 8/11/20: L3-L4 moderate central spinal canal and right foraminal stenoses. L5-S1 moderate bilateral foraminal stenosis  · 8/20/20 Pelvic Mass, Core biopsy with touch preparation - CYTOLOGIC INTERPRETATION: Pelvic Mass, Core biopsy with touch preparation: Squamous cell carcinoma  · TYPE ID + SCC/H+N vs skin  · PET 9/8/20: Large hypermetabolic right pelvic sidewall lymph node. Lytic lesion right anterior acetabulum is hypermetabolic and compatible with metastatic disease   · XRT to Right Pelvic Mass 10/13/20 - 10/22/20 with Dr. Lela Ojeda  · Pembrolizumab 12/15/20 - Current   · PET 1/12/21: New hypermetabolic pulmonary nodule in the lingula. Resolved left pelvic sidewall lymph node activity and resolved activity corresponding to the lytic lesion in the right anterior acetabulum    History of Present Illness:   Enriqueta Lechuga is a 70 y.o. male seen today in office for follow up of metastatic SCC primary site unclear PDL1 80%. He completed XRT to pelvis mass on 10/22/20 and right hip/leg pain has since resolved. He started Pembrolizumab on 12/15/20. He had a follow up PET ordered by Rad/Onc that showed new hypermetabolic pulmonary nodule in the lingula, resolved left pelvic sidewall lymph node activity and resolved activity corresponding to the lytic lesion in the right anterior acetabulum. Case was discussed at cancer conference and recommendation was to continue with Pembrolizumab.  He is here today for Cycle 3 of Pembrolizumab. He reports that he feels well overall today. He is tolerating Pembro well overall with some fatigue and some scattered area of itchy rash. He reports that itching was less after second treatment. He denies fever, chills, cough, SOB, CP, nausea, vomiting, diarrhea, and constipation. He denies pain today. CBC, CMP and TSH were stable/good yesterday. He is ready for treatment today. He remains active, enjoys hunting and fishing. He has been walking and is \"pain free. \" He received first dose of the Pfizer vaccine on 1/22/21 and did well with this. He is here alone today.      Past Medical History:   Diagnosis Date    Arthritis     BPH (benign prostatic hypertrophy) 7/18/2011    Cancer [facility])     prostate cancer    Chronic pain right    knee    ED (erectile dysfunction)     GERD (gastroesophageal reflux disease)     HTN (hypertension), benign     Metastatic squamous cell carcinoma ([facility] [address] 9/16/2020    OA (osteoarthritis) of knee 7/18/2011    BOTH KNEES    Unspecified essential hypertension 7/18/2011      Past Surgical History:   Procedure Laterality Date    HX ACL RECONSTRUCTION Right     x2    HX APPENDECTOMY  1975    HX GI  1/2015    Drainage of hemorrhoid    HX HERNIA REPAIR      left and right,  ventral    HX HIP REPLACEMENT  2000    left    HX HIP REPLACEMENT  2000    left    HX KNEE ARTHROSCOPY Right     x3    HX ORTHOPAEDIC Left 6/2000    HIP REPLACEMENT    HX ORTHOPAEDIC Right 08/05/2014    TKR - Dr. Cosme Martinez    HX PROSTATECTOMY  11/07/2011    prostate removed     HX TONSILLECTOMY      CHILD    IR INJ [facility] EPID LUMB ANES/STER SNGL  8/18/2020      Social History     Tobacco Use    Smoking status: Never Smoker    Smokeless tobacco: Never Used   Substance Use Topics    Alcohol use: No      Family History   Problem Relation Age of Onset    Bleeding Prob Father         clotting disorder    Cancer Brother         colon CA 1/2 brother    Thyroid Disease Daughter    Rocio Nash Heart Failure Mother     Heart Disease Mother     Anesth Problems Neg Hx      Current Outpatient Medications   Medication Sig    candesartan (ATACAND) 4 mg tablet Take 1 tablet by mouth nightly    traZODone (DESYREL) 50 mg tablet TAKE 1 TO 2 TABLETS BY MOUTH ONCE DAILY IN THE EVENING AS NEEDED FOR SLEEP    terazosin (HYTRIN) 10 mg capsule Take 1 capsule by mouth once daily    ibuprofen (AdviL) 200 mg tablet Take  by mouth.  amLODIPine (NORVASC) 5 mg tablet Take 1 Tab by mouth nightly.  aspirin delayed-release 81 mg tablet Take 1 Tab by mouth two (2) times a day. Indications: blood clot prevention    senna-docusate (PERICOLACE) 8.6-50 mg per tablet Take 1 Tab by mouth two (2) times daily as needed for Constipation.  acetaminophen (TYLENOL) 500 mg tablet Take 1 Tab by mouth every four (4) hours.  calcium carbonate/vitamin D3 (CALTRATE-600 PLUS VITAMIN D3 PO) Take 1 Tab by mouth nightly.  leuprolide acetate (LUPRON DEPOT, 6 MONTH, IM) 1 Each by IntraMUSCular route every 6 months. No current facility-administered medications for this visit. Allergies   Allergen Reactions    Barbiturates Anxiety     Hyper        A complete review of systems was obtained, negative except as described above and as reported on ROS sheet scanned into system. Physical Exam:     Visit Vitals  BP (!) 146/85 (BP 1 Location: Left arm, BP Patient Position: Sitting)   Pulse 63   Temp 96.9 °F (36.1 °C) (Temporal)   Resp (!) 97   Wt 229 lb 3.2 oz (104 kg)   BMI 31.09 kg/m²     ECOG PS: 1  General: No distress  Eyes: PERRLA, anicteric sclerae  HENT: Atraumatic, wearing a mask  Neck: Supple  Resp: Normal respiratory effort   MS: Normal gait and station. Digits without clubbing or cyanosis. Skin: No ecchymoses, or petechiae. Normal temperature, turgor, and texture.  Scattered areas of rash   Psych: Alert, oriented, appropriate affect, normal judgment/insight    Results:     Lab Results   Component Value Date/Time WBC 5.9 01/25/2021 04:37 PM    HGB 11.8 (L) 01/25/2021 04:37 PM    HCT 35.1 (L) 01/25/2021 04:37 PM    PLATELET 044 (L) 38/44/7731 04:37 PM    MCV 92.4 01/25/2021 04:37 PM    ABS. NEUTROPHILS 4.5 01/25/2021 04:37 PM     Lab Results   Component Value Date/Time    Sodium 140 01/25/2021 04:37 PM    Potassium 3.8 01/25/2021 04:37 PM    Chloride 108 01/25/2021 04:37 PM    CO2 25 01/25/2021 04:37 PM    Glucose 105 (H) 01/25/2021 04:37 PM    BUN 36 (H) 01/25/2021 04:37 PM    Creatinine 1.58 (H) 01/25/2021 04:37 PM    GFR est AA 52 (L) 01/25/2021 04:37 PM    GFR est non-AA 43 (L) 01/25/2021 04:37 PM    Calcium 8.7 01/25/2021 04:37 PM    Glucose (POC) 108 (H) 01/12/2021 10:21 AM     Lab Results   Component Value Date/Time    Bilirubin, total 0.6 01/25/2021 04:37 PM    ALT (SGPT) 22 01/25/2021 04:37 PM    Alk. phosphatase 50 01/25/2021 04:37 PM    Protein, total 6.1 (L) 01/25/2021 04:37 PM    Albumin 3.2 (L) 01/25/2021 04:37 PM    Globulin 2.9 01/25/2021 04:37 PM     MRI Right Hip 8/5/20  IMPRESSION:   1. Enlarging right pelvic sidewall adenopathy with adjacent muscular and bony  invasion  2. Degenerative changes of the right hip  3. Small bony metastatic deposit anterior wall of the right acetabulum    MRI Lumbar Spine 8/11/20  IMPRESSION:  1. L3-L4 moderate central spinal canal and right foraminal stenoses. 2. L5-S1 moderate bilateral foraminal stenosis. 8/20/20  Specimen Source   1: Pelvic Mass, Core biopsy with touch preparation   CYTOLOGIC INTERPRETATION:   Pelvic Mass, Core biopsy with touch preparation:   Squamous cell carcinoma    PET 9/8/20  IMPRESSION: Large hypermetabolic right pelvic sidewall lymph node. Lytic lesion  right anterior     PET 1/12/21  IMPRESSION:  New hypermetabolic pulmonary nodule in the lingula. Resolved left  pelvic sidewall lymph node activity and resolved activity corresponding to the  lytic lesion in the right anterior acetabulum    Records reviewed and summarized above.   Pathology report(s) reviewed above. Radiology report(s) reviewed above. Assessment/PLAN:     1) Metastatic Squamous Cell Cancer    EGFR/ROS/ALK negative. PDL1 80% by biopsy of pelvic/hip mas 8/20/20. Path is different than prostate which is adenocarcinoma from 2011. Unclear site of origin of squamous cell. TYPE ID sent and SCC H+N vs skin. CT 2/19 which showed LAD and now is progressive. Case discussed at cancer conference   PET 9/8/20 negative except for pelvic mass. Saw Urology and bladder/penis negative. Saw CR sugery and anoscopy negative. Saw Dermatology and skin negative. Seen by Rad/Onc for pelvic mass XRT  Completed XRT to right pelvic mass on 10/22/20. Right hip pain has esolved since completing XRT. Discussed we can do Pembrolizumab/immunotherapy single agent. He had teaching and consent with pharmacy. He started Pembrolizumab on 12/15/20. He had follow up PET on 1/12/21 that showed new hypermetabolic pulmonary nodule in the lingula, resolved left pelvic sidewall lymph node activity and resolved activity corresponding to the lytic lesion in the right anterior acetabulum. Case reviewed at cancer conference and recommendation was to continue [id number] Riidr for now. Case d/w Rad/onc. Long d/w pt today regarding discussion and treatment planning. No plans for radiation at present. He is here today for Cycle 3 of Pembrolizumab. He is tolerating Pembro well overall with mild skin rash. He is clinically stable today - feels well overall, nothing new or different. CBC, CMP, and TSH were stable/good yesterday. Will do follow up PET in 3/21 to assess response. He is ready for treatment pending labs. Follow up in 3 weeks with Cycle 4. Patient agrees with plan. 2) Recurrent Prostate Cancer   PSA 0 on Lupron - next on 2/9/21 per Urology. Management per Urology. 3) Right Hip Pain   Resolved after XRT. 4) Rash  Mild, scattered areas.   Advised to treat topically and let us know if worsens. 5) HTN/Arthritis/GERD  Management per PCP. 6) Health Maintenance  Received first dose of Pfizer COVID-19 vaccine on 1/22/21. 7) Psychosocial  Mood good, coping well. He has great support from his wife. SW support as needed. He is here alone today. Call if questions. Follow up in 3 weeks with Cycle 4. This patient was seen in conjunction with Trish Quintero NP. I personally reviewed the history and all points in the assessment and plan with the patient, and performed the key points on the exam.   I appreciate the opportunity to participate in Mr. Vicenta Cottrell care.     Signed By: Charissa Camacho DO

## 2021-01-26 NOTE — PROGRESS NOTES
[facility] Short Note                       Date: 2021    Name: Frankey Magnus    MRN: [id number]         : 1945      1330 Pt admit to [facility] for Keytruda (C3) ambulatory in stable condition. Assessment completed. No new concerns voiced. 24G PIV established in left AC. PIV connected to NS KVO. Labs drawn yesterday. Patient denies SOB, fever, cough, and/or general not feeling well. Patient denies recent exposure to someone who has tested positive for COVID-19. Patient denies contact with anyone who has a pending COVID test.     Visit Vitals  /79   Pulse 62   Resp 18       Medications Administered     0.9% sodium chloride infusion     Admin Date  2021 Action  New Bag Dose  25 mL/hr Rate  25 mL/hr Route  IntraVENous Administered By  Meredeth Schirmer, RN          pembrolizumab [facility]) 200 mg in 0.9% sodium chloride 100 mL, overfill volume 10 mL IVPB     Admin Date  2021 Action  New Bag Dose  200 mg Rate  236 mL/hr Route  IntraVENous Administered By  Meredeth Schirmer, RN          sodium chloride (NS) flush 10 mL     Admin Date  2021 Action  Given Dose  10 mL Route  IntraVENous Administered By  Meredeth Schirmer, RN              Mr. Cedric Garcia tolerated the infusion, and had no complaints. PIV removed without difficulty. Mr. Cedric Garcia was discharged from [place] [address] in stable condition at 1520.      Future Appointments   Date Time Provider Americo Redd   2/15/2021  4:30 PM H3 [facility]   2021  1:30 PM E3 [facility] [place] [facility]   3/8/2021  4:30 PM H3 [facility]   3/9/2021  1:30 PM E3 [facility] Americo Mcarthur RN  2021

## 2021-01-26 NOTE — PROGRESS NOTES
Vic Sahni is a 70 y.o. male]  Chief Complaint   Patient presents with    Chemotherapy     of metastatic SCC      1. Have you been to the ER, urgent care clinic since your last visit? Hospitalized since your last visit? No.    2. Have you seen or consulted any other health care providers outside of the [address] [place] [address] since your last visit? Include any pap smears or colon screening. No.    Patient states he just got the first dosage of the COVID-19 vaccine last week.

## 2021-02-08 RX ORDER — TERAZOSIN 10 MG/1
CAPSULE ORAL
Qty: 90 CAP | Refills: 0 | Status: SHIPPED | OUTPATIENT
Start: 2021-02-08 | End: 2021-05-09

## 2021-02-15 ENCOUNTER — HOSPITAL ENCOUNTER (OUTPATIENT)
Dept: INFUSION THERAPY | Age: 76
Discharge: HOME | End: 2021-02-15
Payer: MEDICARE

## 2021-02-15 VITALS
SYSTOLIC BLOOD PRESSURE: 139 MMHG | RESPIRATION RATE: 16 BRPM | TEMPERATURE: 96.8 F | OXYGEN SATURATION: 99 % | DIASTOLIC BLOOD PRESSURE: 87 MMHG | HEART RATE: 65 BPM

## 2021-02-15 LAB
ALBUMIN SERPL-MCNC: 2.9 G/DL (ref 3.5–5)
ALBUMIN/GLOB SERPL: 1 {RATIO} (ref 1.1–2.2)
ALP SERPL-CCNC: 59 U/L (ref 45–117)
ALT SERPL-CCNC: 20 U/L (ref 12–78)
ANION GAP SERPL CALC-SCNC: 7 MMOL/L (ref 5–15)
AST SERPL-CCNC: 13 U/L (ref 15–37)
BASOPHILS # BLD: 0 K/UL (ref 0–0.1)
BASOPHILS NFR BLD: 1 % (ref 0–1)
BILIRUB SERPL-MCNC: 0.5 MG/DL (ref 0.2–1)
BUN SERPL-MCNC: 30 MG/DL (ref 6–20)
BUN/CREAT SERPL: 23 (ref 12–20)
CALCIUM SERPL-MCNC: 8.6 MG/DL (ref 8.5–10.1)
CHLORIDE SERPL-SCNC: 108 MMOL/L (ref 97–108)
CO2 SERPL-SCNC: 25 MMOL/L (ref 21–32)
CREAT SERPL-MCNC: 1.32 MG/DL (ref 0.7–1.3)
DIFFERENTIAL METHOD BLD: ABNORMAL
EOSINOPHIL # BLD: 0.2 K/UL (ref 0–0.4)
EOSINOPHIL NFR BLD: 5 % (ref 0–7)
ERYTHROCYTE [DISTWIDTH] IN BLOOD BY AUTOMATED COUNT: 12.8 % (ref 11.5–14.5)
GLOBULIN SER CALC-MCNC: 3 G/DL (ref 2–4)
GLUCOSE SERPL-MCNC: 91 MG/DL (ref 65–100)
HCT VFR BLD AUTO: 34.1 % (ref 36.6–50.3)
HGB BLD-MCNC: 11.2 G/DL (ref 12.1–17)
IMM GRANULOCYTES # BLD AUTO: 0 K/UL (ref 0–0.04)
IMM GRANULOCYTES NFR BLD AUTO: 0 % (ref 0–0.5)
LYMPHOCYTES # BLD: 0.8 K/UL (ref 0.8–3.5)
LYMPHOCYTES NFR BLD: 17 % (ref 12–49)
MCH RBC QN AUTO: 30.4 PG (ref 26–34)
MCHC RBC AUTO-ENTMCNC: 32.8 G/DL (ref 30–36.5)
MCV RBC AUTO: 92.4 FL (ref 80–99)
MONOCYTES # BLD: 0.5 K/UL (ref 0–1)
MONOCYTES NFR BLD: 10 % (ref 5–13)
NEUTS SEG # BLD: 3.2 K/UL (ref 1.8–8)
NEUTS SEG NFR BLD: 67 % (ref 32–75)
NRBC # BLD: 0 K/UL (ref 0–0.01)
NRBC BLD-RTO: 0 PER 100 WBC
PLATELET # BLD AUTO: 115 K/UL (ref 150–400)
PMV BLD AUTO: 10.8 FL (ref 8.9–12.9)
POTASSIUM SERPL-SCNC: 3.4 MMOL/L (ref 3.5–5.1)
PROT SERPL-MCNC: 5.9 G/DL (ref 6.4–8.2)
RBC # BLD AUTO: 3.69 M/UL (ref 4.1–5.7)
RBC MORPH BLD: ABNORMAL
SODIUM SERPL-SCNC: 140 MMOL/L (ref 136–145)
WBC # BLD AUTO: 4.7 K/UL (ref 4.1–11.1)

## 2021-02-15 PROCEDURE — 85025 COMPLETE CBC W/AUTO DIFF WBC: CPT

## 2021-02-15 PROCEDURE — 36415 COLL VENOUS BLD VENIPUNCTURE: CPT

## 2021-02-15 PROCEDURE — 80053 COMPREHEN METABOLIC PANEL: CPT

## 2021-02-15 NOTE — PROGRESS NOTES
[facility] Lab Visit:        5498 Pt arrived ambulatory to [facility] in stable condition, for lab draw. Labs drawn peripherally from Left arm and sent for processing. Pt tolerated well. Visit Vitals  /87 (BP 1 Location: Left upper arm, BP Patient Position: Sitting)   Pulse 65   Temp 96.8 °F (36 °C)   Resp 16   SpO2 99%       1645 No new concerns voiced. D/cd home ambulatory in no distress. Pt aware of next [facility] appointment scheduled. Labs available in CC once resulted.

## 2021-02-16 ENCOUNTER — OFFICE VISIT (OUTPATIENT)
Dept: ONCOLOGY | Age: 76
End: 2021-02-16
Payer: MEDICARE

## 2021-02-16 ENCOUNTER — HOSPITAL ENCOUNTER (OUTPATIENT)
Dept: INFUSION THERAPY | Age: 76
Discharge: HOME | End: 2021-02-16
Payer: MEDICARE

## 2021-02-16 VITALS
WEIGHT: 233.1 LBS | OXYGEN SATURATION: 96 % | TEMPERATURE: 96.8 F | DIASTOLIC BLOOD PRESSURE: 74 MMHG | BODY MASS INDEX: 31.61 KG/M2 | SYSTOLIC BLOOD PRESSURE: 130 MMHG | HEART RATE: 67 BPM

## 2021-02-16 VITALS
SYSTOLIC BLOOD PRESSURE: 126 MMHG | RESPIRATION RATE: 16 BRPM | TEMPERATURE: 96.4 F | HEART RATE: 61 BPM | DIASTOLIC BLOOD PRESSURE: 77 MMHG

## 2021-02-16 DIAGNOSIS — C61 PROSTATE CANCER (HCC): ICD-10-CM

## 2021-02-16 DIAGNOSIS — M19.90 ARTHRITIS: ICD-10-CM

## 2021-02-16 DIAGNOSIS — I10 ESSENTIAL HYPERTENSION: ICD-10-CM

## 2021-02-16 DIAGNOSIS — Z51.12 ENCOUNTER FOR ANTINEOPLASTIC IMMUNOTHERAPY: ICD-10-CM

## 2021-02-16 PROCEDURE — 74011250636 HC RX REV CODE- 250/636: Performed by: NURSE PRACTITIONER

## 2021-02-16 PROCEDURE — G8536 NO DOC ELDER MAL SCRN: HCPCS | Performed by: INTERNAL MEDICINE

## 2021-02-16 PROCEDURE — 1101F PT FALLS ASSESS-DOCD LE1/YR: CPT | Performed by: INTERNAL MEDICINE

## 2021-02-16 PROCEDURE — G8417 CALC BMI ABV UP PARAM F/U: HCPCS | Performed by: INTERNAL MEDICINE

## 2021-02-16 PROCEDURE — G0463 HOSPITAL OUTPT CLINIC VISIT: HCPCS | Performed by: NURSE PRACTITIONER

## 2021-02-16 PROCEDURE — G8754 DIAS BP LESS 90: HCPCS | Performed by: INTERNAL MEDICINE

## 2021-02-16 PROCEDURE — 74011250637 HC RX REV CODE- 250/637: Performed by: NURSE PRACTITIONER

## 2021-02-16 PROCEDURE — G8510 SCR DEP NEG, NO PLAN REQD: HCPCS | Performed by: INTERNAL MEDICINE

## 2021-02-16 PROCEDURE — 3017F COLORECTAL CA SCREEN DOC REV: CPT | Performed by: INTERNAL MEDICINE

## 2021-02-16 PROCEDURE — 74011000258 HC RX REV CODE- 258: Performed by: NURSE PRACTITIONER

## 2021-02-16 PROCEDURE — G8427 DOCREV CUR MEDS BY ELIG CLIN: HCPCS | Performed by: INTERNAL MEDICINE

## 2021-02-16 PROCEDURE — 96413 CHEMO IV INFUSION 1 HR: CPT

## 2021-02-16 PROCEDURE — 99215 OFFICE O/P EST HI 40 MIN: CPT | Performed by: INTERNAL MEDICINE

## 2021-02-16 PROCEDURE — G8752 SYS BP LESS 140: HCPCS | Performed by: INTERNAL MEDICINE

## 2021-02-16 RX ORDER — TRAZODONE HYDROCHLORIDE 50 MG/1
TABLET ORAL
Qty: 60 TAB | Refills: 0 | Status: SHIPPED | OUTPATIENT
Start: 2021-02-16 | End: 2021-04-08

## 2021-02-16 RX ORDER — POTASSIUM CHLORIDE 750 MG/1
40 TABLET, FILM COATED, EXTENDED RELEASE ORAL ONCE
Status: COMPLETED | OUTPATIENT
Start: 2021-02-16 | End: 2021-02-16

## 2021-02-16 RX ORDER — SODIUM CHLORIDE 9 MG/ML
25 INJECTION, SOLUTION INTRAVENOUS CONTINUOUS
Status: DISCONTINUED | OUTPATIENT
Start: 2021-02-16 | End: 2021-02-17 | Stop reason: HOSPADM

## 2021-02-16 RX ADMIN — SODIUM CHLORIDE 25 ML/HR: 900 INJECTION, SOLUTION INTRAVENOUS at 15:00

## 2021-02-16 RX ADMIN — SODIUM CHLORIDE 200 MG: 9 INJECTION, SOLUTION INTRAVENOUS at 15:26

## 2021-02-16 RX ADMIN — POTASSIUM CHLORIDE 40 MEQ: 750 TABLET, FILM COATED, EXTENDED RELEASE ORAL at 14:52

## 2021-02-16 NOTE — PROGRESS NOTES
Cancer [place] at [address], [id number] [place] [address] Road, [place]: [phone]  F: [phone]    Reason for Visit:   Og Goel is a 70 y.o. male who is seen today in office for follow up of metastatic SCC on Pembrolizumab. Treatment History:   · MRI Right Hip 8/5/20: Enlarging right pelvic sidewall adenopathy with adjacent muscular and bony invasion. Degenerative changes of the right hip. Small bony metastatic deposit anterior wall of the right acetabulum  · MRI Lumbar Spine 8/11/20: L3-L4 moderate central spinal canal and right foraminal stenoses. L5-S1 moderate bilateral foraminal stenosis  · 8/20/20 Pelvic Mass, Core biopsy with touch preparation - CYTOLOGIC INTERPRETATION: Pelvic Mass, Core biopsy with touch preparation: Squamous cell carcinoma  · TYPE ID + SCC/H+N vs skin  · PET 9/8/20: Large hypermetabolic right pelvic sidewall lymph node. Lytic lesion right anterior acetabulum is hypermetabolic and compatible with metastatic disease   · XRT to Right Pelvic Mass 10/13/20 - 10/22/20 with Dr. Ricardo Rowland  · Pembrolizumab 12/15/20 - Current   · PET 1/12/21: New hypermetabolic pulmonary nodule in the lingula. Resolved left pelvic sidewall lymph node activity and resolved activity corresponding to the lytic lesion in the right anterior acetabulum    History of Present Illness:   Og Goel is a 70 y.o. male seen today in office for follow up of metastatic SCC primary site unclear PDL1 80% on [place] Tolerating keytruda well. Has some rash/ itching/ fatigue but tolerable. Labs have been good with chronic mild thrombocytopenia. No pain now. No fevers/ chills/ chest pain/ SOB/ nausea/ vomiting/diarrhea/ pain/fatigue  Ready to do treatment today. Last visit:    He completed XRT to pelvis mass on 10/22/20 and right hip/leg pain has since resolved. He started Pembrolizumab on 12/15/20.  He had a follow up PET ordered by Rad/Onc that showed new hypermetabolic pulmonary nodule in the lingula, resolved left pelvic sidewall lymph node activity and resolved activity corresponding to the lytic lesion in the right anterior acetabulum. Case was discussed at cancer conference and recommendation was to continue with Pembrolizumab. He is here today for Cycle 3 of Pembrolizumab. He reports that he feels well overall today. He is tolerating Pembro well overall with some fatigue and some scattered area of itchy rash. He reports that itching was less after second treatment. He denies fever, chills, cough, SOB, CP, nausea, vomiting, diarrhea, and constipation. He denies pain today. CBC, CMP and TSH were stable/good yesterday. He is ready for treatment today. He remains active, enjoys hunting and fishing. He has been walking and is \"pain free. \" He received first dose of the Pfizer vaccine on 1/22/21 and did well with this. He is here alone today.      Past Medical History:   Diagnosis Date    Arthritis     BPH (benign prostatic hypertrophy) 7/18/2011    Cancer [facility])     prostate cancer    Chronic pain right    knee    ED (erectile dysfunction)     GERD (gastroesophageal reflux disease)     HTN (hypertension), benign     Metastatic squamous cell carcinoma ([facility] [address] 9/16/2020    OA (osteoarthritis) of knee 7/18/2011    BOTH KNEES    Unspecified essential hypertension 7/18/2011      Past Surgical History:   Procedure Laterality Date    HX ACL RECONSTRUCTION Right     x2    HX APPENDECTOMY  1975    HX GI  1/2015    Drainage of hemorrhoid    HX HERNIA REPAIR      left and right,  ventral    HX HIP REPLACEMENT  2000    left    HX HIP REPLACEMENT  2000    left    HX KNEE ARTHROSCOPY Right     x3    HX ORTHOPAEDIC Left 6/2000    HIP REPLACEMENT    HX ORTHOPAEDIC Right 08/05/2014    TKR - Dr. Fer Danielle  11/07/2011    prostate removed     HX TONSILLECTOMY      CHILD    IR INJ FORAMIN EPID LUMB ANES/STER SNGL  8/18/2020      Social History     Tobacco Use    Smoking status: Never Smoker    Smokeless tobacco: Never Used   Substance Use Topics    Alcohol use: No      Family History   Problem Relation Age of Onset    Bleeding Prob Father         clotting disorder    Cancer Brother         colon CA 1/2 brother    Thyroid Disease Daughter     Heart Failure Mother     Heart Disease Mother     Anesth Problems Neg Hx      Current Outpatient Medications   Medication Sig    terazosin (HYTRIN) 10 mg capsule Take 1 capsule by mouth once daily    candesartan (ATACAND) 4 mg tablet Take 1 tablet by mouth nightly    traZODone (DESYREL) 50 mg tablet TAKE 1 TO 2 TABLETS BY MOUTH ONCE DAILY IN THE EVENING AS NEEDED FOR SLEEP    ibuprofen (AdviL) 200 mg tablet Take  by mouth.  amLODIPine (NORVASC) 5 mg tablet Take 1 Tab by mouth nightly.  aspirin delayed-release 81 mg tablet Take 1 Tab by mouth two (2) times a day. Indications: blood clot prevention    senna-docusate (PERICOLACE) 8.6-50 mg per tablet Take 1 Tab by mouth two (2) times daily as needed for Constipation.  acetaminophen (TYLENOL) 500 mg tablet Take 1 Tab by mouth every four (4) hours.  calcium carbonate/vitamin D3 (CALTRATE-600 PLUS VITAMIN D3 PO) Take 1 Tab by mouth nightly.  leuprolide acetate (LUPRON DEPOT, 6 MONTH, IM) 1 Each by IntraMUSCular route every 6 months. No current facility-administered medications for this visit. Facility-Administered Medications Ordered in Other Visits   Medication Dose Route Frequency    0.9% sodium chloride infusion  25 mL/hr IntraVENous CONTINUOUS    pembrolizumab (KEYTRUDA) 200 mg in 0.9% sodium chloride 100 mL, overfill volume 10 mL IVPB  200 mg IntraVENous ONCE    potassium chloride SR (KLOR-CON 10) tablet 40 mEq  40 mEq Oral ONCE      Allergies   Allergen Reactions    Barbiturates Anxiety     Hyper        A complete review of systems was obtained, negative except as described above and as reported on ROS sheet scanned into system.      Physical Exam:     Visit Vitals  /74 (BP 1 Location: Left upper arm, BP Patient Position: Sitting)   Pulse 67   Temp 96.8 °F (36 °C) (Temporal)   Wt 233 lb 1.6 oz (105.7 kg)   SpO2 96%   BMI 31.61 kg/m²     ECOG PS: 1  General: No distress  Eyes: PERRLA, anicteric sclerae  HENT: Atraumatic, wearing a mask  Neck: Supple  Resp: lungs clear, Normal respiratory effort   CV regular rate and rhythm  GI soft NT  Ext no edema  MS: Normal gait and station. Digits without clubbing or cyanosis. Skin: No ecchymoses, or petechiae. Normal temperature, turgor, and texture. Scattered areas of rash   Psych: Alert, oriented, appropriate affect, normal judgment/insight  Neuro non focal    Results:     Lab Results   Component Value Date/Time    WBC 4.7 02/15/2021 04:37 PM    HGB 11.2 (L) 02/15/2021 04:37 PM    HCT 34.1 (L) 02/15/2021 04:37 PM    PLATELET 220 (L) 65/96/2697 04:37 PM    MCV 92.4 02/15/2021 04:37 PM    ABS. NEUTROPHILS 3.2 02/15/2021 04:37 PM     Lab Results   Component Value Date/Time    Sodium 140 02/15/2021 04:37 PM    Potassium 3.4 (L) 02/15/2021 04:37 PM    Chloride 108 02/15/2021 04:37 PM    CO2 25 02/15/2021 04:37 PM    Glucose 91 02/15/2021 04:37 PM    BUN 30 (H) 02/15/2021 04:37 PM    Creatinine 1.32 (H) 02/15/2021 04:37 PM    GFR est AA >60 02/15/2021 04:37 PM    GFR est non-AA 53 (L) 02/15/2021 04:37 PM    Calcium 8.6 02/15/2021 04:37 PM    Glucose (POC) 108 (H) 01/12/2021 10:21 AM     Lab Results   Component Value Date/Time    Bilirubin, total 0.5 02/15/2021 04:37 PM    ALT (SGPT) 20 02/15/2021 04:37 PM    Alk. phosphatase 59 02/15/2021 04:37 PM    Protein, total 5.9 (L) 02/15/2021 04:37 PM    Albumin 2.9 (L) 02/15/2021 04:37 PM    Globulin 3.0 02/15/2021 04:37 PM     MRI Right Hip 8/5/20  IMPRESSION:   1. Enlarging right pelvic sidewall adenopathy with adjacent muscular and bony  invasion  2. Degenerative changes of the right hip  3.  Small bony metastatic deposit anterior wall of the right acetabulum    MRI Lumbar Spine 8/11/20  IMPRESSION:  1. L3-L4 moderate central spinal canal and right foraminal stenoses. 2. L5-S1 moderate bilateral foraminal stenosis. 8/20/20  Specimen Source   1: Pelvic Mass, Core biopsy with touch preparation   CYTOLOGIC INTERPRETATION:   Pelvic Mass, Core biopsy with touch preparation:   Squamous cell carcinoma    PET 9/8/20  IMPRESSION: Large hypermetabolic right pelvic sidewall lymph node. Lytic lesion  right anterior     PET 1/12/21  IMPRESSION:  New hypermetabolic pulmonary nodule in the lingula. Resolved left  pelvic sidewall lymph node activity and resolved activity corresponding to the  lytic lesion in the right anterior acetabulum    Records reviewed and summarized above. Pathology report(s) reviewed above. Radiology report(s) reviewed above. Assessment/PLAN:     1) Metastatic Squamous Cell Cancer    EGFR/ROS/ALK negative. PDL1 80% by biopsy of pelvic/hip mas 8/20/20. Path is different than prostate which is adenocarcinoma from 2011. Unclear site of origin of squamous cell. TYPE ID sent and SCC H+N vs skin. Completed XRT to right pelvic mass on 10/22/20. Right hip pain has esolved since completing XRT. He started Pembrolizumab on 12/15/20. He had follow up PET on 1/12/21 that showed new hypermetabolic pulmonary nodule in the lingula, resolved left pelvic sidewall lymph node activity and resolved activity corresponding to the lytic lesion in the right anterior acetabulum. Case reviewed at cancer conference and recommendation was to continue [id number] [address] groopify for now. He is here today for Cycle 5 of Pembrolizumab. He is tolerating Pembro well overall with mild skin rash/  Itching and some fatigue. Discussed high risk med toxicities and management today. No dose or schedule changes needed today. He is clinically stable today - feels well overall, nothing new or different.    CBC, CMP, and TSH are personally reviewed with pt and stable/good    Will do follow up PET in 4/21 to assess response. Goal of care is palliative. Plan to continue immunotherapy as long good response and tolerable. He is ready for treatment   Follow up in 3 weeks with Cycle 6. Patient agrees with plan. 2) Recurrent Prostate Cancer   PSA 0 on Lupron per Urology. Management per Urology. 3) Right Hip Pain. No pain. Resolved after XRT. 4) Rash stable. Mild, scattered areas. Advised to treat topically and let us know if worsens. 5) HTN/Arthritis/GERD/ chronic elevated creat. Management per PCP. 6) high risk drug toxicity monitoring. Reviewed side effects and management today. Specifically as above. Continue same dose and schedule today. No modifications needed. 7) chronic thrombocytopenia. CC review and present in 2011. Likely not caused by therapy. Mild and will continue to monitor. 8) Psychosocial  Mood good, coping well. He has great support from his wife. SW support as needed. He is here alone today. Call if questions. Follow up in 3 weeks. I appreciate the opportunity to participate in [facility].     Signed By: Alexandru Jasso DO

## 2021-02-16 NOTE — PROGRESS NOTES
Diogo Arteaga is a 70 y.o. male  Chief Complaint   Patient presents with    Chemotherapy     metastatic SCC      1. Have you been to the ER, urgent care clinic since your last visit? Hospitalized since your last visit? No.    2. Have you seen or consulted any other health care providers outside of the [address] [place] [address] since your last visit? Include any pap smears or colon screening. Yes, patient went to see Lawrence Fair (Urologist)    Patient stated he now has gotten both doses of the COVID-19 vaccine.

## 2021-02-16 NOTE — PROGRESS NOTES
[facility] - Chemotherapy Progress Note    1320 Pt admit to [facility] for [facility] ambulatory in stable condition. Assessment completed. No new concerns voiced. Labs drawn on 02/15; WNL; PIV access established in L arm with positive blood return. Line flushed, NS infusing. Patient denies SOB, fever, cough, general not feeling well. Patient denies recent exposure to someone who has tested positive for COVID-19. Patient  denies having contact with anyone who has a pending COVID test.     Visit Vitals  /77   Pulse 61   Temp (!) 96.4 °F (35.8 °C)   Resp 16       Medications Administered     0.9% sodium chloride infusion     Admin Date  02/16/2021 Action  New Bag Dose  25 mL/hr Rate  25 mL/hr Route  IntraVENous Administered By  Nia Smith RN          pembrolizumab [facility]) 200 mg in 0.9% sodium chloride 100 mL, overfill volume 10 mL IVPB     Admin Date  02/16/2021 Action  New Bag Dose  200 mg Rate  236 mL/hr Route  IntraVENous Administered By  Nia Smith RN          potassium chloride SR (KLOR-CON 10) tablet 40 mEq     Admin Date  02/16/2021 Action  Given Dose  40 mEq Route  Oral Administered By  Nia Smith RN                  4763 Pt tolerated treatment well. PIV removed at discharge. D/c home ambulatory in no distress. Pt aware of next [facility] appointment scheduled for 03/10/2021.

## 2021-03-05 RX ORDER — EPINEPHRINE 1 MG/ML
0.3 INJECTION, SOLUTION, CONCENTRATE INTRAVENOUS AS NEEDED
Status: CANCELLED | OUTPATIENT
Start: 2021-03-11

## 2021-03-05 RX ORDER — SODIUM CHLORIDE 9 MG/ML
25 INJECTION, SOLUTION INTRAVENOUS CONTINUOUS
Status: CANCELLED | OUTPATIENT
Start: 2021-04-01

## 2021-03-05 RX ORDER — EPINEPHRINE 1 MG/ML
0.3 INJECTION, SOLUTION, CONCENTRATE INTRAVENOUS AS NEEDED
Status: CANCELLED | OUTPATIENT
Start: 2021-04-01

## 2021-03-05 RX ORDER — ONDANSETRON 2 MG/ML
8 INJECTION INTRAMUSCULAR; INTRAVENOUS AS NEEDED
Status: CANCELLED | OUTPATIENT
Start: 2021-04-22

## 2021-03-05 RX ORDER — HEPARIN 100 UNIT/ML
300-500 SYRINGE INTRAVENOUS AS NEEDED
Status: CANCELLED
Start: 2021-03-11

## 2021-03-05 RX ORDER — SODIUM CHLORIDE 9 MG/ML
10 INJECTION INTRAMUSCULAR; INTRAVENOUS; SUBCUTANEOUS AS NEEDED
Status: CANCELLED | OUTPATIENT
Start: 2021-04-01

## 2021-03-05 RX ORDER — SODIUM CHLORIDE 0.9 % (FLUSH) 0.9 %
10 SYRINGE (ML) INJECTION AS NEEDED
Status: CANCELLED | OUTPATIENT
Start: 2021-04-01

## 2021-03-05 RX ORDER — SODIUM CHLORIDE 0.9 % (FLUSH) 0.9 %
10 SYRINGE (ML) INJECTION AS NEEDED
Status: CANCELLED | OUTPATIENT
Start: 2021-03-11

## 2021-03-05 RX ORDER — DIPHENHYDRAMINE HYDROCHLORIDE 50 MG/ML
50 INJECTION, SOLUTION INTRAMUSCULAR; INTRAVENOUS AS NEEDED
Status: CANCELLED
Start: 2021-04-01

## 2021-03-05 RX ORDER — HEPARIN 100 UNIT/ML
300-500 SYRINGE INTRAVENOUS AS NEEDED
Status: CANCELLED
Start: 2021-04-01

## 2021-03-05 RX ORDER — SODIUM CHLORIDE 9 MG/ML
10 INJECTION INTRAMUSCULAR; INTRAVENOUS; SUBCUTANEOUS AS NEEDED
Status: CANCELLED | OUTPATIENT
Start: 2021-03-11

## 2021-03-05 RX ORDER — DIPHENHYDRAMINE HYDROCHLORIDE 50 MG/ML
25 INJECTION, SOLUTION INTRAMUSCULAR; INTRAVENOUS AS NEEDED
Status: CANCELLED
Start: 2021-04-22

## 2021-03-05 RX ORDER — DIPHENHYDRAMINE HYDROCHLORIDE 50 MG/ML
50 INJECTION, SOLUTION INTRAMUSCULAR; INTRAVENOUS AS NEEDED
Status: CANCELLED
Start: 2021-04-22

## 2021-03-05 RX ORDER — ONDANSETRON 2 MG/ML
8 INJECTION INTRAMUSCULAR; INTRAVENOUS AS NEEDED
Status: CANCELLED | OUTPATIENT
Start: 2021-04-01

## 2021-03-05 RX ORDER — SODIUM CHLORIDE 9 MG/ML
25 INJECTION, SOLUTION INTRAVENOUS CONTINUOUS
Status: CANCELLED | OUTPATIENT
Start: 2021-04-22

## 2021-03-05 RX ORDER — ALBUTEROL SULFATE 0.83 MG/ML
2.5 SOLUTION RESPIRATORY (INHALATION) AS NEEDED
Status: CANCELLED
Start: 2021-04-22

## 2021-03-05 RX ORDER — SODIUM CHLORIDE 0.9 % (FLUSH) 0.9 %
10 SYRINGE (ML) INJECTION AS NEEDED
Status: CANCELLED | OUTPATIENT
Start: 2021-04-22

## 2021-03-05 RX ORDER — ACETAMINOPHEN 325 MG/1
650 TABLET ORAL AS NEEDED
Status: CANCELLED
Start: 2021-04-22

## 2021-03-05 RX ORDER — HYDROCORTISONE SODIUM SUCCINATE 100 MG/2ML
100 INJECTION, POWDER, FOR SOLUTION INTRAMUSCULAR; INTRAVENOUS AS NEEDED
Status: CANCELLED | OUTPATIENT
Start: 2021-03-11

## 2021-03-05 RX ORDER — DIPHENHYDRAMINE HYDROCHLORIDE 50 MG/ML
25 INJECTION, SOLUTION INTRAMUSCULAR; INTRAVENOUS AS NEEDED
Status: CANCELLED
Start: 2021-03-11

## 2021-03-05 RX ORDER — ACETAMINOPHEN 325 MG/1
650 TABLET ORAL AS NEEDED
Status: CANCELLED
Start: 2021-03-11

## 2021-03-05 RX ORDER — ALBUTEROL SULFATE 0.83 MG/ML
2.5 SOLUTION RESPIRATORY (INHALATION) AS NEEDED
Status: CANCELLED
Start: 2021-04-01

## 2021-03-05 RX ORDER — EPINEPHRINE 1 MG/ML
0.3 INJECTION, SOLUTION, CONCENTRATE INTRAVENOUS AS NEEDED
Status: CANCELLED | OUTPATIENT
Start: 2021-04-22

## 2021-03-05 RX ORDER — DIPHENHYDRAMINE HYDROCHLORIDE 50 MG/ML
50 INJECTION, SOLUTION INTRAMUSCULAR; INTRAVENOUS AS NEEDED
Status: CANCELLED
Start: 2021-03-11

## 2021-03-05 RX ORDER — HYDROCORTISONE SODIUM SUCCINATE 100 MG/2ML
100 INJECTION, POWDER, FOR SOLUTION INTRAMUSCULAR; INTRAVENOUS AS NEEDED
Status: CANCELLED | OUTPATIENT
Start: 2021-04-22

## 2021-03-05 RX ORDER — HEPARIN 100 UNIT/ML
300-500 SYRINGE INTRAVENOUS AS NEEDED
Status: CANCELLED
Start: 2021-04-22

## 2021-03-05 RX ORDER — ACETAMINOPHEN 325 MG/1
650 TABLET ORAL AS NEEDED
Status: CANCELLED
Start: 2021-04-01

## 2021-03-05 RX ORDER — SODIUM CHLORIDE 9 MG/ML
25 INJECTION, SOLUTION INTRAVENOUS CONTINUOUS
Status: CANCELLED | OUTPATIENT
Start: 2021-03-11

## 2021-03-05 RX ORDER — HYDROCORTISONE SODIUM SUCCINATE 100 MG/2ML
100 INJECTION, POWDER, FOR SOLUTION INTRAMUSCULAR; INTRAVENOUS AS NEEDED
Status: CANCELLED | OUTPATIENT
Start: 2021-04-01

## 2021-03-05 RX ORDER — ONDANSETRON 2 MG/ML
8 INJECTION INTRAMUSCULAR; INTRAVENOUS AS NEEDED
Status: CANCELLED | OUTPATIENT
Start: 2021-03-11

## 2021-03-05 RX ORDER — ALBUTEROL SULFATE 0.83 MG/ML
2.5 SOLUTION RESPIRATORY (INHALATION) AS NEEDED
Status: CANCELLED
Start: 2021-03-11

## 2021-03-05 RX ORDER — DIPHENHYDRAMINE HYDROCHLORIDE 50 MG/ML
25 INJECTION, SOLUTION INTRAMUSCULAR; INTRAVENOUS AS NEEDED
Status: CANCELLED
Start: 2021-04-01

## 2021-03-05 RX ORDER — SODIUM CHLORIDE 9 MG/ML
10 INJECTION INTRAMUSCULAR; INTRAVENOUS; SUBCUTANEOUS AS NEEDED
Status: CANCELLED | OUTPATIENT
Start: 2021-04-22

## 2021-03-08 ENCOUNTER — APPOINTMENT (OUTPATIENT)
Dept: INFUSION THERAPY | Age: 76
End: 2021-03-08

## 2021-03-09 ENCOUNTER — APPOINTMENT (OUTPATIENT)
Dept: INFUSION THERAPY | Age: 76
End: 2021-03-09

## 2021-03-10 ENCOUNTER — HOSPITAL ENCOUNTER (OUTPATIENT)
Dept: INFUSION THERAPY | Age: 76
End: 2021-03-10

## 2021-03-11 ENCOUNTER — HOSPITAL ENCOUNTER (OUTPATIENT)
Dept: INFUSION THERAPY | Age: 76
Discharge: HOME | End: 2021-03-11
Payer: MEDICARE

## 2021-03-11 ENCOUNTER — OFFICE VISIT (OUTPATIENT)
Dept: ONCOLOGY | Age: 76
End: 2021-03-11
Payer: MEDICARE

## 2021-03-11 VITALS
OXYGEN SATURATION: 100 % | TEMPERATURE: 97.5 F | BODY MASS INDEX: 31.49 KG/M2 | WEIGHT: 232.2 LBS | SYSTOLIC BLOOD PRESSURE: 142 MMHG | DIASTOLIC BLOOD PRESSURE: 88 MMHG | HEART RATE: 67 BPM

## 2021-03-11 VITALS
TEMPERATURE: 97.5 F | HEART RATE: 67 BPM | OXYGEN SATURATION: 100 % | RESPIRATION RATE: 18 BRPM | DIASTOLIC BLOOD PRESSURE: 88 MMHG | SYSTOLIC BLOOD PRESSURE: 142 MMHG

## 2021-03-11 DIAGNOSIS — Z51.12 ENCOUNTER FOR ANTINEOPLASTIC IMMUNOTHERAPY: ICD-10-CM

## 2021-03-11 DIAGNOSIS — L27.0 DRUG-INDUCED SKIN RASH: ICD-10-CM

## 2021-03-11 DIAGNOSIS — M19.90 ARTHRITIS: ICD-10-CM

## 2021-03-11 DIAGNOSIS — C61 PROSTATE CANCER METASTATIC TO INTRAPELVIC LYMPH NODE (HCC): ICD-10-CM

## 2021-03-11 DIAGNOSIS — C61 PROSTATE CANCER (HCC): ICD-10-CM

## 2021-03-11 DIAGNOSIS — M25.551 RIGHT HIP PAIN: ICD-10-CM

## 2021-03-11 DIAGNOSIS — M54.9 CHRONIC BACK PAIN, UNSPECIFIED BACK LOCATION, UNSPECIFIED BACK PAIN LATERALITY: ICD-10-CM

## 2021-03-11 DIAGNOSIS — G89.29 CHRONIC BACK PAIN, UNSPECIFIED BACK LOCATION, UNSPECIFIED BACK PAIN LATERALITY: ICD-10-CM

## 2021-03-11 DIAGNOSIS — I10 ESSENTIAL HYPERTENSION: ICD-10-CM

## 2021-03-11 DIAGNOSIS — C77.5 PROSTATE CANCER METASTATIC TO INTRAPELVIC LYMPH NODE (HCC): ICD-10-CM

## 2021-03-11 DIAGNOSIS — K21.9 GASTROESOPHAGEAL REFLUX DISEASE WITHOUT ESOPHAGITIS: ICD-10-CM

## 2021-03-11 LAB
ALBUMIN SERPL-MCNC: 3 G/DL (ref 3.5–5)
ALBUMIN/GLOB SERPL: 0.9 {RATIO} (ref 1.1–2.2)
ALP SERPL-CCNC: 65 U/L (ref 45–117)
ALT SERPL-CCNC: 20 U/L (ref 12–78)
ANION GAP SERPL CALC-SCNC: 6 MMOL/L (ref 5–15)
AST SERPL-CCNC: 13 U/L (ref 15–37)
BASOPHILS # BLD: 0.1 K/UL (ref 0–0.1)
BASOPHILS NFR BLD: 1 % (ref 0–1)
BILIRUB SERPL-MCNC: 0.6 MG/DL (ref 0.2–1)
BUN SERPL-MCNC: 31 MG/DL (ref 6–20)
BUN/CREAT SERPL: 23 (ref 12–20)
CALCIUM SERPL-MCNC: 8.9 MG/DL (ref 8.5–10.1)
CHLORIDE SERPL-SCNC: 108 MMOL/L (ref 97–108)
CO2 SERPL-SCNC: 24 MMOL/L (ref 21–32)
CREAT SERPL-MCNC: 1.35 MG/DL (ref 0.7–1.3)
DIFFERENTIAL METHOD BLD: ABNORMAL
EOSINOPHIL # BLD: 0.2 K/UL (ref 0–0.4)
EOSINOPHIL NFR BLD: 4 % (ref 0–7)
ERYTHROCYTE [DISTWIDTH] IN BLOOD BY AUTOMATED COUNT: 12.1 % (ref 11.5–14.5)
GLOBULIN SER CALC-MCNC: 3.2 G/DL (ref 2–4)
GLUCOSE SERPL-MCNC: 102 MG/DL (ref 65–100)
HCT VFR BLD AUTO: 35.5 % (ref 36.6–50.3)
HGB BLD-MCNC: 12 G/DL (ref 12.1–17)
IMM GRANULOCYTES # BLD AUTO: 0 K/UL (ref 0–0.04)
IMM GRANULOCYTES NFR BLD AUTO: 0 % (ref 0–0.5)
LYMPHOCYTES # BLD: 0.8 K/UL (ref 0.8–3.5)
LYMPHOCYTES NFR BLD: 15 % (ref 12–49)
MCH RBC QN AUTO: 30.9 PG (ref 26–34)
MCHC RBC AUTO-ENTMCNC: 33.8 G/DL (ref 30–36.5)
MCV RBC AUTO: 91.5 FL (ref 80–99)
MONOCYTES # BLD: 0.6 K/UL (ref 0–1)
MONOCYTES NFR BLD: 11 % (ref 5–13)
NEUTS SEG # BLD: 3.3 K/UL (ref 1.8–8)
NEUTS SEG NFR BLD: 69 % (ref 32–75)
NRBC # BLD: 0 K/UL (ref 0–0.01)
NRBC BLD-RTO: 0 PER 100 WBC
PLATELET # BLD AUTO: 131 K/UL (ref 150–400)
PMV BLD AUTO: 11.1 FL (ref 8.9–12.9)
POTASSIUM SERPL-SCNC: 4 MMOL/L (ref 3.5–5.1)
PROT SERPL-MCNC: 6.2 G/DL (ref 6.4–8.2)
RBC # BLD AUTO: 3.88 M/UL (ref 4.1–5.7)
RBC MORPH BLD: ABNORMAL
RBC MORPH BLD: ABNORMAL
SODIUM SERPL-SCNC: 138 MMOL/L (ref 136–145)
T3FREE SERPL-MCNC: 2.9 PG/ML (ref 2.2–4)
T4 FREE SERPL-MCNC: 1.2 NG/DL (ref 0.8–1.5)
TSH SERPL DL<=0.05 MIU/L-ACNC: 0.02 UIU/ML (ref 0.36–3.74)
WBC # BLD AUTO: 5 K/UL (ref 4.1–11.1)

## 2021-03-11 PROCEDURE — G8427 DOCREV CUR MEDS BY ELIG CLIN: HCPCS | Performed by: INTERNAL MEDICINE

## 2021-03-11 PROCEDURE — G8417 CALC BMI ABV UP PARAM F/U: HCPCS | Performed by: INTERNAL MEDICINE

## 2021-03-11 PROCEDURE — 84439 ASSAY OF FREE THYROXINE: CPT

## 2021-03-11 PROCEDURE — 1101F PT FALLS ASSESS-DOCD LE1/YR: CPT | Performed by: INTERNAL MEDICINE

## 2021-03-11 PROCEDURE — 96413 CHEMO IV INFUSION 1 HR: CPT

## 2021-03-11 PROCEDURE — G8753 SYS BP > OR = 140: HCPCS | Performed by: INTERNAL MEDICINE

## 2021-03-11 PROCEDURE — 36415 COLL VENOUS BLD VENIPUNCTURE: CPT

## 2021-03-11 PROCEDURE — 84443 ASSAY THYROID STIM HORMONE: CPT

## 2021-03-11 PROCEDURE — G8536 NO DOC ELDER MAL SCRN: HCPCS | Performed by: INTERNAL MEDICINE

## 2021-03-11 PROCEDURE — G0463 HOSPITAL OUTPT CLINIC VISIT: HCPCS | Performed by: INTERNAL MEDICINE

## 2021-03-11 PROCEDURE — 99214 OFFICE O/P EST MOD 30 MIN: CPT | Performed by: INTERNAL MEDICINE

## 2021-03-11 PROCEDURE — 3017F COLORECTAL CA SCREEN DOC REV: CPT | Performed by: INTERNAL MEDICINE

## 2021-03-11 PROCEDURE — 74011000258 HC RX REV CODE- 258: Performed by: NURSE PRACTITIONER

## 2021-03-11 PROCEDURE — G8754 DIAS BP LESS 90: HCPCS | Performed by: INTERNAL MEDICINE

## 2021-03-11 PROCEDURE — 84481 FREE ASSAY (FT-3): CPT

## 2021-03-11 PROCEDURE — G8510 SCR DEP NEG, NO PLAN REQD: HCPCS | Performed by: INTERNAL MEDICINE

## 2021-03-11 PROCEDURE — 80053 COMPREHEN METABOLIC PANEL: CPT

## 2021-03-11 PROCEDURE — 85025 COMPLETE CBC W/AUTO DIFF WBC: CPT

## 2021-03-11 PROCEDURE — 74011250636 HC RX REV CODE- 250/636: Performed by: NURSE PRACTITIONER

## 2021-03-11 RX ORDER — SODIUM CHLORIDE 9 MG/ML
25 INJECTION, SOLUTION INTRAVENOUS CONTINUOUS
Status: DISCONTINUED | OUTPATIENT
Start: 2021-03-11 | End: 2021-03-12 | Stop reason: HOSPADM

## 2021-03-11 RX ADMIN — SODIUM CHLORIDE 25 ML/HR: 900 INJECTION, SOLUTION INTRAVENOUS at 14:20

## 2021-03-11 RX ADMIN — SODIUM CHLORIDE 200 MG: 9 INJECTION, SOLUTION INTRAVENOUS at 14:40

## 2021-03-11 NOTE — PROGRESS NOTES
Rosi Francisco is a 70 y.o. male  Chief Complaint   Patient presents with    Chemotherapy     metastatic SCC     1. Have you been to the ER, urgent care clinic since your last visit? Hospitalized since your last visit? No.    2. Have you seen or consulted any other health care providers outside of the [address] [place] [address] since your last visit? Include any pap smears or colon screening.  No.

## 2021-03-11 NOTE — PROGRESS NOTES
[facility] [address] [place], [address] [address], [place]: [phone]  F: [phone]    Reason for Visit:   Luis Felipe Finley is a 70 y.o. male who is seen today in office for follow up of metastatic SCC on Pembrolizumab. Treatment History:   · MRI Right Hip 8/5/20: Enlarging right pelvic sidewall adenopathy with adjacent muscular and bony invasion. Degenerative changes of the right hip. Small bony metastatic deposit anterior wall of the right acetabulum  · MRI Lumbar Spine 8/11/20: L3-L4 moderate central spinal canal and right foraminal stenoses. L5-S1 moderate bilateral foraminal stenosis  · 8/20/20 Pelvic Mass, Core biopsy with touch preparation - CYTOLOGIC INTERPRETATION: Pelvic Mass, Core biopsy with touch preparation: Squamous cell carcinoma  · TYPE ID + SCC/H+N vs skin  · PET 9/8/20: Large hypermetabolic right pelvic sidewall lymph node. Lytic lesion right anterior acetabulum is hypermetabolic and compatible with metastatic disease   · XRT to Right Pelvic Mass 10/13/20 - 10/22/20 with Dr. Leonila Aragon  · Pembrolizumab 12/15/20 - Current   · PET 1/12/21: New hypermetabolic pulmonary nodule in the lingula. Resolved left pelvic sidewall lymph node activity and resolved activity corresponding to the lytic lesion in the right anterior acetabulum    History of Present Illness:   Luis Felipe Finley is a 70 y.o. male seen today in office for follow up of metastatic SCC primary site unclear PDL1 80% on [place] Presents today for Cycle 5 Keytruda. His rash/itching is stable. He continues with mild fatigue but states this is tolerable. Denies fevers, chills, chest pain, shortness of breath, nausea/vomiting, constipation/diarrhea, bleeding or pain. Labs reviewed. Abnormal TSH - reviewed with patient and will follow up free T3 and T4. Patient is agreeable to treatment today although he states he wants to finish treatment after cycle 6.       Last visit:    Theresa Mauro keytruda well. Has some rash/ itching/ fatigue but tolerable. Labs have been good with chronic mild thrombocytopenia. No pain now. No fevers/ chills/ chest pain/ SOB/ nausea/ vomiting/diarrhea/ pain/fatigue  Ready to do treatment today.     Past Medical History:   Diagnosis Date    Arthritis     BPH (benign prostatic hypertrophy) 7/18/2011    Cancer [facility])     prostate cancer    Chronic pain right    knee    ED (erectile dysfunction)     GERD (gastroesophageal reflux disease)     HTN (hypertension), benign     Metastatic squamous cell carcinoma ([facility] [address] 9/16/2020    OA (osteoarthritis) of knee 7/18/2011    BOTH KNEES    Unspecified essential hypertension 7/18/2011      Past Surgical History:   Procedure Laterality Date    HX ACL RECONSTRUCTION Right     x2    HX APPENDECTOMY  1975    HX GI  1/2015    Drainage of hemorrhoid    HX HERNIA REPAIR      left and right,  ventral    HX HIP REPLACEMENT  2000    left    HX HIP REPLACEMENT  2000    left    HX KNEE ARTHROSCOPY Right     x3    HX ORTHOPAEDIC Left 6/2000    HIP REPLACEMENT    HX ORTHOPAEDIC Right 08/05/2014    T[facility] - Dr. Amy Mendoza    HX PROSTATECTOMY  11/07/2011    prostate removed     HX TONSILLECTOMY      CHILD    IR INJ Faylene Dung EPID LUMB ANES/STER SNGL  8/18/2020      Social History     Tobacco Use    Smoking status: Never Smoker    Smokeless tobacco: Never Used   Substance Use Topics    Alcohol use: No      Family History   Problem Relation Age of Onset    Bleeding Prob Father         clotting disorder    Cancer Brother         colon CA 1/2 brother    Thyroid Disease Daughter     Heart Failure Mother     Heart Disease Mother     Anesth Problems Neg Hx      Current Outpatient Medications   Medication Sig    traZODone (DESYREL) 50 mg tablet TAKE 1 TO 2 TABLETS BY MOUTH ONCE DAILY IN THE EVENING AS NEEDED FOR SLEEP    terazosin (HYTRIN) 10 mg capsule Take 1 capsule by mouth once daily    candesartan (ATACAND) 4 mg tablet Take 1 tablet by mouth nightly    ibuprofen (AdviL) 200 mg tablet Take  by mouth.  amLODIPine (NORVASC) 5 mg tablet Take 1 Tab by mouth nightly.  aspirin delayed-release 81 mg tablet Take 1 Tab by mouth two (2) times a day. Indications: blood clot prevention    senna-docusate (PERICOLACE) 8.6-50 mg per tablet Take 1 Tab by mouth two (2) times daily as needed for Constipation.  acetaminophen (TYLENOL) 500 mg tablet Take 1 Tab by mouth every four (4) hours.  calcium carbonate/vitamin D3 (CALTRATE-600 PLUS VITAMIN D3 PO) Take 1 Tab by mouth nightly.  leuprolide acetate (LUPRON DEPOT, 6 MONTH, IM) 1 Each by IntraMUSCular route every 6 months. No current facility-administered medications for this visit. Allergies   Allergen Reactions    Barbiturates Anxiety     Hyper        A complete review of systems was obtained, negative except as described above and as reported on ROS sheet scanned into system. Physical Exam:     Visit Vitals  BP (!) 142/88 (BP 1 Location: Left upper arm, BP Patient Position: Sitting)   Pulse 67   Temp 97.5 °F (36.4 °C) (Temporal)   Wt 232 lb 3.2 oz (105.3 kg)   SpO2 100%   BMI 31.49 kg/m²     ECOG PS: 1  General: No distress  Eyes: PERRL, anicteric sclerae  HENT: Atraumatic, wearing a mask  Neck: Supple  Resp: CTAB, Normal respiratory effort   CV regular rate and rhythm  GI soft NT  Ext no edema  MS: Normal gait and station. Digits without clubbing or cyanosis. Skin: No ecchymoses, or petechiae. Normal temperature, turgor, and texture. Scattered areas of rash   Psych: Alert, oriented, appropriate affect, normal judgment/insight  Neuro non focal    Results:     Lab Results   Component Value Date/Time    WBC 5.0 03/11/2021 10:36 AM    HGB 12.0 (L) 03/11/2021 10:36 AM    HCT 35.5 (L) 03/11/2021 10:36 AM    PLATELET 000 (L) 21/16/2736 10:36 AM    MCV 91.5 03/11/2021 10:36 AM    ABS.  NEUTROPHILS 3.3 03/11/2021 10:36 AM     Lab Results   Component Value Date/Time    Sodium 138 03/11/2021 10:36 AM    Potassium 4.0 03/11/2021 10:36 AM    Chloride 108 03/11/2021 10:36 AM    CO2 24 03/11/2021 10:36 AM    Glucose 102 (H) 03/11/2021 10:36 AM    BUN 31 (H) 03/11/2021 10:36 AM    Creatinine 1.35 (H) 03/11/2021 10:36 AM    GFR est AA >60 03/11/2021 10:36 AM    GFR est non-AA 52 (L) 03/11/2021 10:36 AM    Calcium 8.9 03/11/2021 10:36 AM    Glucose (POC) 108 (H) 01/12/2021 10:21 AM     Lab Results   Component Value Date/Time    Bilirubin, total 0.6 03/11/2021 10:36 AM    ALT (SGPT) 20 03/11/2021 10:36 AM    Alk. phosphatase 65 03/11/2021 10:36 AM    Protein, total 6.2 (L) 03/11/2021 10:36 AM    Albumin 3.0 (L) 03/11/2021 10:36 AM    Globulin 3.2 03/11/2021 10:36 AM     MRI Right Hip 8/5/20  IMPRESSION:   1. Enlarging right pelvic sidewall adenopathy with adjacent muscular and bony  invasion  2. Degenerative changes of the right hip  3. Small bony metastatic deposit anterior wall of the right acetabulum    MRI Lumbar Spine 8/11/20  IMPRESSION:  1. L3-L4 moderate central spinal canal and right foraminal stenoses. 2. L5-S1 moderate bilateral foraminal stenosis. 8/20/20  Specimen Source   1: Pelvic Mass, Core biopsy with touch preparation   CYTOLOGIC INTERPRETATION:   Pelvic Mass, Core biopsy with touch preparation:   Squamous cell carcinoma    PET 9/8/20  IMPRESSION: Large hypermetabolic right pelvic sidewall lymph node. Lytic lesion  right anterior     PET 1/12/21  IMPRESSION:  New hypermetabolic pulmonary nodule in the lingula. Resolved left  pelvic sidewall lymph node activity and resolved activity corresponding to the  lytic lesion in the right anterior acetabulum    Records reviewed and summarized above. Pathology report(s) reviewed above. Radiology report(s) reviewed above. Assessment/PLAN:     1) Metastatic Squamous Cell Cancer    EGFR/ROS/ALK negative. PDL1 80% by biopsy of pelvic/hip mas 8/20/20. Path is different than prostate which is adenocarcinoma from 2011.    Unclear site of origin of squamous cell. TYPE ID sent and SCC H+N vs skin. Completed XRT to right pelvic mass on 10/22/20. Right hip pain has esolved since completing XRT. He started Pembrolizumab on 12/15/20. He had follow up PET on 1/12/21 that showed new hypermetabolic pulmonary nodule in the lingula, resolved left pelvic sidewall lymph node activity and resolved activity corresponding to the lytic lesion in the right anterior acetabulum. Case reviewed at cancer conference and recommendation was to continue [id number] for now. He is here today for Cycle 5 of Pembrolizumab. Tolerating well with continued skin rash/itching and mild fatigue. He is clinically stable today and feeling well overall. He states he does not want any further treatment after cycle 6. Will have PET/CT after Cycle 6 and discuss further treatment options. TSH is low. Will continue to monitor. All other labs stable/good. Continue with treatment today. Follow up in 3 weeks with Cycle 6. Patient agrees with plan. 2) Recurrent Prostate Cancer   PSA 0 on Lupron per Urology. Management per Urology. 3) Right Hip Pain. No pain. Resolved after XRT. 4) Rash stable. Mild, scattered areas. Improved with OTC hydrocortisone. Recommended zyrtec for itching    5) Low TSH  TSH 0.02  Free T3 and T4 ordered. Will refer to endocrinology if continues       6) HTN/Arthritis/GERD/ chronic elevated creat. Management per PCP. 7) High Risk Drug Monitoring - Immunotherapy  Patient tolerating well with grade 1 rash. TSH abnormal. Will order free T3 and T4 and refer to endocrinology if no improvement      8) Chronic Thrombocytopenia   CC review and present in 2011. Likely not caused by therapy. Mild and will continue to monitor. 9) Psychosocial  Mood good, coping well. He has great support from his wife.  support as needed. He is here alone today. Call if questions. Follow up in 3 weeks.     This patient was seen in conjunction with ROSA Goldman NP. I personally performed a face to face diagnostic evaluation on this patient. I personally reviewed the history and performed the key points on the exam.   I personally reviewed all points in the assessment and created treatment plan with the patient. Specifically, pt doing well overall. Tolerating immunotherapy with minimal side effects. TSH is off likely related to therapy and will need to monitor. Refer to endocrine if needed. Pt wants to do 6 cycles then take a break from immunotherapy. Will do fu scans after cycle 6. Goal of care is palliative. I appreciate the opportunity to participate in Mr. [place] Bhardwaj care.     Signed By: Alice Schlatter, DO

## 2021-03-11 NOTE — PROGRESS NOTES
[facility] -     1028 - Self ambulating patient, in stable condition, presented to [facility] for lab draw prior to today's treatment. [address] were drawn and sent for processing. Patient tolerated lab draw and voiced no complaints. Patient denies having experienced any COVID symptoms or having any known exposure to COVID      Vitals -     Temp - 97.5  BP - 142/88  O2% - 100  Pulse - 67  Resp.  - 18

## 2021-03-11 NOTE — PROGRESS NOTES
[facility] - Chemotherapy Progress Note    [id number] Pt admit to [facility] for [address] [facility] ambulatory in stable condition. Assessment completed. No new concerns voiced. PIV access established in L arm with positive blood return. Labs drawn prior; WNL. Line flushed, NS infusing. Patient denies SOB, fever, cough, general not feeling well. Patient denies recent exposure to someone who has tested positive for COVID-19. Patient  denies having contact with anyone who has a pending COVID test.     Medications Administered     0.9% sodium chloride infusion     Admin Date  03/11/2021 Action  New Bag Dose  25 mL/hr Rate  25 mL/hr Route  IntraVENous Administered By  Harika Hawley RN          pembrolizumab [facility]) 200 mg in 0.9% sodium chloride 100 mL, overfill volume 10 mL IVPB     Admin Date  03/11/2021 Action  New Bag Dose  200 mg Rate  236 mL/hr Route  IntraVENous Administered By  Harika Hawley, RN                  [id number] Pt tolerated treatment well. PIV removed at discharge. D/c home ambulatory in no distress. Pt aware of next [facility] appointment scheduled for 03/30/2021. Recent Results (from the past 12 hour(s))   CBC WITH AUTOMATED DIFF    Collection Time: 03/11/21 10:36 AM   Result Value Ref Range    WBC 5.0 4.1 - 11.1 K/uL    RBC 3.88 (L) 4.10 - 5.70 M/uL    HGB 12.0 (L) 12.1 - 17.0 g/dL    HCT 35.5 (L) 36.6 - 50.3 %    MCV 91.5 80.0 - 99.0 FL    MCH 30.9 26.0 - 34.0 PG    MCHC 33.8 30.0 - 36.5 g/dL    RDW 12.1 11.5 - 14.5 %    PLATELET 684 (L) 621 - 400 K/uL    MPV 11.1 8.9 - 12.9 FL    NRBC 0.0 0  WBC    ABSOLUTE NRBC 0.00 0.00 - 0.01 K/uL    NEUTROPHILS 69 32 - 75 %    LYMPHOCYTES 15 12 - 49 %    MONOCYTES 11 5 - 13 %    EOSINOPHILS 4 0 - 7 %    BASOPHILS 1 0 - 1 %    IMMATURE GRANULOCYTES 0 0.0 - 0.5 %    ABS. NEUTROPHILS 3.3 1.8 - 8.0 K/UL    ABS. LYMPHOCYTES 0.8 0.8 - 3.5 K/UL    ABS. MONOCYTES 0.6 0.0 - 1.0 K/UL    ABS. EOSINOPHILS 0.2 0.0 - 0.4 K/UL    ABS.  BASOPHILS 0.1 0.0 - 0.1 K/UL    ABS. IMM. GRANS. 0.0 0.00 - 0.04 K/UL    DF SMEAR SCANNED      RBC COMMENTS ANISOCYTOSIS  1+        RBC COMMENTS OVALOCYTES  PRESENT       METABOLIC PANEL, COMPREHENSIVE    Collection Time: 03/11/21 10:36 AM   Result Value Ref Range    Sodium 138 136 - 145 mmol/L    Potassium 4.0 3.5 - 5.1 mmol/L    Chloride 108 97 - 108 mmol/L    CO2 24 21 - 32 mmol/L    Anion gap 6 5 - 15 mmol/L    Glucose 102 (H) 65 - 100 mg/dL    BUN 31 (H) 6 - 20 MG/DL    Creatinine 1.35 (H) 0.70 - 1.30 MG/DL    BUN/Creatinine ratio 23 (H) 12 - 20      GFR est AA >60 >60 ml/min/1.73m2    GFR est non-AA 52 (L) >60 ml/min/1.73m2    Calcium 8.9 8.5 - 10.1 MG/DL    Bilirubin, total 0.6 0.2 - 1.0 MG/DL    ALT (SGPT) 20 12 - 78 U/L    AST (SGOT) 13 (L) 15 - 37 U/L    Alk.  phosphatase 65 45 - 117 U/L    Protein, total 6.2 (L) 6.4 - 8.2 g/dL    Albumin 3.0 (L) 3.5 - 5.0 g/dL    Globulin 3.2 2.0 - 4.0 g/dL    A-G Ratio 0.9 (L) 1.1 - 2.2     TSH 3RD GENERATION    Collection Time: 03/11/21 10:36 AM   Result Value Ref Range    TSH 0.02 (L) 0.36 - 3.74 uIU/mL   T3, FREE    Collection Time: 03/11/21 10:36 AM   Result Value Ref Range    Free Triiodothyronine (T3) 2.9 2.2 - 4.0 pg/mL   T4, FREE    Collection Time: 03/11/21 10:36 AM   Result Value Ref Range    T4, Free 1.2 0.8 - 1.5 NG/DL

## 2021-03-29 ENCOUNTER — APPOINTMENT (OUTPATIENT)
Dept: INFUSION THERAPY | Age: 76
End: 2021-03-29

## 2021-03-30 ENCOUNTER — APPOINTMENT (OUTPATIENT)
Dept: INFUSION THERAPY | Age: 76
End: 2021-03-30

## 2021-04-01 ENCOUNTER — OFFICE VISIT (OUTPATIENT)
Dept: ONCOLOGY | Age: 76
End: 2021-04-01
Payer: MEDICARE

## 2021-04-01 ENCOUNTER — APPOINTMENT (OUTPATIENT)
Dept: INFUSION THERAPY | Age: 76
End: 2021-04-01

## 2021-04-01 ENCOUNTER — HOSPITAL ENCOUNTER (OUTPATIENT)
Dept: INFUSION THERAPY | Age: 76
Discharge: HOME | End: 2021-04-01
Payer: MEDICARE

## 2021-04-01 VITALS
HEART RATE: 55 BPM | SYSTOLIC BLOOD PRESSURE: 162 MMHG | RESPIRATION RATE: 18 BRPM | DIASTOLIC BLOOD PRESSURE: 82 MMHG | TEMPERATURE: 96.8 F

## 2021-04-01 VITALS
TEMPERATURE: 96.8 F | RESPIRATION RATE: 16 BRPM | OXYGEN SATURATION: 97 % | SYSTOLIC BLOOD PRESSURE: 148 MMHG | HEART RATE: 58 BPM | DIASTOLIC BLOOD PRESSURE: 78 MMHG

## 2021-04-01 VITALS
RESPIRATION RATE: 16 BRPM | BODY MASS INDEX: 31.06 KG/M2 | TEMPERATURE: 96.8 F | SYSTOLIC BLOOD PRESSURE: 148 MMHG | OXYGEN SATURATION: 95 % | DIASTOLIC BLOOD PRESSURE: 78 MMHG | WEIGHT: 229 LBS | HEART RATE: 58 BPM

## 2021-04-01 DIAGNOSIS — M19.90 ARTHRITIS: ICD-10-CM

## 2021-04-01 DIAGNOSIS — R79.89 LOW THYROID STIMULATING HORMONE (TSH) LEVEL: ICD-10-CM

## 2021-04-01 DIAGNOSIS — K21.9 GASTROESOPHAGEAL REFLUX DISEASE WITHOUT ESOPHAGITIS: ICD-10-CM

## 2021-04-01 DIAGNOSIS — L27.0 DRUG-INDUCED SKIN RASH: ICD-10-CM

## 2021-04-01 DIAGNOSIS — R79.89 ELEVATED SERUM CREATININE: ICD-10-CM

## 2021-04-01 DIAGNOSIS — D69.6 THROMBOCYTOPENIA (HCC): ICD-10-CM

## 2021-04-01 DIAGNOSIS — Z51.12 ENCOUNTER FOR ANTINEOPLASTIC IMMUNOTHERAPY: ICD-10-CM

## 2021-04-01 DIAGNOSIS — C61 PROSTATE CANCER (HCC): ICD-10-CM

## 2021-04-01 DIAGNOSIS — I10 ESSENTIAL HYPERTENSION: ICD-10-CM

## 2021-04-01 LAB
ALBUMIN SERPL-MCNC: 3 G/DL (ref 3.5–5)
ALBUMIN/GLOB SERPL: 1 {RATIO} (ref 1.1–2.2)
ALP SERPL-CCNC: 60 U/L (ref 45–117)
ALT SERPL-CCNC: 18 U/L (ref 12–78)
ANION GAP SERPL CALC-SCNC: 5 MMOL/L (ref 5–15)
AST SERPL-CCNC: 15 U/L (ref 15–37)
BASOPHILS # BLD: 0.1 K/UL (ref 0–0.1)
BASOPHILS NFR BLD: 2 % (ref 0–1)
BILIRUB SERPL-MCNC: 0.7 MG/DL (ref 0.2–1)
BUN SERPL-MCNC: 31 MG/DL (ref 6–20)
BUN/CREAT SERPL: 22 (ref 12–20)
CALCIUM SERPL-MCNC: 8.9 MG/DL (ref 8.5–10.1)
CHLORIDE SERPL-SCNC: 111 MMOL/L (ref 97–108)
CO2 SERPL-SCNC: 26 MMOL/L (ref 21–32)
CREAT SERPL-MCNC: 1.43 MG/DL (ref 0.7–1.3)
DIFFERENTIAL METHOD BLD: ABNORMAL
EOSINOPHIL # BLD: 0.1 K/UL (ref 0–0.4)
EOSINOPHIL NFR BLD: 3 % (ref 0–7)
ERYTHROCYTE [DISTWIDTH] IN BLOOD BY AUTOMATED COUNT: 12.3 % (ref 11.5–14.5)
GLOBULIN SER CALC-MCNC: 3.1 G/DL (ref 2–4)
GLUCOSE SERPL-MCNC: 94 MG/DL (ref 65–100)
HCT VFR BLD AUTO: 36.4 % (ref 36.6–50.3)
HGB BLD-MCNC: 11.9 G/DL (ref 12.1–17)
IMM GRANULOCYTES # BLD AUTO: 0 K/UL (ref 0–0.04)
IMM GRANULOCYTES NFR BLD AUTO: 0 % (ref 0–0.5)
LYMPHOCYTES # BLD: 0.6 K/UL (ref 0.8–3.5)
LYMPHOCYTES NFR BLD: 13 % (ref 12–49)
MCH RBC QN AUTO: 30.1 PG (ref 26–34)
MCHC RBC AUTO-ENTMCNC: 32.7 G/DL (ref 30–36.5)
MCV RBC AUTO: 91.9 FL (ref 80–99)
MONOCYTES # BLD: 0.5 K/UL (ref 0–1)
MONOCYTES NFR BLD: 10 % (ref 5–13)
NEUTS SEG # BLD: 3.5 K/UL (ref 1.8–8)
NEUTS SEG NFR BLD: 72 % (ref 32–75)
NRBC # BLD: 0 K/UL (ref 0–0.01)
NRBC BLD-RTO: 0 PER 100 WBC
PLATELET # BLD AUTO: 134 K/UL (ref 150–400)
PMV BLD AUTO: 10.8 FL (ref 8.9–12.9)
POTASSIUM SERPL-SCNC: 3.7 MMOL/L (ref 3.5–5.1)
PROT SERPL-MCNC: 6.1 G/DL (ref 6.4–8.2)
RBC # BLD AUTO: 3.96 M/UL (ref 4.1–5.7)
RBC MORPH BLD: ABNORMAL
SODIUM SERPL-SCNC: 142 MMOL/L (ref 136–145)
WBC # BLD AUTO: 4.8 K/UL (ref 4.1–11.1)

## 2021-04-01 PROCEDURE — 96413 CHEMO IV INFUSION 1 HR: CPT

## 2021-04-01 PROCEDURE — 85025 COMPLETE CBC W/AUTO DIFF WBC: CPT

## 2021-04-01 PROCEDURE — G8427 DOCREV CUR MEDS BY ELIG CLIN: HCPCS | Performed by: INTERNAL MEDICINE

## 2021-04-01 PROCEDURE — 3017F COLORECTAL CA SCREEN DOC REV: CPT | Performed by: INTERNAL MEDICINE

## 2021-04-01 PROCEDURE — G8536 NO DOC ELDER MAL SCRN: HCPCS | Performed by: INTERNAL MEDICINE

## 2021-04-01 PROCEDURE — 74011250636 HC RX REV CODE- 250/636: Performed by: NURSE PRACTITIONER

## 2021-04-01 PROCEDURE — 1101F PT FALLS ASSESS-DOCD LE1/YR: CPT | Performed by: INTERNAL MEDICINE

## 2021-04-01 PROCEDURE — G0463 HOSPITAL OUTPT CLINIC VISIT: HCPCS | Performed by: NURSE PRACTITIONER

## 2021-04-01 PROCEDURE — 36415 COLL VENOUS BLD VENIPUNCTURE: CPT

## 2021-04-01 PROCEDURE — G8753 SYS BP > OR = 140: HCPCS | Performed by: INTERNAL MEDICINE

## 2021-04-01 PROCEDURE — 80053 COMPREHEN METABOLIC PANEL: CPT

## 2021-04-01 PROCEDURE — 74011000258 HC RX REV CODE- 258: Performed by: NURSE PRACTITIONER

## 2021-04-01 PROCEDURE — G8754 DIAS BP LESS 90: HCPCS | Performed by: INTERNAL MEDICINE

## 2021-04-01 PROCEDURE — 99214 OFFICE O/P EST MOD 30 MIN: CPT | Performed by: INTERNAL MEDICINE

## 2021-04-01 PROCEDURE — G8510 SCR DEP NEG, NO PLAN REQD: HCPCS | Performed by: INTERNAL MEDICINE

## 2021-04-01 PROCEDURE — G8417 CALC BMI ABV UP PARAM F/U: HCPCS | Performed by: INTERNAL MEDICINE

## 2021-04-01 RX ORDER — SODIUM CHLORIDE 0.9 % (FLUSH) 0.9 %
10 SYRINGE (ML) INJECTION AS NEEDED
Status: DISCONTINUED | OUTPATIENT
Start: 2021-04-01 | End: 2021-04-02 | Stop reason: HOSPADM

## 2021-04-01 RX ORDER — SODIUM CHLORIDE 9 MG/ML
25 INJECTION, SOLUTION INTRAVENOUS CONTINUOUS
Status: DISCONTINUED | OUTPATIENT
Start: 2021-04-01 | End: 2021-04-02 | Stop reason: HOSPADM

## 2021-04-01 RX ADMIN — Medication 10 ML: at 14:15

## 2021-04-01 RX ADMIN — SODIUM CHLORIDE 25 ML/HR: 900 INJECTION, SOLUTION INTRAVENOUS at 14:24

## 2021-04-01 RX ADMIN — SODIUM CHLORIDE 200 MG: 9 INJECTION, SOLUTION INTRAVENOUS at 14:28

## 2021-04-01 NOTE — PROGRESS NOTES
[facility] [place] [address] [address], [address], [place]: [phone]  F: [phone]    Reason for Visit:   Amanda Zamudio is a 70 y.o. male who is seen today in office for follow up of metastatic SCC on Pembrolizumab. Treatment History:   · MRI Right Hip 8/5/20: Enlarging right pelvic sidewall adenopathy with adjacent muscular and bony invasion. Degenerative changes of the right hip. Small bony metastatic deposit anterior wall of the right acetabulum  · MRI Lumbar Spine 8/11/20: L3-L4 moderate central spinal canal and right foraminal stenoses. L5-S1 moderate bilateral foraminal stenosis  · 8/20/20 Pelvic Mass, Core biopsy with touch preparation - CYTOLOGIC INTERPRETATION: Pelvic Mass, Core biopsy with touch preparation: Squamous cell carcinoma  · TYPE ID + SCC/H+N vs skin  · PET 9/8/20: Large hypermetabolic right pelvic sidewall lymph node. Lytic lesion right anterior acetabulum is hypermetabolic and compatible with metastatic disease   · XRT to Right Pelvic Mass 10/13/20 - 10/22/20 with Dr. Radha Ibarra  · Pembrolizumab 12/15/20 - Current   · PET 1/12/21: New hypermetabolic pulmonary nodule in the lingula. Resolved left pelvic sidewall lymph node activity and resolved activity corresponding to the lytic lesion in the right anterior acetabulum    History of Present Illness:   Amanda Zamudio is a 70 y.o. male seen today in office for follow up of metastatic SCC primary site unclear PDL1 80% on Keytruda. He is here today for Cycle 6 of Keytruda. He wants this to be his last treatment. He reports that he feels well overall today. He is tolerating Keytruda well overall. He continues to have rash/itching that is stable/unchanged. He continues to have mild fatigue. His appetite is good. He denies fevers, chills, chest pain, cough, shortness of breath, nausea/vomiting, constipation/diarrhea, bleeding or pain. Follow up PET scan is scheduled for 4/22/21.  CBC and CMP are stable/good today. He wants treatment today. He is here alone today. Past Medical History:   Diagnosis Date    Arthritis     BPH (benign prostatic hypertrophy) 7/18/2011    Cancer (HCC)     prostate cancer    Chronic pain right    knee    ED (erectile dysfunction)     GERD (gastroesophageal reflux disease)     HTN (hypertension), benign     Metastatic squamous cell carcinoma ([facility] [address] 9/16/2020    OA (osteoarthritis) of knee 7/18/2011    BOTH KNEES    Unspecified essential hypertension 7/18/2011      Past Surgical History:   Procedure Laterality Date    HX ACL RECONSTRUCTION Right     x2    HX APPENDECTOMY  1975    HX GI  1/2015    Drainage of hemorrhoid    HX HERNIA REPAIR      left and right,  ventral    HX HIP REPLACEMENT  2000    left    HX HIP REPLACEMENT  2000    left    HX KNEE ARTHROSCOPY Right     x3    HX ORTHOPAEDIC Left 6/2000    HIP REPLACEMENT    HX ORTHOPAEDIC Right 08/05/2014    [facility] - Dr. Wilner giron    HX PROSTATECTOMY  11/07/2011    prostate removed     HX TONSILLECTOMY      CHILD    IR INJ [facility] EPID LUMB ANES/STER SNGL  8/18/2020      Social History     Tobacco Use    Smoking status: Never Smoker    Smokeless tobacco: Never Used   Substance Use Topics    Alcohol use: No      Family History   Problem Relation Age of Onset    Bleeding Prob Father         clotting disorder    Cancer Brother         colon CA 1/2 brother    Thyroid Disease Daughter     Heart Failure Mother     Heart Disease Mother     Anesth Problems Neg Hx      Current Outpatient Medications   Medication Sig    traZODone (DESYREL) 50 mg tablet TAKE 1 TO 2 TABLETS BY MOUTH ONCE DAILY IN THE EVENING AS NEEDED FOR SLEEP    terazosin (HYTRIN) 10 mg capsule Take 1 capsule by mouth once daily    candesartan (ATACAND) 4 mg tablet Take 1 tablet by mouth nightly    ibuprofen (AdviL) 200 mg tablet Take  by mouth.  amLODIPine (NORVASC) 5 mg tablet Take 1 Tab by mouth nightly.     aspirin delayed-release 81 mg tablet Take 1 Tab by mouth two (2) times a day. Indications: blood clot prevention    senna-docusate (PERICOLACE) 8.6-50 mg per tablet Take 1 Tab by mouth two (2) times daily as needed for Constipation.  acetaminophen (TYLENOL) 500 mg tablet Take 1 Tab by mouth every four (4) hours.  calcium carbonate/vitamin D3 (CALTRATE-600 PLUS VITAMIN D3 PO) Take 1 Tab by mouth nightly.  leuprolide acetate (LUPRON DEPOT, 6 MONTH, IM) 1 Each by IntraMUSCular route every 6 months. No current facility-administered medications for this visit. Facility-Administered Medications Ordered in Other Visits   Medication Dose Route Frequency    0.9% sodium chloride infusion  25 mL/hr IntraVENous CONTINUOUS    sodium chloride (NS) flush 10 mL  10 mL IntraVENous PRN      Allergies   Allergen Reactions    Barbiturates Anxiety     Hyper        Review of Systems:  A complete review of systems was obtained, negative except as described above and as reported on ROS sheet scanned into system. Physical Exam:     Visit Vitals  BP (!) 148/78 (BP 1 Location: Left upper arm, BP Patient Position: Sitting)   Pulse (!) 58   Temp 96.8 °F (36 °C)   Resp 16   Wt 229 lb (103.9 kg)   SpO2 95%   BMI 31.06 kg/m²     ECOG PS: 1  General: No distress  Eyes: Anicteric sclerae  HENT: Atraumatic, wearing a mask  Neck: Supple  Resp: CTAB, Normal respiratory effort   CV: Regular rate and rhythm  GI: Soft, non tender   Ext: No edema  MS: Normal gait and station. Digits without clubbing or cyanosis. Skin: No ecchymoses, or petechiae. Normal temperature, turgor, and texture. Psych: Alert, oriented, appropriate affect, normal judgment/insight  Neuro:Non focal    Results:     Lab Results   Component Value Date/Time    WBC 4.8 04/01/2021 10:15 AM    HGB 11.9 (L) 04/01/2021 10:15 AM    HCT 36.4 (L) 04/01/2021 10:15 AM    PLATELET 416 (L) 55/88/1537 10:15 AM    MCV 91.9 04/01/2021 10:15 AM    ABS.  NEUTROPHILS 3.5 04/01/2021 10:15 AM     Lab Results Component Value Date/Time    Sodium 142 04/01/2021 10:15 AM    Potassium 3.7 04/01/2021 10:15 AM    Chloride 111 (H) 04/01/2021 10:15 AM    CO2 26 04/01/2021 10:15 AM    Glucose 94 04/01/2021 10:15 AM    BUN 31 (H) 04/01/2021 10:15 AM    Creatinine 1.43 (H) 04/01/2021 10:15 AM    GFR est AA 59 (L) 04/01/2021 10:15 AM    GFR est non-AA 48 (L) 04/01/2021 10:15 AM    Calcium 8.9 04/01/2021 10:15 AM    Glucose (POC) 108 (H) 01/12/2021 10:21 AM     Lab Results   Component Value Date/Time    Bilirubin, total 0.7 04/01/2021 10:15 AM    ALT (SGPT) 18 04/01/2021 10:15 AM    Alk. phosphatase 60 04/01/2021 10:15 AM    Protein, total 6.1 (L) 04/01/2021 10:15 AM    Albumin 3.0 (L) 04/01/2021 10:15 AM    Globulin 3.1 04/01/2021 10:15 AM     MRI Right Hip 8/5/20  IMPRESSION:   1. Enlarging right pelvic sidewall adenopathy with adjacent muscular and bony  invasion  2. Degenerative changes of the right hip  3. Small bony metastatic deposit anterior wall of the right acetabulum    MRI Lumbar Spine 8/11/20  IMPRESSION:  1. L3-L4 moderate central spinal canal and right foraminal stenoses. 2. L5-S1 moderate bilateral foraminal stenosis. 8/20/20  Specimen Source   1: Pelvic Mass, Core biopsy with touch preparation   CYTOLOGIC INTERPRETATION:   Pelvic Mass, Core biopsy with touch preparation:   Squamous cell carcinoma    PET 9/8/20  IMPRESSION: Large hypermetabolic right pelvic sidewall lymph node. Lytic lesion  right anterior     PET 1/12/21  IMPRESSION:  New hypermetabolic pulmonary nodule in the lingula. Resolved left  pelvic sidewall lymph node activity and resolved activity corresponding to the  lytic lesion in the right anterior acetabulum    Records reviewed and summarized above. Pathology report(s) reviewed above. Radiology report(s) reviewed above. Assessment/PLAN:     1) Metastatic Squamous Cell Cancer    EGFR/ROS/ALK negative. PDL1 80% by biopsy of pelvic/hip mas 8/20/20.    Path is different than prostate which is adenocarcinoma from 2011. Unclear site of origin of squamous cell. TYPE ID sent and SCC H+N vs skin. Completed XRT to right pelvic mass on 10/22/20. Right hip pain has esolved since completing XRT. He started Pembrolizumab on 12/15/20. He had follow up PET on 1/12/21 that showed new hypermetabolic pulmonary nodule in the lingula, resolved left pelvic sidewall lymph node activity and resolved activity corresponding to the lytic lesion in the right anterior acetabulum. Case reviewed at cancer conference and recommendation was to continue [email or web address] for now. He is here today for Cycle 6 of Pembrolizumab. He is clinically stable today and feeling well overall. He is tolerating this regimen well with continued very mild skin rash/itching and mild fatigue. He states that he does not want any further treatment after today. Follow up PET is scheduled for 4/22/21. Labs (CBC and CMP) reviewed today. TSH was low with last labs and will recheck today. He is ready for treatment today. Follow up in 4 weeks after PET. Patient agrees with plan. 2) Recurrent Prostate Cancer   PSA 0 on Lupron per Urology. Management per Urology. 3) Hx of Right Hip Pain  Resolved after XRT. 4) Rash/Itching   Much better overall. Mild, scattered areas. Improved with OTC hydrocortisone. Recommended Zyrtec for itching    5) Low TSH  TSH 0.02  Free T3 and T4 ordered. Will refer to Endocrinology if continues. Recheck TSH today. 6) HTN/Arthritis/GERD/Cronic Elevated Creatinine   Management per PCP. 7) High Risk Drug Monitoring - Immunotherapy  Patient tolerating well with Grade 1 Rash and Grade 1 Fatigue. TSH abnormal. Free T3 and T4 normal/good. Repeat TSH ordered today. Will refer to Endocrinology if no improvement    8) Chronic Thrombocytopenia  CC review and present in 2011. Likely not caused by therapy. Mild and will continue to monitor. 9) Psychosocial  Mood good, coping well.   He has great support from his wife. SW support as needed. He is here alone today. Call if questions. Follow up in 4 weeks. This patient was seen in conjunction with Yasmin Martins NP. I personally performed a face to face diagnostic evaluation on this patient. I personally reviewed the history and performed the key points on the exam.   I personally reviewed all points in the assessment and created treatment plan with the patient. Specifically, pt doing well overall. Tolerating immunotherapy with minimal side effects. Pt does not want to do any more treatments after today. Wants a break. Will do fu scans after cycle 6. Will move to surveillance after today. Goal of care is palliative. I appreciate the opportunity to participate in Mr. Rosmery sinclair.     Signed By: Charissa Costa DO

## 2021-04-01 NOTE — PROGRESS NOTES
Lexis Hawkins is a 70 y.o. male    Chief Complaint   Patient presents with    Chemotherapy     metastatic SCC        1. Have you been to the ER, urgent care clinic since your last visit? Hospitalized since your last visit? No    2. Have you seen or consulted any other health care providers outside of the [address] [place] [address] since your last visit? Include any pap smears or colon screening.  No     Patient states he has had both covid vaccines

## 2021-04-01 NOTE — PROGRESS NOTES
[facility] Lab Visit:        1000  Pt arrived ambulatory to [facility] in stable condition, for lab draw. Labs drawn peripherally from Left AC arm and sent for processing. Pt tolerated well. Visit Vitals  BP (!) 148/78 (BP 1 Location: Left upper arm, BP Patient Position: Sitting)   Pulse (!) 58   Temp 96.8 °F (36 °C)   Resp 16   SpO2 97%       1015 No new concerns voiced. D/cd home ambulatory in no distress. Pt aware of nexyt [facility] appointment scheduled. Labs available in CC once resulted.

## 2021-04-01 NOTE — PROGRESS NOTES
Addended by: NETTA MAYA on: 3/22/2021 10:28 AM     Modules accepted: Orders     [facility] Chemo Progress Note    1400 Mr. Karla Sweeney Arrived to [facility] for  Keytruda (C6) ambulatory in stable condition. Assessment was completed, no acute issues at this time, no new complaints voiced. Labs were collected this morning peripherally. Labs WDL for treatment. PIV established in [facility] with good blood return. PIV connected to NS. Patient denies SOB, fever, cough, general not feeling well. Patient denies recent exposure to someone who has tested positive for COVID-19. Patient denies having contact with anyone who has a pending COVID test.    Chemotherapy Flowsheet 4/1/2021   Cycle C6   Date 4/1/2021   Drug / Regimen Keytruda   Notes given       Mr. Soler's vitals were reviewed. Patient Vitals for the past 12 hrs:   Temp Pulse Resp BP   04/01/21 1500 96.8 °F (36 °C) (!) 55 18 (!) 162/82         Lab results were obtained and reviewed. Recent Results (from the past 12 hour(s))   CBC WITH AUTOMATED DIFF    Collection Time: 04/01/21 10:15 AM   Result Value Ref Range    WBC 4.8 4.1 - 11.1 K/uL    RBC 3.96 (L) 4.10 - 5.70 M/uL    HGB 11.9 (L) 12.1 - 17.0 g/dL    HCT 36.4 (L) 36.6 - 50.3 %    MCV 91.9 80.0 - 99.0 FL    MCH 30.1 26.0 - 34.0 PG    MCHC 32.7 30.0 - 36.5 g/dL    RDW 12.3 11.5 - 14.5 %    PLATELET 293 (L) 997 - 400 K/uL    MPV 10.8 8.9 - 12.9 FL    NRBC 0.0 0  WBC    ABSOLUTE NRBC 0.00 0.00 - 0.01 K/uL    NEUTROPHILS 72 32 - 75 %    LYMPHOCYTES 13 12 - 49 %    MONOCYTES 10 5 - 13 %    EOSINOPHILS 3 0 - 7 %    BASOPHILS 2 (H) 0 - 1 %    IMMATURE GRANULOCYTES 0 0.0 - 0.5 %    ABS. NEUTROPHILS 3.5 1.8 - 8.0 K/UL    ABS. LYMPHOCYTES 0.6 (L) 0.8 - 3.5 K/UL    ABS. MONOCYTES 0.5 0.0 - 1.0 K/UL    ABS. EOSINOPHILS 0.1 0.0 - 0.4 K/UL    ABS. BASOPHILS 0.1 0.0 - 0.1 K/UL    ABS. IMM.  GRANS. 0.0 0.00 - 0.04 K/UL    DF SMEAR SCANNED      RBC COMMENTS NORMOCYTIC, NORMOCHROMIC     METABOLIC PANEL, COMPREHENSIVE    Collection Time: 04/01/21 10:15 AM   Result Value Ref Range    Sodium 142 136 - 145 mmol/L Potassium 3.7 3.5 - 5.1 mmol/L    Chloride 111 (H) 97 - 108 mmol/L    CO2 26 21 - 32 mmol/L    Anion gap 5 5 - 15 mmol/L    Glucose 94 65 - 100 mg/dL    BUN 31 (H) 6 - 20 MG/DL    Creatinine 1.43 (H) 0.70 - 1.30 MG/DL    BUN/Creatinine ratio 22 (H) 12 - 20      GFR est AA 59 (L) >60 ml/min/1.73m2    GFR est non-AA 48 (L) >60 ml/min/1.73m2    Calcium 8.9 8.5 - 10.1 MG/DL    Bilirubin, total 0.7 0.2 - 1.0 MG/DL    ALT (SGPT) 18 12 - 78 U/L    AST (SGOT) 15 15 - 37 U/L    Alk. phosphatase 60 45 - 117 U/L    Protein, total 6.1 (L) 6.4 - 8.2 g/dL    Albumin 3.0 (L) 3.5 - 5.0 g/dL    Globulin 3.1 2.0 - 4.0 g/dL    A-G Ratio 1.0 (L) 1.1 - 2.2         Pre-medications  were administered as ordered and chemotherapy was initiated. Medications Administered     0.9% sodium chloride infusion     Admin Date  04/01/2021 Action  New Bag Dose  25 mL/hr Rate  25 mL/hr Route  IntraVENous Administered By  Oscar Spangler, AMANDA          pembrolizumab [facility]) 200 mg in 0.9% sodium chloride 100 mL, overfill volume 10 mL IVPB     Admin Date  04/01/2021 Action  New Bag Dose  200 mg Rate  236 mL/hr Route  IntraVENous Administered By  Oscar Spangler RN              1502Patient tolerated treatment well. PIV maintained positive blood return throughout treatment. PIV removed per protcol. Patient was discharged from [facility] in stable condition. Patient is aware of next appointment.      Future Appointments   Date Time Provider Americo Redd   4/19/2021  4:30 PM [facility]   4/20/2021  1:30 PM F4 [facility] [place] [facility]   4/22/2021 10:00 AM [facility] RCR PET DOSE 1 SMHRCHPET LOPEZ SYDNEE   4/22/2021 11:00 AM [facility] RCR PET 1 CARINACHPEMILVAI LOPEZ SYDNEE   5/10/2021  4:30 PM H3 [facility]   5/11/2021  1:30 PM F4 ASHLEY MED Spencer Diaz [address] AMANDA Mcarthur  April 1, 2021

## 2021-04-08 RX ORDER — TRAZODONE HYDROCHLORIDE 50 MG/1
TABLET ORAL
Qty: 60 TAB | Refills: 0 | Status: SHIPPED | OUTPATIENT
Start: 2021-04-08 | End: 2021-05-13 | Stop reason: SDUPTHER

## 2021-04-13 ENCOUNTER — APPOINTMENT (OUTPATIENT)
Dept: INFUSION THERAPY | Age: 76
End: 2021-04-13

## 2021-04-19 ENCOUNTER — APPOINTMENT (OUTPATIENT)
Dept: INFUSION THERAPY | Age: 76
End: 2021-04-19

## 2021-04-20 ENCOUNTER — APPOINTMENT (OUTPATIENT)
Dept: INFUSION THERAPY | Age: 76
End: 2021-04-20

## 2021-04-22 ENCOUNTER — HOSPITAL ENCOUNTER (OUTPATIENT)
Dept: PET IMAGING | Age: 76
Discharge: HOME | End: 2021-04-22
Attending: NURSE PRACTITIONER
Payer: MEDICARE

## 2021-04-22 ENCOUNTER — APPOINTMENT (OUTPATIENT)
Dept: INFUSION THERAPY | Age: 76
End: 2021-04-22

## 2021-04-22 VITALS — HEIGHT: 72 IN | WEIGHT: 230 LBS | BODY MASS INDEX: 31.15 KG/M2

## 2021-04-22 DIAGNOSIS — C77.5 PROSTATE CANCER METASTATIC TO INTRAPELVIC LYMPH NODE (HCC): ICD-10-CM

## 2021-04-22 DIAGNOSIS — C61 PROSTATE CANCER (HCC): ICD-10-CM

## 2021-04-22 DIAGNOSIS — C61 PROSTATE CANCER METASTATIC TO INTRAPELVIC LYMPH NODE (HCC): ICD-10-CM

## 2021-04-22 LAB
GLUCOSE BLD STRIP.AUTO-MCNC: 90 MG/DL (ref 65–100)
SERVICE CMNT-IMP: NORMAL

## 2021-04-22 PROCEDURE — A9552 F18 FDG: HCPCS

## 2021-04-22 RX ORDER — SODIUM CHLORIDE 0.9 % (FLUSH) 0.9 %
10 SYRINGE (ML) INJECTION
Status: COMPLETED | OUTPATIENT
Start: 2021-04-22 | End: 2021-04-22

## 2021-04-22 RX ADMIN — Medication 10 ML: at 10:45

## 2021-04-27 ENCOUNTER — APPOINTMENT (OUTPATIENT)
Dept: INFUSION THERAPY | Age: 76
End: 2021-04-27

## 2021-04-27 ENCOUNTER — TELEPHONE (OUTPATIENT)
Dept: ONCOLOGY | Age: 76
End: 2021-04-27

## 2021-04-27 NOTE — TELEPHONE ENCOUNTER
Returned call to patient, ID verified X 2. States has seen PET scan results on [facility] and would like clarification on them. Provider has referred patient and scans for Cancer Conference on 5/4/21. Patient is upset and would like to know if his current treatment will continue, if he should get his affairs in order. Explained to patient that RN had no other information beyond Provider message, but would route message to Provider for more information. Patient grateful for support. Update to Provider.

## 2021-04-27 NOTE — TELEPHONE ENCOUNTER
Patient called and stated that he would like to speak with [facility] in regards to the most recent PET scan.     Call back [phone]

## 2021-04-28 RX ORDER — AMLODIPINE BESYLATE 5 MG/1
TABLET ORAL
Qty: 90 TAB | Refills: 0 | Status: SHIPPED | OUTPATIENT
Start: 2021-04-28 | End: 2021-01-01

## 2021-04-28 RX ORDER — CANDESARTAN 4 MG/1
TABLET ORAL
Qty: 90 TAB | Refills: 0 | Status: SHIPPED | OUTPATIENT
Start: 2021-04-28 | End: 2021-01-01

## 2021-04-28 NOTE — TELEPHONE ENCOUNTER
Returned call to patient. ID verified x2. Discussed with patient that PET/CT result is concerning and will be reviewed at tumor board with radiology, oncology and radonc. Patient states he saw results last week and has been worried about them since reviewing them in [facility]. Provided active listening and appreciative of patient's feedback. Discussed that after review of scans and patient treatment history, a plan will be in place and he will be updated. He states he wants to speak to Dr. Alirio Steele immediately after tumor board to discuss treatment plan. Patient then hung up phone. Will forward message to Dr. Alirio Steele.

## 2021-04-29 ENCOUNTER — TELEPHONE (OUTPATIENT)
Dept: PALLATIVE CARE | Age: 76
End: 2021-04-29

## 2021-04-29 DIAGNOSIS — R59.0 PELVIC LYMPHADENOPATHY: ICD-10-CM

## 2021-04-29 NOTE — TELEPHONE ENCOUNTER
[facility] Palliative Medicine Office  Nursing Note  [phone]  Fax [phone]      Name:  Gwen Gusman  YOB: 1945    Pt called outpatient Palliative office requesting appt. Pt states he talked with Dr. Antelmo Garcia today and she recommended that he schedule an appt with Dr. Magnus Wiseman. Shweta Quintanilla is a 69 yo male with metastatic SCC, primary site unclear. Pt's most recent visit with Dr. Meghann Mcqueen was on 4/1/21. See her note.       Appt scheduled for 5/12/21 at 12:30pm.     CHELI PollackN, RN-BC, [facility]

## 2021-04-29 NOTE — TELEPHONE ENCOUNTER
Mr. Rochatim Yg calling stating that Dr. Jacquie Bolanos was going to have Dr. Flakita Alonso call him. Patient states that Dr. Jacquie Bolanos thought Dr. Flakita Alonso could help him with his health issues.

## 2021-04-29 NOTE — PROGRESS NOTES
Progression on immunotherapy  Will discuss at cancer conference  Likely will need new treatment plan

## 2021-05-04 NOTE — TELEPHONE ENCOUNTER
Call to patient, ID verified X 2. Offered patient follow up appt with MD per NP instructions for scan results/treatment plan. Patient accepts appt with MD on 5/5/21 at 1130. Understands he is being worked in between patients and may have a wait, understanding verbalized. States other appt that day at 1445.     Update to Provider/

## 2021-05-05 ENCOUNTER — DOCUMENTATION ONLY (OUTPATIENT)
Dept: ONCOLOGY | Age: 76
End: 2021-05-05

## 2021-05-05 ENCOUNTER — OFFICE VISIT (OUTPATIENT)
Dept: ONCOLOGY | Age: 76
End: 2021-05-05
Payer: MEDICARE

## 2021-05-05 VITALS
WEIGHT: 227.4 LBS | HEART RATE: 70 BPM | SYSTOLIC BLOOD PRESSURE: 119 MMHG | BODY MASS INDEX: 30.84 KG/M2 | DIASTOLIC BLOOD PRESSURE: 72 MMHG | OXYGEN SATURATION: 98 % | TEMPERATURE: 97.3 F

## 2021-05-05 DIAGNOSIS — C61 PROSTATE CANCER (HCC): ICD-10-CM

## 2021-05-05 DIAGNOSIS — I10 ESSENTIAL HYPERTENSION: ICD-10-CM

## 2021-05-05 DIAGNOSIS — M19.90 ARTHRITIS: ICD-10-CM

## 2021-05-05 DIAGNOSIS — Z51.12 ENCOUNTER FOR ANTINEOPLASTIC IMMUNOTHERAPY: ICD-10-CM

## 2021-05-05 DIAGNOSIS — L27.0 DRUG-INDUCED SKIN RASH: ICD-10-CM

## 2021-05-05 PROCEDURE — G8536 NO DOC ELDER MAL SCRN: HCPCS | Performed by: INTERNAL MEDICINE

## 2021-05-05 PROCEDURE — 3017F COLORECTAL CA SCREEN DOC REV: CPT | Performed by: INTERNAL MEDICINE

## 2021-05-05 PROCEDURE — G8510 SCR DEP NEG, NO PLAN REQD: HCPCS | Performed by: INTERNAL MEDICINE

## 2021-05-05 PROCEDURE — G8752 SYS BP LESS 140: HCPCS | Performed by: INTERNAL MEDICINE

## 2021-05-05 PROCEDURE — G8417 CALC BMI ABV UP PARAM F/U: HCPCS | Performed by: INTERNAL MEDICINE

## 2021-05-05 PROCEDURE — 99215 OFFICE O/P EST HI 40 MIN: CPT | Performed by: INTERNAL MEDICINE

## 2021-05-05 PROCEDURE — G8754 DIAS BP LESS 90: HCPCS | Performed by: INTERNAL MEDICINE

## 2021-05-05 PROCEDURE — G0463 HOSPITAL OUTPT CLINIC VISIT: HCPCS | Performed by: INTERNAL MEDICINE

## 2021-05-05 PROCEDURE — 1101F PT FALLS ASSESS-DOCD LE1/YR: CPT | Performed by: INTERNAL MEDICINE

## 2021-05-05 PROCEDURE — G8427 DOCREV CUR MEDS BY ELIG CLIN: HCPCS | Performed by: INTERNAL MEDICINE

## 2021-05-05 RX ORDER — ONDANSETRON 4 MG/1
4-8 TABLET, ORALLY DISINTEGRATING ORAL
Qty: 60 TAB | Refills: 1 | Status: SHIPPED | OUTPATIENT
Start: 2021-05-05 | End: 2022-01-01 | Stop reason: ALTCHOICE

## 2021-05-05 RX ORDER — DEXAMETHASONE 4 MG/1
TABLET ORAL
Qty: 60 TAB | Refills: 0 | Status: SHIPPED | OUTPATIENT
Start: 2021-05-05 | End: 2022-01-01 | Stop reason: ALTCHOICE

## 2021-05-05 RX ORDER — PROCHLORPERAZINE MALEATE 5 MG
TABLET ORAL
Qty: 30 TAB | Refills: 1 | Status: SHIPPED | OUTPATIENT
Start: 2021-05-05 | End: 2021-01-01 | Stop reason: ALTCHOICE

## 2021-05-05 NOTE — PROGRESS NOTES
Jana Eduardo is a 70 y.o. male  Chief Complaint   Patient presents with    Follow-up     metastatic SCC      1. Have you been to the ER, urgent care clinic since your last visit? Hospitalized since your last visit? No.  2. Have you seen or consulted any other health care providers outside of the [address] [place] [address] since your last visit? Include any pap smears or colon screening. No.    Patient is now fully vaccinated with the Mendez Peter covid-19 vaccine.

## 2021-05-05 NOTE — PROGRESS NOTES
[facility] [address] [place], [address] [address], [place]: [phone]  F: [phone]    Reason for Visit:   Sirisha Marie is a 70 y.o. male who is seen today in office for follow up of metastatic SCC on Pembrolizumab. Treatment History:   · MRI Right Hip 8/5/20: Enlarging right pelvic sidewall adenopathy with adjacent muscular and bony invasion. Degenerative changes of the right hip. Small bony metastatic deposit anterior wall of the right acetabulum  · MRI Lumbar Spine 8/11/20: L3-L4 moderate central spinal canal and right foraminal stenoses. L5-S1 moderate bilateral foraminal stenosis  · 8/20/20 Pelvic Mass, Core biopsy with touch preparation - CYTOLOGIC INTERPRETATION: Pelvic Mass, Core biopsy with touch preparation: Squamous cell carcinoma  · TYPE ID + SCC/H+N vs skin  · PET 9/8/20: Large hypermetabolic right pelvic sidewall lymph node. Lytic lesion right anterior acetabulum is hypermetabolic and compatible with metastatic disease   · XRT to Right Pelvic Mass 10/13/20 - 10/22/20 with Dr. Benny Galan  · Pembrolizumab 12/15/20 - Current   · PET 1/12/21: New hypermetabolic pulmonary nodule in the lingula. Resolved left pelvic sidewall lymph node activity and resolved activity corresponding to the lytic lesion in the right anterior acetabulum    History of Present Illness:   Sirisha Marie is a 70 y.o. male seen today in office for follow up of metastatic SCC primary site unclear PDL1 80% on Keytruda. Had CTs which showed slight progression in lung nodules and LAD. Here to discuss scans and treatment plan. Here with wife today. Pt clinically healthy overall. No fevers/ chills/ chest pain/ SOB/ nausea/ vomiting/diarrhea/ pain/fatigue        Last visit:  He is here today for Cycle 6 of Keytruda. He wants this to be his last treatment. He reports that he feels well overall today. He is tolerating Keytruda well overall.  He continues to have rash/itching that is stable/unchanged. He continues to have mild fatigue. His appetite is good. He denies fevers, chills, chest pain, cough, shortness of breath, nausea/vomiting, constipation/diarrhea, bleeding or pain. Follow up PET scan is scheduled for 4/22/21. CBC and CMP are stable/good today. He wants treatment today. He is here alone today.      Past Medical History:   Diagnosis Date    Arthritis     BPH (benign prostatic hypertrophy) 7/18/2011    Cancer [facility])     prostate cancer    Chronic pain right    knee    ED (erectile dysfunction)     GERD (gastroesophageal reflux disease)     HTN (hypertension), benign     Metastatic squamous cell carcinoma ([facility] [address] 9/16/2020    OA (osteoarthritis) of knee 7/18/2011    BOTH KNEES    Unspecified essential hypertension 7/18/2011      Past Surgical History:   Procedure Laterality Date    HX ACL RECONSTRUCTION Right     x2    HX APPENDECTOMY  1975    HX GI  1/2015    Drainage of hemorrhoid    HX HERNIA REPAIR      left and right,  ventral    HX HIP REPLACEMENT  2000    left    HX HIP REPLACEMENT  2000    left    HX KNEE ARTHROSCOPY Right     x3    HX ORTHOPAEDIC Left 6/2000    HIP REPLACEMENT    HX ORTHOPAEDIC Right 08/05/2014    TKR - Dr. [place]    HX PROSTATECTOMY  11/07/2011    prostate removed     HX TONSILLECTOMY      CHILD    IR INJ Dorcus Counter EPID LUMB ANES/STER SNGL  8/18/2020      Social History     Tobacco Use    Smoking status: Never Smoker    Smokeless tobacco: Never Used   Substance Use Topics    Alcohol use: No      Family History   Problem Relation Age of Onset    Bleeding Prob Father         clotting disorder    Cancer Brother         colon CA 1/2 brother    Thyroid Disease Daughter     Heart Failure Mother     Heart Disease Mother     Anesth Problems Neg Hx      Current Outpatient Medications   Medication Sig    amLODIPine (NORVASC) 5 mg tablet Take 1 tablet by mouth nightly    candesartan (ATACAND) 4 mg tablet Take 1 tablet by mouth nightly    traZODone (DESYREL) 50 mg tablet TAKE 1 TO 2 TABLETS BY MOUTH ONCE DAILY IN THE EVENING AS NEEDED FOR SLEEP    terazosin (HYTRIN) 10 mg capsule Take 1 capsule by mouth once daily    ibuprofen (AdviL) 200 mg tablet Take  by mouth.  aspirin delayed-release 81 mg tablet Take 1 Tab by mouth two (2) times a day. Indications: blood clot prevention    senna-docusate (PERICOLACE) 8.6-50 mg per tablet Take 1 Tab by mouth two (2) times daily as needed for Constipation.  acetaminophen (TYLENOL) 500 mg tablet Take 1 Tab by mouth every four (4) hours.  calcium carbonate/vitamin D3 (CALTRATE-600 PLUS VITAMIN D3 PO) Take 1 Tab by mouth nightly.  leuprolide acetate (LUPRON DEPOT, 6 MONTH, IM) 1 Each by IntraMUSCular route every 6 months.  dexAMETHasone (DECADRON) 4 mg tablet Take 2 tabs (8mg) by mouth daily on days 2 and 3 after chemotherapy    ondansetron (ZOFRAN ODT) 4 mg disintegrating tablet Take 1-2 Tabs by mouth every eight (8) hours as needed for Nausea or Vomiting.  prochlorperazine (Compazine) 5 mg tablet Take 1 tab by mouth every 6 hours as needed for nausea or vomiting     No current facility-administered medications for this visit. Allergies   Allergen Reactions    Barbiturates Anxiety     Hyper        Review of Systems:  A complete review of systems was obtained, negative except as described above and as reported on ROS sheet scanned into system. Physical Exam:     Visit Vitals  /72 (BP 1 Location: Left upper arm, BP Patient Position: Sitting)   Pulse 70   Temp 97.3 °F (36.3 °C) (Temporal)   Wt 227 lb 6.4 oz (103.1 kg)   SpO2 98%   BMI 30.84 kg/m²     ECOG PS: 1  General: No distress  Eyes: Anicteric sclerae  HENT: Atraumatic, wearing a mask  Neck: Supple  Resp: CTAB, Normal respiratory effort   CV: Regular rate and rhythm  GI: Soft, non tender   Ext: No edema  MS: Normal gait and station. Digits without clubbing or cyanosis. Skin: No ecchymoses, or petechiae.  Normal temperature, turgor, and texture. Psych: Alert, oriented, appropriate affect, normal judgment/insight  Neuro:Non focal    Results:     Lab Results   Component Value Date/Time    WBC 4.8 04/01/2021 10:15 AM    HGB 11.9 (L) 04/01/2021 10:15 AM    HCT 36.4 (L) 04/01/2021 10:15 AM    PLATELET 854 (L) 94/28/5729 10:15 AM    MCV 91.9 04/01/2021 10:15 AM    ABS. NEUTROPHILS 3.5 04/01/2021 10:15 AM     Lab Results   Component Value Date/Time    Sodium 142 04/01/2021 10:15 AM    Potassium 3.7 04/01/2021 10:15 AM    Chloride 111 (H) 04/01/2021 10:15 AM    CO2 26 04/01/2021 10:15 AM    Glucose 94 04/01/2021 10:15 AM    BUN 31 (H) 04/01/2021 10:15 AM    Creatinine 1.43 (H) 04/01/2021 10:15 AM    GFR est AA 59 (L) 04/01/2021 10:15 AM    GFR est non-AA 48 (L) 04/01/2021 10:15 AM    Calcium 8.9 04/01/2021 10:15 AM    Glucose (POC) 90 04/22/2021 10:19 AM     Lab Results   Component Value Date/Time    Bilirubin, total 0.7 04/01/2021 10:15 AM    ALT (SGPT) 18 04/01/2021 10:15 AM    Alk. phosphatase 60 04/01/2021 10:15 AM    Protein, total 6.1 (L) 04/01/2021 10:15 AM    Albumin 3.0 (L) 04/01/2021 10:15 AM    Globulin 3.1 04/01/2021 10:15 AM     MRI Right Hip 8/5/20  IMPRESSION:   1. Enlarging right pelvic sidewall adenopathy with adjacent muscular and bony  invasion  2. Degenerative changes of the right hip  3. Small bony metastatic deposit anterior wall of the right acetabulum    MRI Lumbar Spine 8/11/20  IMPRESSION:  1. L3-L4 moderate central spinal canal and right foraminal stenoses. 2. L5-S1 moderate bilateral foraminal stenosis. 8/20/20  Specimen Source   1: Pelvic Mass, Core biopsy with touch preparation   CYTOLOGIC INTERPRETATION:   Pelvic Mass, Core biopsy with touch preparation:   Squamous cell carcinoma    PET 9/8/20  IMPRESSION: Large hypermetabolic right pelvic sidewall lymph node. Lytic lesion  right anterior     PET 1/12/21  IMPRESSION:  New hypermetabolic pulmonary nodule in the lingula.  Resolved left  pelvic sidewall lymph node activity and resolved activity corresponding to the  lytic lesion in the right anterior acetabulum    Records reviewed and summarized above. Pathology report(s) reviewed above. Radiology report(s) reviewed above. Assessment/PLAN:     1) Metastatic Squamous Cell Cancer    EGFR/ROS/ALK negative. PDL1 80% by biopsy of pelvic/hip mas 8/20/20. Path is different than prostate which is adenocarcinoma from 2011. Unclear site of origin of squamous cell. TYPE ID sent and SCC H+N vs skin. Completed XRT to right pelvic mass on 10/22/20. Right hip pain has esolved since completing XRT. He started Pembrolizumab on 12/15/20. He had follow up PET on 1/12/21 that showed new hypermetabolic pulmonary nodule in the lingula, resolved left pelvic sidewall lymph node activity and resolved activity corresponding to the lytic lesion in the right anterior acetabulum. Case reviewed at cancer conference and recommendation was to continue [id number] [employer] for now. Here today for discussion. PET 4/21 showed slight progressive disease in lungs/ LAD/ bones. Reviewed PET with pt and wife today. Case discussed at cancer conference and reviewed this with pt/ wife today. Pt clinically doing well overall. Discussed possible disease progression vs immunotherapy response. Discussed overall plan of no therapy/ continued immunotherapy/ vs chemo immunotherapy. Discussed perhaps most aggressive path is chemo/ immunotherapy. Discussed carbo/ taxol/immunotherapy specifically. Pt and wife want to pursue chemo/ immunotherapy. We discussed the risks and benefits of chemotherapy with Carboplatin and Paclitaxel. Potential side effects include but are not limited to: nausea, vomiting, diarrhea, constipation, taste changes, allergic reactions, myelosuppression, infection, fatigue, alopecia, neuropathy, mucositis, renal failure, infertility, and rarely, death.  Additionally, chemotherapy leads to radiosensitization which can intensify the side effects of radiation therapy. The patient has consented to beginning chemotherapy. Pt is ready to start treatment and wants to set up chemo. Chemo teaching today. We also discussed second opinions today. Pt and wife will consider this option also. Labs have been good. Fu at chemo. 2) Recurrent Prostate Cancer   PSA 0 on Lupron per Urology. Management per Urology. 3) Hx of Right Hip Pain  Resolved after XRT. 4) Rash/Itching   Much better overall. Mild, scattered areas. Improved with OTC hydrocortisone. Zyrtec for itching    5) Low TSH  TSH 0.02  Free T3 and T4 ordered. Will refer to Endocrinology if continues. Recheck TSH . 6) HTN/Arthritis/GERD/Cronic Elevated Creatinine   Management per PCP. 7) High Risk Drug Monitoring - Immunotherapy  Patient tolerating well with Grade 1 Rash and Grade 1 Fatigue. TSH abnormal. Free T3 and T4 normal/good. Repeat TSH ordered    Will refer to Endocrinology if no improvement    8) Chronic Thrombocytopenia  CC review and present in 2011. Likely not caused by therapy. Mild and will continue to monitor. 9) Psychosocial  Mood good, coping well. He has great support from his wife.  support as needed. Prognosis: Intermediate     This is our best current assessment. Cancers respond differently to treatment. Overall prognosis depends on many factors including other conditions, cancer stage, side effects, and other unforeseen events.    Goal of therapy: Palliative    Expected response to treatment:  Intermediate: Anticipate cancer shrinking or slowed growth but not remission    Treatment benefits and harms:  We discussed potential short term side effects to include:GI upset, increased infection risk, anemia, alopecia, increased risk of bleeding and fatigue     Long term side effects of treatment:  secondary malignancies, bone marrow suppression, reproductive/fertility effects, neuropathy and cardiotoxicity    Quality of life: Quality of life concerns have been addressed. Treatment as outlined is expected to have moderate impact on patients quality of life. Call if questions. Follow up at chemo     I appreciate the opportunity to participate in  Valentin Catherine sinclair.     Signed By: Yasmine Harrington, DO

## 2021-05-05 NOTE — PROGRESS NOTES
Pharmacy Note- Chemotherapy Education    Elizabeth Ruiz is a  70 y.o.male  diagnosed with squamous cell cancer here today for chemotherapy counseling and consent. Mr. Sonny Lux is being treated with CARBOplatin, PACLItaxel, and pembrolizumab. Provided education on CARBOplatin, PACLitaxel and premedications - fosaprepitant, palonosetron, dexamethasone, diphenhydramine and famotidine. Mr. Sonny Lux was previously being treated with the pembrolizumab, with chemotherapy now being added on for 4 cycles and then back to single agent pembrolizumab. Side effects of chemotherapy reviewed included s/s infection, anemia, appetite changes, thromboyctopenia, fatigue, hair loss/alopecia, bone pain, skin and nail changes, diarrhea/constipation, infusion reactions and peripheral neuropathy. Mr. Sonny Lux was provided information regarding the risks of immune-mediated adverse reactions secondary to pembrolizumab that may require interruption/delay of treatment and possible use of corticosteroids. These reactions include, but are not limited to: colitis (diarrhea or severe abdominal pain); hepatitis (jaundice, severe nausea, or vomiting, easy bruising, and/or bleeding); hypophysitis (persistent or unusual headache, extreme weakness, dizziness/fainting, and/or vision changes); dermatitis (skin rash, mouth sores, skin blisters, and/or skin peeling); ocular toxicity (uveitis, iritis, and/or episcleritis); neuropathy (motor or sensory); hyper/hypothyroidism. Patient given ways to manage these side effects and when to contact office. [address] Handout of medications provided to patient. Mr. Sonny Lux verbalized understanding of the information presented and all of the patient's questions were answered.     Karis Gómez, PharmD, BCPS, BCOP    For Pharmacy Admin Tracking Only     CPA in place: NO    Recommendation Provided To: Patient/Caregiver: 1 via In person     Total # of Interventions Recommended: 1   Total # of Interventions Accepted: 1   Intervention Accepted By: Patient/Caregiver: 1   Time Spent (min): 30

## 2021-05-07 ENCOUNTER — TELEPHONE (OUTPATIENT)
Dept: INFUSION THERAPY | Age: 76
End: 2021-05-07

## 2021-05-07 RX ORDER — SODIUM CHLORIDE 9 MG/ML
10 INJECTION INTRAMUSCULAR; INTRAVENOUS; SUBCUTANEOUS AS NEEDED
Status: CANCELLED | OUTPATIENT
Start: 2021-01-01

## 2021-05-07 RX ORDER — EPINEPHRINE 1 MG/ML
0.3 INJECTION, SOLUTION, CONCENTRATE INTRAVENOUS AS NEEDED
Status: CANCELLED | OUTPATIENT
Start: 2021-01-01

## 2021-05-07 RX ORDER — PALONOSETRON 0.05 MG/ML
0.25 INJECTION, SOLUTION INTRAVENOUS ONCE
Status: CANCELLED | OUTPATIENT
Start: 2021-01-01 | End: 2021-05-13

## 2021-05-07 RX ORDER — SODIUM CHLORIDE 0.9 % (FLUSH) 0.9 %
10 SYRINGE (ML) INJECTION AS NEEDED
Status: CANCELLED | OUTPATIENT
Start: 2021-01-01

## 2021-05-07 RX ORDER — ONDANSETRON 2 MG/ML
8 INJECTION INTRAMUSCULAR; INTRAVENOUS AS NEEDED
Status: CANCELLED | OUTPATIENT
Start: 2021-01-01

## 2021-05-07 RX ORDER — ALBUTEROL SULFATE 0.83 MG/ML
2.5 SOLUTION RESPIRATORY (INHALATION) AS NEEDED
Status: CANCELLED
Start: 2021-01-01

## 2021-05-07 RX ORDER — ACETAMINOPHEN 325 MG/1
650 TABLET ORAL AS NEEDED
Status: CANCELLED
Start: 2021-01-01

## 2021-05-07 RX ORDER — HYDROCORTISONE SODIUM SUCCINATE 100 MG/2ML
100 INJECTION, POWDER, FOR SOLUTION INTRAMUSCULAR; INTRAVENOUS AS NEEDED
Status: CANCELLED | OUTPATIENT
Start: 2021-01-01

## 2021-05-07 RX ORDER — DIPHENHYDRAMINE HYDROCHLORIDE 50 MG/ML
25 INJECTION, SOLUTION INTRAMUSCULAR; INTRAVENOUS AS NEEDED
Status: CANCELLED
Start: 2021-01-01

## 2021-05-07 RX ORDER — HEPARIN 100 UNIT/ML
300-500 SYRINGE INTRAVENOUS AS NEEDED
Status: CANCELLED
Start: 2021-01-01

## 2021-05-07 RX ORDER — DIPHENHYDRAMINE HYDROCHLORIDE 50 MG/ML
50 INJECTION, SOLUTION INTRAMUSCULAR; INTRAVENOUS AS NEEDED
Status: CANCELLED
Start: 2021-01-01

## 2021-05-07 RX ORDER — SODIUM CHLORIDE 9 MG/ML
25 INJECTION, SOLUTION INTRAVENOUS CONTINUOUS
Status: CANCELLED | OUTPATIENT
Start: 2021-01-01

## 2021-05-07 RX ORDER — DIPHENHYDRAMINE HYDROCHLORIDE 50 MG/ML
50 INJECTION, SOLUTION INTRAMUSCULAR; INTRAVENOUS ONCE
Status: CANCELLED
Start: 2021-01-01 | End: 2021-05-13

## 2021-05-07 NOTE — TELEPHONE ENCOUNTER
Mara Greenfield from [facility] called and stated that the she needs the molecular report prior to patient's appt on Tuesday.  She indicated that their physician needs any pathology reports and imaging that patient has had sent to them    CB: [phone] opt 1     Fax [phone]

## 2021-05-07 NOTE — TELEPHONE ENCOUNTER
Returned call to Zulema Lanza at [facility] regarding request for records. Left VM with RN contact info at [phone], option 1 containing RN contact info/hours. Update to Provider.

## 2021-05-09 RX ORDER — TERAZOSIN 10 MG/1
CAPSULE ORAL
Qty: 90 CAP | Refills: 0 | Status: SHIPPED | OUTPATIENT
Start: 2021-05-09 | End: 2021-01-01

## 2021-05-10 ENCOUNTER — APPOINTMENT (OUTPATIENT)
Dept: INFUSION THERAPY | Age: 76
End: 2021-05-10

## 2021-05-11 ENCOUNTER — HOSPITAL ENCOUNTER (OUTPATIENT)
Dept: INFUSION THERAPY | Age: 76
End: 2021-05-11

## 2021-05-11 NOTE — TELEPHONE ENCOUNTER
Incoming call from Miguel Zepeda at [facility], patient ID verified X 2. States patient has appointment today and requests faxing of patient Path report. Faxed with confirmation of receipt. Update to Provider.

## 2021-05-12 ENCOUNTER — OFFICE VISIT (OUTPATIENT)
Dept: PALLATIVE CARE | Age: 76
End: 2021-05-12
Payer: MEDICARE

## 2021-05-12 ENCOUNTER — PATIENT MESSAGE (OUTPATIENT)
Dept: INTERNAL MEDICINE CLINIC | Age: 76
End: 2021-05-12

## 2021-05-12 ENCOUNTER — TELEPHONE (OUTPATIENT)
Dept: ONCOLOGY | Age: 76
End: 2021-05-12

## 2021-05-12 VITALS
DIASTOLIC BLOOD PRESSURE: 77 MMHG | HEART RATE: 65 BPM | TEMPERATURE: 95.6 F | RESPIRATION RATE: 18 BRPM | OXYGEN SATURATION: 98 % | SYSTOLIC BLOOD PRESSURE: 146 MMHG

## 2021-05-12 DIAGNOSIS — Z71.89 GOALS OF CARE, COUNSELING/DISCUSSION: ICD-10-CM

## 2021-05-12 DIAGNOSIS — C61 PROSTATE CANCER (HCC): ICD-10-CM

## 2021-05-12 DIAGNOSIS — Z76.89 ENCOUNTER TO ESTABLISH CARE: Primary | ICD-10-CM

## 2021-05-12 PROCEDURE — G8754 DIAS BP LESS 90: HCPCS | Performed by: INTERNAL MEDICINE

## 2021-05-12 PROCEDURE — 1101F PT FALLS ASSESS-DOCD LE1/YR: CPT | Performed by: INTERNAL MEDICINE

## 2021-05-12 PROCEDURE — G8536 NO DOC ELDER MAL SCRN: HCPCS | Performed by: INTERNAL MEDICINE

## 2021-05-12 PROCEDURE — G8417 CALC BMI ABV UP PARAM F/U: HCPCS | Performed by: INTERNAL MEDICINE

## 2021-05-12 PROCEDURE — G8753 SYS BP > OR = 140: HCPCS | Performed by: INTERNAL MEDICINE

## 2021-05-12 PROCEDURE — G8427 DOCREV CUR MEDS BY ELIG CLIN: HCPCS | Performed by: INTERNAL MEDICINE

## 2021-05-12 PROCEDURE — 99204 OFFICE O/P NEW MOD 45 MIN: CPT | Performed by: INTERNAL MEDICINE

## 2021-05-12 PROCEDURE — 3017F COLORECTAL CA SCREEN DOC REV: CPT | Performed by: INTERNAL MEDICINE

## 2021-05-12 PROCEDURE — G8510 SCR DEP NEG, NO PLAN REQD: HCPCS | Performed by: INTERNAL MEDICINE

## 2021-05-12 NOTE — PROGRESS NOTES
Palliative Medicine Outpatient Services  [place]: [phone] ([phone])    Patient Name: Aneesh Epsarza  YOB: 1945    Date of Current Visit: 21  Location of Current Visit:    [x] [facility] Office  [] [facility] Office  [] [address] [facility] Office  [] Home  []Synchronous real-time A/V virtual visit    Date of Initial Visit: 21  Referral from: self-referred  Primary Care Physician: John Melissa MD      SUMMARY:   Aneesh Esparza is a 70y.o. 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 His first wife, Russell León, now  from [address] [place] [address], was followed by Dr. Dea Enrique in [place] [address]. He was diagnosed with prostate cancer in  s/p prostatectomy, currently being treated with Lupron, most recent PSA 0. He was diagnosed in with metastatic SCC in 2020 s/p radiation therapy to right pelvic mass, treated with Keytruda until disease progression noted in 2021. He was seen at [facility] for a 2nd opinion and anticipates started chemotherapy here under the care of Dr. Stephanie Perez. He is pursuing treatment with the goal of prolonging life to enjoy time with his second wife, Jean Carlos Thomas, and his family. He is deeply appreciate of the support he and his first wife received from Palliative Medicine over the course of her illness in assisting with care decisions, symptom management and, in particular, in supporting him in his role as his wife's primary caregiver. He wishes to have that same support for himself, and also for his wife, as his disease progresses. The patients social history includes: he is  to Jean Carlos Thomas. They each have children from prior marriages. He enjoys the outdoors and has waterside property where he and Jean Carlos Thomas frequently visit.     Palliative Medicine is addressing the following current patient/family concerns: discussion of personal goals and values informing health care decisions; review of AMD.    Initial Referral Intake note reviewed   PALLIATIVE DIAGNOSES:       ICD-10-CM ICD-9-CM    1. Encounter to establish care  Z76.89 V65.8    2. Squamous cell carcinoma, metastatic (HCC)  C79.9 199.1    3. Prostate cancer ([facility] [address]  C61 185    4. Goals of care, counseling/discussion  Z71.89 V65.49           PLAN:   Patient Instructions     Dear Elana Miles ,    It was a pleasure seeing you today in [address] [place] [address]. We will see you again on an as needed basis. If labs or imaging tests have been ordered for you today, please call the office  at [phone] 48 hours after completion to obtain the results. Your described symptoms were: Fatigue: 5 Drowsiness: 3   Depression: 0 Pain: 0   Anxiety: 2 Nausea: 0   Anorexia: 0 Dyspnea: 0   Best Well-Bein Constipation: No   Other Problem (Comment): 0       This is the plan we talked about:    Today we talked about what's important to you going forward with your care as you explore your treatment options for your metastatic squamous cell carcinoma. You're overall feeling well and hoping to start treatment soon to meet your goal of living longer to enjoy your family and your friends. It's important to you to have your symptoms controlled well as they occur. It's also important to you to have all your affairs in order so Trisha Vasquez and your family will not need to deal with these when you get sicker. You're devoted to Trisha Vasquez and want her to be well-supported through the process of your treatment and, especially, during the time when treatment is no longer effective and your care needs may be greater than they are now. You've updated your Advance Medical Directives. You've been to [facility] to get a 2nd opinion from Dr. Shauna Shaw and expect to hear about his recommendations in the next week or so.     You wanted to establish care with Palliative Medicine in anticipation of possible need in the future. You prefer to not schedule a follow-up visit at this time but will reach out to us when you wish to be seen again. This is what you have shared with us about Advance Care Planning:      Primary Decision Maker: Parish Hernandez - [phone]    Secondary Decision Maker: Pearl Gramajo Child - [phone]  Advance Care Planning 5/12/2021   Patient's [address] is: Named in scanned ACP document   Confirm Advance Directive Yes, on file   Does the patient have other document types -           The Palliative Medicine Team is here to support you and your family. Sincerely,      Martha Fontanez MD and the Palliative Medicine Team       GOALS OF CARE / TREATMENT PREFERENCES:   [====Goals of Care====]  GOALS OF CARE:  Patient / health care proxy stated goals: See Patient Instructions / Summary    TREATMENT PREFERENCES:   Code Status:  [] Attempt Resuscitation       [] Do Not Attempt Resuscitation    Advance Care Planning:  [x] [facility] Interdisciplinary Team has updated the ACP Navigator with Decision Maker and Patient Capacity      Primary Decision Maker: Parish Hernandez - [phone]    Secondary Decision Maker: Pearl Gramajo Child - [phone]  Advance Care Planning 5/12/2021   Patient's Healthcare Decision Maker is: Named in scanned ACP document   Confirm Advance Directive Yes, on file   Does the patient have other document types -       Other:  (If patient appropriate for POST, consider using PALLPOST smart phrase here)    The palliative care team has discussed with patient / health care proxy about goals of care / treatment preferences for patient.  [====Goals of Care====]     PRESCRIPTIONS GIVEN:   No orders of the defined types were placed in this encounter.           FOLLOW UP:     Future Appointments   Date Time Provider Americo Redd   6/2/2021  4:30 PM [address] [place]    6/3/2021  8:00 AM C2 ASHLEY LONG [address] [facility]   6/3/2021  8:45 AM Reji Knight [place],  [address] [facility] AMB           PHYSICIANS INVOLVED IN CARE:   Patient Care Team:  Gildardo Guzman MD as PCP - General  Gildardo Guzman MD as PCP - [facility] Empaneled Provider  Gina Cuellar MD as Physician (Urology)  Tiffanie Daley MD (Ophthalmology)  Deidre Benjamin MD (General Surgery)  Coby Alexander MD (Orthopedic Surgery)  Cuate Montejo MD as Physician (Palliative Medicine)       HISTORY:   Reviewed patient-completed ESAS and advance care planning form. Reviewed patient record in prescription monitoring program.    CHIEF COMPLAINT:   Chief Complaint   Patient presents with    Establish Care       HPI/SUBJECTIVE:    The patient is: [x] Verbal / [] Nonverbal     He came in today to establish care with Palliative Medicine. His first wife, Levi Nolasco,  from a brain tumor several years ago. Dr. Jacquie Bolanos helped care for her and was lot of help for him, too. He was diagnosed with prostate cancer in   He had surgery and started Lupron and his PSA has been low since then. He had left knee replacement last . He went to rehab and things were going well until he started having pain in his.right hip. At first, he thought it might be related to altered gait due to his knee replacement. But it didn't get better so he had a MRI last August which slowed a right pelvic mass. The biopsy showed squamous cell carcinoma. He had a lot of tests but the origin of the cancer was never found. He had 5 dose of radiation therapy which helped with the pain, then started Tere Duty. He had a PET can in 2021 which showed no pelvic lesions but new small spot on his lung. He continued Keytruda, then had another scan which showed progression in the size of the lung lesion and new lung metastases. Dr. Luis Gallagher recommended chemotherapy.     He went to [facility] for a 2nd opinion, was seen by  Stefano Mcginnis, who did additional blood work and requested samples of the original biopsy for further testing  He has planned initiation of chemo tomorrow but this is now on hold pending the results of the work-up at [facility]. He currently feels fairly well. He no longer has right hip pain and and has no pain in other areas. His appetite is good. His energy isn't as good as it used to be but he's doing most of the things he enjoys doing. He feels at peace with his new cancer diagnosis. He knows his cancer is not curable. He hopes to prolong his life with therapy so he can have more time with Colonel Kurtz and their blended family. It's very important to him to have all his affairs in order so Colonel Kurtz and his family won't have to deal with them. He's updated his will and his Advance Medical Directives. It's also very important to help to have someone who can look at the \"big picture\" as he gets sicker to help him and his wife make decisions. He wants his symptoms to be well-controlled as they occur and it's particularly important to him that Colonel Kurtz be supported over the course of his illness and dying process.         Clinical Pain Assessment (nonverbal scale for nonverbal patients):   [++++ Clinical Pain Assessment++++]  [++++Pain Severity++++]: Pain: 0  [++++Pain Character++++]:  [++++Pain Duration++++]:  [++++Pain Effect++++]:  [++++Pain Factors++++]:  [++++Pain Frequency++++]:  [++++Pain Location++++]:  [++++ Clinical Pain Assessment++++]       FUNCTIONAL ASSESSMENT:     Palliative Performance Scale (PPS):          PSYCHOSOCIAL/SPIRITUAL SCREENING:     Any spiritual / Orthodoxy concerns:  [] Yes /  [x] No    Caregiver Burnout:  [] Yes /  [x] No /  [] No Caregiver Present      Anticipatory grief assessment:   [x] Normal  / [] Maladaptive       ESAS Anxiety: Anxiety: 2    ESAS Depression: Depression: 0       REVIEW OF SYSTEMS:     The following systems were [x] reviewed / [] unable to be reviewed  Systems: constitutional, ears/nose/mouth/throat, respiratory, gastrointestinal, genitourinary, musculoskeletal, integumentary, neurologic, psychiatric, endocrine. Positive findings noted below. Modified ESAS Completed by: provider   Fatigue: 5 Drowsiness: 3   Depression: 0 Pain: 0   Anxiety: 2 Nausea: 0   Anorexia: 0 Dyspnea: 0   Best Well-Bein Constipation: No   Other Problem (Comment): 0          PHYSICAL EXAM:     Wt Readings from Last 3 Encounters:   21 227 lb 6.4 oz (103.1 kg)   21 230 lb (104.3 kg)   21 229 lb (103.9 kg)     Blood pressure (!) 146/77, pulse 65, temperature (!) 95.6 °F (35.3 °C), temperature source Temporal, resp. rate 18, SpO2 98 %.   Last bowel movement: See Nursing Note    Constitutional: sitting in chair, appears relaxed, comfortable  Eyes: pupils equal, anicteric  ENMT: no nasal discharge, moist mucous membranes  Cardiovascular: regular rhythm, no peripheral edema  Respiratory: breathing not labored, symmetric  Gastrointestinal: soft non-tender, +bowel sounds  Musculoskeletal: no deformity, no tenderness to palpation  Skin: warm, dry  Neurologic: following commands, moving all extremities  Psychiatric: full affect, no hallucinations  Other:       HISTORY:     Past Medical History:   Diagnosis Date    Arthritis     BPH (benign prostatic hypertrophy) 2011    Cancer (HCC)     prostate cancer    Chronic pain right    knee    ED (erectile dysfunction)     GERD (gastroesophageal reflux disease)     HTN (hypertension), benign     Metastatic squamous cell carcinoma ([facility] [address] 2020    OA (osteoarthritis) of knee 2011    BOTH KNEES    Unspecified essential hypertension 2011      Past Surgical History:   Procedure Laterality Date    HX ACL RECONSTRUCTION Right     x2    HX APPENDECTOMY      HX GI  2015    Drainage of hemorrhoid    HX HERNIA REPAIR      left and right,  ventral    HX HIP REPLACEMENT      left    HX HIP REPLACEMENT      left    HX KNEE ARTHROSCOPY Right     x3    HX ORTHOPAEDIC Left 6/2000    HIP REPLACEMENT    HX ORTHOPAEDIC Right 08/05/2014    TKR - Dr. Castillo Valeria HX PROSTATECTOMY  11/07/2011    prostate removed     HX TONSILLECTOMY      CHILD    IR INJ FORAMIN EPID LUMB ANES/STER SNGL  8/18/2020      Family History   Problem Relation Age of Onset    Bleeding Prob Father         clotting disorder    Cancer Brother         colon CA 1/2 brother    Thyroid Disease Daughter     Heart Failure Mother     Heart Disease Mother     Anesth Problems Neg Hx       History reviewed, no pertinent family history. Social History     Tobacco Use    Smoking status: Never Smoker    Smokeless tobacco: Never Used   Substance Use Topics    Alcohol use: No     Allergies   Allergen Reactions    Barbiturates Anxiety     Hyper        Current Outpatient Medications   Medication Sig    terazosin (HYTRIN) 10 mg capsule Take 1 capsule by mouth once daily    amLODIPine (NORVASC) 5 mg tablet Take 1 tablet by mouth nightly    candesartan (ATACAND) 4 mg tablet Take 1 tablet by mouth nightly    ibuprofen (AdviL) 200 mg tablet Take  by mouth.  calcium carbonate/vitamin D3 (CALTRATE-600 PLUS VITAMIN D3 PO) Take 1 Tab by mouth nightly.  leuprolide acetate (LUPRON DEPOT, 6 MONTH, IM) 1 Each by IntraMUSCular route every 6 months.  traZODone (DESYREL) 50 mg tablet TAKE 1 TO 2 TABLETS BY MOUTH ONCE DAILY IN THE EVENING AS NEEDED FOR SLEEP    dexAMETHasone (DECADRON) 4 mg tablet Take 2 tabs (8mg) by mouth daily on days 2 and 3 after chemotherapy    ondansetron (ZOFRAN ODT) 4 mg disintegrating tablet Take 1-2 Tabs by mouth every eight (8) hours as needed for Nausea or Vomiting.  prochlorperazine (Compazine) 5 mg tablet Take 1 tab by mouth every 6 hours as needed for nausea or vomiting    aspirin delayed-release 81 mg tablet Take 1 Tab by mouth two (2) times a day.  Indications: blood clot prevention    senna-docusate (PERICOLACE) 8.6-50 mg per tablet Take 1 Tab by mouth two (2) times daily as needed for Constipation.  acetaminophen (TYLENOL) 500 mg tablet Take 1 Tab by mouth every four (4) hours. No current facility-administered medications for this visit. MRI Right Hip 8/5/20  IMPRESSION:   1. Enlarging right pelvic sidewall adenopathy with adjacent muscular and bony  invasion  2. Degenerative changes of the right hip  3. Small bony metastatic deposit anterior wall of the right acetabulum     MRI Lumbar Spine 8/11/20  IMPRESSION:  1. L3-L4 moderate central spinal canal and right foraminal stenoses. 2. L5-S1 moderate bilateral foraminal stenosis.      PET 9/8/20  IMPRESSION: Large hypermetabolic right pelvic sidewall lymph node. Lytic lesion  right anterior      PET 1/12/21  IMPRESSION:  New hypermetabolic pulmonary nodule in the lingula. Resolved left  pelvic sidewall lymph node activity and resolved activity corresponding to the  lytic lesion in the right anterior acetabulum        LAB DATA REVIEWED:     Lab Results   Component Value Date/Time    WBC 4.8 04/01/2021 10:15 AM    HGB 11.9 (L) 04/01/2021 10:15 AM    PLATELET 053 (L) 87/52/8189 10:15 AM     Lab Results   Component Value Date/Time    Sodium 142 04/01/2021 10:15 AM    Potassium 3.7 04/01/2021 10:15 AM    Chloride 111 (H) 04/01/2021 10:15 AM    CO2 26 04/01/2021 10:15 AM    BUN 31 (H) 04/01/2021 10:15 AM    Creatinine 1.43 (H) 04/01/2021 10:15 AM    Calcium 8.9 04/01/2021 10:15 AM      Lab Results   Component Value Date/Time    Alk. phosphatase 60 04/01/2021 10:15 AM    Protein, total 6.1 (L) 04/01/2021 10:15 AM    Albumin 3.0 (L) 04/01/2021 10:15 AM    Globulin 3.1 04/01/2021 10:15 AM     Lab Results   Component Value Date/Time    INR 1.0 06/02/2020 09:48 AM    Prothrombin time 10.6 06/02/2020 09:48 AM      No results found for: IRON, FE, TIBC, IBCT, PSAT, FERR     PET  HEAD/NECK: No apparent foci of abnormal hypermetabolism.  Cerebral evaluation is  limited by normal intense activity.     CHEST: Bilateral pulmonary nodules are hypermetabolic, maximum SUV is of a  lesion in the left upper lobe, 13.3. These have increased in size and number  compared to the prior exam. The largest lesion in the lingula now measures 2.0  cm, previously measuring 1.1 cm.     ABDOMEN/PELVIS: Enlargement of the right iliacus muscle is noted. Hypermetabolic  foci are now noted in the right pelvic sidewall and right external iliac region,  maximum SUV 10.8. Soft tissue nodules in the deep pelvis are hypermetabolic and  are new compared to the prior exam, maximum SUV on the left is 18. 3.     SKELETON: Hypermetabolic foci are noted in the right ischium and anterior  acetabulum, increased compared to the prior exam.     IMPRESSION  Progression of disease with new soft tissue nodules in the deep  pelvis, foci of increased tracer activity in the right pelvic sidewall and right  external iliac chain, increase in the size and number of pulmonary metastases,  and hypermetabolic bone lesions right hemipelvis.      CONTROLLED SUBSTANCES SAFETY ASSESSMENT (IF ON CONTROLLED SUBSTANCES):     Reviewed opioid safety handout:  [] Yes   [] No  24 hour opioid dose >150mg morphine equivalent/day:  [] Yes   [] No  Benzodiazepines:  [] Yes   [] No  Sleep apnea:  [] Yes   [] No  Urine Toxicology Testing within last 6 months:  [] Yes   [] No  History of or new aberrant medication taking behaviors:  [] Yes   [] No  Has Narcan been prescribed [] Yes   [] No          Total time: 50 minutes  Counseling / coordination time: 40 minutes  > 50% counseling / coordination?: yes - medical records review; history; physical exam; exploration of personal goals and values; symptom assessment and management options; care plan discussion with Palliative Medicine Team; documentation

## 2021-05-12 NOTE — PROGRESS NOTES
Palliative Medicine Office Visit  Palliative Medicine Nurse Check In  [phone]    Patient Name: Edson Rosales  YOB: 1945      Date of Office Visit: 5/12/2021    Patient states: \"  \"    From Check In Sheet (scanned in Media):  Has a medical provider talked with you about cardiopulmonary resuscitation (CPR)? [x] Yes   [] No   [] Unable to obtain    Nurse reminder to complete or update ACP FlowSheet:    Is ACP on the Problem List?    [x] Yes    [] No  IF ACP Document is ON FILE; Nurse to place ACP on Problem List     Is there an ACP Note in Chart Review/Note? [x] Yes    [] No   If NO: ALERT PROVIDER       Primary Decision Maker: Jose De Jesus Roberts  [phone]    Secondary Decision Maker: Ziyad Beauchamp [facility] [phone]  Advance Care Planning 5/12/2021   Patient's Healthcare Decision Maker is: Named in scanned ACP document   Confirm Advance Directive Yes, on file   Does the patient have other document types -     Is there anything that we should know about you as a person in order to provide you the best care possible? *    Have you been to the ER, urgent care clinic since your last visit? [] Yes   [] No   [] Unable to obtain    Have you been hospitalized since your last visit? [] Yes   [] No   [] Unable to obtain    Have you seen or consulted any other health care providers outside of the [address] [place] [address] since your last visit?    [] Yes   [] No   [] Unable to obtain    Functional status (describe):         Last BM: 5/12/2021     accessed (date): 5/12/2021    Bottle review (for opioid pain medication):  Medication 1:   Date filled:   Directions:   # filled:   # left:   # pills taking per day:  Last dose taken:    Medication 2:   Date filled:   Directions:   # filled:   # left:   # pills taking per day:  Last dose taken:    Medication 3:   Date filled:   Directions:   # filled:   # left:   # pills taking per day:  Last dose taken:    Medication 4:   Date filled:   Directions:   # filled:   # left:   # pills taking per day:  Last dose taken:

## 2021-05-12 NOTE — PATIENT INSTRUCTIONS
Dear Lexis Hawkins , It was a pleasure seeing you today in [address] [facility]. We will see you again on an as needed basis. If labs or imaging tests have been ordered for you today, please call the office  at [phone] 48 hours after completion to obtain the results. Your described symptoms were: Fatigue: 5 Drowsiness: 3 Depression: 0 Pain: 0 Anxiety: 2 Nausea: 0 Anorexia: 0 Dyspnea: 0 Best Well-Bein Constipation: No  
Other Problem (Comment): 0 This is the plan we talked about: Today we talked about what's important to you going forward with your care as you explore your treatment options for your metastatic squamous cell carcinoma. You're overall feeling well and hoping to start treatment soon to meet your goal of living longer to enjoy your family and your friends. It's important to you to have your symptoms controlled well as they occur. It's also important to you to have all your affairs in order so Ramin Tomas and your family will not need to deal with these when you get sicker. You're devoted to Ramin Tomas and want her to be well-supported through the process of your treatment and, especially, during the time when treatment is no longer effective and your care needs may be greater than they are now. You've updated your Advance Medical Directives. You've been to [facility] to get a 2nd opinion from Dr. Terry Almanzar and expect to hear about his recommendations in the next week or so. You wanted to establish care with Palliative Medicine in anticipation of possible need in the future. You prefer to not schedule a follow-up visit at this time but will reach out to us when you wish to be seen again. This is what you have shared with us about Advance Care Planning: 
 
  Primary Decision Maker: Susie Buchanan - [phone] Secondary Decision MakerMseamusto Kamran  Raúl - [phone] Advance Care Planning 2021 Patient's Healthcare Decision Maker is: Named in scanned ACP document Confirm Advance Directive Yes, on file Does the patient have other document types - The Palliative Medicine Team is here to support you and your family. Sincerely, Dago Mayes MD and the Palliative Medicine Team

## 2021-05-12 NOTE — TELEPHONE ENCOUNTER
Pre chemo call to patient, ID verified X 2. States had visit at [facility] with Dr. Tata James yesterday. States that Dr. Tata James plans to present patient at a Tumor Board, would like additional molecular testing performed at [facility] and has recommended patient \"push treatment out 1-2 weeks. \"  Patient will not be at [facility] on 5/12/21. Also has [email or web address] appt today with Dr. Mel Ortiz. Update to Provider.

## 2021-05-13 ENCOUNTER — HOSPITAL ENCOUNTER (OUTPATIENT)
Dept: INFUSION THERAPY | Age: 76
End: 2021-05-13

## 2021-05-13 RX ORDER — TRAZODONE HYDROCHLORIDE 50 MG/1
TABLET ORAL
Qty: 60 TAB | Refills: 1 | Status: SHIPPED | OUTPATIENT
Start: 2021-05-13 | End: 2021-01-01

## 2021-05-19 ENCOUNTER — TELEPHONE (OUTPATIENT)
Dept: ONCOLOGY | Age: 76
End: 2021-05-19

## 2021-05-19 NOTE — TELEPHONE ENCOUNTER
Dr. Jim Olson from [facility] called on behalf of patient to provide Dr. Abdulkadir Prabhakar with updated information on patient.      Call back [phone]

## 2021-05-20 ENCOUNTER — DOCUMENTATION ONLY (OUTPATIENT)
Dept: ONCOLOGY | Age: 76
End: 2021-05-20

## 2021-05-20 NOTE — PROGRESS NOTES
Pt was seen by Dr Tata James at [facility]  Case d/w Dr Tata James  Also talked with pt  Pt had Aamir Perez testing done at [facility] that was negative for actionable mutations. Pt wants to start chemo we ordered.    Please call pt to set up

## 2021-06-02 ENCOUNTER — TELEPHONE (OUTPATIENT)
Dept: ONCOLOGY | Age: 76
End: 2021-06-02

## 2021-06-02 ENCOUNTER — HOSPITAL ENCOUNTER (OUTPATIENT)
Dept: INFUSION THERAPY | Age: 76
Discharge: HOME | End: 2021-06-02
Payer: MEDICARE

## 2021-06-02 VITALS
OXYGEN SATURATION: 97 % | DIASTOLIC BLOOD PRESSURE: 91 MMHG | RESPIRATION RATE: 18 BRPM | SYSTOLIC BLOOD PRESSURE: 155 MMHG | HEART RATE: 61 BPM | TEMPERATURE: 97.3 F

## 2021-06-02 LAB
ALBUMIN SERPL-MCNC: 3.2 G/DL (ref 3.5–5)
ALBUMIN/GLOB SERPL: 0.9 {RATIO} (ref 1.1–2.2)
ALP SERPL-CCNC: 68 U/L (ref 45–117)
ALT SERPL-CCNC: 18 U/L (ref 12–78)
ANION GAP SERPL CALC-SCNC: 7 MMOL/L (ref 5–15)
AST SERPL-CCNC: 16 U/L (ref 15–37)
BASOPHILS # BLD: 0.1 K/UL (ref 0–0.1)
BASOPHILS NFR BLD: 1 % (ref 0–1)
BILIRUB SERPL-MCNC: 0.6 MG/DL (ref 0.2–1)
BUN SERPL-MCNC: 34 MG/DL (ref 6–20)
BUN/CREAT SERPL: 22 (ref 12–20)
CALCIUM SERPL-MCNC: 9 MG/DL (ref 8.5–10.1)
CHLORIDE SERPL-SCNC: 107 MMOL/L (ref 97–108)
CO2 SERPL-SCNC: 26 MMOL/L (ref 21–32)
CREAT SERPL-MCNC: 1.54 MG/DL (ref 0.7–1.3)
DIFFERENTIAL METHOD BLD: ABNORMAL
EOSINOPHIL # BLD: 0.2 K/UL (ref 0–0.4)
EOSINOPHIL NFR BLD: 3 % (ref 0–7)
ERYTHROCYTE [DISTWIDTH] IN BLOOD BY AUTOMATED COUNT: 13.2 % (ref 11.5–14.5)
GLOBULIN SER CALC-MCNC: 3.4 G/DL (ref 2–4)
GLUCOSE SERPL-MCNC: 101 MG/DL (ref 65–100)
HCT VFR BLD AUTO: 36.3 % (ref 36.6–50.3)
HGB BLD-MCNC: 11.7 G/DL (ref 12.1–17)
IMM GRANULOCYTES # BLD AUTO: 0 K/UL (ref 0–0.04)
IMM GRANULOCYTES NFR BLD AUTO: 0 % (ref 0–0.5)
LYMPHOCYTES # BLD: 1.1 K/UL (ref 0.8–3.5)
LYMPHOCYTES NFR BLD: 14 % (ref 12–49)
MCH RBC QN AUTO: 30.7 PG (ref 26–34)
MCHC RBC AUTO-ENTMCNC: 32.2 G/DL (ref 30–36.5)
MCV RBC AUTO: 95.3 FL (ref 80–99)
MONOCYTES # BLD: 0.6 K/UL (ref 0–1)
MONOCYTES NFR BLD: 9 % (ref 5–13)
NEUTS SEG # BLD: 5.3 K/UL (ref 1.8–8)
NEUTS SEG NFR BLD: 73 % (ref 32–75)
NRBC # BLD: 0 K/UL (ref 0–0.01)
NRBC BLD-RTO: 0 PER 100 WBC
PLATELET # BLD AUTO: 125 K/UL (ref 150–400)
PMV BLD AUTO: 11.3 FL (ref 8.9–12.9)
POTASSIUM SERPL-SCNC: 3.8 MMOL/L (ref 3.5–5.1)
PROT SERPL-MCNC: 6.6 G/DL (ref 6.4–8.2)
RBC # BLD AUTO: 3.81 M/UL (ref 4.1–5.7)
SODIUM SERPL-SCNC: 140 MMOL/L (ref 136–145)
TSH SERPL DL<=0.05 MIU/L-ACNC: 1.95 UIU/ML (ref 0.36–3.74)
WBC # BLD AUTO: 7.3 K/UL (ref 4.1–11.1)

## 2021-06-02 PROCEDURE — 80053 COMPREHEN METABOLIC PANEL: CPT

## 2021-06-02 PROCEDURE — 85025 COMPLETE CBC W/AUTO DIFF WBC: CPT

## 2021-06-02 PROCEDURE — 36415 COLL VENOUS BLD VENIPUNCTURE: CPT

## 2021-06-02 PROCEDURE — 84443 ASSAY THYROID STIM HORMONE: CPT

## 2021-06-02 NOTE — TELEPHONE ENCOUNTER
06/02/2021 Incoming call from Dr. Elliott Jasso at [facility], who is faxing report to MD. [phone]  Would like call back from Provider to [phone] - states this is clinic  phone line. Staff message to MD, as well. Update to Provider.

## 2021-06-02 NOTE — PROGRESS NOTES
[facility]    [facility] - Self ambulating patient, in stable condition, presented to [facility] for lab draw. Labs were drawn and sent for processing. Patient tolerated lab draw and voiced no complaints. Patient denies having experienced any COVID symptoms or having any known exposure to COVID      Vitals -     Temp - 97.3  BP - 155/91  O2% - 97  Pulse - 61  Resp.  - 18

## 2021-06-03 ENCOUNTER — HOSPITAL ENCOUNTER (OUTPATIENT)
Dept: INFUSION THERAPY | Age: 76
Discharge: HOME | End: 2021-06-03

## 2021-06-03 ENCOUNTER — OFFICE VISIT (OUTPATIENT)
Dept: ONCOLOGY | Age: 76
End: 2021-06-03
Payer: MEDICARE

## 2021-06-03 VITALS
RESPIRATION RATE: 18 BRPM | BODY MASS INDEX: 31.45 KG/M2 | TEMPERATURE: 96.9 F | HEIGHT: 72 IN | SYSTOLIC BLOOD PRESSURE: 149 MMHG | HEART RATE: 63 BPM | WEIGHT: 232.2 LBS | DIASTOLIC BLOOD PRESSURE: 82 MMHG

## 2021-06-03 VITALS
WEIGHT: 232.2 LBS | DIASTOLIC BLOOD PRESSURE: 82 MMHG | BODY MASS INDEX: 31.45 KG/M2 | TEMPERATURE: 96.9 F | HEIGHT: 72 IN | HEART RATE: 63 BPM | SYSTOLIC BLOOD PRESSURE: 149 MMHG | OXYGEN SATURATION: 94 %

## 2021-06-03 DIAGNOSIS — M19.90 ARTHRITIS: ICD-10-CM

## 2021-06-03 DIAGNOSIS — Z51.12 ENCOUNTER FOR ANTINEOPLASTIC IMMUNOTHERAPY: ICD-10-CM

## 2021-06-03 DIAGNOSIS — I10 ESSENTIAL HYPERTENSION: ICD-10-CM

## 2021-06-03 DIAGNOSIS — C61 PROSTATE CANCER (HCC): ICD-10-CM

## 2021-06-03 PROCEDURE — G8417 CALC BMI ABV UP PARAM F/U: HCPCS | Performed by: NURSE PRACTITIONER

## 2021-06-03 PROCEDURE — G8754 DIAS BP LESS 90: HCPCS | Performed by: NURSE PRACTITIONER

## 2021-06-03 PROCEDURE — 1101F PT FALLS ASSESS-DOCD LE1/YR: CPT | Performed by: NURSE PRACTITIONER

## 2021-06-03 PROCEDURE — G0463 HOSPITAL OUTPT CLINIC VISIT: HCPCS | Performed by: NURSE PRACTITIONER

## 2021-06-03 PROCEDURE — 3017F COLORECTAL CA SCREEN DOC REV: CPT | Performed by: NURSE PRACTITIONER

## 2021-06-03 PROCEDURE — G8510 SCR DEP NEG, NO PLAN REQD: HCPCS | Performed by: NURSE PRACTITIONER

## 2021-06-03 PROCEDURE — G8753 SYS BP > OR = 140: HCPCS | Performed by: NURSE PRACTITIONER

## 2021-06-03 PROCEDURE — G8427 DOCREV CUR MEDS BY ELIG CLIN: HCPCS | Performed by: NURSE PRACTITIONER

## 2021-06-03 PROCEDURE — G8536 NO DOC ELDER MAL SCRN: HCPCS | Performed by: NURSE PRACTITIONER

## 2021-06-03 PROCEDURE — 99215 OFFICE O/P EST HI 40 MIN: CPT | Performed by: NURSE PRACTITIONER

## 2021-06-03 NOTE — PROGRESS NOTES
Rachael Ramírez is a 78 y.o. male  Chief Complaint   Patient presents with    Chemotherapy      metastatic SCC     1. Have you been to the ER, urgent care clinic since your last visit? Hospitalized since your last visit? No.  2. Have you seen or consulted any other health care providers outside of the [address] [place] [address] since your last visit? Include any pap smears or colon screening.  Yes, patient went to see his Urologist (April Serna)

## 2021-06-03 NOTE — PROGRESS NOTES
[facility] [place] [facility] [place] [address] [address], [place]: [phone]  F: [phone]    Reason for Visit:   Birdie Mcburney is a 78 y.o. male who is seen today in office for follow up of metastatic SCC     Treatment History:   · MRI Right Hip 8/5/20: Enlarging right pelvic sidewall adenopathy with adjacent muscular and bony invasion. Degenerative changes of the right hip. Small bony metastatic deposit anterior wall of the right acetabulum  · MRI Lumbar Spine 8/11/20: L3-L4 moderate central spinal canal and right foraminal stenoses. L5-S1 moderate bilateral foraminal stenosis  · 8/20/20 Pelvic Mass, Core biopsy with touch preparation - CYTOLOGIC INTERPRETATION: Pelvic Mass, Core biopsy with touch preparation: Squamous cell carcinoma  · TYPE ID + SCC/H+N vs skin  · PET 9/8/20: Large hypermetabolic right pelvic sidewall lymph node. Lytic lesion right anterior acetabulum is hypermetabolic and compatible with metastatic disease   · XRT to Right Pelvic Mass 10/13/20 - 10/22/20 with Dr. Matty Higgins  · Pembrolizumab 12/15/20 - Current   · PET 1/12/21: New hypermetabolic pulmonary nodule in the lingula. Resolved left pelvic sidewall lymph node activity and resolved activity corresponding to the lytic lesion in the right anterior acetabulum    History of Present Illness:   Birdie Mcburney is a 78 y.o. male seen today in office for follow up of metastatic SCC primary site unclear PDL1 80% on Keytruda. Had CTs which showed slight progression in lung nodules and LAD. Pt is here today for chemo/ immunotherapy. Here with wife today. Case d/w [facility]. 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 Per [facility], hold chemo today and pt will see Uro/ONcology at [facility] prior to start of treatment here. Pt/ wife has been good with going to [facility]. Pt clinically healthy overall.    No fevers/ chills/ chest pain/ SOB/ nausea/ vomiting/diarrhea/ pain/fatigue        Last visit:  He is here today for Cycle 6 of Keytruda. He wants this to be his last treatment. He reports that he feels well overall today. He is tolerating Keytruda well overall. He continues to have rash/itching that is stable/unchanged. He continues to have mild fatigue. His appetite is good. He denies fevers, chills, chest pain, cough, shortness of breath, nausea/vomiting, constipation/diarrhea, bleeding or pain. Follow up PET scan is scheduled for 4/22/21. CBC and CMP are stable/good today. He wants treatment today. He is here alone today.      Past Medical History:   Diagnosis Date    Arthritis     BPH (benign prostatic hypertrophy) 7/18/2011    Cancer [facility])     prostate cancer    Chronic pain right    knee    ED (erectile dysfunction)     GERD (gastroesophageal reflux disease)     HTN (hypertension), benign     Metastatic squamous cell carcinoma ([facility] [address] 9/16/2020    OA (osteoarthritis) of knee 7/18/2011    BOTH KNEES    Unspecified essential hypertension 7/18/2011      Past Surgical History:   Procedure Laterality Date    HX ACL RECONSTRUCTION Right     x2    HX APPENDECTOMY  1975    HX GI  1/2015    Drainage of hemorrhoid    HX HERNIA REPAIR      left and right,  ventral    HX HIP REPLACEMENT  2000    left    HX HIP REPLACEMENT  2000    left    HX KNEE ARTHROSCOPY Right     x3    HX ORTHOPAEDIC Left 6/2000    HIP REPLACEMENT    HX ORTHOPAEDIC Right 08/05/2014    [facility] - Dr. May Ribeiro    HX PROSTATECTOMY  11/07/2011    prostate removed     HX TONSILLECTOMY      CHILD    IR INJ [facility] EPID LUMB ANES/STER SNGL  8/18/2020      Social History     Tobacco Use    Smoking status: Never Smoker    Smokeless tobacco: Never Used   Substance Use Topics    Alcohol use: No      Family History   Problem Relation Age of Onset    Bleeding Prob Father         clotting disorder    Cancer Brother         colon CA 1/2 brother    Thyroid Disease Daughter     Heart Failure Mother     Heart Disease Mother    [place] Problems Neg Hx      Current Outpatient Medications   Medication Sig    traZODone (DESYREL) 50 mg tablet TAKE 1 TO 2 TABLETS BY MOUTH ONCE DAILY IN THE EVENING AS NEEDED FOR SLEEP    terazosin (HYTRIN) 10 mg capsule Take 1 capsule by mouth once daily    dexAMETHasone (DECADRON) 4 mg tablet Take 2 tabs (8mg) by mouth daily on days 2 and 3 after chemotherapy    ondansetron (ZOFRAN ODT) 4 mg disintegrating tablet Take 1-2 Tabs by mouth every eight (8) hours as needed for Nausea or Vomiting.  prochlorperazine (Compazine) 5 mg tablet Take 1 tab by mouth every 6 hours as needed for nausea or vomiting    amLODIPine (NORVASC) 5 mg tablet Take 1 tablet by mouth nightly    candesartan (ATACAND) 4 mg tablet Take 1 tablet by mouth nightly    ibuprofen (AdviL) 200 mg tablet Take  by mouth.  aspirin delayed-release 81 mg tablet Take 1 Tab by mouth two (2) times a day. Indications: blood clot prevention    senna-docusate (PERICOLACE) 8.6-50 mg per tablet Take 1 Tab by mouth two (2) times daily as needed for Constipation.  acetaminophen (TYLENOL) 500 mg tablet Take 1 Tab by mouth every four (4) hours.  calcium carbonate/vitamin D3 (CALTRATE-600 PLUS VITAMIN D3 PO) Take 1 Tab by mouth nightly.  leuprolide acetate (LUPRON DEPOT, 6 MONTH, IM) 1 Each by IntraMUSCular route every 6 months. No current facility-administered medications for this visit. Allergies   Allergen Reactions    Barbiturates Anxiety     Hyper        Review of Systems:  A complete review of systems was obtained, negative except as described above and as reported on ROS sheet scanned into system.      Physical Exam:     Visit Vitals  BP (!) 149/82 (BP 1 Location: Left upper arm, BP Patient Position: Sitting)   Pulse 63   Temp 96.9 °F (36.1 °C) (Temporal)   Ht 6' (1.829 m)   Wt 232 lb 3.2 oz (105.3 kg)   SpO2 94%   BMI 31.49 kg/m²     ECOG PS: 1  General: No distress  Eyes: Anicteric sclerae  HENT: Atraumatic, wearing a mask  Neck: Supple  Resp: CTAB, Normal respiratory effort   CV: Regular rate and rhythm  GI: Soft, non tender   Ext: No edema  MS: Normal gait and station. Digits without clubbing or cyanosis. Skin: No ecchymoses, or petechiae. Normal temperature, turgor, and texture. Psych: Alert, oriented, appropriate affect, normal judgment/insight  Neuro:Non focal    Results:     Lab Results   Component Value Date/Time    WBC 7.3 06/02/2021 04:26 PM    HGB 11.7 (L) 06/02/2021 04:26 PM    HCT 36.3 (L) 06/02/2021 04:26 PM    PLATELET 212 (L) 79/63/1562 04:26 PM    MCV 95.3 06/02/2021 04:26 PM    ABS. NEUTROPHILS 5.3 06/02/2021 04:26 PM     Lab Results   Component Value Date/Time    Sodium 140 06/02/2021 04:26 PM    Potassium 3.8 06/02/2021 04:26 PM    Chloride 107 06/02/2021 04:26 PM    CO2 26 06/02/2021 04:26 PM    Glucose 101 (H) 06/02/2021 04:26 PM    BUN 34 (H) 06/02/2021 04:26 PM    Creatinine 1.54 (H) 06/02/2021 04:26 PM    GFR est AA 54 (L) 06/02/2021 04:26 PM    GFR est non-AA 44 (L) 06/02/2021 04:26 PM    Calcium 9.0 06/02/2021 04:26 PM    Glucose (POC) 90 04/22/2021 10:19 AM     Lab Results   Component Value Date/Time    Bilirubin, total 0.6 06/02/2021 04:26 PM    ALT (SGPT) 18 06/02/2021 04:26 PM    Alk. phosphatase 68 06/02/2021 04:26 PM    Protein, total 6.6 06/02/2021 04:26 PM    Albumin 3.2 (L) 06/02/2021 04:26 PM    Globulin 3.4 06/02/2021 04:26 PM     MRI Right Hip 8/5/20  IMPRESSION:   1. Enlarging right pelvic sidewall adenopathy with adjacent muscular and bony  invasion  2. Degenerative changes of the right hip  3. Small bony metastatic deposit anterior wall of the right acetabulum    MRI Lumbar Spine 8/11/20  IMPRESSION:  1. L3-L4 moderate central spinal canal and right foraminal stenoses. 2. L5-S1 moderate bilateral foraminal stenosis.      8/20/20  Specimen Source   1: Pelvic Mass, Core biopsy with touch preparation   CYTOLOGIC INTERPRETATION:   Pelvic Mass, Core biopsy with touch preparation:   Squamous cell carcinoma    PET 9/8/20  IMPRESSION: Large hypermetabolic right pelvic sidewall lymph node. Lytic lesion  right anterior     PET 1/12/21  IMPRESSION:  New hypermetabolic pulmonary nodule in the lingula. Resolved left  pelvic sidewall lymph node activity and resolved activity corresponding to the  lytic lesion in the right anterior acetabulum    Records reviewed and summarized above. Pathology report(s) reviewed above. Radiology report(s) reviewed above. Assessment/PLAN:     1) Metastatic Squamous Cell Cancer    EGFR/ROS/ALK negative. PDL1 80% by biopsy of pelvic/hip mas 8/20/20. Path is different than prostate which is adenocarcinoma from 2011. Unclear site of origin of squamous cell. TYPE ID sent and SCC H+N vs skin. Completed XRT to right pelvic mass on 10/22/20. Right hip pain has esolved since completing XRT. He started Pembrolizumab on 12/15/20. He had follow up PET on 1/12/21 that showed new hypermetabolic pulmonary nodule in the lingula, resolved left pelvic sidewall lymph node activity and resolved activity corresponding to the lytic lesion in the right anterior acetabulum. Case reviewed at cancer conference and recommendation was to continue [id number] for now. PET 4/21 showed slight progressive disease in lungs/ LAD/ bones. Pt here to start chemo/ immunotherapy. Case discussed with Dr Jaz Eason at [facility] today. Path review at [facility] possibly prostate cancer. Pt will see Uro/ oncology at [facility] prior to start of chemo here. Pt and wife want to see [facility] prior to start. [facility] will se this up. Hold treatment today. Pt clinically doing well overall. On hold carbo/ taxol/immunotherapy. Fu pending [facility] eval.   Fu here after [facility] appt. 2) Recurrent Prostate Cancer   PSA 0 on Lupron per Urology. Management per Urology. 3) Hx of Right Hip Pain  Resolved after XRT. 4) Rash/Itching   Much better overall. Mild, scattered areas. Improved with OTC hydrocortisone.    Zyrtec for itching    5) Low TSH  TSH 0.02  Free T3 and T4 ordered. Will refer to Endocrinology if continues. Recheck TSH . 6) HTN/Arthritis/GERD/Cronic Elevated Creatinine   Management per PCP. 7) High Risk Drug Monitoring - Immunotherapy  Patient tolerating well with Grade 1 Rash and Grade 1 Fatigue. TSH abnormal. Free T3 and T4 normal/good. Repeat TSH ordered    Will refer to Endocrinology if no improvement    8) Chronic Thrombocytopenia  CC review and present in 2011. Likely not caused by therapy. Mild and will continue to monitor. 9) Psychosocial  Mood good, coping well. He has great support from his wife. SW support as needed. Prognosis: Intermediate     This is our best current assessment. Cancers respond differently to treatment. Overall prognosis depends on many factors including other conditions, cancer stage, side effects, and other unforeseen events. Goal of therapy: Palliative    Expected response to treatment:  Intermediate: Anticipate cancer shrinking or slowed growth but not remission    Treatment benefits and harms:  We discussed potential short term side effects to include:GI upset, increased infection risk, anemia, alopecia, increased risk of bleeding and fatigue     Long term side effects of treatment:  secondary malignancies, bone marrow suppression, reproductive/fertility effects, neuropathy and cardiotoxicity    Quality of life: Quality of life concerns have been addressed. Treatment as outlined is expected to have moderate impact on patients quality of life. Call if questions. Follow up in 1 week    I appreciate the opportunity to participate in [facility].     Signed By: Jessica Gonzalez DO

## 2021-06-03 NOTE — PROGRESS NOTES
[facility] Short Visit Note:    3092:  Pt arrived ambulatory and in no distress for C1 D1 Carboplatin, Paclitaxel, Pembrolizumab (held) He has an appointment with a Doctor at [facility] center and the plan is to hold this chemo today and make another plan after visiting the MD at [facility]. Labs were done yesterday, I placed a PIV  In his left FA that flushed well with good blood  Return, was removed prior to his release. Patient denies Covid contact, denies fever, sob, cough and feeling un well D/Cd from [address] [place] [address] ambulatory and in no distress. Next visit 6/23/21.     Visit Vitals  BP (!) 149/82   Pulse 63   Temp 96.9 °F (36.1 °C)   Resp 18   Ht 6' (1.829 m)   Wt 105.3 kg (232 lb 3.2 oz)   BMI 31.49 kg/m²

## 2021-06-10 ENCOUNTER — DOCUMENTATION ONLY (OUTPATIENT)
Dept: ONCOLOGY | Age: 76
End: 2021-06-10

## 2021-06-10 NOTE — PROGRESS NOTES
This pt went to [facility]  And saw urology that said cancer is not prostate  Pt states plan is to continue with chemo as set up here  Please call pt to confirm  And get [facility] records

## 2021-06-11 ENCOUNTER — TELEPHONE (OUTPATIENT)
Dept: ONCOLOGY | Age: 76
End: 2021-06-11

## 2021-06-11 NOTE — TELEPHONE ENCOUNTER
Returned call to Dr. Emile Avendaño, [facility], [phone]. Updating MD that patient had been seen by Dr. Marjorie Shelton at [facility], but note has not been placed in system yet. States sending email update for Provider. RN supplied fax number and email. States patient to have outside work up including cystoscopy with , would also like Full Body PET ordered. Assured MD that email to be scanned into record when received. Confirmed that recommendation is for chemo ordered. Understands that PSR is working to get patient treatment rescheduled to date earlier than 6/23/21. Update to Provider.

## 2021-06-11 NOTE — TELEPHONE ENCOUNTER
Dr. Tata James from [address] called in regards to this patient. Wanted to speak with Dr. Virgie Landers or RN.  Did not specify why    Office: [phone]  Cell: [phone]

## 2021-06-14 DIAGNOSIS — C77.5 PROSTATE CANCER METASTATIC TO INTRAPELVIC LYMPH NODE (HCC): ICD-10-CM

## 2021-06-14 DIAGNOSIS — C61 PROSTATE CANCER METASTATIC TO INTRAPELVIC LYMPH NODE (HCC): ICD-10-CM

## 2021-06-15 DIAGNOSIS — C61 PROSTATE CANCER (HCC): ICD-10-CM

## 2021-06-15 DIAGNOSIS — C61 PROSTATE CANCER METASTATIC TO INTRAPELVIC LYMPH NODE (HCC): ICD-10-CM

## 2021-06-15 DIAGNOSIS — C77.5 PROSTATE CANCER METASTATIC TO INTRAPELVIC LYMPH NODE (HCC): ICD-10-CM

## 2021-06-16 NOTE — TELEPHONE ENCOUNTER
Outgoing call to patient, ID verified X 2. Updated patient that per [facility] [employer] [facility] recommendations a full body PET had been ordered. Assured patient that if needed a PA would be performed with insurance company. Reviewed other [facility] recommendations for cystoscope (states had recently done) and a urine cytology. Reviewed the change in chemo regimen, patient has materials from consent signed in May. Reviewed post meds, Will take D 2 & 3 Dex with food in AM, discussed  management of potential side effects, how to reach after hours provider. Understanding verbalized of all. Update to Provider.

## 2021-06-17 NOTE — PROGRESS NOTES
[facility] [place] [facility] [place] [address] [address], [place]: [phone]  F: [phone]    Reason for Visit:   Sirisha Marie is a 78 y.o. male who is seen today in office for follow up of metastatic SCC     Treatment History:   · MRI Right Hip 8/5/20: Enlarging right pelvic sidewall adenopathy with adjacent muscular and bony invasion. Degenerative changes of the right hip. Small bony metastatic deposit anterior wall of the right acetabulum  · MRI Lumbar Spine 8/11/20: L3-L4 moderate central spinal canal and right foraminal stenoses. L5-S1 moderate bilateral foraminal stenosis  · 8/20/20 Pelvic Mass, Core biopsy with touch preparation - CYTOLOGIC INTERPRETATION: Pelvic Mass, Core biopsy with touch preparation: Squamous cell carcinoma  · TYPE ID + SCC/H+N vs skin  · PET 9/8/20: Large hypermetabolic right pelvic sidewall lymph node. Lytic lesion right anterior acetabulum is hypermetabolic and compatible with metastatic disease   · XRT to Right Pelvic Mass 10/13/20 - 10/22/20 with Dr. Benny Galan  · Pembrolizumab 12/15/20 - Current   · PET 1/12/21: New hypermetabolic pulmonary nodule in the lingula. Resolved left pelvic sidewall lymph node activity and resolved activity corresponding to the lytic lesion in the right anterior acetabulum    History of Present Illness:   Sirisha Marie is a 78 y.o. male seen today in office for follow up of metastatic SCC primary site unclear PDL1 80%    Pt is doing well and feeling well today. [facility]. Pt is for chemo/immunotherapy tmw. Here with wife today. [facility]. Labs today prior to chemo tmw.    No fevers/ chills/ chest pain/ SOB/ nausea/ vomiting/diarrhea/ pain/fatigue    Past Medical History:   Diagnosis Date    Arthritis     BPH (benign prostatic hypertrophy) 7/18/2011    Cancer (HCC)     prostate cancer    Chronic pain right    knee    ED (erectile dysfunction)     GERD (gastroesophageal reflux disease)     HTN (hypertension), benign     Metastatic squamous cell carcinoma (HCC) 9/16/2020    OA (osteoarthritis) of knee 7/18/2011    BOTH KNEES    Unspecified essential hypertension 7/18/2011      Past Surgical History:   Procedure Laterality Date    HX ACL RECONSTRUCTION Right     x2    HX APPENDECTOMY  1975    HX GI  1/2015    Drainage of hemorrhoid    HX HERNIA REPAIR      left and right,  ventral    HX HIP REPLACEMENT  2000    left    HX HIP REPLACEMENT  2000    left    HX KNEE ARTHROSCOPY Right     x3    HX ORTHOPAEDIC Left 6/2000    HIP REPLACEMENT    HX ORTHOPAEDIC Right 08/05/2014    [facility] - Dr. Kasie Parmar HX PROSTATECTOMY  11/07/2011    prostate removed     HX TONSILLECTOMY      CHILD    IR INJ FORAMIN EPID LUMB ANES/STER SNGL  8/18/2020      Social History     Tobacco Use    Smoking status: Never Smoker    Smokeless tobacco: Never Used   Substance Use Topics    Alcohol use: No      Family History   Problem Relation Age of Onset    Bleeding Prob Father         clotting disorder    Cancer Brother         colon CA 1/2 brother    Thyroid Disease Daughter     Heart Failure Mother     Heart Disease Mother     Anesth Problems Neg Hx      Current Outpatient Medications   Medication Sig    traZODone (DESYREL) 50 mg tablet TAKE 1 TO 2 TABLETS BY MOUTH ONCE DAILY IN THE EVENING AS NEEDED FOR SLEEP    terazosin (HYTRIN) 10 mg capsule Take 1 capsule by mouth once daily    dexAMETHasone (DECADRON) 4 mg tablet Take 2 tabs (8mg) by mouth daily on days 2 and 3 after chemotherapy    ondansetron (ZOFRAN ODT) 4 mg disintegrating tablet Take 1-2 Tabs by mouth every eight (8) hours as needed for Nausea or Vomiting.     prochlorperazine (Compazine) 5 mg tablet Take 1 tab by mouth every 6 hours as needed for nausea or vomiting    amLODIPine (NORVASC) 5 mg tablet Take 1 tablet by mouth nightly    candesartan (ATACAND) 4 mg tablet Take 1 tablet by mouth nightly    ibuprofen (AdviL) 200 mg tablet Take  by mouth.  aspirin delayed-release 81 mg tablet Take 1 Tab by mouth two (2) times a day. Indications: blood clot prevention    senna-docusate (PERICOLACE) 8.6-50 mg per tablet Take 1 Tab by mouth two (2) times daily as needed for Constipation.  acetaminophen (TYLENOL) 500 mg tablet Take 1 Tab by mouth every four (4) hours.  calcium carbonate/vitamin D3 (CALTRATE-600 PLUS VITAMIN D3 PO) Take 1 Tab by mouth nightly.  leuprolide acetate (LUPRON DEPOT, 6 MONTH, IM) 1 Each by IntraMUSCular route every 6 months. No current facility-administered medications for this visit. Allergies   Allergen Reactions    Barbiturates Anxiety     Hyper        Review of Systems:  A complete review of systems was obtained, negative except as described above and as reported on ROS sheet scanned into system. Physical Exam:     Visit Vitals  BP (!) 155/88 (BP 1 Location: Left upper arm, BP Patient Position: Sitting)   Pulse (!) 58   Temp 96.9 °F (36.1 °C) (Temporal)   Ht 6' (1.829 m)   Wt 233 lb 6.4 oz (105.9 kg)   SpO2 96%   BMI 31.65 kg/m²     ECOG PS: 1  General: No distress  Eyes: Anicteric sclerae  HENT: Atraumatic, wearing a mask  Neck: Supple  Resp: CTAB, Normal respiratory effort   CV: Regular rate and rhythm  GI: Soft, non tender   Ext: No edema  MS: Normal gait and station. Digits without clubbing or cyanosis. Skin: No ecchymoses, or petechiae. Normal temperature, turgor, and texture. Psych: Alert, oriented, appropriate affect, normal judgment/insight  Neuro:Non focal    Results:     Lab Results   Component Value Date/Time    WBC 7.3 06/02/2021 04:26 PM    HGB 11.7 (L) 06/02/2021 04:26 PM    HCT 36.3 (L) 06/02/2021 04:26 PM    PLATELET 501 (L) 27/02/5025 04:26 PM    MCV 95.3 06/02/2021 04:26 PM    ABS.  NEUTROPHILS 5.3 06/02/2021 04:26 PM     Lab Results   Component Value Date/Time    Sodium 140 06/02/2021 04:26 PM Potassium 3.8 06/02/2021 04:26 PM    Chloride 107 06/02/2021 04:26 PM    CO2 26 06/02/2021 04:26 PM    Glucose 101 (H) 06/02/2021 04:26 PM    BUN 34 (H) 06/02/2021 04:26 PM    Creatinine 1.54 (H) 06/02/2021 04:26 PM    GFR est AA 54 (L) 06/02/2021 04:26 PM    GFR est non-AA 44 (L) 06/02/2021 04:26 PM    Calcium 9.0 06/02/2021 04:26 PM    Glucose (POC) 90 04/22/2021 10:19 AM     Lab Results   Component Value Date/Time    Bilirubin, total 0.6 06/02/2021 04:26 PM    ALT (SGPT) 18 06/02/2021 04:26 PM    Alk. phosphatase 68 06/02/2021 04:26 PM    Protein, total 6.6 06/02/2021 04:26 PM    Albumin 3.2 (L) 06/02/2021 04:26 PM    Globulin 3.4 06/02/2021 04:26 PM     MRI Right Hip 8/5/20  IMPRESSION:   1. Enlarging right pelvic sidewall adenopathy with adjacent muscular and bony  invasion  2. Degenerative changes of the right hip  3. Small bony metastatic deposit anterior wall of the right acetabulum    MRI Lumbar Spine 8/11/20  IMPRESSION:  1. L3-L4 moderate central spinal canal and right foraminal stenoses. 2. L5-S1 moderate bilateral foraminal stenosis. 8/20/20  Specimen Source   1: Pelvic Mass, Core biopsy with touch preparation   CYTOLOGIC INTERPRETATION:   Pelvic Mass, Core biopsy with touch preparation:   Squamous cell carcinoma    PET 9/8/20  IMPRESSION: Large hypermetabolic right pelvic sidewall lymph node. Lytic lesion  right anterior     PET 1/12/21  IMPRESSION:  New hypermetabolic pulmonary nodule in the lingula. Resolved left  pelvic sidewall lymph node activity and resolved activity corresponding to the  lytic lesion in the right anterior acetabulum    Records reviewed and summarized above. Pathology report(s) reviewed above. Radiology report(s) reviewed above. Assessment/PLAN:     1) Metastatic Squamous Cell Cancer    EGFR/ROS/ALK negative. PDL1 80% by biopsy of pelvic/hip mas 8/20/20. Path is different than prostate which is adenocarcinoma from 2011. Unclear site of origin of squamous cell. TYPE ID sent and SCC H+N vs skin. Completed XRT to right pelvic mass on 10/22/20. Right hip pain has esolved since completing XRT. He started Pembrolizumab on 12/15/20. He had follow up PET on 1/12/21 that showed new hypermetabolic pulmonary nodule in the lingula, resolved left pelvic sidewall lymph node activity and resolved activity corresponding to the lytic lesion in the right anterior acetabulum. Case reviewed at cancer conference and recommendation was to continue [id number] [address] for now. PET 4/21 showed slight progressive disease in lungs/ LAD/ bones. Pt has been seen at [facility] by med/onc and uro/onc. D/w [facility] and plan is still same as recommended here. Site of origin of cancer is still unclear but not likely prostate. Pt here to start chemo/ immunotherapy. Pt clinically doing well overall. Reviewed treatment plan overall with two months of therapy then fu imaging again. Labs personally reviewed today. Proceed with treatment tmw. 2) Recurrent Prostate Cancer   PSA 0 on Lupron per Urology. Management per Urology. 3) Hx of Right Hip Pain  Resolved after XRT. 4) Rash/Itching   Much better overall. Mild, scattered areas. Improved with OTC hydrocortisone. Zyrtec for itching    5) Low TSH  TSH 0.02  Free T3 and T4 ordered. Will refer to Endocrinology if continues. Recheck TSH . 6) HTN/Arthritis/GERD/Cronic Elevated Creatinine   Management per PCP. 7) High Risk Drug Monitoring - Immunotherapy  Patient tolerating well with Grade 1 Rash and Grade 1 Fatigue. TSH abnormal. Free T3 and T4 normal/good. Repeat TSH ordered    Will refer to Endocrinology if no improvement    8) Chronic Thrombocytopenia  CC review and present in 2011. Likely not caused by therapy. Mild and will continue to monitor. 9) Psychosocial  Mood good, coping well. He has great support from his wife.  support as needed. Prognosis: Intermediate     This is our best current assessment. Cancers respond differently to treatment. Overall prognosis depends on many factors including other conditions, cancer stage, side effects, and other unforeseen events. Goal of therapy: Palliative    Expected response to treatment:  Intermediate: Anticipate cancer shrinking or slowed growth but not remission    Treatment benefits and harms:  We discussed potential short term side effects to include:GI upset, increased infection risk, anemia, alopecia, increased risk of bleeding and fatigue     Long term side effects of treatment:  secondary malignancies, bone marrow suppression, reproductive/fertility effects, neuropathy and cardiotoxicity    Quality of life: Quality of life concerns have been addressed. Treatment as outlined is expected to have moderate impact on patients quality of life. Call if questions. Follow up in 1 week    I appreciate the opportunity to participate in Mr. Donald Truong yahir.     Signed By: Andrews Awad DO

## 2021-06-17 NOTE — PROGRESS NOTES
\[facility]\"" Lab Visit:        1600:  Pt arrived ambulatory  To [facility] in stable condition, for lab draw. Labs drawn peripherally from Right AC arm and sent for processing. Pt tolerated well. Visit Vitals  BP (!) 169/87 (BP 1 Location: Left upper arm, BP Patient Position: Sitting)   Pulse (!) 52   Temp 97.1 °F (36.2 °C)   Resp 18   SpO2 97%       1615: No new concerns voiced. D/d home ambulatory in no distress. Pt aware of next [facility] appointment scheduled. Labs available in CC once resulted.

## 2021-06-17 NOTE — PROGRESS NOTES
Dale Kearney is a 78 y.o. male  Chief Complaint   Patient presents with    Chemotherapy     metastatic SCC      1. Have you been to the ER, urgent care clinic since your last visit? Hospitalized since your last visit? No.    2. Have you seen or consulted any other health care providers outside of the [address] [place] [address] since your last visit? Include any pap smears or colon screening. Yes, patient went to see [place]. Patient is now fully vaccinated with the Mendez Peter covid-19 vaccine.

## 2021-06-18 NOTE — PROGRESS NOTES
[facility] Chemo Progress Note    Date: 2021    Name: Elizabeth Ruiz    MRN: [id number]         : 1945    0750 Mr. Sonny Lux Arrived to [facility] for C1 Taxol+Carbo+Pembrolizumab ambulatory in stable condition. Assessment was completed, no acute issues at this time, no new complaints voiced. Peripheral IV established with positive blood return. Labs drawn at previous appt and reviewed by pharmacist.     Chemotherapy Flowsheet 2021   Cycle C1   Date 2021   Drug / Regimen Taxol/Carbo/Pembro   Pre Meds given   Notes given     Mr. Soler's vitals were reviewed. Patient Vitals for the past 12 hrs:   Temp Pulse Resp BP   21 1544  85 18 (!) 165/92   21 0753 97.3 °F (36.3 °C) 66 18 (!) 150/86     Pre-medications  were administered as ordered and chemotherapy was initiated.   Medications Administered     0.9% sodium chloride infusion     Admin Date  2021 Action  New Bag Dose  25 mL/hr Rate  25 mL/hr Route  IntraVENous Administered By  Ian Arnold RN          CARBOplatin (PARAPLATIN) 551 mg in 0.9% sodium chloride 250 mL, overfill volume 25 mL chemo infusion     Admin Date  2021 Action  New Bag Dose  551 mg Rate  660.2 mL/hr Route  IntraVENous Administered By  Ian Arnold RN          dexamethasone (DECADRON) 12 mg in 0.9% sodium chloride 50 mL, overfill volume 5 mL IVPB     Admin Date  2021 Action  New Bag Dose  12 mg Rate  232 mL/hr Route  IntraVENous Administered By  Ian Arnold RN          diphenhydrAMINE (BENADRYL) injection 50 mg     Admin Date  2021 Action  Given Dose  50 mg Route  IntraVENous Administered By  Ian Arnold RN          famotidine (PF) (PEPCID) 20 mg in 0.9% sodium chloride 10 mL injection     Admin Date  2021 Action  Given Dose  20 mg Route  IntraVENous Administered By  Ian Arnold RN          fosaprepitant (EMEND) 150 mg in 0.9% sodium chloride 150 mL IVPB     Admin Date  2021 Action  New Bag Dose  150 mg Rate  450 mL/hr Route  IntraVENous Administered By  Erika Amaya RN          PACLitaxeL (TAXOL) 401 mg in 0.9% sodium chloride 500 mL, overfill volume 50 mL chemo infusion     Admin Date  06/18/2021 Action  New Bag Dose  401 mg Rate  205.6 mL/hr Route  IntraVENous Administered By  Erika Amaya RN          palonosetron HCl (ALOXI) injection 0.25 mg     Admin Date  06/18/2021 Action  Given Dose  0.25 mg Route  IntraVENous Administered By  Erika Amaya RN          pembrolizumab [facility]) 200 mg in 0.9% sodium chloride 100 mL, overfill volume 10 mL IVPB     Admin Date  06/18/2021 Action  New Bag Dose  200 mg Rate  236 mL/hr Route  IntraVENous Administered By  Erika Amaya RN          sodium chloride (NS) flush 10 mL     Admin Date  06/18/2021 Action  Given Dose  10 mL Route  IntraVENous Administered By  Erika Amaya RN              2914 Patient tolerated treatment well. PIV maintained positive blood return throughout treatment. Line flushed and removed per protocol. Patient was discharged from [facility] in stable condition.      Future Appointments   Date Time Provider Americo Redd   7/7/2021  4:30 PM H3 [facility]   7/8/2021  7:30 AM A2 ASHLEY LONG [place] [facility]   7/28/2021  4:30 PM H3 [facility]   7/29/2021  7:30 AM A2 ASHLEY Franco RN  June 18, 2021

## 2021-07-09 NOTE — PROGRESS NOTES
[facility]cer [place] [facility] [place] [address] [address], [address] [address], [facility] [phone]  F: [phone]    Reason for Visit:   Chinyere Storey is a 78 y.o. male who is seen today in office for follow up of metastatic SCC     Treatment History:   · MRI Right Hip 8/5/20: Enlarging right pelvic sidewall adenopathy with adjacent muscular and bony invasion. Degenerative changes of the right hip. Small bony metastatic deposit anterior wall of the right acetabulum  · MRI Lumbar Spine 8/11/20: L3-L4 moderate central spinal canal and right foraminal stenoses. L5-S1 moderate bilateral foraminal stenosis  · 8/20/20 Pelvic Mass, Core biopsy with touch preparation - CYTOLOGIC INTERPRETATION: Pelvic Mass, Core biopsy with touch preparation: Squamous cell carcinoma  · TYPE ID + SCC/H+N vs skin  · PET 9/8/20: Large hypermetabolic right pelvic sidewall lymph node. Lytic lesion right anterior acetabulum is hypermetabolic and compatible with metastatic disease   · XRT to Right Pelvic Mass 10/13/20 - 10/22/20 with Dr. Orson Lesches  · Pembrolizumab 12/15/20 - Current   · PET 1/12/21: New hypermetabolic pulmonary nodule in the lingula. Resolved left pelvic sidewall lymph node activity and resolved activity corresponding to the lytic lesion in the right anterior acetabulum    History of Present Illness:   Chinyere Storey is a 78 y.o. male seen today in office for follow up of metastatic SCC primary site unclear PDL1 80%  On carbo/ taxol/ pembro. Pt is doing well and feeling well today. Tolerated cycle 1 chemo well. Has some achy and fatigue. Minimal nausea. Feels fine for chemo today. Labs good yesterday. No fevers/ chills/ chest pain/ SOB/ nausea/ vomiting/diarrhea/ pain        [facility]: [facility] our treatment plan. Pt is for chemo/immunotherapy tmw. Here with wife today. Pt [facility] for a [facility]. Labs today prior to chemo tmw. No fevers/ chills/ chest pain/ SOB/ nausea/ vomiting/diarrhea/ pain/fatigue    Past Medical History:   Diagnosis Date    Arthritis     BPH (benign prostatic hypertrophy) 7/18/2011    Cancer (HCC)     prostate cancer    Chronic pain right    knee    ED (erectile dysfunction)     GERD (gastroesophageal reflux disease)     HTN (hypertension), benign     Metastatic squamous cell carcinoma ([facility] [address] 9/16/2020    OA (osteoarthritis) of knee 7/18/2011    BOTH KNEES    Unspecified essential hypertension 7/18/2011      Past Surgical History:   Procedure Laterality Date    HX ACL RECONSTRUCTION Right     x2    HX APPENDECTOMY  1975    HX GI  1/2015    Drainage of hemorrhoid    HX HERNIA REPAIR      left and right,  ventral    HX HIP REPLACEMENT  2000    left    HX HIP REPLACEMENT  2000    left    HX KNEE ARTHROSCOPY Right     x3    HX ORTHOPAEDIC Left 6/2000    HIP REPLACEMENT    HX ORTHOPAEDIC Right 08/05/2014    TKR - Dr. Brown Manual    HX PROSTATECTOMY  11/07/2011    prostate removed     HX TONSILLECTOMY      CHILD    IR INJ [place] EPID LUMB ANES/STER SNGL  8/18/2020      Social History     Tobacco Use    Smoking status: Never Smoker    Smokeless tobacco: Never Used   Substance Use Topics    Alcohol use: No      Family History   Problem Relation Age of Onset    Bleeding Prob Father         clotting disorder    Cancer Brother         colon CA 1/2 brother    Thyroid Disease Daughter     Heart Failure Mother     Heart Disease Mother     Anesth Problems Neg Hx      Current Outpatient Medications   Medication Sig    traZODone (DESYREL) 50 mg tablet TAKE 1 TO 2 TABLETS BY MOUTH ONCE DAILY IN THE EVENING AS NEEDED FOR SLEEP    terazosin (HYTRIN) 10 mg capsule Take 1 capsule by mouth once daily    dexAMETHasone (DECADRON) 4 mg tablet Take 2 tabs (8mg) by mouth daily on days 2 and 3 after chemotherapy    ondansetron (ZOFRAN ODT) 4 mg disintegrating tablet Take 1-2 Tabs by mouth every eight (8) hours as needed for Nausea or Vomiting.  prochlorperazine (Compazine) 5 mg tablet Take 1 tab by mouth every 6 hours as needed for nausea or vomiting    amLODIPine (NORVASC) 5 mg tablet Take 1 tablet by mouth nightly    candesartan (ATACAND) 4 mg tablet Take 1 tablet by mouth nightly    ibuprofen (AdviL) 200 mg tablet Take  by mouth.  aspirin delayed-release 81 mg tablet Take 1 Tab by mouth two (2) times a day. Indications: blood clot prevention    senna-docusate (PERICOLACE) 8.6-50 mg per tablet Take 1 Tab by mouth two (2) times daily as needed for Constipation.  acetaminophen (TYLENOL) 500 mg tablet Take 1 Tab by mouth every four (4) hours.  calcium carbonate/vitamin D3 (CALTRATE-600 PLUS VITAMIN D3 PO) Take 1 Tab by mouth nightly.  leuprolide acetate (LUPRON DEPOT, 6 MONTH, IM) 1 Each by IntraMUSCular route every 6 months. No current facility-administered medications for this visit. Allergies   Allergen Reactions    Barbiturates Anxiety     Hyper        Review of Systems:  A complete review of systems was obtained, negative except as described above and as reported on ROS sheet scanned into system. Physical Exam:     Visit Vitals  /75 (BP 1 Location: Left upper arm, BP Patient Position: Sitting)   Pulse 73   Temp 97.4 °F (36.3 °C) (Temporal)   Ht 6' (1.829 m)   Wt 230 lb 1.6 oz (104.4 kg)   SpO2 96%   BMI 31.21 kg/m²     ECOG PS: 1  General: No distress  Eyes: Anicteric sclerae  HENT: Atraumatic, wearing a mask  Neck: Supple  Resp: CTAB, Normal respiratory effort   CV: Regular rate and rhythm  GI: Soft, non tender   Ext: No edema  MS: Normal gait and station. Digits without clubbing or cyanosis. Skin: No ecchymoses, or petechiae. Normal temperature, turgor, and texture.   Psych: Alert, oriented, appropriate affect, normal judgment/insight  Neuro:Non focal    Results:     Lab Results   Component Value Date/Time    WBC 5.5 07/08/2021 03:30 PM    HGB 9.7 (L) 07/08/2021 03:30 PM    HCT 29.7 (L) 07/08/2021 03:30 PM    PLATELET 147 89/19/2373 03:30 PM    MCV 94.3 07/08/2021 03:30 PM    ABS. NEUTROPHILS 3.8 07/08/2021 03:30 PM     Lab Results   Component Value Date/Time    Sodium 140 07/08/2021 03:30 PM    Potassium 4.5 07/08/2021 03:30 PM    Chloride 108 07/08/2021 03:30 PM    CO2 26 07/08/2021 03:30 PM    Glucose 106 (H) 07/08/2021 03:30 PM    BUN 27 (H) 07/08/2021 03:30 PM    Creatinine 1.39 (H) 07/08/2021 03:30 PM    GFR est AA >60 07/08/2021 03:30 PM    GFR est non-AA 50 (L) 07/08/2021 03:30 PM    Calcium 8.4 (L) 07/08/2021 03:30 PM    Glucose (POC) 90 04/22/2021 10:19 AM     Lab Results   Component Value Date/Time    Bilirubin, total 0.4 07/08/2021 03:30 PM    ALT (SGPT) 14 07/08/2021 03:30 PM    Alk. phosphatase 65 07/08/2021 03:30 PM    Protein, total 5.9 (L) 07/08/2021 03:30 PM    Albumin 3.0 (L) 07/08/2021 03:30 PM    Globulin 2.9 07/08/2021 03:30 PM     MRI Right Hip 8/5/20  IMPRESSION:   1. Enlarging right pelvic sidewall adenopathy with adjacent muscular and bony  invasion  2. Degenerative changes of the right hip  3. Small bony metastatic deposit anterior wall of the right acetabulum    MRI Lumbar Spine 8/11/20  IMPRESSION:  1. L3-L4 moderate central spinal canal and right foraminal stenoses. 2. L5-S1 moderate bilateral foraminal stenosis. 8/20/20  Specimen Source   1: Pelvic Mass, Core biopsy with touch preparation   CYTOLOGIC INTERPRETATION:   Pelvic Mass, Core biopsy with touch preparation:   Squamous cell carcinoma    PET 9/8/20  IMPRESSION: Large hypermetabolic right pelvic sidewall lymph node. Lytic lesion  right anterior     PET 1/12/21  IMPRESSION:  New hypermetabolic pulmonary nodule in the lingula. Resolved left  pelvic sidewall lymph node activity and resolved activity corresponding to the  lytic lesion in the right anterior acetabulum    Records reviewed and summarized above. Pathology report(s) reviewed above. Radiology report(s) reviewed above.     Assessment/PLAN: 1) Metastatic Squamous Cell Cancer    EGFR/ROS/ALK negative. PDL1 80% by biopsy of pelvic/hip mas 8/20/20. Path is different than prostate which is adenocarcinoma from 2011. Unclear site of origin of squamous cell. TYPE ID sent and SCC H+N vs skin. Completed XRT to right pelvic mass on 10/22/20. Right hip pain has esolved since completing XRT. He started Pembrolizumab on 12/15/20. He had follow up PET on 1/12/21 that showed new hypermetabolic pulmonary nodule in the lingula, resolved left pelvic sidewall lymph node activity and resolved activity corresponding to the lytic lesion in the right anterior acetabulum. Case reviewed at cancer conference and recommendation was to continue [id number] for now. PET 4/21 showed slight progressive disease in lungs/ LAD/ bones. Pt has been seen at [facility] by med/onc and uro/onc. D/w [facility] and plan is still same as recommended here. Site of origin of cancer is still unclear but not likely prostate. Seen today for fu. Tolerated cycle 1 well. On chemo/ immunotherapy. Pt clinically doing well overall. Reviewed treatment plan overall with two months of therapy then fu imaging again. Labs personally reviewed today. Proceed with treatment today. 2) Recurrent Prostate Cancer   PSA 0 on Lupron per Urology. Management per Urology. 3) Hx of Right Hip Pain  Resolved after XRT. 4) Rash/Itching   Much better overall. Mild, scattered areas. Improved with OTC hydrocortisone. Zyrtec for itching    5) Low TSH  TSH 0.02  Free T3 and T4 ordered. Will refer to Endocrinology if continues. Recheck TSH . 6) HTN/Arthritis/GERD/Cronic Elevated Creatinine   Management per PCP. 7) High Risk Drug Monitoring - Immunotherapy  Patient tolerating well with Grade 1 Rash and Grade 1 Fatigue. TSH abnormal. Free T3 and T4 normal/good.   Repeat TSH ordered    Will refer to Endocrinology if no improvement    8) Chronic Thrombocytopenia  CC review and present in 2011. Likely not caused by therapy. Mild and will continue to monitor. 9) Psychosocial  Mood good, coping well. He has great support from his wife. Fisherman. SW support as needed. Call if questions. Follow up in 3 weeks    I appreciate the opportunity to participate in [facility].     Signed By: Dennis Guerreor DO

## 2021-07-09 NOTE — PROGRESS NOTES
Halie Garcia is a 78 y.o. male  Chief Complaint   Patient presents with    Chemotherapy      metastatic SCC      1. Have you been to the ER, urgent care clinic since your last visit? Hospitalized since your last visit? No.  2. Have you seen or consulted any other health care providers outside of the [address] [place] [address] since your last visit? Include any pap smears or colon screening.  No.

## 2021-07-09 NOTE — PROGRESS NOTES
hospitals Chemo Progress Note    Date: 2021    Name: Sun Willis    MRN: [id number]         : 1945    0800 Mr. Jason Juarez Arrived to [place] for  C2 Keytruda/Taxol/Carboplatin ambulatory in stable condition. Assessment was completed, no acute issues at this time, no new complaints voiced. Peripheral IV established with positive blood return. Normal Saline started at [facility]. Chemotherapy Flowsheet 2021   Cycle C2   Date 2021   Drug / Regimen Keytruda/Taxol/Carboplatin   Pre Meds given   Notes given         Patient denies  SOB, fever, cough, general not feeling well. Patient denies recent exposure to someone who has tested positive for COVID-19. Patient denies having contact with anyone who has a pending COVID test.      Mr. Bill Gomez vitals were reviewed. Patient Vitals for the past 12 hrs:   Pulse Resp BP   21 1600 62 16 (!) 154/87   21 0800 65 16 139/80         Lab results were obtained and reviewed. No results found for this or any previous visit (from the past 12 hour(s)). Pre-medications  were administered as ordered and chemotherapy was initiated.   Medications Administered     0.9% sodium chloride infusion     Admin Date  2021 Action  New Bag Dose  25 mL/hr Rate  25 mL/hr Route  IntraVENous Administered By  Jared Helton RN          CARBOplatin (PARAPLATIN) 563 mg in 0.9% sodium chloride 250 mL, overfill volume 25 mL chemo infusion     Admin Date  2021 Action  New Bag Dose  563 mg Rate  662.6 mL/hr Route  IntraVENous Administered By  Jared Helton RN          dexamethasone (DECADRON) 12 mg in 0.9% sodium chloride 50 mL, overfill volume 5 mL IVPB     Admin Date  2021 Action  New Bag Dose  12 mg Rate  232 mL/hr Route  IntraVENous Administered By  Jared Helton RN          diphenhydrAMINE (BENADRYL) injection 50 mg     Admin Date  2021 Action  Given Dose  50 mg Route  IntraVENous Administered By  Jared Helton RN          famotidine (PF) (PEPCID) 20 mg in 0.9% sodium chloride 10 mL injection     Admin Date  07/09/2021 Action  Given Dose  20 mg Route  IntraVENous Administered By  Ly Castaneda RN          fosaprepitant (EMEND) 150 mg in 0.9% sodium chloride 150 mL IVPB     Admin Date  07/09/2021 Action  New Bag Dose  150 mg Rate  450 mL/hr Route  IntraVENous Administered By  Ly Castaneda RN          PACLitaxeL (TAXOL) 401 mg in 0.9% sodium chloride 250 mL, overfill volume 25 mL chemo infusion     Admin Date  07/09/2021 Action  New Bag Dose  401 mg Rate  113.9 mL/hr Route  IntraVENous Administered By  Ly Castaneda RN          palonosetron HCl (ALOXI) injection 0.25 mg     Admin Date  07/09/2021 Action  Given Dose  0.25 mg Route  IntraVENous Administered By  Ly Castaneda RN          pembrolizumab [facility]) 200 mg in 0.9% sodium chloride 100 mL, overfill volume 10 mL IVPB     Admin Date  07/09/2021 Action  New Bag Dose  200 mg Rate  236 mL/hr Route  IntraVENous Administered By  Ly Castaneda RN          sodium chloride (NS) flush 10 mL     Admin Date  07/09/2021 Action  Given Dose  10 mL Route  IntraVENous Administered By  Ly Castaneda RN                  1600 Patient tolerated treatment well. Peripheral IV maintained positive blood return throughout treatment. Peripheral IV flushed and removed per protocol Patient was discharged from [place] [address].     Future Appointments   Date Time Provider Americo Redd   7/28/2021  4:30 PM H3 [facility] H   7/29/2021  7:30 AM A2 ASHLEY LONG [address] [facility] H   7/29/2021  8:15 AM Adriana Close,  [address] BS AMB   8/18/2021  4:30 PM [facility]   8/19/2021  7:30 AM A2 ASHLEY LONG [address] [facility], RN  July 9, 2021

## 2021-07-16 NOTE — TELEPHONE ENCOUNTER
Patient called stating that he went to get his scan done and once it was finished he was told he could not leave unless he was going to the emergency room. Patient did not want to go but wanted the technologist, who was keeping him there, to speak with Dr. Kale Kramer directly. Spoke with the technologist and she provided a number for Dr. Kale Kramer to call and restated that they can not release the patient until they spoke with Dr. Kale Kramer. Made her aware that we would have Dr. Kale Kramer call her back at [phone] as the patient has an extensive DVT.     Update to Provider

## 2021-07-16 NOTE — TELEPHONE ENCOUNTER
Returned call to [facility], Provider is out of the office, will relay message and have MD contact. Message relayed to Provider who attempted multiple times to reach via [phone]. Contacted Tech and provided with MD cell phone number and advised to contact this way. Provider notified RN that call with Tech was dropped and patient advised to proceed to ER.

## 2021-07-16 NOTE — TELEPHONE ENCOUNTER
Incoming call from patient, patient ID verified X 2. [phone]    States previous swelling in right ankle, cut grass on riding mower yesterday and now has swelling from ankle to groin - states \"tremendously swollen. \"  Has also texted info to MD.  Denies redness and heat, no bug bites to area. Currently in [facility], very concerned about DVT, would like ultrasound. During call, patient received text from MD that scan was ordered. Contacted [facility] to schedule. Spoke with Tony Jacques at [phone]. Patient scheduled at [facility] with a noon arrival time today, 1230 scan. Follow up call to patient, provided appt information - understands to bring photo ID, insurance cards, follow parking signs in lot, to enter door with blue canopy near ER entrance. Understanding verbalized of all. Update to Provider.

## 2021-07-16 NOTE — PROGRESS NOTES
Tried calling two numbers at [facility] and cannot get through  Tell pt to go to ER  Only way he can get treated for DVT

## 2021-07-16 NOTE — ED PROVIDER NOTES
EMERGENCY DEPARTMENT HISTORY AND PHYSICAL EXAM          Date: 7/16/2021  Patient Name: Pilar Alvarez    History of Presenting Illness     Chief Complaint   Patient presents with    Blood Clot       History Provided By: Patient    HPI: Pilar Alvarez is a 78 y.o. 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 He called his oncologist office this morning who recommended he come in they set up an ultrasound outpatient at 1230. Outpatient notable for extensive clot burden from the groin to the distal leg. Patient states other than the pain he feels great. He has not had any fevers, chills, nausea, vomiting, diarrhea, chest pain or shortness of breath. His last chemo was 1 week ago and he notes that after he recovered still a little bit of arthralgia but otherwise had no issues. He states he has been eating and drinking well and does not feel dehydrated. PCP: Luci Francois MD    Allergies: Barbiturates    There are no other complaints, changes, or physical findings at this time.      Current Facility-Administered Medications   Medication Dose Route Frequency Provider Last Rate Last Admin    sodium chloride (NS) flush 5-10 mL  5-10 mL IntraVENous ONCE Termeer, Evelyn Bentley MD         Current Outpatient Medications   Medication Sig Dispense Refill    apixaban (Eliquis DVT-PE Treat 30D Start) 5 mg (74 tabs) starter pack Take 10 mg (two 5 mg tablets) by mouth twice a day for 7 days   Followed by 5 mg (one 5 mg tablet) by mouth twice a day 1 Dose Pack 0    traZODone (DESYREL) 50 mg tablet TAKE 1 TO 2 TABLETS BY MOUTH ONCE DAILY IN THE EVENING AS NEEDED FOR SLEEP 60 Tab 1    terazosin (HYTRIN) 10 mg capsule Take 1 capsule by mouth once daily 90 Cap 0    dexAMETHasone (DECADRON) 4 mg tablet Take 2 tabs (8mg) by mouth daily on days 2 and 3 after chemotherapy 60 Tab 0    ondansetron (ZOFRAN ODT) 4 mg disintegrating tablet Take 1-2 Tabs by mouth every eight (8) hours as needed for Nausea or Vomiting. 60 Tab 1    prochlorperazine (Compazine) 5 mg tablet Take 1 tab by mouth every 6 hours as needed for nausea or vomiting 30 Tab 1    amLODIPine (NORVASC) 5 mg tablet Take 1 tablet by mouth nightly 90 Tab 0    candesartan (ATACAND) 4 mg tablet Take 1 tablet by mouth nightly 90 Tab 0    senna-docusate (PERICOLACE) 8.6-50 mg per tablet Take 1 Tab by mouth two (2) times daily as needed for Constipation. 60 Tab 0    acetaminophen (TYLENOL) 500 mg tablet Take 1 Tab by mouth every four (4) hours. 100 Tab 0    calcium carbonate/vitamin D3 (CALTRATE-600 PLUS VITAMIN D3 PO) Take 1 Tab by mouth nightly.  leuprolide acetate (LUPRON DEPOT, 6 MONTH, IM) 1 Each by IntraMUSCular route every 6 months.          Past History     Past Medical History:  Past Medical History:   Diagnosis Date    Arthritis     BPH (benign prostatic hypertrophy) 7/18/2011    Cancer [facility])     prostate cancer    Chronic pain right    knee    ED (erectile dysfunction)     GERD (gastroesophageal reflux disease)     HTN (hypertension), benign     Metastatic squamous cell carcinoma ([facility] [address] 9/16/2020    OA (osteoarthritis) of knee 7/18/2011    BOTH KNEES    Unspecified essential hypertension 7/18/2011       Past Surgical History:  Past Surgical History:   Procedure Laterality Date    HX ACL RECONSTRUCTION Right     x2    HX APPENDECTOMY  1975    HX GI  1/2015    Drainage of hemorrhoid    HX HERNIA REPAIR      left and right,  ventral    HX HIP REPLACEMENT  2000    left    HX HIP REPLACEMENT  2000    left    HX KNEE ARTHROSCOPY Right     x3    HX ORTHOPAEDIC Left 6/2000    HIP REPLACEMENT    HX ORTHOPAEDIC Right 08/05/2014    TKR - Dr. Toño Amato 11/07/2011    prostate removed     HX TONSILLECTOMY      CHILD    IR INJ FORAMIN EPID LUMB ANES/STER SNGL  8/18/2020       Family History:  Family History   Problem Relation Age of Onset    Bleeding Prob Father         clotting disorder    Cancer Brother         colon CA 1/2 brother    Thyroid Disease Daughter     Heart Failure Mother     Heart Disease Mother     Anesth Problems Neg Hx        Social History:  Social History     Tobacco Use    Smoking status: Never Smoker    Smokeless tobacco: Never Used   Substance Use Topics    Alcohol use: No    Drug use: No       Allergies: Allergies   Allergen Reactions    Barbiturates Anxiety     Hyper           Review of Systems   Review of Systems   Constitutional: Negative for activity change, appetite change, chills, fever and unexpected weight change. HENT: Negative for congestion. Eyes: Negative for pain and visual disturbance. Respiratory: Negative for cough and shortness of breath. Cardiovascular: Negative for chest pain. Gastrointestinal: Negative for abdominal pain, diarrhea, nausea and vomiting. Genitourinary: Negative for difficulty urinating and dysuria. Musculoskeletal: Positive for arthralgias and myalgias. Negative for back pain. Skin: Negative for rash. Neurological: Negative for headaches. Physical Exam   Physical Exam  Vitals and nursing note reviewed. Constitutional:       Appearance: He is well-developed. He is not diaphoretic. Comments: Elderly male currently hemodynamically stable, in minimal acute distress   HENT:      Head: Normocephalic and atraumatic. Eyes:      General:         Right eye: No discharge. Left eye: No discharge. Conjunctiva/sclera: Conjunctivae normal.      Pupils: Pupils are equal, round, and reactive to light. Cardiovascular:      Rate and Rhythm: Normal rate and regular rhythm. Heart sounds: Normal heart sounds. No murmur heard.      Pulmonary:      Effort: Pulmonary effort is normal. No respiratory distress. Breath sounds: Normal breath sounds. No wheezing or rales. Abdominal:      General: Bowel sounds are normal. There is no distension. Palpations: Abdomen is soft. Tenderness: There is no abdominal tenderness. Musculoskeletal:         General: Normal range of motion. Cervical back: Normal range of motion and neck supple. Right lower leg: No edema. Left lower leg: No edema. Skin:     General: Skin is warm and dry. Findings: No rash. Neurological:      Mental Status: He is alert and oriented to person, place, and time. Cranial Nerves: No cranial nerve deficit. Motor: No abnormal muscle tone. Diagnostic Study Results     Labs -     Recent Results (from the past 12 hour(s))   CBC WITH AUTOMATED DIFF    Collection Time: 07/16/21  2:14 PM   Result Value Ref Range    WBC 4.3 4.1 - 11.1 K/uL    RBC 3.00 (L) 4.10 - 5.70 M/uL    HGB 9.2 (L) 12.1 - 17.0 g/dL    HCT 27.9 (L) 36.6 - 50.3 %    MCV 93.0 80.0 - 99.0 FL    MCH 30.7 26.0 - 34.0 PG    MCHC 33.0 30.0 - 36.5 g/dL    RDW 13.1 11.5 - 14.5 %    PLATELET 766 (L) 378 - 400 K/uL    MPV 10.9 8.9 - 12.9 FL    NRBC 0.0 0  WBC    ABSOLUTE NRBC 0.00 0.00 - 0.01 K/uL    NEUTROPHILS 80 (H) 32 - 75 %    LYMPHOCYTES 11 (L) 12 - 49 %    MONOCYTES 4 (L) 5 - 13 %    EOSINOPHILS 3 0 - 7 %    BASOPHILS 1 0 - 1 %    IMMATURE GRANULOCYTES 1 (H) 0.0 - 0.5 %    ABS. NEUTROPHILS 3.5 1.8 - 8.0 K/UL    ABS. LYMPHOCYTES 0.5 (L) 0.8 - 3.5 K/UL    ABS. MONOCYTES 0.2 0.0 - 1.0 K/UL    ABS. EOSINOPHILS 0.1 0.0 - 0.4 K/UL    ABS. BASOPHILS 0.0 0.0 - 0.1 K/UL    ABS. IMM.  GRANS. 0.0 0.00 - 0.04 K/UL    DF AUTOMATED      RBC COMMENTS ANISOCYTOSIS  1+        RBC COMMENTS POIKILOCYTOSIS  1+       PROTHROMBIN TIME + INR    Collection Time: 07/16/21  2:14 PM   Result Value Ref Range    INR 1.1 0.9 - 1.1      Prothrombin time 10.4 9.0 - 12.5 sec   METABOLIC PANEL, COMPREHENSIVE    Collection Time: 07/16/21  2:14 PM   Result Value Ref Range    Sodium 138 136 - 145 mmol/L    Potassium 4.8 3.5 - 5.1 mmol/L    Chloride 105 97 - 108 mmol/L    CO2 25 21 - 32 mmol/L    Anion gap 8 5 - 15 mmol/L    Glucose 101 (H) 65 - 100 mg/dL    BUN 40 (H) 6 - 20 MG/DL    Creatinine 1.47 (H) 0.70 - 1.30 MG/DL    BUN/Creatinine ratio 27 (H) 12 - 20      GFR est AA 57 (L) >60 ml/min/1.73m2    GFR est non-AA 47 (L) >60 ml/min/1.73m2    Calcium 8.3 (L) 8.5 - 10.1 MG/DL    Bilirubin, total 0.7 0.2 - 1.0 MG/DL    ALT (SGPT) 28 12 - 78 U/L    AST (SGOT) 19 15 - 37 U/L    Alk. phosphatase 55 45 - 117 U/L    Protein, total 6.0 (L) 6.4 - 8.2 g/dL    Albumin 2.8 (L) 3.5 - 5.0 g/dL    Globulin 3.2 2.0 - 4.0 g/dL    A-G Ratio 0.9 (L) 1.1 - 2.2         Radiologic Studies -   No orders to display     CT Results  (Last 48 hours)    None        CXR Results  (Last 48 hours)    None            Medical Decision Making   I am the first provider for this patient. I reviewed the vital signs, available nursing notes, past medical history, past surgical history, family history and social history. Vital Signs-Reviewed the patient's vital signs. Patient Vitals for the past 12 hrs:   Temp Pulse Resp BP SpO2   07/16/21 1424   16  98 %   07/16/21 1423  64  135/74    07/16/21 1422 98.2 °F (36.8 °C) 65 18 135/74 98 %       Pulse Oximetry Analysis - 98% on RA    Cardiac Monitor:   Rate: 67bpm  Rhythm: Normal Sinus Rhythm      Records Reviewed: Nursing Notes, Old Medical Records, Previous Radiology Studies and Previous Laboratory Studies    Provider Notes (Medical Decision Making):   MDM: Elderly male currently undergoing chemotherapy for metastatic cancer presenting from outpatient vascular lab for DVT. Patient is hemodynamically stable without any concerning findings for PE. Lab evaluation to be completed with hematology oncology consult to his physician Dr. Evangelista Wolfe    ED Course:   Initial assessment performed.  The patients presenting problems have been discussed, and they are in agreement with the care plan formulated and outlined with them. I have encouraged them to ask questions as they arise throughout their visit. PROGRESS NOTE:  3:01 PM  Discussed case with Dr Mario Villanueva, patient's oncologist.  Given his lack of symptoms and signs of PE, she is okay starting him on Noble anticoagulant. She notes that her office will reach out to him for follow-up appointment. Awaiting CBC and INR results. Chemistry is notable for increased BUN and creatinine. IV fluids infusing. 3:30 PM  Patient continues to do well. Discussed lab results with he and his wife. We discussed the risk of anticoagulant especially with his platelet count of 573D. Medications adjusted to remove any anti-inflammatory medications. Patient to follow-up with hematology and return to the ER for any fall or sites of bleeding. Discharge note:  Pt re-evaluated and noted to be feeling better, ready for discharge. Updated pt and his spouse on all final lab findings. Will follow up as instructed. All questions have been answered, pt voiced understanding and agreement with plan. Specific return precautions provided as well as instructions to return to the ED should sx worsen at any time. Vital signs stable for discharge. Critical Care Time:   0      Diagnosis     Clinical Impression:   1. Acute deep vein thrombosis (DVT) of proximal vein of right lower extremity (HCC)        PLAN:  1.    Discharge Medication List as of 7/16/2021  3:21 PM      CONTINUE these medications which have CHANGED    Details   apixaban (Eliquis DVT-PE Treat 30D Start) 5 mg (74 tabs) starter pack Take 10 mg (two 5 mg tablets) by mouth twice a day for 7 days   Followed by 5 mg (one 5 mg tablet) by mouth twice a day, Print, Disp-1 Dose Pack, R-0         CONTINUE these medications which have NOT CHANGED    Details   traZODone (DESYREL) 50 mg tablet TAKE 1 TO 2 TABLETS BY MOUTH ONCE DAILY IN THE EVENING AS NEEDED FOR SLEEP, Normal, Disp-60 Tab, R-1      terazosin (HYTRIN) 10 mg capsule Take 1 capsule by mouth once daily, Normal, Disp-90 Cap, R-0      dexAMETHasone (DECADRON) 4 mg tablet Take 2 tabs (8mg) by mouth daily on days 2 and 3 after chemotherapy, Normal, Disp-60 Tab, R-0      ondansetron (ZOFRAN ODT) 4 mg disintegrating tablet Take 1-2 Tabs by mouth every eight (8) hours as needed for Nausea or Vomiting., Normal, Disp-60 Tab, R-1      prochlorperazine (Compazine) 5 mg tablet Take 1 tab by mouth every 6 hours as needed for nausea or vomiting, Normal, Disp-30 Tab, R-1      amLODIPine (NORVASC) 5 mg tablet Take 1 tablet by mouth nightly, Normal, Disp-90 Tab, R-0      candesartan (ATACAND) 4 mg tablet Take 1 tablet by mouth nightly, Normal, Disp-90 Tab, R-0      senna-docusate (PERICOLACE) 8.6-50 mg per tablet Take 1 Tab by mouth two (2) times daily as needed for Constipation. , Print, Disp-60 Tab, R-0      acetaminophen (TYLENOL) 500 mg tablet Take 1 Tab by mouth every four (4) hours. , No Print, Disp-100 Tab, R-0      calcium carbonate/vitamin D3 (CALTRATE-600 PLUS VITAMIN D3 PO) Take 1 Tab by mouth nightly., Historical Med      leuprolide acetate (LUPRON DEPOT, 6 MONTH, IM) 1 Each by IntraMUSCular route every 6 months., Historical Med           2. Follow-up Information     Follow up With Specialties Details Why Contact Info    [address] [place] [address] Emergency Medicine  If symptoms worsen with chest pain, trouble breathing, fatigue or bleeding [address] [facility] [address]  [phone]        Return to ED if worse     Disposition:  Home       Please note, this dictation was completed with [employer], the [employer] voice recognition software. Quite often unanticipated grammatical, syntax, homophones, and other interpretive errors are inadvertently transcribed by the computer software. Please disregard these errors. Please excuse any errors that have escaped final proof reading.

## 2021-07-16 NOTE — TELEPHONE ENCOUNTER
Pt sent message about swelling in RIGHT leg  Please call pt and see what's going on  Can do doppler if needed/ ordered

## 2021-07-19 NOTE — PROGRESS NOTES
Post ER call to patient, ID verified X 2. Has been started on Eliquis and is tolerating. States very fatigued post ED visit, but this is resolving. Was instructed to avoid caffeine in ER and has been, RN reviewed with patient to avoid alcohol, maintain hydration, avoid driving until cleared by PCP or as instructed in ER. Understands to follow up with PCP and will contact today to notify. Also reviewed if sudden onset of chest pain, disorientation, or sudden onset of SOB to proceed to ER for eval.  Understanding verbalized of all. Update to Provider.

## 2021-07-26 NOTE — PROGRESS NOTES
This is the Subsequent Medicare Annual Wellness Exam, performed 12 months or more after the Initial AWV or the last Subsequent AWV    I have reviewed the patient's medical history in detail and updated the computerized patient record. Also for follow-up of his health issues. He was seen in the emergency room recently with an acute right sided DVT. He has been doing well with anticoagulants. He continues to receive chemotherapy on a regular basis. He has had no bleeding issues. He does note some dyspnea on exertion and some fatigue. No fevers or chills. No sweats. No vomiting but some nausea. No change in bowel or bladder habits. ROS - Per HPI    Physical Examination: General appearance - alert, well appearing, and in no distress  Mouth - mucous membranes moist, pharynx normal without lesions  Neck - supple, no significant adenopathy  Lymphatics - no palpable lymphadenopathy, no hepatosplenomegaly  Chest - clear to auscultation, no wheezes, rales or rhonchi, symmetric air entry  Heart - normal rate, regular rhythm, normal S1, S2, no murmurs, rubs, clicks or gallops  Abdomen - soft, nontender, nondistended, no masses or organomegaly  Neurological - alert, oriented, normal speech, no focal findings or movement disorder noted  Musculoskeletal - no joint tenderness, deformity or swelling  Extremities -leg with significant edema. No redness or warmth. No point tenderness. Pulses 2+ and equal bilaterally. Assessment/Plan   Education and counseling provided:  Are appropriate based on today's review and evaluation    1. Medicare annual wellness visit, subsequent  2. Essential hypertensionblood pressure well controlled on current meds. Will continue those for now. 3. Benign prostatic hyperplasia with nocturiastable symptom wise. 4. 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 5. Metastatic squamous cell carcinoma (HCC)continue oncology follow-up.   6. Low thyroid stimulating hormone (TSH) levelrecent labs with normal TSH. 7. Thrombocytopenia (HCC)stable. We discussed this from his most recent labs. Continue at least 3 months of anticoagulation. He will discuss the duration with his oncologist.  8. Acute deep vein thrombosis (DVT) of proximal vein of right lower extremity (HCC) also suggested compression stockings for his leg to avoid ongoing issues with swelling and lymphedema to evaluate if needed. Depression Risk Factor Screening     3 most recent PHQ Screens 7/26/2021   Little interest or pleasure in doing things Not at all   Feeling down, depressed, irritable, or hopeless Not at all   Total Score PHQ 2 0       Alcohol Risk Screen    Do you average more than 1 drink per night or more than 7 drinks a week: No    In the past three months have you have had more than 4 drinks containing alcohol on one occasion: No        Functional Ability and Level of Safety    Hearing: Hearing is good. Activities of Daily Living: The home contains: no safety equipment. Patient does total self care      Ambulation: with no difficulty     Fall Risk:  Fall Risk Assessment, last 12 mths 7/26/2021   Able to walk? Yes   Fall in past 12 months? 0   Do you feel unsteady? 0   Are you worried about falling 0   Fall with injury?  -      Abuse Screen:  Patient is not abused       Cognitive Screening    Has your family/caregiver stated any concerns about your memory: no         Health Maintenance Due     Health Maintenance Due   Topic Date Due    Shingrix Vaccine Age 49> (1 of 2) Never done       Patient Care Team   Patient Care Team:  Jhon Lilly MD as PCP - General  Jhon Lilly MD as PCP - [facility] Empaneled Provider  Christofer Bradford MD as Physician (Urology)  Yrn Davis MD (Ophthalmology)  Aniyah Marin MD (General Surgery)  Yin Cramer MD (Orthopedic Surgery)  Angléica Giraldo MD as Physician (Palliative Medicine)  Shannon Isaacs Tawana Mota DO (Hematology and Oncology)    History     Patient Active Problem List   Diagnosis Code    Essential hypertension I10    Benign prostatic hyperplasia N40.0    OA (osteoarthritis) of knee M17.10    Prostate cancer ([facility] [address] C61    Primary localized osteoarthrosis, lower leg M17.10    Advance directive on file Z78.9    Recurrent left inguinal hernia K40.91    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) C61, C77.5    Localized osteoarthritis of left knee M17.12    Metastatic squamous cell carcinoma (HCC) C79.9    History of prostate cancer Z85.46    Encounter for antineoplastic immunotherapy Z51.12    Arthritis M19.90    Gastroesophageal reflux disease without esophagitis K21.9    Drug-induced skin rash L27.0    Elevated serum creatinine R79.89    Low thyroid stimulating hormone (TSH) level R79.89    Thrombocytopenia (HCC) D69.6     Past Medical History:   Diagnosis Date    Arthritis     BPH (benign prostatic hypertrophy) 7/18/2011    Cancer (HCC)     prostate cancer    Chronic pain right    knee    ED (erectile dysfunction)     GERD (gastroesophageal reflux disease)     HTN (hypertension), benign     Metastatic squamous cell carcinoma ([facility] [address] 9/16/2020    OA (osteoarthritis) of knee 7/18/2011    BOTH KNEES    Unspecified essential hypertension 7/18/2011      Past Surgical History:   Procedure Laterality Date    HX ACL RECONSTRUCTION Right     x2    HX APPENDECTOMY  1975    HX GI  1/2015    Drainage of hemorrhoid    HX HERNIA REPAIR      left and right,  ventral    HX HIP REPLACEMENT  2000    left    HX HIP REPLACEMENT  2000    left    HX KNEE ARTHROSCOPY Right     x3    HX MALIGNANT SKIN LESION EXCISION      melanoma - face    HX MOHS PROCEDURES Right     Basal cell behind ear.      HX ORTHOPAEDIC Left 6/2000    HIP REPLACEMENT    HX ORTHOPAEDIC Right 08/05/2014    TKR - Dr. Daphne Montero HX PROSTATECTOMY  11/07/2011    prostate removed     HX TONSILLECTOMY CHILD    IR INJ FORAMIN EPID LUMB ANES/STER SNGL  8/18/2020     Current Outpatient Medications   Medication Sig Dispense Refill    amLODIPine (NORVASC) 5 mg tablet Take 1 tablet by mouth nightly 90 Tablet 0    candesartan (ATACAND) 4 mg tablet Take 1 tablet by mouth nightly 90 Tablet 0    apixaban (Eliquis DVT-PE Treat 30D Start) 5 mg (74 tabs) starter pack Take 10 mg (two 5 mg tablets) by mouth twice a day for 7 days   Followed by 5 mg (one 5 mg tablet) by mouth twice a day 1 Dose Pack 0    traZODone (DESYREL) 50 mg tablet TAKE 1 TO 2 TABLETS BY MOUTH ONCE DAILY IN THE EVENING AS NEEDED FOR SLEEP 60 Tab 1    terazosin (HYTRIN) 10 mg capsule Take 1 capsule by mouth once daily 90 Cap 0    ondansetron (ZOFRAN ODT) 4 mg disintegrating tablet Take 1-2 Tabs by mouth every eight (8) hours as needed for Nausea or Vomiting. 60 Tab 1    calcium carbonate/vitamin D3 (CALTRATE-600 PLUS VITAMIN D3 PO) Take 1 Tab by mouth nightly.  dexAMETHasone (DECADRON) 4 mg tablet Take 2 tabs (8mg) by mouth daily on days 2 and 3 after chemotherapy (Patient not taking: Reported on 7/26/2021) 60 Tab 0    leuprolide acetate (LUPRON DEPOT, 6 MONTH, IM) 1 Each by IntraMUSCular route every 6 months.  (Patient not taking: Reported on 7/26/2021)       Allergies   Allergen Reactions    Barbiturates Anxiety     Hyper         Family History   Problem Relation Age of Onset    Bleeding Prob Father         clotting disorder    Cancer Brother         colon CA 1/2 brother    Thyroid Disease Daughter     Heart Failure Mother     Heart Disease Mother     Anesth Problems Neg Hx      Social History     Tobacco Use    Smoking status: Never Smoker    Smokeless tobacco: Never Used   Substance Use Topics    Alcohol use: No         Jg Hill MD

## 2021-07-26 NOTE — PATIENT INSTRUCTIONS
Medicare Wellness Visit, Male    The best way to live healthy is to have a lifestyle where you eat a well-balanced diet, exercise regularly, limit alcohol use, and quit all forms of tobacco/nicotine, if applicable. Regular preventive services are another way to keep healthy. Preventive services (vaccines, screening tests, monitoring & exams) can help personalize your care plan, which helps you manage your own care. Screening tests can find health problems at the earliest stages, when they are easiest to treat. [facility] follows the current, evidence-based guidelines published by the [facility] ([facility]) when recommending preventive services for our patients. Because we follow these guidelines, sometimes recommendations change over time as research supports it. (For example, a prostate screening blood test is no longer routinely recommended for men with no symptoms). Of course, you and your doctor may decide to screen more often for some diseases, based on your risk and co-morbidities (chronic disease you are already diagnosed with). Preventive services for you include:  - Medicare offers their members a free annual wellness visit, which is time for you and your primary care provider to discuss and plan for your preventive service needs. Take advantage of this benefit every year!  -All adults over age 72 should receive the recommended pneumonia vaccines. Current USPSTF guidelines recommend a series of two vaccines for the best pneumonia protection.   -All adults should have a flu vaccine yearly and tetanus vaccine every 10 years.  -All adults age 48 and older should receive the shingles vaccines (series of two vaccines).        -All adults age 38-68 who are overweight should have a diabetes screening test once every three years.   -Other screening tests & preventive services for persons with diabetes include: an eye exam to screen for diabetic retinopathy, a kidney function test, a foot exam, and stricter control over your cholesterol.   -Cardiovascular screening for adults with routine risk involves an electrocardiogram (ECG) at intervals determined by the provider.   -Colorectal cancer screening should be done for adults age 54-65 with no increased risk factors for colorectal cancer. There are a number of acceptable methods of screening for this type of cancer. Each test has its own benefits and drawbacks. Discuss with your provider what is most appropriate for you during your annual wellness visit. The different tests include: colonoscopy (considered the best screening method), a fecal occult blood test, a fecal DNA test, and sigmoidoscopy.  -All adults born between [facility] should be screened once for Hepatitis C.  -An Abdominal Aortic Aneurysm (AAA) Screening is recommended for men age 73-68 who has ever smoked in their lifetime.      Here is a list of your current Health Maintenance items (your personalized list of preventive services) with a due date:  Health Maintenance Due   Topic Date Due    Shingles Vaccine (1 of 2) Never done

## 2021-07-26 NOTE — PROGRESS NOTES
Chief Complaint   Patient presents with   [facility] Annual Wellness Visit       1. Have you been to the ER, urgent care clinic since your last visit? Hospitalized since your last visit? Yes When: July 2021 Where: [facility]  Reason for visit: Blood clot    2. Have you seen or consulted any other health care providers outside of the [address] [place] [address] since your last visit? Include any pap smears or colon screening.  No

## 2021-07-29 PROBLEM — I82.4Z1 ACUTE DEEP VEIN THROMBOSIS (DVT) OF DISTAL VEIN OF RIGHT LOWER EXTREMITY (HCC): Status: ACTIVE | Noted: 2021-01-01

## 2021-07-29 NOTE — PROGRESS NOTES
[facility] at [address], [id number] [place] [address] Road, [facility] [phone]  F: [phone]    Reason for Visit:   Mary Lux is a 78 y.o. male who is seen today in office for follow up of metastatic SCC on Carbo/Taxol/Pembro. Treatment History:   · MRI Right Hip 8/5/20: Enlarging right pelvic sidewall adenopathy with adjacent muscular and bony invasion. Degenerative changes of the right hip. Small bony metastatic deposit anterior wall of the right acetabulum  · MRI Lumbar Spine 8/11/20: L3-L4 moderate central spinal canal and right foraminal stenoses. L5-S1 moderate bilateral foraminal stenosis  · 8/20/20 Pelvic Mass, Core biopsy with touch preparation - CYTOLOGIC INTERPRETATION: Pelvic Mass, Core biopsy with touch preparation: Squamous cell carcinoma  · TYPE ID + SCC/H+N vs skin  · PET 9/8/20: Large hypermetabolic right pelvic sidewall lymph node. Lytic lesion right anterior acetabulum is hypermetabolic and compatible with metastatic disease   · XRT to Right Pelvic Mass 10/13/20 - 10/22/20 with Dr. Cristy Miner  · Pembrolizumab 12/15/20 - Current   · PET 1/12/21: New hypermetabolic pulmonary nodule in the lingula. Resolved left pelvic sidewall lymph node activity and resolved activity corresponding to the lytic lesion in the right anterior acetabulum  · PET 4/22/21:Progression of disease with new soft tissue nodules in the deep pelvis, foci of increased tracer activity in the right pelvic sidewall and right external iliac chain, increase in the size and number of pulmonary metastases, and hypermetabolic bone lesions right hemipelvis  · Carbo/Taxol/Pembro 6/18/21 - Current     History of Present Illness:   Mary Lux is a 78 y.o. male seen today in office for follow up of metastatic SCC primary site unclear PDL1 80% He started Carbo/Taxol/Pembro on 6/18/21. He is scheduled for Cycle 3 tomorrow. He reports that he feels well overall today.  He is tolerating chemo well overall with some fatigue, itching, and arthralgia. He also states he has some STEWART. He only had very mild nausea after last treatment, relived with Zofran. He denies vomiting. He used Hydrocortisone and Benadryl as needed for the itching which helps. He was seen in the ER at [facility] on 7/16/21 with right leg swelling. He was diagnosed with extensive right leg DVTs. He was started on Eliquis. He is tolerating Eliquis well overall and denies bleeding. He denies fever, chills, mouth sores, cough, CP, diarrhea, and constipation. CBC, CMP, and TSH are still pending today. He is ready/wants to do chemo tomorrow. He is here alone today. Past Medical History:   Diagnosis Date    Arthritis     BPH (benign prostatic hypertrophy) 7/18/2011    Cancer [facility])     prostate cancer    Chronic pain right    knee    ED (erectile dysfunction)     GERD (gastroesophageal reflux disease)     HTN (hypertension), benign     Metastatic squamous cell carcinoma ([facility] [address] 9/16/2020    OA (osteoarthritis) of knee 7/18/2011    BOTH KNEES    Unspecified essential hypertension 7/18/2011      Past Surgical History:   Procedure Laterality Date    HX ACL RECONSTRUCTION Right     x2    HX APPENDECTOMY  1975    HX GI  1/2015    Drainage of hemorrhoid    HX HERNIA REPAIR      left and right,  ventral    HX HIP REPLACEMENT  2000    left    HX HIP REPLACEMENT  2000    left    HX KNEE ARTHROSCOPY Right     x3    HX MALIGNANT SKIN LESION EXCISION      melanoma - face    HX MOHS PROCEDURES Right     Basal cell behind ear.      HX ORTHOPAEDIC Left 6/2000    HIP REPLACEMENT    HX ORTHOPAEDIC Right 08/05/2014    [facility] - Dr. Nghia Hurtado HX PROSTATECTOMY  11/07/2011    prostate removed     HX TONSILLECTOMY      CHILD    IR INJ FORAMIN EPID LUMB ANES/STER SNGL  8/18/2020      Social History     Tobacco Use    Smoking status: Never Smoker    Smokeless tobacco: Never Used   Substance Use Topics    Alcohol use: No      Family History   Problem Relation Age of Onset    Bleeding Prob Father         clotting disorder    Cancer Brother         colon CA 1/2 brother    Thyroid Disease Daughter     Heart Failure Mother     Heart Disease Mother     Anesth Problems Neg Hx      Current Outpatient Medications   Medication Sig    amLODIPine (NORVASC) 5 mg tablet Take 1 tablet by mouth nightly    candesartan (ATACAND) 4 mg tablet Take 1 tablet by mouth nightly    apixaban (Eliquis DVT-PE Treat 30D Start) 5 mg (74 tabs) starter pack Take 10 mg (two 5 mg tablets) by mouth twice a day for 7 days   Followed by 5 mg (one 5 mg tablet) by mouth twice a day    traZODone (DESYREL) 50 mg tablet TAKE 1 TO 2 TABLETS BY MOUTH ONCE DAILY IN THE EVENING AS NEEDED FOR SLEEP    terazosin (HYTRIN) 10 mg capsule Take 1 capsule by mouth once daily    ondansetron (ZOFRAN ODT) 4 mg disintegrating tablet Take 1-2 Tabs by mouth every eight (8) hours as needed for Nausea or Vomiting.  calcium carbonate/vitamin D3 (CALTRATE-600 PLUS VITAMIN D3 PO) Take 1 Tab by mouth nightly.  dexAMETHasone (DECADRON) 4 mg tablet Take 2 tabs (8mg) by mouth daily on days 2 and 3 after chemotherapy (Patient not taking: Reported on 7/26/2021)    leuprolide acetate (LUPRON DEPOT, 6 MONTH, IM) 1 Each by IntraMUSCular route every 6 months. (Patient not taking: Reported on 7/26/2021)     No current facility-administered medications for this visit. Allergies   Allergen Reactions    Barbiturates Anxiety     Hyper        Review of Systems:  A complete review of systems was obtained, negative except as described above and as reported on ROS sheet scanned into system.      Physical Exam:     Visit Vitals  BP (!) 161/88 (BP 1 Location: Left upper arm, BP Patient Position: Sitting)   Pulse 65   Temp 98.4 °F (36.9 °C) (Temporal)   Resp 18   Ht 6' (1.829 m)   Wt 236 lb 12.8 oz (107.4 kg)   SpO2 95%   BMI 32.12 kg/m²     ECOG PS: 1  General: No distress  Eyes: Anicteric sclerae  HENT: Atraumatic, wearing a mask  Neck: Supple  Resp: CTAB, normal respiratory effort   CV: Regular rate and rhythm  GI: Soft, non tender   Ext: Right leg edema, no edema in left leg  MS: Normal gait and station. Digits without clubbing or cyanosis. Skin: No ecchymoses, or petechiae. Normal temperature, turgor, and texture. Psych: Alert, oriented, appropriate affect, normal judgment/insight  Neuro: Non focal    Results:     Lab Results   Component Value Date/Time    WBC 4.3 07/29/2021 03:55 PM    HGB 9.5 (L) 07/29/2021 03:55 PM    HCT 29.2 (L) 07/29/2021 03:55 PM    PLATELET 785 15/43/4011 03:55 PM    MCV 95.4 07/29/2021 03:55 PM    ABS. NEUTROPHILS 2.9 07/29/2021 03:55 PM     Lab Results   Component Value Date/Time    Sodium 140 07/29/2021 03:55 PM    Potassium 4.2 07/29/2021 03:55 PM    Chloride 110 (H) 07/29/2021 03:55 PM    CO2 24 07/29/2021 03:55 PM    Glucose 98 07/29/2021 03:55 PM    BUN 33 (H) 07/29/2021 03:55 PM    Creatinine 1.44 (H) 07/29/2021 03:55 PM    GFR est AA 58 (L) 07/29/2021 03:55 PM    GFR est non-AA 48 (L) 07/29/2021 03:55 PM    Calcium 8.4 (L) 07/29/2021 03:55 PM    Glucose (POC) 90 04/22/2021 10:19 AM     Lab Results   Component Value Date/Time    Bilirubin, total 0.4 07/29/2021 03:55 PM    ALT (SGPT) 17 07/29/2021 03:55 PM    Alk. phosphatase 58 07/29/2021 03:55 PM    Protein, total 6.0 (L) 07/29/2021 03:55 PM    Albumin 3.1 (L) 07/29/2021 03:55 PM    Globulin 2.9 07/29/2021 03:55 PM     MRI Right Hip 8/5/20  IMPRESSION:   1. Enlarging right pelvic sidewall adenopathy with adjacent muscular and bony  invasion  2. Degenerative changes of the right hip  3. Small bony metastatic deposit anterior wall of the right acetabulum    MRI Lumbar Spine 8/11/20  IMPRESSION:  1. L3-L4 moderate central spinal canal and right foraminal stenoses. 2. L5-S1 moderate bilateral foraminal stenosis.      8/20/20  Specimen Source   1: Pelvic Mass, Core biopsy with touch preparation   CYTOLOGIC INTERPRETATION:   Pelvic Mass, Core biopsy with touch preparation:   Squamous cell carcinoma    PET 9/8/20  IMPRESSION: Large hypermetabolic right pelvic sidewall lymph node. Lytic lesion  right anterior     PET 1/12/21  IMPRESSION:  New hypermetabolic pulmonary nodule in the lingula. Resolved left  pelvic sidewall lymph node activity and resolved activity corresponding to the  lytic lesion in the right anterior acetabulum    PET 4/22/21  IMPRESSION:  Progression of disease with new soft tissue nodules in the deep  pelvis, foci of increased tracer activity in the right pelvic sidewall and right  external iliac chain, increase in the size and number of pulmonary metastases,  and hypermetabolic bone lesions right hemipelvis. Records reviewed and summarized above. Pathology report(s) reviewed above. Radiology report(s) reviewed above. Assessment/PLAN:     1) Metastatic Squamous Cell Cancer    EGFR/ROS/ALK negative. PDL1 80% by biopsy of pelvic/hip mas 8/20/20. Path is different than prostate which is adenocarcinoma from 2011. Unclear site of origin of squamous cell. TYPE ID sent and SCC H+N vs skin. Completed XRT to right pelvic mass on 10/22/20. Right hip pain has esolved since completing XRT. He started Pembrolizumab on 12/15/20. He had follow up PET on 1/12/21 that showed new hypermetabolic pulmonary nodule in the lingula, resolved left pelvic sidewall lymph node activity and resolved activity corresponding to the lytic lesion in the right anterior acetabulum. Case reviewed at cancer conference and recommendation was to continue [facility] for now. PET 4/21 showed slight progressive disease in lungs/LAD/bones. Patient was seen at [facility] by Med/Onc and Uro/Onc. D/w [facility] and plan is still same as recommended here. Site of origin of cancer is still unclear but not likely prostate. Patient started Carbo/Taxol/Pembro on 6/18/21. He is here today for follow up - has Cycle 3 tomorrow.    He is tolerating treatment well overall thus far.  He has some fatigue, mild nausea, arthralgia. Side effects are tolerable per patient. He is clinically stable today. Labs (CBC, CMP and TSH) personally reviewed today. Patient is ready for treatment tomorrow. Follow up in 3 weeks with Cycle 4. Patient agrees with plan. Reviewed treatment plan overall with two months of therapy then follow up imaging again. 2) Recurrent Prostate Cancer   PSA 0 on Lupron per Urology. Management per Urology. 3) Hx of Right Hip Pain  Resolved after XRT. 4) Rash/Itching   Much better overall. Mild, scattered areas. Improved with OTC Hydrocortisone and Benadryl. Can also take Zyrtec for itching    5) Low TSH - Resolved  TSH 0.02 on 3/11/21  Free T3 and T4 ordered. TSH normal at 1.95 on 6/2/21 and 1.88 on 6/17/21. Will continue to monitor. 6) HTN/Arthritis/GERD/Cronic Elevated Creatinine   Management per PCP. 7) High Risk Drug Monitoring - Immunotherapy  Patient tolerating well with Grade 1 Rash, Grade 1 Neuropathy, Grade 1 Arthralgia, and Grade 1-2 Fatigue. Labs (CBC, CMP, and TSH) reviewed today. Patient is ready for treatment tomorrow. Will continue to monitor for side effects. 8) Chronic Thrombocytopenia  CC review and present in 2011. Likely not caused by therapy. Mild and will continue to monitor. 9) Extensive Right Leg DVT  On Eliquis and tolerating well - no bleeding. Recommend indefinite anticoagulation. 10) Psychosocial  Mood good, coping well. He has great support from his wife. SW support as needed. He is here alone today. Call if questions. Follow up in 3 weeks. This patient was seen in conjunction with Marcela Richards NP. I personally performed a face to face diagnostic evaluation on this patient. I personally reviewed the history and performed the key points on the exam.   I personally reviewed all points in the assessment and created treatment plan with the patient.   Specifically pt seen by me today  Had DVT and now on anticoagulation. Talked with ER doc. Pt is tolerating chemo/ immunotherapy well. Continue same treatment plan without modifications. Labs will be reviewed tmw. Fu scans after cycle 4. Pt clinically doing well overall. I appreciate the opportunity to participate in [facility] yahir.     Signed By: Stacy Morales DO

## 2021-07-29 NOTE — PROGRESS NOTES
Archana Flores is a 78 y.o. male  Chief Complaint   Patient presents with    Follow-up    Cancer     metastatic SCC     1. Have you been to the ER, urgent care clinic since your last visit? Hospitalized since your last visit? [facility] 7/16/21    2. Have you seen or consulted any other health care providers outside of the [address] [place] [address] since your last visit? Include any pap smears or colon screening. PCP - Dr. Ketan Hair on Monday 726/21        Asked about the patient's status and the patient stated that fatigue has been the biggest side effect. It's not debilitating but it is limiting especially the first week after treatment but it is better by the third week. The patient also stated that he experiences neuropathy in his hands and feet off and on and has joint aches all over but does not have any joint pain currently.

## 2021-07-30 NOTE — PROGRESS NOTES
[facility] Progress Note    Date: 2021    Name: Nan Jones    MRN: [id number]         : 1945    Mr. Eduardo Mancilla arrived ambulatory and in no distress for C3D1 of Keytruda/Taxol/Carboplatin Regimen. Assessment was completed, no acute issues at this time, no new complaints voiced. Left arm PIV accessed without difficulty. Labs reviewed from 21. Chemotherapy Flowsheet 2021   Cycle C3   Date 2021   Drug / Regimen Keytruda/Taxol/Carboplatin   Pre Meds given   Notes given         Mr. Soler's vitals were reviewed.   Visit Vitals  /64 (BP 1 Location: Left upper arm, BP Patient Position: Sitting)   Pulse 69   Temp 96.9 °F (36.1 °C)   Resp 20   Ht 6' (1.829 m)   Wt 107.4 kg (236 lb 12.4 oz)   SpO2 97%   BMI 32.11 kg/m²       Medications:  Medications Administered     0.9% sodium chloride infusion     Admin Date  2021 Action  New Bag Dose  25 mL/hr Rate  25 mL/hr Route  IntraVENous Administered By  Haritha Goel RN          0.9% sodium chloride injection 10 mL     Admin Date  2021 Action  Given Dose  10 mL Route  IntraVENous Administered By  Haritha Goel RN          CARBOplatin (PARAPLATIN) 560 mg in 0.9% sodium chloride 250 mL, overfill volume 25 mL chemo infusion     Admin Date  2021 Action  New Bag Dose  560 mg Rate  662 mL/hr Route  IntraVENous Administered By  Haritha Goel RN          dexamethasone (DECADRON) 12 mg in 0.9% sodium chloride 50 mL, overfill volume 5 mL IVPB     Admin Date  2021 Action  New Bag Dose  12 mg Rate  232 mL/hr Route  IntraVENous Administered By  Haritha Goel RN          diphenhydrAMINE (BENADRYL) injection 50 mg     Admin Date  2021 Action  Given Dose  50 mg Route  IntraVENous Administered By  Haritha Goel RN          famotidine (PF) (PEPCID) 20 mg in 0.9% sodium chloride 10 mL injection     Admin Date  2021 Action  Given Dose  20 mg Route  IntraVENous Administered By  Haritha Goel RN fosaprepitant (EMEND) 150 mg in 0.9% sodium chloride 150 mL IVPB     Admin Date  07/30/2021 Action  New Bag Dose  150 mg Rate  450 mL/hr Route  IntraVENous Administered By  Renu Steven RN          PACLitaxeL (TAXOL) 401 mg in 0.9% sodium chloride 500 mL, overfill volume 50 mL chemo infusion     Admin Date  07/30/2021 Action  New Bag Dose  401 mg Rate  205.6 mL/hr Route  IntraVENous Administered By  Renu Steven RN          palonosetron HCl (ALOXI) injection 0.25 mg     Admin Date  07/30/2021 Action  Given Dose  0.25 mg Route  IntraVENous Administered By  Renu Steven RN          pembrolizumab [facility]) 200 mg in 0.9% sodium chloride 100 mL, overfill volume 10 mL IVPB     Admin Date  07/30/2021 Action  New Bag Dose  200 mg Rate  236 mL/hr Route  IntraVENous Administered By  Renu Steven RN          sodium chloride (NS) flush 10 mL     Admin Date  07/30/2021 Action  Given Dose  10 mL Route  IntraVENous Administered By  Renu Steven RN                Mr. Rosalia Wayne tolerated treatment well and was discharged from [place] [address] in stable condition at 1530. PIV flushed & removed per protocol. Blood return maintained throughout treatment. He is to return on August 19 for his next appointment.     Elvira Stone RN  July 30, 2021

## 2021-08-05 NOTE — TELEPHONE ENCOUNTER
Orders Placed This Encounter    apixaban (ELIQUIS) 5 mg tablet     Sig: Take 1 Tablet by mouth two (2) times a day. Dispense:  60 Tablet     Refill:  0     The above orders were approved via [facility] per Dr. Kailey Mejia III. Patient notified rx sent.

## 2021-08-05 NOTE — TELEPHONE ENCOUNTER
Link Slater (Self) [phone] (H)     Requested Prescriptions     Pending Prescriptions Disp Refills    apixaban (ELIQUIS) 5 mg tablet       Sig: Take 1 Tablet by mouth two (2) times a day. [address], [facility] [place]    Pt went to [place] and forgot his medications. Says he doesn't even have tonight's dose of 2 and that it is important he gets it before the pharmacy closes [facility]. Vend says the pharmacy closes at [address]. M.

## 2021-08-19 NOTE — PROGRESS NOTES
[facility] Lab Visit:        1256:  Pt arrived ambulatory to [facility] in stable condition, for lab draw. Labs drawn peripherally from Right AC arm and sent for processing. Pt tolerated well. Visit Vitals  BP (!) 157/86 (BP 1 Location: Right upper arm, BP Patient Position: Sitting)   Pulse 61   Temp 97 °F (36.1 °C)   Resp 18   Wt 107.4 kg (236 lb 12.4 oz)   SpO2 97%   BMI 32.11 kg/m²       1545: No new concerns voiced. D/cd home ambulatory in no distress. Pt aware of next [facility] appointment scheduled. Labs available in CC once resulted.

## 2021-08-19 NOTE — PROGRESS NOTES
[facility] [place] at [address], [address] [place] [address] Road, [place]: [phone]  F: [phone]    Reason for Visit:   Marylou Aguirre is a 78 y.o. male who is seen today in office for follow up of metastatic SCC on Carbo/Taxol/Pembro. Treatment History:   · MRI Right Hip 8/5/20: Enlarging right pelvic sidewall adenopathy with adjacent muscular and bony invasion. Degenerative changes of the right hip. Small bony metastatic deposit anterior wall of the right acetabulum  · MRI Lumbar Spine 8/11/20: L3-L4 moderate central spinal canal and right foraminal stenoses. L5-S1 moderate bilateral foraminal stenosis  · 8/20/20 Pelvic Mass, Core biopsy with touch preparation - CYTOLOGIC INTERPRETATION: Pelvic Mass, Core biopsy with touch preparation: Squamous cell carcinoma  · TYPE ID + SCC/H+N vs skin  · PET 9/8/20: Large hypermetabolic right pelvic sidewall lymph node. Lytic lesion right anterior acetabulum is hypermetabolic and compatible with metastatic disease   · XRT to Right Pelvic Mass 10/13/20 - 10/22/20 with Dr. Trinidad Mcduffie  · Pembrolizumab 12/15/20 - Current   · PET 1/12/21: New hypermetabolic pulmonary nodule in the lingula. Resolved left pelvic sidewall lymph node activity and resolved activity corresponding to the lytic lesion in the right anterior acetabulum  · PET 4/22/21:Progression of disease with new soft tissue nodules in the deep pelvis, foci of increased tracer activity in the right pelvic sidewall and right external iliac chain, increase in the size and number of pulmonary metastases, and hypermetabolic bone lesions right hemipelvis  · Carbo/Taxol/Pembro 6/18/21 - Current     History of Present Illness:   Marylou Aguirre is a 78 y.o. male seen today in office for follow up of metastatic SCC primary site unclear PDL1 80%. He started Carbo/Taxol/Pembro on 6/18/21. He is scheduled for Cycle 4 tomorrow. He reports that he feels well overall today.  He is tolerating chemo well overall with some fatigue/feeling wiped out and arthralgia. He continues to have some STEWART. He has not had much nausea lately. He also has some mild constipation. The itching is mostly resolved. He is tolerating Eliquis well overall and denies bleeding. He denies fever, chills, mouth sores, cough, CP, nausea, and, diarrhea. CBC and CMP are still pending today. He is ready/wants to do chemo tomorrow. His wife is here today. Past Medical History:   Diagnosis Date    Arthritis     BPH (benign prostatic hypertrophy) 7/18/2011    Cancer [facility])     prostate cancer    Chronic pain right    knee    ED (erectile dysfunction)     GERD (gastroesophageal reflux disease)     HTN (hypertension), benign     Metastatic squamous cell carcinoma ([facility] [address] 9/16/2020    OA (osteoarthritis) of knee 7/18/2011    BOTH KNEES    Unspecified essential hypertension 7/18/2011      Past Surgical History:   Procedure Laterality Date    HX ACL RECONSTRUCTION Right     x2    HX APPENDECTOMY  1975    HX GI  1/2015    Drainage of hemorrhoid    HX HERNIA REPAIR      left and right,  ventral    HX HIP REPLACEMENT  2000    left    HX HIP REPLACEMENT  2000    left    HX KNEE ARTHROSCOPY Right     x3    HX MALIGNANT SKIN LESION EXCISION      melanoma - face    HX MOHS PROCEDURES Right     Basal cell behind ear.      HX ORTHOPAEDIC Left 6/2000    HIP REPLACEMENT    HX ORTHOPAEDIC Right 08/05/2014    [facility] - Dr. Pradeep Jeff HX PROSTATECTOMY  11/07/2011    prostate removed     HX TONSILLECTOMY      CHILD    IR INJ FORAMIN EPID LUMB ANES/STER SNGL  8/18/2020      Social History     Tobacco Use    Smoking status: Never Smoker    Smokeless tobacco: Never Used   Substance Use Topics    Alcohol use: No      Family History   Problem Relation Age of Onset    Bleeding Prob Father         clotting disorder    Cancer Brother         colon CA 1/2 brother    Thyroid Disease Daughter     Heart Failure Mother     Heart Disease Mother    Tanesha Krueger Anesth Problems Neg Hx      Current Outpatient Medications   Medication Sig    terazosin (HYTRIN) 10 mg capsule Take 1 capsule by mouth once daily    apixaban (ELIQUIS) 5 mg tablet Take 1 Tablet by mouth two (2) times a day.  amLODIPine (NORVASC) 5 mg tablet Take 1 tablet by mouth nightly    candesartan (ATACAND) 4 mg tablet Take 1 tablet by mouth nightly    apixaban (Eliquis DVT-PE Treat 30D Start) 5 mg (74 tabs) starter pack Take 10 mg (two 5 mg tablets) by mouth twice a day for 7 days   Followed by 5 mg (one 5 mg tablet) by mouth twice a day    traZODone (DESYREL) 50 mg tablet TAKE 1 TO 2 TABLETS BY MOUTH ONCE DAILY IN THE EVENING AS NEEDED FOR SLEEP    dexAMETHasone (DECADRON) 4 mg tablet Take 2 tabs (8mg) by mouth daily on days 2 and 3 after chemotherapy    ondansetron (ZOFRAN ODT) 4 mg disintegrating tablet Take 1-2 Tabs by mouth every eight (8) hours as needed for Nausea or Vomiting.  calcium carbonate/vitamin D3 (CALTRATE-600 PLUS VITAMIN D3 PO) Take 1 Tab by mouth nightly.  leuprolide acetate (LUPRON DEPOT, 6 MONTH, IM) 1 Each by IntraMUSCular route every 6 months. No current facility-administered medications for this visit. Allergies   Allergen Reactions    Barbiturates Anxiety     Hyper        Review of Systems:  A complete review of systems was obtained, negative except as described above and as reported on ROS sheet scanned into system. Physical Exam:     Visit Vitals  /79 (BP 1 Location: Left upper arm, BP Patient Position: Sitting)   Pulse 75   Temp 98.2 °F (36.8 °C) (Oral)   Ht 6' (1.829 m)   Wt 236 lb (107 kg)   SpO2 98%   BMI 32.01 kg/m²     ECOG PS: 1  General: No distress  Eyes: Anicteric sclerae  HENT: Atraumatic, wearing a mask  Neck: Supple  Resp: CTAB, normal respiratory effort   CV: Regular rate and rhythm  GI: Soft, non tender   Ext: Trace right leg edema, no edema in left leg  MS: Normal gait and station. Digits without clubbing or cyanosis.   Skin: No ecchymoses, or petechiae. Normal temperature, turgor, and texture. Psych: Alert, oriented, appropriate affect, normal judgment/insight  Neuro: Non focal    Results:     Lab Results   Component Value Date/Time    WBC 4.0 (L) 08/19/2021 04:17 PM    HGB 9.6 (L) 08/19/2021 04:17 PM    HCT 30.0 (L) 08/19/2021 04:17 PM    PLATELET 459 56/27/1176 04:17 PM    MCV 97.7 08/19/2021 04:17 PM    ABS. NEUTROPHILS 2.7 08/19/2021 04:17 PM     Lab Results   Component Value Date/Time    Sodium 141 08/19/2021 04:17 PM    Potassium 4.1 08/19/2021 04:17 PM    Chloride 112 (H) 08/19/2021 04:17 PM    CO2 24 08/19/2021 04:17 PM    Glucose 93 08/19/2021 04:17 PM    BUN 27 (H) 08/19/2021 04:17 PM    Creatinine 1.46 (H) 08/19/2021 04:17 PM    GFR est AA 57 (L) 08/19/2021 04:17 PM    GFR est non-AA 47 (L) 08/19/2021 04:17 PM    Calcium 8.4 (L) 08/19/2021 04:17 PM    Glucose (POC) 90 04/22/2021 10:19 AM     Lab Results   Component Value Date/Time    Bilirubin, total 0.5 08/19/2021 04:17 PM    ALT (SGPT) 18 08/19/2021 04:17 PM    Alk. phosphatase 56 08/19/2021 04:17 PM    Protein, total 6.2 (L) 08/19/2021 04:17 PM    Albumin 3.2 (L) 08/19/2021 04:17 PM    Globulin 3.0 08/19/2021 04:17 PM     MRI Right Hip 8/5/20  IMPRESSION:   1. Enlarging right pelvic sidewall adenopathy with adjacent muscular and bony  invasion  2. Degenerative changes of the right hip  3. Small bony metastatic deposit anterior wall of the right acetabulum    MRI Lumbar Spine 8/11/20  IMPRESSION:  1. L3-L4 moderate central spinal canal and right foraminal stenoses. 2. L5-S1 moderate bilateral foraminal stenosis. 8/20/20  Specimen Source   1: Pelvic Mass, Core biopsy with touch preparation   CYTOLOGIC INTERPRETATION:   Pelvic Mass, Core biopsy with touch preparation:   Squamous cell carcinoma    PET 9/8/20  IMPRESSION: Large hypermetabolic right pelvic sidewall lymph node.  Lytic lesion  right anterior     PET 1/12/21  IMPRESSION:  New hypermetabolic pulmonary nodule in the lingula. Resolved left  pelvic sidewall lymph node activity and resolved activity corresponding to the  lytic lesion in the right anterior acetabulum    PET 4/22/21  IMPRESSION:  Progression of disease with new soft tissue nodules in the deep  pelvis, foci of increased tracer activity in the right pelvic sidewall and right  external iliac chain, increase in the size and number of pulmonary metastases,  and hypermetabolic bone lesions right hemipelvis. Records reviewed and summarized above. Pathology report(s) reviewed above. Radiology report(s) reviewed above. Assessment/PLAN:     1) Metastatic Squamous Cell Cancer    EGFR/ROS/ALK negative. PDL1 80% by biopsy of pelvic/hip mas 8/20/20. Path is different than prostate which is adenocarcinoma from 2011. Unclear site of origin of squamous cell. TYPE ID sent and SCC H+N vs skin. Completed XRT to right pelvic mass on 10/22/20. Right hip pain has esolved since completing XRT. He started Pembrolizumab on 12/15/20. He had follow up PET on 1/12/21 that showed new hypermetabolic pulmonary nodule in the lingula, resolved left pelvic sidewall lymph node activity and resolved activity corresponding to the lytic lesion in the right anterior acetabulum. Case reviewed at cancer conference and recommendation was to continue [facility] for now. PET 4/21 showed slight progressive disease in lungs/LAD/bones. Patient was seen at [facility] by Med/Onc and Uro/Onc. D/w [facility] and plan is still same as recommended here. Site of origin of cancer is still unclear but not likely prostate. Patient started Carbo/Taxol/Pembro on 6/18/21. He is here today for follow up - has Cycle 4 tomorrow. He is tolerating treatment well overall thus far. He has some fatigue and arthralgia for a few days after treatment. Side effects are tolerable/manageable per patient. He is clinically stable today and doing well overall. Labs (CBC and CMP) personally reviewed today. Patient is ready for treatment tomorrow. Follow up PET to evaluate response to therapy ordered today. Follow up in 3 weeks with Cycle 5 (maintenance Pembro). Patient agrees with plan. 2) Recurrent Prostate Cancer   PSA 0 on Lupron per Urology. Management per Urology. 3) Hx of Right Hip Pain  Resolved after XRT. 4) Rash/Itching   Essentially resolved. Improved with OTC Hydrocortisone and Benadryl. Can also take Zyrtec for itching    5) Low TSH - Resolved  TSH 0.02 on 3/11/21  Free T3 and T4 ordered. TSH normal at 1.95 on 6/2/21 and 1.88 on 6/17/21. Will continue to monitor. 6) HTN/Arthritis/GERD/Cronic Elevated Creatinine   Management per PCP. 7) Chronic Thrombocytopenia  CC review and present in 2011. Likely not caused by therapy. Mild and will continue to monitor. 8) Extensive Right Leg DVT  On Eliquis and tolerating well - no bleeding. Recommend indefinite anticoagulation. 9) High Risk Drug Monitoring - Immunotherapy  Patient tolerating well with Grade 1 Arthralgia and Grade 1-2 Fatigue. Labs (CBC and CMP) reviewed today. Patient is ready for treatment tomorrow. Will continue to monitor for side effects. 10) Psychosocial  Mood good, coping well. He has great support from his wife. SW support as needed. His wife is here today. Call if questions. Follow up in 3 weeks. This patient was seen in conjunction with Vanesa Chun NP. I personally performed a face to face diagnostic evaluation on this patient. I personally reviewed the history and performed the key points on the exam.   I personally reviewed all points in the assessment and created treatment plan with the patient. Specifically pt seen by me today  Doing well overall. Less swelling in RLE. Pt is tolerating chemo/ immunotherapy well. Continue same treatment plan without modifications. Labs will be reviewed tmw. Fu scans after cycle 4. Pt clinically doing well overall.      I appreciate the opportunity to participate in  Marco Navarrete yahir.     Signed By: Ludwig Obrien DO

## 2021-08-19 NOTE — PROGRESS NOTES
Sahil Eduardo is a 78 y.o. male  Chief Complaint   Patient presents with    Chemotherapy      metastatic SCC      1. Have you been to the ER, urgent care clinic since your last visit? Hospitalized since your last visit? No.    2. Have you seen or consulted any other health care providers outside of the [address] [place] [address] since your last visit? Include any pap smears or colon screening.  No.

## 2021-08-20 NOTE — PROGRESS NOTES
[facility] Chemo Progress Note    0810 Mr. Jason Juarez Arrived to [place] for  Keytruda/Taxol/Carbo (C4) ambulatory in stable condition. Assessment was completed, no acute issues at this time, no new complaints voiced. PIV established with positive blood return. Labs reviewed from 8/19. WDL for treatment. Medications ordered from pharmacy. PIV connected to NS KVO. Patient denies SOB, fever, cough, general not feeling well. Patient denies recent exposure to someone who has tested positive for COVID-19. Patient denies having contact with anyone who has a pending COVID test.     Chemotherapy Flowsheet 8/20/2021   Cycle C4   Date 8/20/2021   Drug / Regimen Keytruda/Taxol/Carbo   Pre Meds given   Notes given     Mr. Soler's vitals were reviewed. Patient Vitals for the past 12 hrs:   Temp Pulse Resp BP   08/20/21 1530  62  (!) 162/82   08/20/21 0817 98 °F (36.7 °C) 61 16 130/69     Pre-medications  were administered as ordered and chemotherapy was initiated.   Medications Administered     0.9% sodium chloride infusion     Admin Date  08/20/2021 Action  New Bag Dose  25 mL/hr Rate  25 mL/hr Route  IntraVENous Administered By  Arpita Gann RN          CARBOplatin (PARAPLATIN) 551 mg in 0.9% sodium chloride 250 mL, overfill volume 25 mL chemo infusion     Admin Date  08/20/2021 Action  New Bag Dose  551 mg Rate  660.2 mL/hr Route  IntraVENous Administered By  Arpita Gann RN          dexamethasone (DECADRON) 12 mg in 0.9% sodium chloride 50 mL, overfill volume 5 mL IVPB     Admin Date  08/20/2021 Action  New Bag Dose  12 mg Rate  232 mL/hr Route  IntraVENous Administered By  Arpita Gann RN          diphenhydrAMINE (BENADRYL) injection 50 mg     Admin Date  08/20/2021 Action  Given Dose  50 mg Route  IntraVENous Administered By  Arpita Gann RN          famotidine (PF) (PEPCID) 20 mg in 0.9% sodium chloride 10 mL injection     Admin Date  08/20/2021 Action  Given Dose  20 mg Route  IntraVENous Administered By  Dariana Mason VITALIY RN          fosaprepitant (EMEND) 150 mg in 0.9% sodium chloride 150 mL IVPB     Admin Date  08/20/2021 Action  New Bag Dose  150 mg Rate  450 mL/hr Route  IntraVENous Administered By  Misti Reardon RN          PACLitaxeL (TAXOL) 401 mg in 0.9% sodium chloride 250 mL, overfill volume 25 mL chemo infusion     Admin Date  08/20/2021 Action  New Bag Dose  401 mg Rate  113.9 mL/hr Route  IntraVENous Administered By  Misti Reardon RN          palonosetron HCl (ALOXI) injection 0.25 mg     Admin Date  08/20/2021 Action  Given Dose  0.25 mg Route  IntraVENous Administered By  Misti Reardon RN          pembrolizumab [facility]) 200 mg in 0.9% sodium chloride 100 mL, overfill volume 10 mL IVPB     Admin Date  08/20/2021 Action  New Bag Dose  200 mg Rate  236 mL/hr Route  IntraVENous Administered By  Misti Reardon RN          sodium chloride (NS) flush 10 mL     Admin Date  08/20/2021 Action  Given Dose  10 mL Route  IntraVENous Administered By  Misti Reardon RN              [id number] Patient tolerated treatment well. PIV maintained positive blood return throughout treatment. PIV flushed and removed per protocol. Patient was discharged from [facility] in stable condition. Patient is aware of next OPI visit.      Future Appointments   Date Time Provider Americo Redd   9/9/2021  3:00 PM Batsheva Avelar  [address] BS AMB   9/9/2021  3:30 PM H3 [facility]   9/10/2021 10:00 AM A1 [facility] TX [facility]   9/14/2021 10:00 AM [facility] RCR PET DOSE 1 [facility]   9/14/2021 11:00 AM [facility] RCR PET 1 [facility]   9/30/2021  3:30 PM H3 [facility]   10/1/2021 10:00 AM A1 [facility] TX [facility]   10/21/2021  3:30 PM H3 [facility]   10/22/2021 10:00 AM A1 [facility] Spencer Diaz [address], RN  August 20, 2021

## 2021-09-07 NOTE — PROGRESS NOTES
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

## 2021-09-07 NOTE — PROGRESS NOTES
Called the patient and verified ID x 2. Asked the patient how his neuropathy is and the patient stated that he did not have any problems until the 4th treatment and then the neuropathy \"came on pretty strong\" and that all 10 toes are numb and tingling with numb spots on his feet and his fingertips are numb as well. Informed him that per Dr. Trice Reid and Karlee Horton NP they are able to send in a prescription for neurontin or gabapentin which helps specifically with neuropathy and asked if he is skipping treatment this week. The patient stated that he is skipping treatment this week and that he is scheduled for a PET scan on Tuesday 9/14/21 to see how he is responding to treatment and requested that a prescription for gabapentin be sent in to the [address] on [address]. Informed the patient that his [facility] appointment will be cancelled and a prescription for gabapentin will be sent in to the requested pharmacy and to call with any questions or concerns. The patient verbalized understanding and denied any questions or concerns.

## 2021-09-07 NOTE — TELEPHONE ENCOUNTER
Pt not in town wed,thurs, or Friday this week. Per Dr Vita Brown not, pt is not getting treatment this week. Please cancel appointments this week and notify the patient.

## 2021-09-15 NOTE — PROGRESS NOTES
LISSETTE Verified. Called pt on mobile phone number listed. Patient was made aware of his PET scan results! Patient was very appreciative for update.   Tammy Uriarte

## 2021-09-15 NOTE — TELEPHONE ENCOUNTER
From: Katie Kolb  To: Linn Boeck, MD  Sent: 9/15/2021 4:27 PM EDT  Subject: Prescription Question    Hi Dr. Bozena Solitario - Jules Tobin here. I need a refill for my blood thinner, Eliquis. Please be sure it is sent to the [address] [facility] on [address] [facility] near you, NOT the [facility] in [place]. Many thanks! ps - I called your office this afternoon during normal office hourswas put in hold for 10 minutes, and then got a message that nobody could answer your phonesnot good.

## 2021-09-21 NOTE — TELEPHONE ENCOUNTER
Patient going out of town today and needs to know were we able to get him scheduled for opic this week or not?     Cb# [phone]

## 2021-09-21 NOTE — PROGRESS NOTES
[facility] [place] at [address], [address] [address], [facility]: [phone]  F: [phone]    Reason for Visit:   Chinyere Storey is a 78 y.o. male who is seen today in office for follow up of metastatic SCC on Carbo/Taxol/Pembro. Treatment History:   · MRI Right Hip 8/5/20: Enlarging right pelvic sidewall adenopathy with adjacent muscular and bony invasion. Degenerative changes of the right hip. Small bony metastatic deposit anterior wall of the right acetabulum  · MRI Lumbar Spine 8/11/20: L3-L4 moderate central spinal canal and right foraminal stenoses. L5-S1 moderate bilateral foraminal stenosis  · 8/20/20 Pelvic Mass, Core biopsy with touch preparation - CYTOLOGIC INTERPRETATION: Pelvic Mass, Core biopsy with touch preparation: Squamous cell carcinoma  · TYPE ID + SCC/H+N vs skin  · PET 9/8/20: Large hypermetabolic right pelvic sidewall lymph node. Lytic lesion right anterior acetabulum is hypermetabolic and compatible with metastatic disease   · XRT to Right Pelvic Mass 10/13/20 - 10/22/20 with Dr. Orson Lesches  · Pembrolizumab 12/15/20 - Current   · PET 1/12/21: New hypermetabolic pulmonary nodule in the lingula. Resolved left pelvic sidewall lymph node activity and resolved activity corresponding to the lytic lesion in the right anterior acetabulum  · PET 4/22/21:Progression of disease with new soft tissue nodules in the deep pelvis, foci of increased tracer activity in the right pelvic sidewall and right external iliac chain, increase in the size and number of pulmonary metastases, and hypermetabolic bone lesions right hemipelvis  · Carbo/Taxol/Pembro 6/18/21 - Current     History of Present Illness:   Chinyere Storey is a 78 y.o. male seen today in office for follow up of metastatic SCC primary site unclear PDL1 80% started Carbo/Taxol/Pembro on 6/18/21. he feels well overall today.   He is tolerating chemo well overall with some fatigue/feeling wiped out and arthralgia. Had PET done which showed improvement overall. Here today for discussion of treatment plan.   has neuropathy and fatigue. Sees cardio for bp. No fevers/ chills/ chest pain/ SOB/ nausea/ vomiting/diarrhea/      Past Medical History:   Diagnosis Date    Arthritis     BPH (benign prostatic hypertrophy) 7/18/2011    Cancer (HCC)     prostate cancer    Chronic pain right    knee    ED (erectile dysfunction)     GERD (gastroesophageal reflux disease)     HTN (hypertension), benign     Metastatic squamous cell carcinoma ([facility] [address] 9/16/2020    OA (osteoarthritis) of knee 7/18/2011    BOTH KNEES    Unspecified essential hypertension 7/18/2011      Past Surgical History:   Procedure Laterality Date    HX ACL RECONSTRUCTION Right     x2    HX APPENDECTOMY  1975    HX GI  1/2015    Drainage of hemorrhoid    HX HERNIA REPAIR      left and right,  ventral    HX HIP REPLACEMENT  2000    left    HX HIP REPLACEMENT  2000    left    HX KNEE ARTHROSCOPY Right     x3    HX MALIGNANT SKIN LESION EXCISION      melanoma - face    HX MOHS PROCEDURES Right     Basal cell behind ear.  HX ORTHOPAEDIC Left 6/2000    HIP REPLACEMENT    HX ORTHOPAEDIC Right 08/05/2014    [facility] - Dr. [place] Matamoros HX PROSTATECTOMY  11/07/2011    prostate removed     HX TONSILLECTOMY      CHILD    IR INJ FORAMIN EPID LUMB ANES/STER SNGL  8/18/2020      Social History     Tobacco Use    Smoking status: Never Smoker    Smokeless tobacco: Never Used   Substance Use Topics    Alcohol use: No      Family History   Problem Relation Age of Onset    Bleeding Prob Father         clotting disorder    Cancer Brother         colon CA 1/2 brother    Thyroid Disease Daughter     Heart Failure Mother     Heart Disease Mother     Anesth Problems Neg Hx      Current Outpatient Medications   Medication Sig    apixaban (ELIQUIS) 5 mg tablet Take 1 Tablet by mouth two (2) times a day.     gabapentin (NEURONTIN) 300 mg capsule Take 1 Capsule by mouth nightly. Max Daily Amount: 300 mg.  terazosin (HYTRIN) 10 mg capsule Take 1 capsule by mouth once daily    amLODIPine (NORVASC) 5 mg tablet Take 1 tablet by mouth nightly    candesartan (ATACAND) 4 mg tablet Take 1 tablet by mouth nightly    traZODone (DESYREL) 50 mg tablet TAKE 1 TO 2 TABLETS BY MOUTH ONCE DAILY IN THE EVENING AS NEEDED FOR SLEEP    dexAMETHasone (DECADRON) 4 mg tablet Take 2 tabs (8mg) by mouth daily on days 2 and 3 after chemotherapy    ondansetron (ZOFRAN ODT) 4 mg disintegrating tablet Take 1-2 Tabs by mouth every eight (8) hours as needed for Nausea or Vomiting.  calcium carbonate/vitamin D3 (CALTRATE-600 PLUS VITAMIN D3 PO) Take 1 Tab by mouth nightly.  leuprolide acetate (LUPRON DEPOT, 6 MONTH, IM) 1 Each by IntraMUSCular route every 6 months. No current facility-administered medications for this visit. Allergies   Allergen Reactions    Barbiturates Anxiety     Hyper        Review of Systems:  A complete review of systems was obtained, negative except as described above and as reported on ROS sheet scanned into system. Physical Exam:     Visit Vitals  /68 (BP 1 Location: Left upper arm, BP Patient Position: Sitting)   Pulse 68   Temp (!) 96.2 °F (35.7 °C) (Temporal)   Ht 6' (1.829 m)   Wt 235 lb 1.6 oz (106.6 kg)   SpO2 93%   BMI 31.89 kg/m²     ECOG PS: 1  General: No distress  Eyes: Anicteric sclerae  HENT: Atraumatic, wearing a mask  Neck: Supple  Resp: CTAB, normal respiratory effort   CV: Regular rate and rhythm  GI: Soft, non tender   Ext: Trace right leg edema, no edema in left leg  MS: Normal gait and station. Digits without clubbing or cyanosis. Skin: No ecchymoses, or petechiae. Normal temperature, turgor, and texture.   Psych: Alert, oriented, appropriate affect, normal judgment/insight  Neuro: Non focal    Results:     Lab Results   Component Value Date/Time    WBC 4.0 (L) 08/19/2021 04:17 PM    HGB 9.6 (L) 08/19/2021 04:17 PM    HCT 30.0 (L) 08/19/2021 04:17 PM    PLATELET 512 43/18/7198 04:17 PM    MCV 97.7 08/19/2021 04:17 PM    ABS. NEUTROPHILS 2.7 08/19/2021 04:17 PM     Lab Results   Component Value Date/Time    Sodium 141 08/19/2021 04:17 PM    Potassium 4.1 08/19/2021 04:17 PM    Chloride 112 (H) 08/19/2021 04:17 PM    CO2 24 08/19/2021 04:17 PM    Glucose 93 08/19/2021 04:17 PM    BUN 27 (H) 08/19/2021 04:17 PM    Creatinine 1.46 (H) 08/19/2021 04:17 PM    GFR est AA 57 (L) 08/19/2021 04:17 PM    GFR est non-AA 47 (L) 08/19/2021 04:17 PM    Calcium 8.4 (L) 08/19/2021 04:17 PM    Glucose (POC) 94 09/14/2021 10:04 AM     Lab Results   Component Value Date/Time    Bilirubin, total 0.5 08/19/2021 04:17 PM    ALT (SGPT) 18 08/19/2021 04:17 PM    Alk. phosphatase 56 08/19/2021 04:17 PM    Protein, total 6.2 (L) 08/19/2021 04:17 PM    Albumin 3.2 (L) 08/19/2021 04:17 PM    Globulin 3.0 08/19/2021 04:17 PM     MRI Right Hip 8/5/20  IMPRESSION:   1. Enlarging right pelvic sidewall adenopathy with adjacent muscular and bony  invasion  2. Degenerative changes of the right hip  3. Small bony metastatic deposit anterior wall of the right acetabulum    MRI Lumbar Spine 8/11/20  IMPRESSION:  1. L3-L4 moderate central spinal canal and right foraminal stenoses. 2. L5-S1 moderate bilateral foraminal stenosis. 8/20/20  Specimen Source   1: Pelvic Mass, Core biopsy with touch preparation   CYTOLOGIC INTERPRETATION:   Pelvic Mass, Core biopsy with touch preparation:   Squamous cell carcinoma    PET 9/8/20  IMPRESSION: Large hypermetabolic right pelvic sidewall lymph node. Lytic lesion  right anterior     PET 1/12/21  IMPRESSION:  New hypermetabolic pulmonary nodule in the lingula.  Resolved left  pelvic sidewall lymph node activity and resolved activity corresponding to the  lytic lesion in the right anterior acetabulum    PET 4/22/21  IMPRESSION:  Progression of disease with new soft tissue nodules in the deep  pelvis, foci of increased tracer activity in the right pelvic sidewall and right  external iliac chain, increase in the size and number of pulmonary metastases,  and hypermetabolic bone lesions right hemipelvis. Records reviewed and summarized above. Pathology report(s) reviewed above. Radiology report(s) reviewed above. Assessment/PLAN:     1) Metastatic Squamous Cell Cancer    EGFR/ROS/ALK negative. PDL1 80% by biopsy of pelvic/hip mas 8/20/20. Path is different than prostate which is adenocarcinoma from 2011. Unclear site of origin of squamous cell. TYPE ID sent and SCC H+N vs skin. Completed XRT to right pelvic mass on 10/22/20. Right hip pain has esolved since completing XRT. He started Pembrolizumab on 12/15/20. He had follow up PET on 1/12/21 that showed new hypermetabolic pulmonary nodule in the lingula, resolved left pelvic sidewall lymph node activity and resolved activity corresponding to the lytic lesion in the right anterior acetabulum. Case reviewed at cancer conference and recommendation was to continue [facility] for now. PET 4/21 showed slight progressive disease in lungs/LAD/bones. Patient was seen at [facility] by Med/Onc and Uro/Onc. D/w [facility] and plan is still same as recommended here. Site of origin of cancer is still unclear but not likely prostate. Patient started Carbo/Taxol/Pembro on 6/18/21. He is here today for follow up    He is tolerating treatment well overall thus far. He has some fatigue and arthralgia and neuropathy. Side effects are tolerable/manageable per patient. Continue gabapentin. Up to bid. Had PET and good response to therapy with decreased disease. Discussed doing two more cycles of carbo/ taxol / pembro. Taxol 10% dose reduction due to neuropathy. Pt and wife agreeable to plan. Set up in [address] [address]. Labs at chemo. He is clinically stable today and doing well overall. 2) Recurrent Prostate Cancer   PSA 0 on Lupron per Urology. Management per Urology.      3) Hx of Right Hip Pain  Resolved after XRT. 4) Rash/Itching    resolved. Improved with OTC Hydrocortisone and Benadryl. Can also take Zyrtec for itching    5) Low TSH - Resolved  TSH 0.02 on 3/11/21  Free T3 and T4 ordered. TSH normal at 1.95 on 6/2/21 and 1.88 on 6/17/21. Will continue to monitor. 6) HTN/Arthritis/GERD/Cronic Elevated Creatinine   Management per PCP. 7) Chronic Thrombocytopenia  CC review and present in 2011. Likely not caused by therapy. Mild and will continue to monitor. 8) Extensive Right Leg DVT  On Eliquis and tolerating well - no bleeding. Recommend indefinite anticoagulation. 9) High Risk Drug Monitoring - Immunotherapy  Patient tolerating well with Grade 1 Arthralgia and Grade 1-2 Fatigue. Labs (CBC and CMP) reviewed today. Will continue to monitor for side effects. 10) Psychosocial  Mood good, coping well. He has great support from his wife. SW support as needed. His wife is here today. Call if questions. Follow up in 3 weeks. I appreciate the opportunity to participate in Mr. Monica Mack yahir.     Signed By: Jake Dueñas DO

## 2021-09-21 NOTE — PROGRESS NOTES
Juanpablo Chatterjee is a 78 y.o. male  Chief Complaint   Patient presents with    Follow-up      metastatic SCC      1. Have you been to the ER, urgent care clinic since your last visit? Hospitalized since your last visit? No.    2. Have you seen or consulted any other health care providers outside of the [address] [place] [address] since your last visit? Include any pap smears or colon screening.  No.

## 2021-09-23 NOTE — PROGRESS NOTES
Outpatient Infusion Center Short Note    1740 Patient admit to [facility] for pre-chemo labs ambulatory in no distress. Assessment completed, patient reports numbness + tingling to both feet. Labs drawn peripherally and sent for processing. Visit Vitals  BP (!) 160/89 (BP 1 Location: Left upper arm, BP Patient Position: Sitting)   Pulse (!) 58   Temp 97.1 °F (36.2 °C)   Resp 20     1750 D/C ambulatory in no distress. Patient is aware of appointment tomorrow.       Future Appointments   Date Time Provider Americo Redd   9/23/2021  6:00 PM H3 [facility]   9/24/2021  7:30 AM B2 A[place]LEY [place] [facility]   10/14/2021  2:00 PM H3 [facility]   10/14/2021  2:30 PM Sussy Perez,  [address] BS AMB   10/15/2021 10:00 AM E4 [facility]LEY [place] [facility]   11/4/2021  3:00 PM H3 [facility]   11/5/2021 10:00 AM E4 [facility]LEY [place] [facility]

## 2021-09-24 NOTE — PROGRESS NOTES
\[facility]\"" Chemo Progress Note    Date: 2021    Name: Enriqueta Lechuga    MRN: [id number]         : 1945    0730 Mr. Alisha Gaston Arrived to [facility] for C5 D1 ambulatory in stable condition. Assessment was completed, no acute issues at this time, no new complaints voiced. PIV(right ac) accessed with positive blood return. Labs drawn yesterday and were reviewed. Chemotherapy Flowsheet 2021   Cycle D1 C5   Date 2021   Drug / Regimen Keytruday,Carboplatin,Paclitaxel   Pre Meds given   Notes given         Patient denies SOB, fever, cough, general not feeling well. Patient denies recent exposure to someone who has tested positive for COVID-19. Patient denies having contact with anyone who has a pending COVID test.      Mr. Joe Jaime vitals were reviewed. Patient Vitals for the past 12 hrs:   Temp Pulse Resp BP SpO2   21 1515  67  (!) 156/87    21 0800     94 %   21 0730 97.8 °F (36.6 °C) 65 18 (!) 141/72          Lab results were obtained and reviewed. No results found for this or any previous visit (from the past 12 hour(s)). Pre-medications  were administered as ordered and chemotherapy was initiated.   Medications Administered     0.9% sodium chloride infusion     Admin Date  2021 Action  New Bag Dose  25 mL/hr Rate  25 mL/hr Route  IntraVENous Administered By  Manjula Man RN          CARBOplatin (PARAPLATIN) 551 mg in 0.9% sodium chloride 250 mL, overfill volume 25 mL chemo infusion     Admin Date  2021 Action  New Bag Dose  551 mg Rate  660.2 mL/hr Route  IntraVENous Administered By  Manjula Man RN          dexamethasone (DECADRON) 12 mg in 0.9% sodium chloride 50 mL, overfill volume 5 mL IVPB     Admin Date  2021 Action  New Bag Dose  12 mg Rate  232 mL/hr Route  IntraVENous Administered By  Manjula Man RN          diphenhydrAMINE (BENADRYL) injection 50 mg     Admin Date  2021 Action  Given Dose  50 mg Route  IntraVENous Administered By  Shahana Rehman RN          famotidine (PF) (PEPCID) 20 mg in 0.9% sodium chloride 10 mL injection     Admin Date  09/24/2021 Action  Given Dose  20 mg Route  IntraVENous Administered By  Shahana Rehman RN          fosaprepitant (EMEND) 150 mg in 0.9% sodium chloride 150 mL IVPB     Admin Date  09/24/2021 Action  New Bag Dose  150 mg Rate  450 mL/hr Route  IntraVENous Administered By  Shahana Rehman RN          PACLitaxeL (TAXOL) 361 mg in 0.9% sodium chloride 250 mL, overfill volume 25 mL chemo infusion     Admin Date  09/24/2021 Action  New Bag Dose  361 mg Rate  111.7 mL/hr Route  IntraVENous Administered By  Shahana Rehman RN          palonosetron HCl (ALOXI) injection 0.25 mg     Admin Date  09/24/2021 Action  Given Dose  0.25 mg Route  IntraVENous Administered By  Shahana Rehman RN          pembrolizumab [facility]) 200 mg in 0.9% sodium chloride 100 mL, overfill volume 10 mL IVPB     Admin Date  09/24/2021 Action  New Bag Dose  200 mg Rate  236 mL/hr Route  IntraVENous Administered By  Shahana Rehman, [address] [place] Patient tolerated treatment well. PIV maintained positive blood return throughout treatment. Removed  per protocol at end of treatment. Patient was discharged from [facility] in stable condition. Patient aware of next appointment.      Future Appointments   Date Time Provider Americo Redd   10/14/2021  2:00 PM H3 [facility]   10/14/2021  2:30 PM Theodora Barragan  [address] BS AMB   10/15/2021 10:00 AM E4 [facility] [place] [facility]   11/4/2021  3:00 PM H3 [facility]   11/5/2021 10:00 AM E4 ASHLEY LONG [address] Estrada RN  September 24, 2021

## 2021-10-14 PROBLEM — C78.01 BILATERAL PULMONARY METASTASES (HCC): Status: ACTIVE | Noted: 2021-01-01

## 2021-10-14 PROBLEM — Z86.718 HISTORY OF DVT (DEEP VEIN THROMBOSIS): Status: ACTIVE | Noted: 2021-01-01

## 2021-10-14 PROBLEM — Z98.890 HISTORY OF KNEE SURGERY: Status: ACTIVE | Noted: 2021-01-01

## 2021-10-14 PROBLEM — C78.02 BILATERAL PULMONARY METASTASES (HCC): Status: ACTIVE | Noted: 2021-01-01

## 2021-10-14 PROBLEM — C79.51 BONE METASTASES (HCC): Status: ACTIVE | Noted: 2021-01-01

## 2021-10-14 PROBLEM — Z09 CHEMOTHERAPY FOLLOW-UP EXAMINATION: Status: ACTIVE | Noted: 2021-01-01

## 2021-10-14 NOTE — PROGRESS NOTES
Luciana Paz is a 78 y.o. male  Chief Complaint   Patient presents with    Chemotherapy     metastatic SCC      1. Have you been to the ER, urgent care clinic since your last visit? Hospitalized since your last visit? No.  2. Have you seen or consulted any other health care providers outside of the [address] [place] [address] since your last visit? Include any pap smears or colon screening.  No.

## 2021-10-14 NOTE — PROGRESS NOTES
[facility] at [address], [address] [address], [place]: [phone]  F: [phone]    Reason for Visit:   Frankey Magnus is a 78 y.o. male who is seen today in office for follow up of metastatic SCC on Carbo/Taxol/Pembro. Treatment History:   · MRI Right Hip 8/5/20: Enlarging right pelvic sidewall adenopathy with adjacent muscular and bony invasion. Degenerative changes of the right hip. Small bony metastatic deposit anterior wall of the right acetabulum  · MRI Lumbar Spine 8/11/20: L3-L4 moderate central spinal canal and right foraminal stenoses. L5-S1 moderate bilateral foraminal stenosis  · 8/20/20 Pelvic Mass, Core biopsy with touch preparation - CYTOLOGIC INTERPRETATION: Pelvic Mass, Core biopsy with touch preparation: Squamous cell carcinoma  · TYPE ID + SCC/H+N vs skin  · PET 9/8/20: Large hypermetabolic right pelvic sidewall lymph node. Lytic lesion right anterior acetabulum is hypermetabolic and compatible with metastatic disease   · XRT to Right Pelvic Mass 10/13/20 - 10/22/20 with Dr. Nelson France  · Pembrolizumab 12/15/20 - Current   · PET 1/12/21: New hypermetabolic pulmonary nodule in the lingula. Resolved left pelvic sidewall lymph node activity and resolved activity corresponding to the lytic lesion in the right anterior acetabulum  · PET 4/22/21:Progression of disease with new soft tissue nodules in the deep pelvis, foci of increased tracer activity in the right pelvic sidewall and right external iliac chain, increase in the size and number of pulmonary metastases, and hypermetabolic bone lesions right hemipelvis  · Carbo/Taxol/Pembro 6/18/21 - Current   · Taxol DR 10% with Cycle 5 for neuropathy   · PET 9/14/21: Overall improvement in the hypermetabolic activity corresponding to bilateral pulmonary nodules, the pelvic and right pelvic sidewall soft tissue nodules, and osseous metastatic foci.  Pulmonary nodules have decreased in size compared to the prior exam    History of Present Illness:   Wilfrido Taylor is a 78 y.o. male seen today in office for follow up of metastatic SCC primary site unclear PDL1 80% started Carbo/Taxol/Pembro on 6/18/21. He had a PET on 9/14/21 which showed improvement overall. He is here today for labs as Cycle 6 of Carbo/Taxol/Pembro is scheduled for tomorrow. He reports that he feels well overall today. He is tolerating chemo well overall with some fatigue and neuropathy. He is taking Gabapentin for neuropathy. He denies fever, chills, mouth sores, cough, SOB, CP, nausea, vomiting, diarrhea, and constipation. He denies pain today. CBC, CMP and TSH are still pending today. He wants treatment tomorrow. He is here alone today. Past Medical History:   Diagnosis Date    Arthritis     BPH (benign prostatic hypertrophy) 7/18/2011    Cancer [facility])     prostate cancer    Chronic pain right    knee    ED (erectile dysfunction)     GERD (gastroesophageal reflux disease)     HTN (hypertension), benign     Metastatic squamous cell carcinoma ([facility] [address] 9/16/2020    OA (osteoarthritis) of knee 7/18/2011    BOTH KNEES    Unspecified essential hypertension 7/18/2011      Past Surgical History:   Procedure Laterality Date    HX ACL RECONSTRUCTION Right     x2    HX APPENDECTOMY  1975    HX GI  1/2015    Drainage of hemorrhoid    HX HERNIA REPAIR      left and right,  ventral    HX HIP REPLACEMENT  2000    left    HX HIP REPLACEMENT  2000    left    HX KNEE ARTHROSCOPY Right     x3    HX MALIGNANT SKIN LESION EXCISION      melanoma - face    HX MOHS PROCEDURES Right     Basal cell behind ear.      HX ORTHOPAEDIC Left 6/2000    HIP REPLACEMENT    HX ORTHOPAEDIC Right 08/05/2014    [facility] - Dr. Jerica Ag HX PROSTATECTOMY  11/07/2011    prostate removed     HX TONSILLECTOMY      CHILD    IR INJ FORAMIN EPID LUMB ANES/STER SNGL  8/18/2020      Social History     Tobacco Use    Smoking status: Never Smoker    Smokeless tobacco: Never Used Substance Use Topics    Alcohol use: No      Family History   Problem Relation Age of Onset    Bleeding Prob Father         clotting disorder    Cancer Brother         colon CA 1/2 brother    Thyroid Disease Daughter     Heart Failure Mother     Heart Disease Mother     Anesth Problems Neg Hx      Current Outpatient Medications   Medication Sig    Eliquis 5 mg tablet Take 1 tablet by mouth twice daily    gabapentin (NEURONTIN) 300 mg capsule TAKE 1 CAPSULE BY MOUTH NIGHTLY. MAX DAILY AMOUNT 300 MG.  traZODone (DESYREL) 50 mg tablet TAKE 1 TO 2 TABLETS BY MOUTH ONCE DAILY IN THE EVENING AS NEEDED FOR SLEEP    terazosin (HYTRIN) 10 mg capsule Take 1 capsule by mouth once daily    amLODIPine (NORVASC) 5 mg tablet Take 1 tablet by mouth nightly    candesartan (ATACAND) 4 mg tablet Take 1 tablet by mouth nightly    dexAMETHasone (DECADRON) 4 mg tablet Take 2 tabs (8mg) by mouth daily on days 2 and 3 after chemotherapy    ondansetron (ZOFRAN ODT) 4 mg disintegrating tablet Take 1-2 Tabs by mouth every eight (8) hours as needed for Nausea or Vomiting.  calcium carbonate/vitamin D3 (CALTRATE-600 PLUS VITAMIN D3 PO) Take 1 Tab by mouth nightly.  leuprolide acetate (LUPRON DEPOT, 6 MONTH, IM) 1 Each by IntraMUSCular route every 6 months. No current facility-administered medications for this visit. Allergies   Allergen Reactions    Barbiturates Anxiety     Hyper        Review of Systems:  A complete review of systems was obtained, negative except as described above and as reported on ROS sheet scanned into system.      Physical Exam:     Visit Vitals  /76 (BP 1 Location: Left upper arm, BP Patient Position: Sitting)   Pulse 76   Temp (!) 96.6 °F (35.9 °C) (Temporal)   Ht 6' (1.829 m)   Wt 237 lb 12.8 oz (107.9 kg)   SpO2 96%   BMI 32.25 kg/m²     ECOG PS: 1  General: No distress  Eyes: Anicteric sclerae  HENT: Atraumatic, wearing a mask  Neck: Supple  Resp: CTAB, normal respiratory effort   CV: Regular rate and rhythm  GI: Soft, non tender   Ext: Trace right leg edema, no edema in left leg  MS: Normal gait and station. Digits without clubbing or cyanosis. Skin: No ecchymoses, or petechiae. Normal temperature, turgor, and texture. Psych: Alert, oriented, appropriate affect, normal judgment/insight  Neuro: Non focal    Results:     Lab Results   Component Value Date/Time    WBC 4.1 09/23/2021 05:48 PM    HGB 10.0 (L) 09/23/2021 05:48 PM    HCT 30.9 (L) 09/23/2021 05:48 PM    PLATELET 498 06/10/4925 05:48 PM    MCV 99.0 09/23/2021 05:48 PM    ABS. NEUTROPHILS 2.5 09/23/2021 05:48 PM     Lab Results   Component Value Date/Time    Sodium 138 09/23/2021 05:48 PM    Potassium 4.0 09/23/2021 05:48 PM    Chloride 109 (H) 09/23/2021 05:48 PM    CO2 22 09/23/2021 05:48 PM    Glucose 130 (H) 09/23/2021 05:48 PM    BUN 38 (H) 09/23/2021 05:48 PM    Creatinine 1.52 (H) 09/23/2021 05:48 PM    GFR est AA 55 (L) 09/23/2021 05:48 PM    GFR est non-AA 45 (L) 09/23/2021 05:48 PM    Calcium 8.5 09/23/2021 05:48 PM    Glucose (POC) 94 09/14/2021 10:04 AM     Lab Results   Component Value Date/Time    Bilirubin, total 0.6 09/23/2021 05:48 PM    ALT (SGPT) 13 09/23/2021 05:48 PM    Alk. phosphatase 54 09/23/2021 05:48 PM    Protein, total 5.9 (L) 09/23/2021 05:48 PM    Albumin 3.2 (L) 09/23/2021 05:48 PM    Globulin 2.7 09/23/2021 05:48 PM     MRI Right Hip 8/5/20  IMPRESSION:   1. Enlarging right pelvic sidewall adenopathy with adjacent muscular and bony  invasion  2. Degenerative changes of the right hip  3. Small bony metastatic deposit anterior wall of the right acetabulum    MRI Lumbar Spine 8/11/20  IMPRESSION:  1. L3-L4 moderate central spinal canal and right foraminal stenoses. 2. L5-S1 moderate bilateral foraminal stenosis.      8/20/20  Specimen Source   1: Pelvic Mass, Core biopsy with touch preparation   CYTOLOGIC INTERPRETATION:   Pelvic Mass, Core biopsy with touch preparation:   Squamous cell carcinoma    PET 9/8/20  IMPRESSION: Large hypermetabolic right pelvic sidewall lymph node. Lytic lesion  right anterior     PET 1/12/21  IMPRESSION:  New hypermetabolic pulmonary nodule in the lingula. Resolved left  pelvic sidewall lymph node activity and resolved activity corresponding to the  lytic lesion in the right anterior acetabulum    PET 4/22/21  IMPRESSION:  Progression of disease with new soft tissue nodules in the deep  pelvis, foci of increased tracer activity in the right pelvic sidewall and right  external iliac chain, increase in the size and number of pulmonary metastases,  and hypermetabolic bone lesions right hemipelvis. PET 9/14/21  IMPRESSION:  Overall improvement in the hypermetabolic activity corresponding to  bilateral pulmonary nodules, the pelvic and right pelvic sidewall soft tissue  nodules, and osseous metastatic foci. Pulmonary nodules have decreased in size  compared to the prior exam.     Records reviewed and summarized above. Pathology report(s) reviewed above. Radiology report(s) reviewed above. Assessment/PLAN:     1) Metastatic Squamous Cell Cancer    EGFR/ROS/ALK negative. PDL1 80% by biopsy of pelvic/hip mas 8/20/20. Path is different than prostate which is adenocarcinoma from 2011. Unclear site of origin of squamous cell. TYPE ID sent and SCC H+N vs skin. Completed XRT to right pelvic mass on 10/22/20. Right hip pain has esolved since completing XRT. He started Pembrolizumab on 12/15/20. He had follow up PET on 1/12/21 that showed new hypermetabolic pulmonary nodule in the lingula, resolved left pelvic sidewall lymph node activity and resolved activity corresponding to the lytic lesion in the right anterior acetabulum. Case reviewed at cancer conference and recommendation was to continue [facility] for now. PET 4/21 showed slight progressive disease in lungs/LAD/bones. Patient was seen at [facility] by Med/Onc and Uro/Onc.    D/w [facility] and plan is still same as recommended here. Site of origin of cancer is still unclear but not likely prostate. Reviewed [address] notes, they are recommending whole body PET-scan which will include skin and knees. Patient has had multiple surgeries on the right knee since the 1970s and a scar carcinoma may be considered more likely than prostate. Patient started Carbo/Taxol/Pembro on 6/18/21. Taxol 10% dose reduction due to neuropathy. He had a follow up PET on 9/14/21 that showed good response to therapy with decreased disease. Personally reviewed PET. Discussed PET with patient. He is here today Cycle 6 of Carbo/Taxol/Pembro. He is clinically stable today and doing well overall. He is tolerating treatment well overall thus far. He has some fatigue and arthralgia and neuropathy. Side effects are tolerable/manageable per patient. Continue Gabapentin BID. Labs (CBC, CMP and TSH) reviewed today. Patient is ready for treatment tomorrow. Site of origin of cancer is still unclear but not likely prostate. Reviewed [address] notes, they are recommending whole body PET-scan which will include skin and knees. Patient has had multiple surgeries on the right knee since the 1970s and a scar carcinoma may be considered more likely than prostate. Follow up PET (whole body) ordered today to evaluate response to therapy. Follow up in 4-5 weeks after PET. Patient agrees with plan. 2) Recurrent Prostate Cancer   PSA 0 on Lupron per Urology. Management per Urology. 3) Hx of Right Hip Pain  Resolved after XRT. 4) Rash/Itching   Mostly resolved. Improved with OTC Hydrocortisone and Benadryl. Can also take Zyrtec for itching    5) Low TSH - Resolved  TSH 0.02 on 3/11/21  Free T3 and T4 ordered. TSH normal at 1.95 on 6/2/21 and 1.88 on 6/17/21. Will continue to monitor. 6) HTN/Arthritis/GERD/Cronic Elevated Creatinine   Management per PCP. 7) Chronic Thrombocytopenia  CC review and present in 2011.    Likely not caused by therapy. Mild and will continue to monitor. 8) Extensive Right Leg DVT  On Eliquis and tolerating well - no bleeding. Recommend indefinite anticoagulation. 9) High Risk Drug Monitoring - Immunotherapy  Patient tolerating well with Grade 1 Arthralgia, Grade 1 Neuropathy, Grade 1-2 Fatigue. Taxol is DR 10% due to neuropathy. Labs (CBC, CMP and TSH) reviewed today. No additional dose reductions needed. Will continue to monitor for side effects. 10) Psychosocial  Mood good, coping well. He has great support from his wife. SW support as needed. He is here alone today. Call if questions. Follow up in 4-5 weeks. I appreciate the opportunity to participate in Mr. [facility].     Signed By: Cordell Lopez NP

## 2021-10-14 NOTE — PROGRESS NOTES
[facility] Lab Visit:    1500:  Pt arrived ambulatory and in no distress, pre-chemo labs drawn and sent for processing. Departed [facility] ambulatory and in no distress. Visit Vitals  BP (!) 159/86 (BP 1 Location: Right arm, BP Patient Position: At rest)   Pulse 63   Temp 97 °F (36.1 °C)   Resp 18       Recent Results (from the past 24 hour(s))   CBC WITH AUTOMATED DIFF    Collection Time: 10/14/21  3:43 PM   Result Value Ref Range    WBC 4.9 4.1 - 11.1 K/uL    RBC 3.10 (L) 4.10 - 5.70 M/uL    HGB 10.2 (L) 12.1 - 17.0 g/dL    HCT 30.8 (L) 36.6 - 50.3 %    MCV 99.4 (H) 80.0 - 99.0 FL    MCH 32.9 26.0 - 34.0 PG    MCHC 33.1 30.0 - 36.5 g/dL    RDW 15.6 (H) 11.5 - 14.5 %    PLATELET 523 035 - 228 K/uL    MPV 10.3 8.9 - 12.9 FL    NRBC 0.0 0  WBC    ABSOLUTE NRBC 0.00 0.00 - 0.01 K/uL    NEUTROPHILS 67 32 - 75 %    LYMPHOCYTES 16 12 - 49 %    MONOCYTES 12 5 - 13 %    EOSINOPHILS 3 0 - 7 %    BASOPHILS 1 0 - 1 %    IMMATURE GRANULOCYTES 1 (H) 0.0 - 0.5 %    ABS. NEUTROPHILS 3.2 1.8 - 8.0 K/UL    ABS. LYMPHOCYTES 0.8 0.8 - 3.5 K/UL    ABS. MONOCYTES 0.6 0.0 - 1.0 K/UL    ABS. EOSINOPHILS 0.1 0.0 - 0.4 K/UL    ABS. BASOPHILS 0.1 0.0 - 0.1 K/UL    ABS. IMM. GRANS. 0.1 (H) 0.00 - 0.04 K/UL    DF SMEAR SCANNED      RBC COMMENTS ANISOCYTOSIS  1+       METABOLIC PANEL, COMPREHENSIVE    Collection Time: 10/14/21  3:43 PM   Result Value Ref Range    Sodium 138 136 - 145 mmol/L    Potassium 4.2 3.5 - 5.1 mmol/L    Chloride 108 97 - 108 mmol/L    CO2 24 21 - 32 mmol/L    Anion gap 6 5 - 15 mmol/L    Glucose 100 65 - 100 mg/dL    BUN 35 (H) 6 - 20 MG/DL    Creatinine 1.81 (H) 0.70 - 1.30 MG/DL    BUN/Creatinine ratio 19 12 - 20      GFR est AA 45 (L) >60 ml/min/1.73m2    GFR est non-AA 37 (L) >60 ml/min/1.73m2    Calcium 8.5 8.5 - 10.1 MG/DL    Bilirubin, total 0.5 0.2 - 1.0 MG/DL    ALT (SGPT) 17 12 - 78 U/L    AST (SGOT) 16 15 - 37 U/L    Alk.  phosphatase 62 45 - 117 U/L    Protein, total 6.3 (L) 6.4 - 8.2 g/dL    Albumin 3.0 (L) 3.5 - 5.0 g/dL    Globulin 3.3 2.0 - 4.0 g/dL    A-G Ratio 0.9 (L) 1.1 - 2.2     TSH 3RD GENERATION    Collection Time: 10/14/21  3:43 PM   Result Value Ref Range    TSH 1.69 0.36 - 3.74 uIU/mL

## 2021-10-15 NOTE — PROGRESS NOTES
[facility] - Chemotherapy Progress Note    [id number] Pt admit to [facility] for Keytruda/Taxol/Carboplatin C6D1 ambulatory in stable condition. Assessment completed. No new concerns voiced. PIV access established in L arm with positive blood return; labs drawn 10/14/2021; line flushed, NS infusing. Patient denies SOB, fever, cough, general not feeling well. Patient denies recent exposure to someone who has tested positive for COVID-19.  Patient  denies having contact with anyone who has a pending COVID test.     Visit Vitals  /72   Pulse 72   Temp 97 °F (36.1 °C)   Resp 16   Ht 6' (1.829 m)   Wt 107.9 kg (237 lb 12.8 oz)   BMI 32.25 kg/m²     Medications Administered     0.9% sodium chloride infusion     Admin Date  10/15/2021 Action  New Bag Dose  25 mL/hr Rate  25 mL/hr Route  IntraVENous Administered By  Radha Claudio RN          CARBOplatin (PARAPLATIN) 478 mg in 0.9% sodium chloride 250 mL, overfill volume 25 mL chemo infusion     Admin Date  10/15/2021 Action  New Bag Dose  478 mg Rate  645.6 mL/hr Route  IntraVENous Administered By  Radha Claudio RN          dexamethasone (DECADRON) 12 mg in 0.9% sodium chloride 50 mL, overfill volume 5 mL IVPB     Admin Date  10/15/2021 Action  New Bag Dose  12 mg Rate  232 mL/hr Route  IntraVENous Administered By  Radha Claudio RN          diphenhydrAMINE (BENADRYL) injection 50 mg     Admin Date  10/15/2021 Action  Given Dose  50 mg Route  IntraVENous Administered By  Radha Claudio RN          famotidine (PF) (PEPCID) 20 mg in 0.9% sodium chloride 10 mL injection     Admin Date  10/15/2021 Action  Given Dose  20 mg Route  IntraVENous Administered By  Radha Claudio RN          fosaprepitant (EMEND) 150 mg in 0.9% sodium chloride 150 mL IVPB     Admin Date  10/15/2021 Action  New Bag Dose  150 mg Rate  450 mL/hr Route  IntraVENous Administered By  Radha Claudio RN          PACLitaxeL (TAXOL) 361 mg in 0.9% sodium chloride 250 mL, overfill volume 25 mL chemo infusion     Admin Date  10/15/2021 Action  New Bag Dose  361 mg Rate  111.7 mL/hr Route  IntraVENous Administered By  Yenifer Perez RN          palonosetron HCl (ALOXI) injection 0.25 mg     Admin Date  10/15/2021 Action  Given Dose  0.25 mg Route  IntraVENous Administered By  Yenifer Perez RN          pembrolizumab [facility]) 200 mg in 0.9% sodium chloride 100 mL, overfill volume 10 mL IVPB     Admin Date  10/15/2021 Action  New Bag Dose  200 mg Rate  236 mL/hr Route  IntraVENous Administered By  Yenifer Perez RN                  [id number] Pt tolerated treatment well. PIV removed at discharge. D/c home ambulatory in no distress. Pt aware of next \[facility]\"" appointment scheduled for 11/04/2021. Recent Results (from the past 24 hour(s))   CBC WITH AUTOMATED DIFF    Collection Time: 10/14/21  3:43 PM   Result Value Ref Range    WBC 4.9 4.1 - 11.1 K/uL    RBC 3.10 (L) 4.10 - 5.70 M/uL    HGB 10.2 (L) 12.1 - 17.0 g/dL    HCT 30.8 (L) 36.6 - 50.3 %    MCV 99.4 (H) 80.0 - 99.0 FL    MCH 32.9 26.0 - 34.0 PG    MCHC 33.1 30.0 - 36.5 g/dL    RDW 15.6 (H) 11.5 - 14.5 %    PLATELET 143 689 - 955 K/uL    MPV 10.3 8.9 - 12.9 FL    NRBC 0.0 0  WBC    ABSOLUTE NRBC 0.00 0.00 - 0.01 K/uL    NEUTROPHILS 67 32 - 75 %    LYMPHOCYTES 16 12 - 49 %    MONOCYTES 12 5 - 13 %    EOSINOPHILS 3 0 - 7 %    BASOPHILS 1 0 - 1 %    IMMATURE GRANULOCYTES 1 (H) 0.0 - 0.5 %    ABS. NEUTROPHILS 3.2 1.8 - 8.0 K/UL    ABS. LYMPHOCYTES 0.8 0.8 - 3.5 K/UL    ABS. MONOCYTES 0.6 0.0 - 1.0 K/UL    ABS. EOSINOPHILS 0.1 0.0 - 0.4 K/UL    ABS. BASOPHILS 0.1 0.0 - 0.1 K/UL    ABS. IMM.  GRANS. 0.1 (H) 0.00 - 0.04 K/UL    DF SMEAR SCANNED      RBC COMMENTS ANISOCYTOSIS  1+       METABOLIC PANEL, COMPREHENSIVE    Collection Time: 10/14/21  3:43 PM   Result Value Ref Range    Sodium 138 136 - 145 mmol/L    Potassium 4.2 3.5 - 5.1 mmol/L    Chloride 108 97 - 108 mmol/L    CO2 24 21 - 32 mmol/L    Anion gap 6 5 - 15 mmol/L Glucose 100 65 - 100 mg/dL    BUN 35 (H) 6 - 20 MG/DL    Creatinine 1.81 (H) 0.70 - 1.30 MG/DL    BUN/Creatinine ratio 19 12 - 20      GFR est AA 45 (L) >60 ml/min/1.73m2    GFR est non-AA 37 (L) >60 ml/min/1.73m2    Calcium 8.5 8.5 - 10.1 MG/DL    Bilirubin, total 0.5 0.2 - 1.0 MG/DL    ALT (SGPT) 17 12 - 78 U/L    AST (SGOT) 16 15 - 37 U/L    Alk.  phosphatase 62 45 - 117 U/L    Protein, total 6.3 (L) 6.4 - 8.2 g/dL    Albumin 3.0 (L) 3.5 - 5.0 g/dL    Globulin 3.3 2.0 - 4.0 g/dL    A-G Ratio 0.9 (L) 1.1 - 2.2     TSH 3RD GENERATION    Collection Time: 10/14/21  3:43 PM   Result Value Ref Range    TSH 1.69 0.36 - 3.74 uIU/mL

## 2021-10-25 NOTE — TELEPHONE ENCOUNTER
HIPPA Verified. Called pt on mobile phone number listed. Patient was made aware of Gabapentin has been refilled to take 300MG BID, patient verbalized understanding and expressed no question!   Junior Madrid

## 2021-10-25 NOTE — TELEPHONE ENCOUNTER
Patient called and would like a refill a RX of gabapentin and would like enough to be able to take 2 day instead of 1 a day.        # [phone]

## 2021-11-23 NOTE — PROGRESS NOTES
Joce Levine is a 78 y.o. male  Chief Complaint   Patient presents with    Follow-up     metastatic SCC      1. Have you been to the ER, urgent care clinic since your last visit? Hospitalized since your last visit? No.  2. Have you seen or consulted any other health care providers outside of the [address] [place] [address] since your last visit? Include any pap smears or colon screening.  No.

## 2021-11-23 NOTE — PROGRESS NOTES
[facility] [place] [address] [address], [address], [place]: [phone]  F: [phone]    Reason for Visit:   Constanza Shelton is a 78 y.o. male who is seen today in office for follow up of metastatic SCC     Treatment History:   · MRI Right Hip 8/5/20: Enlarging right pelvic sidewall adenopathy with adjacent muscular and bony invasion. Degenerative changes of the right hip. Small bony metastatic deposit anterior wall of the right acetabulum  · MRI Lumbar Spine 8/11/20: L3-L4 moderate central spinal canal and right foraminal stenoses. L5-S1 moderate bilateral foraminal stenosis  · 8/20/20 Pelvic Mass, Core biopsy with touch preparation - CYTOLOGIC INTERPRETATION: Pelvic Mass, Core biopsy with touch preparation: Squamous cell carcinoma  · TYPE ID + SCC/H+N vs skin  · PET 9/8/20: Large hypermetabolic right pelvic sidewall lymph node. Lytic lesion right anterior acetabulum is hypermetabolic and compatible with metastatic disease   · XRT to Right Pelvic Mass 10/13/20 - 10/22/20 with Dr. Delfina Ryan  · Pembrolizumab 12/15/20 - Current   · PET 1/12/21: New hypermetabolic pulmonary nodule in the lingula. Resolved left pelvic sidewall lymph node activity and resolved activity corresponding to the lytic lesion in the right anterior acetabulum  · PET 4/22/21:Progression of disease with new soft tissue nodules in the deep pelvis, foci of increased tracer activity in the right pelvic sidewall and right external iliac chain, increase in the size and number of pulmonary metastases, and hypermetabolic bone lesions right hemipelvis  · Carbo/Taxol/Pembro 6/18/21 - Current     History of Present Illness:   Constanza Shelton is a 78 y.o. male seen today in office for follow up of metastatic SCC primary site unclear PDL1 80% not on any therapy at present. Last chemo 10/21 per pt preference/ pt wanted a break. PET done yesterday but not compared to prior PET 9/21.    Shows some progressive disease in pelvis but not sure if true compared to 9/21. 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 Taking gabapentin. Has some fatigue. No pain. Stopped lupron per urology due to 0 PSA. Here with wife today. No fevers/ chills/ chest pain/ SOB/ nausea/ vomiting/diarrhea/ pain/fatigue        Last visit:  started Carbo/Taxol/Pembro on 6/18/21. he feels well overall today. He is tolerating chemo well overall with some fatigue/feeling wiped out and arthralgia. Had PET done which showed improvement overall. Here today for discussion of treatment plan.   has neuropathy and fatigue. Sees cardio for bp. No fevers/ chills/ chest pain/ SOB/ nausea/ vomiting/diarrhea/      Past Medical History:   Diagnosis Date    Arthritis     BPH (benign prostatic hypertrophy) 7/18/2011    Cancer (HCC)     prostate cancer    Chronic pain right    knee    ED (erectile dysfunction)     GERD (gastroesophageal reflux disease)     HTN (hypertension), benign     Metastatic squamous cell carcinoma ([facility] [address] 9/16/2020    OA (osteoarthritis) of knee 7/18/2011    BOTH KNEES    Unspecified essential hypertension 7/18/2011      Past Surgical History:   Procedure Laterality Date    HX ACL RECONSTRUCTION Right     x2    HX APPENDECTOMY  1975    HX GI  1/2015    Drainage of hemorrhoid    HX HERNIA REPAIR      left and right,  ventral    HX HIP REPLACEMENT  2000    left    HX HIP REPLACEMENT  2000    left    HX KNEE ARTHROSCOPY Right     x3    HX MALIGNANT SKIN LESION EXCISION      melanoma - face    HX MOHS PROCEDURES Right     Basal cell behind ear.  HX ORTHOPAEDIC Left 6/2000    HIP REPLACEMENT    HX ORTHOPAEDIC Right 08/05/2014    [facility] - Dr. Yola Ochoa HX PROSTATECTOMY  11/07/2011    prostate removed     HX TONSILLECTOMY      CHILD    IR INJ FORAMIN EPID LUMB ANES/STER SNGL  8/18/2020      Social History     Tobacco Use    Smoking status: Never Smoker    Smokeless tobacco: Never Used   Substance Use Topics    Alcohol use:  No Family History   Problem Relation Age of Onset    Bleeding Prob Father         clotting disorder    Cancer Brother         colon CA 1/2 brother    Thyroid Disease Daughter     Heart Failure Mother     Heart Disease Mother     Anesth Problems Neg Hx      Current Outpatient Medications   Medication Sig    gabapentin (NEURONTIN) 300 mg capsule TAKE 1 CAPSULE BY MOUTH TWICE DAILY.  MG DAILY.  traZODone (DESYREL) 50 mg tablet TAKE 1 TO 2 TABLETS BY MOUTH ONCE DAILY IN THE EVENING AS NEEDED FOR SLEEP    Eliquis 5 mg tablet Take 1 tablet by mouth twice daily    terazosin (HYTRIN) 10 mg capsule Take 1 capsule by mouth once daily    amLODIPine (NORVASC) 5 mg tablet Take 1 tablet by mouth nightly    candesartan (ATACAND) 4 mg tablet Take 1 tablet by mouth nightly    dexAMETHasone (DECADRON) 4 mg tablet Take 2 tabs (8mg) by mouth daily on days 2 and 3 after chemotherapy    ondansetron (ZOFRAN ODT) 4 mg disintegrating tablet Take 1-2 Tabs by mouth every eight (8) hours as needed for Nausea or Vomiting.  calcium carbonate/vitamin D3 (CALTRATE-600 PLUS VITAMIN D3 PO) Take 1 Tab by mouth nightly.  leuprolide acetate (LUPRON DEPOT, 6 MONTH, IM) 1 Each by IntraMUSCular route every 6 months. No current facility-administered medications for this visit. Allergies   Allergen Reactions    Barbiturates Anxiety     Hyper        Review of Systems:  A complete review of systems was obtained, negative except as described above and as reported on ROS sheet scanned into system.      Physical Exam:     Visit Vitals  /64 (BP 1 Location: Right arm, BP Patient Position: Sitting)   Pulse 71   Temp 97.6 °F (36.4 °C) (Temporal)   Ht 6' (1.829 m)   Wt 245 lb 9.6 oz (111.4 kg)   SpO2 95%   BMI 33.31 kg/m²     ECOG PS: 1  General: No distress  Eyes: Anicteric sclerae  HENT: Atraumatic, wearing a mask  Neck: Supple  Resp: CTAB, normal respiratory effort   CV: Regular rate and rhythm  GI: Soft, non tender Ext: Trace right leg edema, no edema in left leg  MS: Normal gait and station. Digits without clubbing or cyanosis. Skin: No ecchymoses, or petechiae. Normal temperature, turgor, and texture. Psych: Alert, oriented, appropriate affect, normal judgment/insight  Neuro: Non focal    Results:     Lab Results   Component Value Date/Time    WBC 4.9 10/14/2021 03:43 PM    HGB 10.2 (L) 10/14/2021 03:43 PM    HCT 30.8 (L) 10/14/2021 03:43 PM    PLATELET 370 02/16/6487 03:43 PM    MCV 99.4 (H) 10/14/2021 03:43 PM    ABS. NEUTROPHILS 3.2 10/14/2021 03:43 PM     Lab Results   Component Value Date/Time    Sodium 138 10/14/2021 03:43 PM    Potassium 4.2 10/14/2021 03:43 PM    Chloride 108 10/14/2021 03:43 PM    CO2 24 10/14/2021 03:43 PM    Glucose 100 10/14/2021 03:43 PM    BUN 35 (H) 10/14/2021 03:43 PM    Creatinine 1.81 (H) 10/14/2021 03:43 PM    GFR est AA 45 (L) 10/14/2021 03:43 PM    GFR est non-AA 37 (L) 10/14/2021 03:43 PM    Calcium 8.5 10/14/2021 03:43 PM    Glucose (POC) 110 11/22/2021 02:14 PM     Lab Results   Component Value Date/Time    Bilirubin, total 0.5 10/14/2021 03:43 PM    ALT (SGPT) 17 10/14/2021 03:43 PM    Alk. phosphatase 62 10/14/2021 03:43 PM    Protein, total 6.3 (L) 10/14/2021 03:43 PM    Albumin 3.0 (L) 10/14/2021 03:43 PM    Globulin 3.3 10/14/2021 03:43 PM     MRI Right Hip 8/5/20  IMPRESSION:   1. Enlarging right pelvic sidewall adenopathy with adjacent muscular and bony  invasion  2. Degenerative changes of the right hip  3. Small bony metastatic deposit anterior wall of the right acetabulum    MRI Lumbar Spine 8/11/20  IMPRESSION:  1. L3-L4 moderate central spinal canal and right foraminal stenoses. 2. L5-S1 moderate bilateral foraminal stenosis.      8/20/20  Specimen Source   1: Pelvic Mass, Core biopsy with touch preparation   CYTOLOGIC INTERPRETATION:   Pelvic Mass, Core biopsy with touch preparation:   Squamous cell carcinoma    PET 9/8/20  IMPRESSION: Large hypermetabolic right pelvic sidewall lymph node. Lytic lesion  right anterior     PET 1/12/21  IMPRESSION:  New hypermetabolic pulmonary nodule in the lingula. Resolved left  pelvic sidewall lymph node activity and resolved activity corresponding to the  lytic lesion in the right anterior acetabulum    PET 4/22/21  IMPRESSION:  Progression of disease with new soft tissue nodules in the deep  pelvis, foci of increased tracer activity in the right pelvic sidewall and right  external iliac chain, increase in the size and number of pulmonary metastases,  and hypermetabolic bone lesions right hemipelvis. Records reviewed and summarized above. Pathology report(s) reviewed above. Radiology report(s) reviewed above. Assessment/PLAN:     1) Metastatic Squamous Cell Cancer    EGFR/ROS/ALK negative. PDL1 80% by biopsy of pelvic/hip mas 8/20/20. Path is different than prostate which is adenocarcinoma from 2011. Unclear site of origin of squamous cell. TYPE ID sent and SCC H+N vs skin. Completed XRT to right pelvic mass on 10/22/20. Right hip pain has esolved since completing XRT. He started Pembrolizumab on 12/15/20. He had follow up PET on 1/12/21 that showed new hypermetabolic pulmonary nodule in the lingula, resolved left pelvic sidewall lymph node activity and resolved activity corresponding to the lytic lesion in the right anterior acetabulum. Case reviewed at cancer conference and recommendation was to continue [facility] for now. PET 4/21 showed slight progressive disease in lungs/LAD/bones. Patient was seen at [facility] by Med/Onc and Uro/Onc. D/w [facility] and plan is still same as recommended here. Site of origin of cancer is still unclear but not likely prostate. Patient started Carbo/Taxol/Pembro on 6/18/21. Has been off therapy since 10/21. here today for follow up    PET done yesterday 11/21. Not compared to prior 9/21 PET. Will have radiology compare to prior. Clinically doing well overall.    He has some fatigue and arthralgia and neuropathy. Attributes to chemo. Continue gabapentin bid. He is clinically stable today   Stay off therapy for now. Will add to cancer conference next tues. 2) Recurrent Prostate Cancer   PSA 0 on Lupron per Urology. Management per Urology. 3) Hx of Right Hip Pain  Resolved after XRT. 4) Rash/Itching    resolved. Improved with OTC Hydrocortisone and Benadryl. Can also take Zyrtec for itching    5) Low TSH - Resolved  TSH 0.02 on 3/11/21  Free T3 and T4 ordered. TSH normal at 1.95 on 6/2/21 and 1.88 on 6/17/21. Will continue to monitor. 6) HTN/Arthritis/GERD/Cronic Elevated Creatinine   Management per PCP. 7) Chronic Thrombocytopenia  CC review and present in 2011. Likely not caused by therapy. Mild and will continue to monitor. 8) Extensive Right Leg DVT  On Eliquis and tolerating well - no bleeding. Recommend indefinite anticoagulation. 9) High Risk Drug Monitoring - Immunotherapy  Patient tolerating well with Grade 1 Arthralgia and Grade 1-2 Fatigue. Labs (CBC and CMP) reviewed today. Will continue to monitor for side effects. 10) Psychosocial  Mood good, coping well. He has great support from his wife. SW support as needed. His wife is here today. Call if questions. Follow up in 3 weeks. I appreciate the opportunity to participate in Mr. Talbot Sharath sinclair.     Signed By: Kate Dandy, DO

## 2021-11-23 NOTE — LETTER
11/23/2021    Patient: Yanick Almanzar   YOB: 1945   Date of Visit: 11/23/2021     Michell Malone MD  [address] [place] [address] [address]    Dear Michell Malone MD,      Thank you for referring Mr. Deja Wong to [address] [place] [address] for evaluation. My notes for this consultation are attached. If you have questions, please do not hesitate to call me. I look forward to following your patient along with you.       Sincerely,    Kate Dandy, DO

## 2021-11-24 NOTE — PROGRESS NOTES
Call to [facility] [phone]. RN requested PET comparison to most recent PET scan, per Provider request.  Stated scan would be updated.

## 2021-12-02 NOTE — PROGRESS NOTES
MA has faxed most recent PET scan report from 11/22/21 ( addended 11/24/21) to [facility] to fax number: [phone]. Fax confirmation was received back! This was done.   Paco Pascual

## 2021-12-16 NOTE — TELEPHONE ENCOUNTER
Call to [facility], [phone], spoke with Ozzie Campos. Patient ID verified X 2. Updated that new CT orders had been placed with new coding to avoid ABN trigger. Per RONEL they will contact patient to schedule.

## 2021-12-16 NOTE — TELEPHONE ENCOUNTER
New orders for CTs C/A/P placed with updated diagnostic codes. Please let Scheduling Department know.

## 2021-12-16 NOTE — TELEPHONE ENCOUNTER
Scheduling called and left a message about the patient. She stated that they we're trying to schedule a CT scan but the ABN was triggered and they need a new order with a good code. Please follow up.

## 2021-12-16 NOTE — PROGRESS NOTES
Jon Soto is a 78 y.o. male  Chief Complaint   Patient presents with    Follow-up     metastatic SCC      1. Have you been to the ER, urgent care clinic since your last visit? Hospitalized since your last visit? No.    2. Have you seen or consulted any other health care providers outside of the [address] [place] [address] since your last visit? Include any pap smears or colon screening.  No.

## 2021-12-16 NOTE — PROGRESS NOTES
[facility] [place] [address] [address], [address] [facility] [address], [place]: [phone]  F: [phone]    Reason for Visit:   Melony Epps is a 78 y.o. male who is seen today in office for follow up of metastatic SCC     Treatment History:   · MRI Right Hip 8/5/20: Enlarging right pelvic sidewall adenopathy with adjacent muscular and bony invasion. Degenerative changes of the right hip. Small bony metastatic deposit anterior wall of the right acetabulum  · MRI Lumbar Spine 8/11/20: L3-L4 moderate central spinal canal and right foraminal stenoses. L5-S1 moderate bilateral foraminal stenosis  · 8/20/20 Pelvic Mass, Core biopsy with touch preparation - CYTOLOGIC INTERPRETATION: Pelvic Mass, Core biopsy with touch preparation: Squamous cell carcinoma  · TYPE ID + SCC/H+N vs skin  · PET 9/8/20: Large hypermetabolic right pelvic sidewall lymph node. Lytic lesion right anterior acetabulum is hypermetabolic and compatible with metastatic disease   · XRT to Right Pelvic Mass 10/13/20 - 10/22/20 with Dr. Warren Perez  · Pembrolizumab 12/15/20 - Current   · PET 1/12/21: New hypermetabolic pulmonary nodule in the lingula. Resolved left pelvic sidewall lymph node activity and resolved activity corresponding to the lytic lesion in the right anterior acetabulum  · PET 4/22/21:Progression of disease with new soft tissue nodules in the deep pelvis, foci of increased tracer activity in the right pelvic sidewall and right external iliac chain, increase in the size and number of pulmonary metastases, and hypermetabolic bone lesions right hemipelvis  · Carbo/Taxol/Pembro 6/18/21 - Current     History of Present Illness:   Melony Epps is a 78 y.o. male seen today in office for follow up of metastatic SCC primary site unclear PDL1 80% not on any therapy at present. Doing well overall. Here with wife today . 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 Taking gabapentin.    No fevers/ chills/ chest pain/ SOB/ nausea/ vomiting/diarrhea/ pain/fatigue        Last visit:  Last chemo 10/21 per pt preference/ pt wanted a break. PET done yesterday but not compared to prior PET 9/21. Shows some progressive disease in pelvis but not sure if true compared to 9/21. 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 Taking gabapentin. Has some fatigue. No pain. Stopped lupron per urology due to 0 PSA. Here with wife today. No fevers/ chills/ chest pain/ SOB/ nausea/ vomiting/diarrhea/ pain/fatigue        Last visit:  started Carbo/Taxol/Pembro on 6/18/21. he feels well overall today. He is tolerating chemo well overall with some fatigue/feeling wiped out and arthralgia. Had PET done which showed improvement overall. Here today for discussion of treatment plan.   has neuropathy and fatigue. Sees cardio for bp. No fevers/ chills/ chest pain/ SOB/ nausea/ vomiting/diarrhea/      Past Medical History:   Diagnosis Date    Arthritis     BPH (benign prostatic hypertrophy) 7/18/2011    Cancer (HCC)     prostate cancer    Chronic pain right    knee    ED (erectile dysfunction)     GERD (gastroesophageal reflux disease)     HTN (hypertension), benign     Metastatic squamous cell carcinoma ([facility] [address] 9/16/2020    OA (osteoarthritis) of knee 7/18/2011    BOTH KNEES    Unspecified essential hypertension 7/18/2011      Past Surgical History:   Procedure Laterality Date    HX ACL RECONSTRUCTION Right     x2    HX APPENDECTOMY  1975    HX GI  1/2015    Drainage of hemorrhoid    HX HERNIA REPAIR      left and right,  ventral    HX HIP REPLACEMENT  2000    left    HX HIP REPLACEMENT  2000    left    HX KNEE ARTHROSCOPY Right     x3    HX MALIGNANT SKIN LESION EXCISION      melanoma - face    HX MOHS PROCEDURES Right     Basal cell behind ear.      HX ORTHOPAEDIC Left 6/2000    HIP REPLACEMENT    HX ORTHOPAEDIC Right 08/05/2014    TKR - Dr. Saloni Tavarez HX PROSTATECTOMY  11/07/2011    prostate removed     HX TONSILLECTOMY      CHILD    IR INJ FORAMIN EPID LUMB ANES/STER SNGL  8/18/2020      Social History     Tobacco Use    Smoking status: Never Smoker    Smokeless tobacco: Never Used   Substance Use Topics    Alcohol use: No      Family History   Problem Relation Age of Onset    Bleeding Prob Father         clotting disorder    Cancer Brother         colon CA 1/2 brother    Thyroid Disease Daughter     Heart Failure Mother     Heart Disease Mother     Anesth Problems Neg Hx      Current Outpatient Medications   Medication Sig    gabapentin (NEURONTIN) 300 mg capsule TAKE 1 CAPSULE BY MOUTH TWICE DAILY.  MG DAILY.  traZODone (DESYREL) 50 mg tablet TAKE 1 TO 2 TABLETS BY MOUTH ONCE DAILY IN THE EVENING AS NEEDED FOR SLEEP    Eliquis 5 mg tablet Take 1 tablet by mouth twice daily    terazosin (HYTRIN) 10 mg capsule Take 1 capsule by mouth once daily    amLODIPine (NORVASC) 5 mg tablet Take 1 tablet by mouth nightly    candesartan (ATACAND) 4 mg tablet Take 1 tablet by mouth nightly    dexAMETHasone (DECADRON) 4 mg tablet Take 2 tabs (8mg) by mouth daily on days 2 and 3 after chemotherapy    ondansetron (ZOFRAN ODT) 4 mg disintegrating tablet Take 1-2 Tabs by mouth every eight (8) hours as needed for Nausea or Vomiting.  calcium carbonate/vitamin D3 (CALTRATE-600 PLUS VITAMIN D3 PO) Take 1 Tab by mouth nightly.  leuprolide acetate (LUPRON DEPOT, 6 MONTH, IM) 1 Each by IntraMUSCular route every 6 months. No current facility-administered medications for this visit. Allergies   Allergen Reactions    Barbiturates Anxiety     Hyper        Review of Systems:  A complete review of systems was obtained, negative except as described above and as reported on ROS sheet scanned into system.      Physical Exam:     Visit Vitals  /70 (BP 1 Location: Left upper arm, BP Patient Position: Sitting)   Pulse 72   Temp (!) 96.2 °F (35.7 °C) (Temporal)   Ht 6' (1.829 m)   Wt 241 lb (109.3 kg)   SpO2 98%   BMI 32.69 kg/m²     ECOG PS: 1  General: No distress  Eyes: Anicteric sclerae  HENT: Atraumatic, wearing a mask  Neck: Supple  Resp: CTAB, normal respiratory effort   CV: Regular rate and rhythm  GI: Soft, non tender   Ext: Trace right leg edema, no edema in left leg  MS: Normal gait and station. Digits without clubbing or cyanosis. Skin: No ecchymoses, or petechiae. Normal temperature, turgor, and texture. Psych: Alert, oriented, appropriate affect, normal judgment/insight  Neuro: Non focal    Results:     Lab Results   Component Value Date/Time    WBC 4.9 10/14/2021 03:43 PM    HGB 10.2 (L) 10/14/2021 03:43 PM    HCT 30.8 (L) 10/14/2021 03:43 PM    PLATELET 551 16/20/5403 03:43 PM    MCV 99.4 (H) 10/14/2021 03:43 PM    ABS. NEUTROPHILS 3.2 10/14/2021 03:43 PM     Lab Results   Component Value Date/Time    Sodium 138 10/14/2021 03:43 PM    Potassium 4.2 10/14/2021 03:43 PM    Chloride 108 10/14/2021 03:43 PM    CO2 24 10/14/2021 03:43 PM    Glucose 100 10/14/2021 03:43 PM    BUN 35 (H) 10/14/2021 03:43 PM    Creatinine 1.81 (H) 10/14/2021 03:43 PM    GFR est AA 45 (L) 10/14/2021 03:43 PM    GFR est non-AA 37 (L) 10/14/2021 03:43 PM    Calcium 8.5 10/14/2021 03:43 PM    Glucose (POC) 110 11/22/2021 02:14 PM     Lab Results   Component Value Date/Time    Bilirubin, total 0.5 10/14/2021 03:43 PM    ALT (SGPT) 17 10/14/2021 03:43 PM    Alk. phosphatase 62 10/14/2021 03:43 PM    Protein, total 6.3 (L) 10/14/2021 03:43 PM    Albumin 3.0 (L) 10/14/2021 03:43 PM    Globulin 3.3 10/14/2021 03:43 PM     MRI Right Hip 8/5/20  IMPRESSION:   1. Enlarging right pelvic sidewall adenopathy with adjacent muscular and bony  invasion  2. Degenerative changes of the right hip  3. Small bony metastatic deposit anterior wall of the right acetabulum    MRI Lumbar Spine 8/11/20  IMPRESSION:  1. L3-L4 moderate central spinal canal and right foraminal stenoses. 2. L5-S1 moderate bilateral foraminal stenosis.      8/20/20  Specimen Source   1: Pelvic Mass, Core biopsy with touch preparation   CYTOLOGIC INTERPRETATION:   Pelvic Mass, Core biopsy with touch preparation:   Squamous cell carcinoma    PET 9/8/20  IMPRESSION: Large hypermetabolic right pelvic sidewall lymph node. Lytic lesion  right anterior     PET 1/12/21  IMPRESSION:  New hypermetabolic pulmonary nodule in the lingula. Resolved left  pelvic sidewall lymph node activity and resolved activity corresponding to the  lytic lesion in the right anterior acetabulum    PET 4/22/21  IMPRESSION:  Progression of disease with new soft tissue nodules in the deep  pelvis, foci of increased tracer activity in the right pelvic sidewall and right  external iliac chain, increase in the size and number of pulmonary metastases,  and hypermetabolic bone lesions right hemipelvis. Records reviewed and summarized above. Pathology report(s) reviewed above. Radiology report(s) reviewed above. Assessment/PLAN:     1) Metastatic Squamous Cell Cancer    EGFR/ROS/ALK negative. PDL1 80% by biopsy of pelvic/hip mas 8/20/20. Path is different than prostate which is adenocarcinoma from 2011. Unclear site of origin of squamous cell. TYPE ID sent and SCC H+N vs skin. Completed XRT to right pelvic mass on 10/22/20. Right hip pain has esolved since completing XRT. He started Pembrolizumab on 12/15/20. He had follow up PET on 1/12/21 that showed new hypermetabolic pulmonary nodule in the lingula, resolved left pelvic sidewall lymph node activity and resolved activity corresponding to the lytic lesion in the right anterior acetabulum. Case reviewed at cancer conference and recommendation was to continue [facility] for now. PET 4/21 showed slight progressive disease in lungs/LAD/bones. Patient was seen at [facility] by Med/Onc and Uro/Onc. D/w [facility] and plan is still same as recommended here. Site of origin of cancer is still unclear but not likely prostate. Patient started Carbo/Taxol/Pembro on 6/18/21.   Has been off therapy since 10/21. here today for follow up    PET done yesterday 11/21. Not compared to prior 9/21 PET. Will have radiology compare to prior. Clinically doing well overall. He has some fatigue and arthralgia and neuropathy. Attributes to chemo. Continue gabapentin bid. He is clinically stable today   Stay off therapy for now. Will add to cancer conference next tues. 2) Recurrent Prostate Cancer   PSA 0 on Lupron per Urology. Management per Urology. 3) Hx of Right Hip Pain  Resolved after XRT. 4) Rash/Itching    resolved. Improved with OTC Hydrocortisone and Benadryl. Can also take Zyrtec for itching    5) Low TSH - Resolved  TSH 0.02 on 3/11/21  Free T3 and T4 ordered. TSH normal at 1.95 on 6/2/21 and 1.88 on 6/17/21. Will continue to monitor. 6) HTN/Arthritis/GERD/Cronic Elevated Creatinine   Management per PCP. 7) Chronic Thrombocytopenia  CC review and present in 2011. Likely not caused by therapy. Mild and will continue to monitor. 8) Extensive Right Leg DVT  On Eliquis and tolerating well - no bleeding. Recommend indefinite anticoagulation. 9) High Risk Drug Monitoring - Immunotherapy  Patient tolerating well with Grade 1 Arthralgia and Grade 1-2 Fatigue. Labs (CBC and CMP) reviewed today. Will continue to monitor for side effects. 10) Psychosocial  Mood good, coping well. He has great support from his wife. SW support as needed. His wife is here today. Call if questions. Follow up in 4 weeks. I appreciate the opportunity to participate in Mr. Simon Pal yahir.     Signed By: Hiral Lee DO

## 2021-12-17 NOTE — TELEPHONE ENCOUNTER
Chief Complaint   Patient presents with    Medication Refill     Last Appointment with Dr. Medel :  7/26/2021  No future appointments.

## 2022-01-01 ENCOUNTER — HOSPITAL ENCOUNTER (OUTPATIENT)
Dept: INFUSION THERAPY | Age: 77
Discharge: HOME | End: 2022-06-07
Payer: MEDICARE

## 2022-01-01 ENCOUNTER — OFFICE VISIT (OUTPATIENT)
Dept: ONCOLOGY | Age: 77
End: 2022-01-01
Payer: MEDICARE

## 2022-01-01 ENCOUNTER — APPOINTMENT (OUTPATIENT)
Dept: INFUSION THERAPY | Age: 77
End: 2022-01-01

## 2022-01-01 ENCOUNTER — PATIENT MESSAGE (OUTPATIENT)
Dept: INTERNAL MEDICINE CLINIC | Age: 77
End: 2022-01-01

## 2022-01-01 ENCOUNTER — TELEPHONE (OUTPATIENT)
Dept: ONCOLOGY | Age: 77
End: 2022-01-01

## 2022-01-01 ENCOUNTER — HOSPITAL ENCOUNTER (OUTPATIENT)
Dept: INFUSION THERAPY | Age: 77
Discharge: HOME | End: 2022-05-31
Payer: MEDICARE

## 2022-01-01 ENCOUNTER — HOSPITAL ENCOUNTER (OUTPATIENT)
Dept: CT IMAGING | Age: 77
Discharge: HOME | End: 2022-01-25
Attending: NURSE PRACTITIONER
Payer: MEDICARE

## 2022-01-01 ENCOUNTER — OFFICE VISIT (OUTPATIENT)
Dept: INTERNAL MEDICINE CLINIC | Age: 77
End: 2022-01-01
Payer: MEDICARE

## 2022-01-01 ENCOUNTER — HOSPITAL ENCOUNTER (OUTPATIENT)
Dept: INFUSION THERAPY | Age: 77
Discharge: HOME | End: 2022-05-17
Payer: MEDICARE

## 2022-01-01 ENCOUNTER — DOCUMENTATION ONLY (OUTPATIENT)
Dept: ONCOLOGY | Age: 77
End: 2022-01-01

## 2022-01-01 ENCOUNTER — HOSPITAL ENCOUNTER (OUTPATIENT)
Dept: INFUSION THERAPY | Age: 77
Discharge: HOME | End: 2022-05-30
Payer: MEDICARE

## 2022-01-01 ENCOUNTER — HOSPITAL ENCOUNTER (OUTPATIENT)
Dept: INFUSION THERAPY | Age: 77
Discharge: HOME | End: 2022-05-16
Payer: MEDICARE

## 2022-01-01 ENCOUNTER — APPOINTMENT (OUTPATIENT)
Dept: INFUSION THERAPY | Age: 77
End: 2022-01-01
Payer: MEDICARE

## 2022-01-01 ENCOUNTER — OFFICE VISIT (OUTPATIENT)
Dept: PALLATIVE CARE | Age: 77
End: 2022-01-01
Payer: MEDICARE

## 2022-01-01 ENCOUNTER — HOSPITAL ENCOUNTER (OUTPATIENT)
Dept: CT IMAGING | Age: 77
Discharge: HOME | End: 2022-04-28
Attending: INTERNAL MEDICINE
Payer: MEDICARE

## 2022-01-01 ENCOUNTER — HOSPITAL ENCOUNTER (OUTPATIENT)
Dept: INFUSION THERAPY | Age: 77
Discharge: HOME | End: 2022-06-06
Payer: MEDICARE

## 2022-01-01 ENCOUNTER — HOSPITAL ENCOUNTER (OUTPATIENT)
Dept: INFUSION THERAPY | Age: 77
Discharge: HOME | End: 2022-05-10
Payer: MEDICARE

## 2022-01-01 ENCOUNTER — HOSPITAL ENCOUNTER (EMERGENCY)
Age: 77
End: 2022-06-17
Attending: EMERGENCY MEDICINE
Payer: MEDICARE

## 2022-01-01 ENCOUNTER — TELEPHONE (OUTPATIENT)
Dept: PALLATIVE CARE | Age: 77
End: 2022-01-01

## 2022-01-01 VITALS
BODY MASS INDEX: 31.98 KG/M2 | SYSTOLIC BLOOD PRESSURE: 149 MMHG | TEMPERATURE: 96.6 F | DIASTOLIC BLOOD PRESSURE: 82 MMHG | WEIGHT: 236.1 LBS | HEIGHT: 72 IN | HEART RATE: 72 BPM | OXYGEN SATURATION: 95 %

## 2022-01-01 VITALS
DIASTOLIC BLOOD PRESSURE: 90 MMHG | TEMPERATURE: 97.4 F | HEART RATE: 97 BPM | RESPIRATION RATE: 18 BRPM | SYSTOLIC BLOOD PRESSURE: 138 MMHG

## 2022-01-01 VITALS
SYSTOLIC BLOOD PRESSURE: 94 MMHG | HEART RATE: 80 BPM | TEMPERATURE: 97.1 F | BODY MASS INDEX: 30.41 KG/M2 | HEIGHT: 72 IN | OXYGEN SATURATION: 95 % | DIASTOLIC BLOOD PRESSURE: 59 MMHG | WEIGHT: 224.5 LBS

## 2022-01-01 VITALS
WEIGHT: 230.4 LBS | HEART RATE: 75 BPM | OXYGEN SATURATION: 96 % | BODY MASS INDEX: 31.21 KG/M2 | TEMPERATURE: 97.6 F | DIASTOLIC BLOOD PRESSURE: 80 MMHG | SYSTOLIC BLOOD PRESSURE: 127 MMHG | HEIGHT: 72 IN

## 2022-01-01 VITALS
SYSTOLIC BLOOD PRESSURE: 150 MMHG | DIASTOLIC BLOOD PRESSURE: 72 MMHG | HEART RATE: 66 BPM | TEMPERATURE: 97.3 F | RESPIRATION RATE: 18 BRPM

## 2022-01-01 VITALS
DIASTOLIC BLOOD PRESSURE: 75 MMHG | RESPIRATION RATE: 18 BRPM | TEMPERATURE: 96.8 F | WEIGHT: 230 LBS | HEART RATE: 58 BPM | BODY MASS INDEX: 31.15 KG/M2 | SYSTOLIC BLOOD PRESSURE: 142 MMHG | HEIGHT: 72 IN

## 2022-01-01 VITALS
OXYGEN SATURATION: 95 % | TEMPERATURE: 97.1 F | RESPIRATION RATE: 18 BRPM | HEART RATE: 57 BPM | DIASTOLIC BLOOD PRESSURE: 86 MMHG | SYSTOLIC BLOOD PRESSURE: 149 MMHG

## 2022-01-01 VITALS
RESPIRATION RATE: 18 BRPM | TEMPERATURE: 97.1 F | WEIGHT: 230 LBS | HEIGHT: 72 IN | OXYGEN SATURATION: 97 % | BODY MASS INDEX: 31.15 KG/M2 | HEART RATE: 67 BPM | SYSTOLIC BLOOD PRESSURE: 107 MMHG | DIASTOLIC BLOOD PRESSURE: 71 MMHG

## 2022-01-01 VITALS
HEIGHT: 72 IN | WEIGHT: 229.3 LBS | HEART RATE: 66 BPM | SYSTOLIC BLOOD PRESSURE: 150 MMHG | TEMPERATURE: 97.3 F | BODY MASS INDEX: 31.06 KG/M2 | OXYGEN SATURATION: 97 % | DIASTOLIC BLOOD PRESSURE: 86 MMHG

## 2022-01-01 VITALS
WEIGHT: 224 LBS | HEIGHT: 72 IN | RESPIRATION RATE: 16 BRPM | SYSTOLIC BLOOD PRESSURE: 145 MMHG | DIASTOLIC BLOOD PRESSURE: 78 MMHG | TEMPERATURE: 97.4 F | HEART RATE: 60 BPM | BODY MASS INDEX: 30.34 KG/M2

## 2022-01-01 VITALS
BODY MASS INDEX: 29.26 KG/M2 | DIASTOLIC BLOOD PRESSURE: 61 MMHG | RESPIRATION RATE: 20 BRPM | HEIGHT: 72 IN | OXYGEN SATURATION: 97 % | SYSTOLIC BLOOD PRESSURE: 101 MMHG | WEIGHT: 216 LBS | TEMPERATURE: 98.1 F | HEART RATE: 68 BPM

## 2022-01-01 VITALS
RESPIRATION RATE: 18 BRPM | HEART RATE: 74 BPM | DIASTOLIC BLOOD PRESSURE: 56 MMHG | TEMPERATURE: 97.6 F | SYSTOLIC BLOOD PRESSURE: 95 MMHG

## 2022-01-01 DIAGNOSIS — C79.51 BONE METASTASES (HCC): ICD-10-CM

## 2022-01-01 DIAGNOSIS — R35.1 BENIGN PROSTATIC HYPERPLASIA WITH NOCTURIA: ICD-10-CM

## 2022-01-01 DIAGNOSIS — M19.90 ARTHRITIS: ICD-10-CM

## 2022-01-01 DIAGNOSIS — C61 PROSTATE CANCER (HCC): ICD-10-CM

## 2022-01-01 DIAGNOSIS — C78.01 BILATERAL PULMONARY METASTASES (HCC): ICD-10-CM

## 2022-01-01 DIAGNOSIS — D69.6 THROMBOCYTOPENIA (HCC): ICD-10-CM

## 2022-01-01 DIAGNOSIS — I46.9 CARDIAC ARREST (HCC): Primary | ICD-10-CM

## 2022-01-01 DIAGNOSIS — G62.0 CHEMOTHERAPY-INDUCED NEUROPATHY (HCC): ICD-10-CM

## 2022-01-01 DIAGNOSIS — Z09 CHEMOTHERAPY FOLLOW-UP EXAMINATION: ICD-10-CM

## 2022-01-01 DIAGNOSIS — C78.02 BILATERAL PULMONARY METASTASES (HCC): ICD-10-CM

## 2022-01-01 DIAGNOSIS — R79.89 ELEVATED SERUM CREATININE: ICD-10-CM

## 2022-01-01 DIAGNOSIS — I82.4Y1 ACUTE DEEP VEIN THROMBOSIS (DVT) OF PROXIMAL VEIN OF RIGHT LOWER EXTREMITY (HCC): ICD-10-CM

## 2022-01-01 DIAGNOSIS — I10 ESSENTIAL HYPERTENSION: ICD-10-CM

## 2022-01-01 DIAGNOSIS — C78.01 BILATERAL PULMONARY METASTASES (HCC): Primary | ICD-10-CM

## 2022-01-01 DIAGNOSIS — T45.1X5A CHEMOTHERAPY-INDUCED NEUROPATHY (HCC): ICD-10-CM

## 2022-01-01 DIAGNOSIS — G89.3 CANCER ASSOCIATED PAIN: Primary | ICD-10-CM

## 2022-01-01 DIAGNOSIS — C80.1 DILATED CARDIOMYOPATHY SECONDARY TO MALIGNANCY (HCC): ICD-10-CM

## 2022-01-01 DIAGNOSIS — H25.9 SENILE CATARACT OF LEFT EYE, UNSPECIFIED AGE-RELATED CATARACT TYPE: Primary | ICD-10-CM

## 2022-01-01 DIAGNOSIS — C79.51 METASTATIC SQUAMOUS CELL CARCINOMA INVOLVING BONE WITH UNKNOWN PRIMARY SITE (HCC): ICD-10-CM

## 2022-01-01 DIAGNOSIS — N18.30 STAGE 3 CHRONIC KIDNEY DISEASE, UNSPECIFIED WHETHER STAGE 3A OR 3B CKD (HCC): ICD-10-CM

## 2022-01-01 DIAGNOSIS — C80.1 METASTATIC SQUAMOUS CELL CARCINOMA INVOLVING BONE WITH UNKNOWN PRIMARY SITE (HCC): ICD-10-CM

## 2022-01-01 DIAGNOSIS — Z86.718 HISTORY OF DVT (DEEP VEIN THROMBOSIS): ICD-10-CM

## 2022-01-01 DIAGNOSIS — C78.02 BILATERAL PULMONARY METASTASES (HCC): Primary | ICD-10-CM

## 2022-01-01 DIAGNOSIS — Z71.89 GOALS OF CARE, COUNSELING/DISCUSSION: ICD-10-CM

## 2022-01-01 DIAGNOSIS — K21.9 GASTROESOPHAGEAL REFLUX DISEASE WITHOUT ESOPHAGITIS: ICD-10-CM

## 2022-01-01 DIAGNOSIS — I42.0 DILATED CARDIOMYOPATHY SECONDARY TO MALIGNANCY (HCC): ICD-10-CM

## 2022-01-01 DIAGNOSIS — Z51.11 ENCOUNTER FOR ANTINEOPLASTIC CHEMOTHERAPY: ICD-10-CM

## 2022-01-01 DIAGNOSIS — L27.0 DRUG-INDUCED SKIN RASH: ICD-10-CM

## 2022-01-01 DIAGNOSIS — N40.1 BENIGN PROSTATIC HYPERPLASIA WITH NOCTURIA: ICD-10-CM

## 2022-01-01 LAB
ALBUMIN SERPL-MCNC: 2.7 G/DL (ref 3.5–5)
ALBUMIN SERPL-MCNC: 3.1 G/DL (ref 3.5–5)
ALBUMIN/GLOB SERPL: 0.8 {RATIO} (ref 1.1–2.2)
ALBUMIN/GLOB SERPL: 0.9 {RATIO} (ref 1.1–2.2)
ALP SERPL-CCNC: 71 U/L (ref 45–117)
ALP SERPL-CCNC: 72 U/L (ref 45–117)
ALT SERPL-CCNC: 12 U/L (ref 12–78)
ALT SERPL-CCNC: 46 U/L (ref 12–78)
ANION GAP SERPL CALC-SCNC: 6 MMOL/L (ref 5–15)
ANION GAP SERPL CALC-SCNC: 7 MMOL/L (ref 5–15)
AST SERPL-CCNC: 13 U/L (ref 15–37)
AST SERPL-CCNC: 23 U/L (ref 15–37)
BASOPHILS # BLD: 0 K/UL (ref 0–0.1)
BASOPHILS # BLD: 0.1 K/UL (ref 0–0.1)
BASOPHILS NFR BLD: 0 % (ref 0–1)
BASOPHILS NFR BLD: 0 % (ref 0–1)
BASOPHILS NFR BLD: 1 % (ref 0–1)
BASOPHILS NFR BLD: 1 % (ref 0–1)
BILIRUB SERPL-MCNC: 0.6 MG/DL (ref 0.2–1)
BILIRUB SERPL-MCNC: 0.7 MG/DL (ref 0.2–1)
BUN SERPL-MCNC: 34 MG/DL (ref 6–20)
BUN SERPL-MCNC: 37 MG/DL (ref 6–20)
BUN/CREAT SERPL: 19 (ref 12–20)
BUN/CREAT SERPL: 21 (ref 12–20)
CA-I BLD-SCNC: 1.22 MMOL/L (ref 1.12–1.32)
CALCIUM SERPL-MCNC: 8.9 MG/DL (ref 8.5–10.1)
CALCIUM SERPL-MCNC: 9.5 MG/DL (ref 8.5–10.1)
CHLORIDE SERPL-SCNC: 105 MMOL/L (ref 97–108)
CHLORIDE SERPL-SCNC: 106 MMOL/L (ref 97–108)
CO2 SERPL-SCNC: 22 MMOL/L (ref 21–32)
CO2 SERPL-SCNC: 28 MMOL/L (ref 21–32)
COMMENT, HOLDF: NORMAL
CREAT BLD-MCNC: 1.7 MG/DL (ref 0.6–1.3)
CREAT SERPL-MCNC: 1.75 MG/DL (ref 0.7–1.3)
CREAT SERPL-MCNC: 1.8 MG/DL (ref 0.7–1.3)
DIFFERENTIAL METHOD BLD: ABNORMAL
EOSINOPHIL # BLD: 0 K/UL (ref 0–0.4)
EOSINOPHIL # BLD: 0.1 K/UL (ref 0–0.4)
EOSINOPHIL # BLD: 0.1 K/UL (ref 0–0.4)
EOSINOPHIL # BLD: 0.2 K/UL (ref 0–0.4)
EOSINOPHIL NFR BLD: 1 % (ref 0–7)
EOSINOPHIL NFR BLD: 2 % (ref 0–7)
EOSINOPHIL NFR BLD: 2 % (ref 0–7)
EOSINOPHIL NFR BLD: 3 % (ref 0–7)
ERYTHROCYTE [DISTWIDTH] IN BLOOD BY AUTOMATED COUNT: 12.4 % (ref 11.5–14.5)
ERYTHROCYTE [DISTWIDTH] IN BLOOD BY AUTOMATED COUNT: 12.4 % (ref 11.5–14.5)
ERYTHROCYTE [DISTWIDTH] IN BLOOD BY AUTOMATED COUNT: 13 % (ref 11.5–14.5)
ERYTHROCYTE [DISTWIDTH] IN BLOOD BY AUTOMATED COUNT: 13.2 % (ref 11.5–14.5)
GLOBULIN SER CALC-MCNC: 3.4 G/DL (ref 2–4)
GLOBULIN SER CALC-MCNC: 3.6 G/DL (ref 2–4)
GLUCOSE SERPL-MCNC: 100 MG/DL (ref 65–100)
GLUCOSE SERPL-MCNC: 97 MG/DL (ref 65–100)
HBV CORE AB SERPL QL IA: NEGATIVE
HBV CORE AB SERPL QL IA: NEGATIVE
HBV CORE IGM SERPL QL IA: NEGATIVE
HBV SURFACE AB SER QL: NONREACTIVE
HBV SURFACE AB SER-ACNC: <3.1 MIU/ML
HBV SURFACE AG SER QL: <0.1 INDEX
HBV SURFACE AG SER QL: NEGATIVE
HCT VFR BLD AUTO: 27.3 % (ref 36.6–50.3)
HCT VFR BLD AUTO: 30.5 % (ref 36.6–50.3)
HCT VFR BLD AUTO: 31 % (ref 36.6–50.3)
HCT VFR BLD AUTO: 35.9 % (ref 36.6–50.3)
HGB BLD-MCNC: 10 G/DL (ref 12.1–17)
HGB BLD-MCNC: 11.8 G/DL (ref 12.1–17)
HGB BLD-MCNC: 9 G/DL (ref 12.1–17)
HGB BLD-MCNC: 9.9 G/DL (ref 12.1–17)
IMM GRANULOCYTES # BLD AUTO: 0 K/UL
IMM GRANULOCYTES # BLD AUTO: 0 K/UL (ref 0–0.04)
IMM GRANULOCYTES # BLD AUTO: 0 K/UL (ref 0–0.04)
IMM GRANULOCYTES # BLD AUTO: 0.1 K/UL (ref 0–0.04)
IMM GRANULOCYTES NFR BLD AUTO: 0 %
IMM GRANULOCYTES NFR BLD AUTO: 0 % (ref 0–0.5)
IMM GRANULOCYTES NFR BLD AUTO: 0 % (ref 0–0.5)
IMM GRANULOCYTES NFR BLD AUTO: 2 % (ref 0–0.5)
LYMPHOCYTES # BLD: 0.5 K/UL (ref 0.8–3.5)
LYMPHOCYTES # BLD: 0.6 K/UL (ref 0.8–3.5)
LYMPHOCYTES # BLD: 0.6 K/UL (ref 0.8–3.5)
LYMPHOCYTES # BLD: 0.8 K/UL (ref 0.8–3.5)
LYMPHOCYTES NFR BLD: 10 % (ref 12–49)
LYMPHOCYTES NFR BLD: 13 % (ref 12–49)
LYMPHOCYTES NFR BLD: 19 % (ref 12–49)
LYMPHOCYTES NFR BLD: 22 % (ref 12–49)
MCH RBC QN AUTO: 30.8 PG (ref 26–34)
MCH RBC QN AUTO: 31.1 PG (ref 26–34)
MCH RBC QN AUTO: 31.6 PG (ref 26–34)
MCH RBC QN AUTO: 31.6 PG (ref 26–34)
MCHC RBC AUTO-ENTMCNC: 32.3 G/DL (ref 30–36.5)
MCHC RBC AUTO-ENTMCNC: 32.5 G/DL (ref 30–36.5)
MCHC RBC AUTO-ENTMCNC: 32.9 G/DL (ref 30–36.5)
MCHC RBC AUTO-ENTMCNC: 33 G/DL (ref 30–36.5)
MCV RBC AUTO: 95 FL (ref 80–99)
MCV RBC AUTO: 95.8 FL (ref 80–99)
MCV RBC AUTO: 96 FL (ref 80–99)
MCV RBC AUTO: 96.3 FL (ref 80–99)
MONOCYTES # BLD: 0.3 K/UL (ref 0–1)
MONOCYTES # BLD: 0.5 K/UL (ref 0–1)
MONOCYTES # BLD: 0.6 K/UL (ref 0–1)
MONOCYTES # BLD: 0.8 K/UL (ref 0–1)
MONOCYTES NFR BLD: 10 % (ref 5–13)
MONOCYTES NFR BLD: 13 % (ref 5–13)
MONOCYTES NFR BLD: 23 % (ref 5–13)
MONOCYTES NFR BLD: 9 % (ref 5–13)
MYELOCYTES NFR BLD MANUAL: 1 %
NEUTS SEG # BLD: 1.1 K/UL (ref 1.8–8)
NEUTS SEG # BLD: 1.9 K/UL (ref 1.8–8)
NEUTS SEG # BLD: 4.4 K/UL (ref 1.8–8)
NEUTS SEG # BLD: 4.4 K/UL (ref 1.8–8)
NEUTS SEG NFR BLD: 52 % (ref 32–75)
NEUTS SEG NFR BLD: 70 % (ref 32–75)
NEUTS SEG NFR BLD: 73 % (ref 32–75)
NEUTS SEG NFR BLD: 73 % (ref 32–75)
NRBC # BLD: 0 K/UL (ref 0–0.01)
NRBC BLD-RTO: 0 PER 100 WBC
PLATELET # BLD AUTO: 126 K/UL (ref 150–400)
PLATELET # BLD AUTO: 171 K/UL (ref 150–400)
PLATELET # BLD AUTO: 174 K/UL (ref 150–400)
PLATELET # BLD AUTO: 303 K/UL (ref 150–400)
PMV BLD AUTO: 10 FL (ref 8.9–12.9)
PMV BLD AUTO: 10 FL (ref 8.9–12.9)
PMV BLD AUTO: 10.4 FL (ref 8.9–12.9)
PMV BLD AUTO: 11.1 FL (ref 8.9–12.9)
POTASSIUM SERPL-SCNC: 3.8 MMOL/L (ref 3.5–5.1)
POTASSIUM SERPL-SCNC: 4 MMOL/L (ref 3.5–5.1)
PROT SERPL-MCNC: 6.1 G/DL (ref 6.4–8.2)
PROT SERPL-MCNC: 6.7 G/DL (ref 6.4–8.2)
RBC # BLD AUTO: 2.85 M/UL (ref 4.1–5.7)
RBC # BLD AUTO: 3.21 M/UL (ref 4.1–5.7)
RBC # BLD AUTO: 3.22 M/UL (ref 4.1–5.7)
RBC # BLD AUTO: 3.74 M/UL (ref 4.1–5.7)
RBC MORPH BLD: ABNORMAL
SAMPLES BEING HELD,HOLD: NORMAL
SARS-COV-2, NAA: POSITIVE
SODIUM SERPL-SCNC: 134 MMOL/L (ref 136–145)
SODIUM SERPL-SCNC: 140 MMOL/L (ref 136–145)
WBC # BLD AUTO: 2.1 K/UL (ref 4.1–11.1)
WBC # BLD AUTO: 2.9 K/UL (ref 4.1–11.1)
WBC # BLD AUTO: 6 K/UL (ref 4.1–11.1)
WBC # BLD AUTO: 6 K/UL (ref 4.1–11.1)

## 2022-01-01 PROCEDURE — 85025 COMPLETE CBC W/AUTO DIFF WBC: CPT

## 2022-01-01 PROCEDURE — G8536 NO DOC ELDER MAL SCRN: HCPCS | Performed by: INTERNAL MEDICINE

## 2022-01-01 PROCEDURE — 99215 OFFICE O/P EST HI 40 MIN: CPT | Performed by: INTERNAL MEDICINE

## 2022-01-01 PROCEDURE — 96375 TX/PRO/DX INJ NEW DRUG ADDON: CPT

## 2022-01-01 PROCEDURE — 74011000250 HC RX REV CODE- 250: Performed by: INTERNAL MEDICINE

## 2022-01-01 PROCEDURE — 74011250636 HC RX REV CODE- 250/636: Performed by: INTERNAL MEDICINE

## 2022-01-01 PROCEDURE — G8510 SCR DEP NEG, NO PLAN REQD: HCPCS | Performed by: INTERNAL MEDICINE

## 2022-01-01 PROCEDURE — 3017F COLORECTAL CA SCREEN DOC REV: CPT | Performed by: INTERNAL MEDICINE

## 2022-01-01 PROCEDURE — 74011000636 HC RX REV CODE- 636: Performed by: INTERNAL MEDICINE

## 2022-01-01 PROCEDURE — 96413 CHEMO IV INFUSION 1 HR: CPT

## 2022-01-01 PROCEDURE — 99214 OFFICE O/P EST MOD 30 MIN: CPT | Performed by: INTERNAL MEDICINE

## 2022-01-01 PROCEDURE — 74177 CT ABD & PELVIS W/CONTRAST: CPT

## 2022-01-01 PROCEDURE — G8754 DIAS BP LESS 90: HCPCS | Performed by: INTERNAL MEDICINE

## 2022-01-01 PROCEDURE — G8417 CALC BMI ABV UP PARAM F/U: HCPCS | Performed by: INTERNAL MEDICINE

## 2022-01-01 PROCEDURE — 86704 HEP B CORE ANTIBODY TOTAL: CPT

## 2022-01-01 PROCEDURE — 71260 CT THORAX DX C+: CPT

## 2022-01-01 PROCEDURE — 1101F PT FALLS ASSESS-DOCD LE1/YR: CPT | Performed by: INTERNAL MEDICINE

## 2022-01-01 PROCEDURE — 74011000250 HC RX REV CODE- 250: Performed by: EMERGENCY MEDICINE

## 2022-01-01 PROCEDURE — G0463 HOSPITAL OUTPT CLINIC VISIT: HCPCS | Performed by: INTERNAL MEDICINE

## 2022-01-01 PROCEDURE — 99285 EMERGENCY DEPT VISIT HI MDM: CPT

## 2022-01-01 PROCEDURE — G8417 CALC BMI ABV UP PARAM F/U: HCPCS | Performed by: NURSE PRACTITIONER

## 2022-01-01 PROCEDURE — 80053 COMPREHEN METABOLIC PANEL: CPT

## 2022-01-01 PROCEDURE — G8427 DOCREV CUR MEDS BY ELIG CLIN: HCPCS | Performed by: INTERNAL MEDICINE

## 2022-01-01 PROCEDURE — 74011000250 HC RX REV CODE- 250: Performed by: NURSE PRACTITIONER

## 2022-01-01 PROCEDURE — G0463 HOSPITAL OUTPT CLINIC VISIT: HCPCS | Performed by: NURSE PRACTITIONER

## 2022-01-01 PROCEDURE — G8752 SYS BP LESS 140: HCPCS | Performed by: INTERNAL MEDICINE

## 2022-01-01 PROCEDURE — 3017F COLORECTAL CA SCREEN DOC REV: CPT | Performed by: NURSE PRACTITIONER

## 2022-01-01 PROCEDURE — G8754 DIAS BP LESS 90: HCPCS | Performed by: NURSE PRACTITIONER

## 2022-01-01 PROCEDURE — G8536 NO DOC ELDER MAL SCRN: HCPCS | Performed by: NURSE PRACTITIONER

## 2022-01-01 PROCEDURE — 36415 COLL VENOUS BLD VENIPUNCTURE: CPT

## 2022-01-01 PROCEDURE — G8753 SYS BP > OR = 140: HCPCS | Performed by: NURSE PRACTITIONER

## 2022-01-01 PROCEDURE — G8432 DEP SCR NOT DOC, RNG: HCPCS | Performed by: NURSE PRACTITIONER

## 2022-01-01 PROCEDURE — 82565 ASSAY OF CREATININE: CPT

## 2022-01-01 PROCEDURE — 74011000636 HC RX REV CODE- 636: Performed by: NURSE PRACTITIONER

## 2022-01-01 PROCEDURE — G8427 DOCREV CUR MEDS BY ELIG CLIN: HCPCS | Performed by: NURSE PRACTITIONER

## 2022-01-01 PROCEDURE — 74011250636 HC RX REV CODE- 250/636: Performed by: EMERGENCY MEDICINE

## 2022-01-01 PROCEDURE — 82330 ASSAY OF CALCIUM: CPT

## 2022-01-01 PROCEDURE — 1123F ACP DISCUSS/DSCN MKR DOCD: CPT | Performed by: INTERNAL MEDICINE

## 2022-01-01 PROCEDURE — 99213 OFFICE O/P EST LOW 20 MIN: CPT | Performed by: NURSE PRACTITIONER

## 2022-01-01 PROCEDURE — 87340 HEPATITIS B SURFACE AG IA: CPT

## 2022-01-01 PROCEDURE — 86706 HEP B SURFACE ANTIBODY: CPT

## 2022-01-01 PROCEDURE — 1101F PT FALLS ASSESS-DOCD LE1/YR: CPT | Performed by: NURSE PRACTITIONER

## 2022-01-01 PROCEDURE — G8432 DEP SCR NOT DOC, RNG: HCPCS | Performed by: INTERNAL MEDICINE

## 2022-01-01 PROCEDURE — 92950 HEART/LUNG RESUSCITATION CPR: CPT

## 2022-01-01 PROCEDURE — G8753 SYS BP > OR = 140: HCPCS | Performed by: INTERNAL MEDICINE

## 2022-01-01 RX ORDER — HYDROCORTISONE SODIUM SUCCINATE 100 MG/2ML
100 INJECTION, POWDER, FOR SOLUTION INTRAMUSCULAR; INTRAVENOUS AS NEEDED
Status: CANCELLED | OUTPATIENT
Start: 2022-06-21

## 2022-01-01 RX ORDER — SODIUM CHLORIDE 9 MG/ML
25 INJECTION, SOLUTION INTRAVENOUS CONTINUOUS
Status: CANCELLED | OUTPATIENT
Start: 2022-01-01

## 2022-01-01 RX ORDER — ONDANSETRON 2 MG/ML
8 INJECTION INTRAMUSCULAR; INTRAVENOUS ONCE
Status: COMPLETED | OUTPATIENT
Start: 2022-01-01 | End: 2022-01-01

## 2022-01-01 RX ORDER — SODIUM CHLORIDE 9 MG/ML
10 INJECTION INTRAMUSCULAR; INTRAVENOUS; SUBCUTANEOUS AS NEEDED
Status: CANCELLED | OUTPATIENT
Start: 2022-01-01

## 2022-01-01 RX ORDER — METHYLPREDNISOLONE 4 MG/1
TABLET ORAL
Qty: 1 DOSE PACK | Refills: 0 | Status: SHIPPED | OUTPATIENT
Start: 2022-01-01 | End: 2022-01-01 | Stop reason: SDUPTHER

## 2022-01-01 RX ORDER — BARIUM SULFATE 20 MG/ML
900 SUSPENSION ORAL ONCE
Status: COMPLETED | OUTPATIENT
Start: 2022-01-01 | End: 2022-01-01

## 2022-01-01 RX ORDER — ACETAMINOPHEN 325 MG/1
650 TABLET ORAL AS NEEDED
Status: CANCELLED
Start: 2022-01-01

## 2022-01-01 RX ORDER — SODIUM CHLORIDE 9 MG/ML
10 INJECTION INTRAMUSCULAR; INTRAVENOUS; SUBCUTANEOUS AS NEEDED
Status: CANCELLED | OUTPATIENT
Start: 2022-06-28

## 2022-01-01 RX ORDER — ONDANSETRON 2 MG/ML
8 INJECTION INTRAMUSCULAR; INTRAVENOUS AS NEEDED
Status: ACTIVE | OUTPATIENT
Start: 2022-01-01 | End: 2022-01-01

## 2022-01-01 RX ORDER — SODIUM CHLORIDE 9 MG/ML
25 INJECTION, SOLUTION INTRAVENOUS CONTINUOUS
Status: DISCONTINUED | OUTPATIENT
Start: 2022-01-01 | End: 2022-01-01 | Stop reason: HOSPADM

## 2022-01-01 RX ORDER — ALBUTEROL SULFATE 0.83 MG/ML
2.5 SOLUTION RESPIRATORY (INHALATION) AS NEEDED
Status: CANCELLED
Start: 2022-01-01

## 2022-01-01 RX ORDER — DIPHENHYDRAMINE HYDROCHLORIDE 50 MG/ML
25 INJECTION, SOLUTION INTRAMUSCULAR; INTRAVENOUS AS NEEDED
Status: ACTIVE | OUTPATIENT
Start: 2022-01-01 | End: 2022-01-01

## 2022-01-01 RX ORDER — SODIUM CHLORIDE 9 MG/ML
25 INJECTION, SOLUTION INTRAVENOUS CONTINUOUS
Status: DISPENSED | OUTPATIENT
Start: 2022-01-01 | End: 2022-01-01

## 2022-01-01 RX ORDER — HYDROCORTISONE SODIUM SUCCINATE 100 MG/2ML
100 INJECTION, POWDER, FOR SOLUTION INTRAMUSCULAR; INTRAVENOUS AS NEEDED
Status: CANCELLED | OUTPATIENT
Start: 2022-01-01

## 2022-01-01 RX ORDER — SODIUM CHLORIDE 0.9 % (FLUSH) 0.9 %
10 SYRINGE (ML) INJECTION AS NEEDED
Status: DISPENSED | OUTPATIENT
Start: 2022-01-01 | End: 2022-01-01

## 2022-01-01 RX ORDER — SODIUM CHLORIDE 0.9 % (FLUSH) 0.9 %
10 SYRINGE (ML) INJECTION AS NEEDED
Status: CANCELLED | OUTPATIENT
Start: 2022-01-01

## 2022-01-01 RX ORDER — ONDANSETRON 2 MG/ML
8 INJECTION INTRAMUSCULAR; INTRAVENOUS ONCE
Status: CANCELLED | OUTPATIENT
Start: 2022-06-21 | End: 2022-06-21

## 2022-01-01 RX ORDER — TERAZOSIN 10 MG/1
CAPSULE ORAL
Qty: 90 CAPSULE | Refills: 0 | Status: SHIPPED | OUTPATIENT
Start: 2022-01-01 | End: 2022-01-01

## 2022-01-01 RX ORDER — SODIUM CHLORIDE 9 MG/ML
10 INJECTION INTRAMUSCULAR; INTRAVENOUS; SUBCUTANEOUS AS NEEDED
Status: ACTIVE | OUTPATIENT
Start: 2022-01-01 | End: 2022-01-01

## 2022-01-01 RX ORDER — AMLODIPINE BESYLATE 5 MG/1
TABLET ORAL
Qty: 90 TABLET | Refills: 0 | Status: SHIPPED | OUTPATIENT
Start: 2022-01-01 | End: 2022-01-01 | Stop reason: ALTCHOICE

## 2022-01-01 RX ORDER — EPINEPHRINE 1 MG/ML
0.3 INJECTION, SOLUTION, CONCENTRATE INTRAVENOUS AS NEEDED
Status: CANCELLED | OUTPATIENT
Start: 2022-01-01

## 2022-01-01 RX ORDER — ONDANSETRON 2 MG/ML
8 INJECTION INTRAMUSCULAR; INTRAVENOUS ONCE
Status: CANCELLED | OUTPATIENT
Start: 2022-01-01 | End: 2022-01-01

## 2022-01-01 RX ORDER — DIPHENHYDRAMINE HYDROCHLORIDE 50 MG/ML
25 INJECTION, SOLUTION INTRAMUSCULAR; INTRAVENOUS AS NEEDED
Status: CANCELLED
Start: 2022-06-28

## 2022-01-01 RX ORDER — DIPHENHYDRAMINE HYDROCHLORIDE 50 MG/ML
50 INJECTION, SOLUTION INTRAMUSCULAR; INTRAVENOUS AS NEEDED
Status: CANCELLED
Start: 2022-06-28

## 2022-01-01 RX ORDER — SODIUM CHLORIDE 0.9 % (FLUSH) 0.9 %
10 SYRINGE (ML) INJECTION AS NEEDED
Status: CANCELLED | OUTPATIENT
Start: 2022-06-28

## 2022-01-01 RX ORDER — TERAZOSIN 10 MG/1
CAPSULE ORAL
Qty: 90 CAPSULE | Refills: 0 | Status: SHIPPED | OUTPATIENT
Start: 2022-01-01

## 2022-01-01 RX ORDER — TRAZODONE HYDROCHLORIDE 50 MG/1
TABLET ORAL
Qty: 60 TABLET | Refills: 0 | Status: SHIPPED | OUTPATIENT
Start: 2022-01-01 | End: 2022-01-01

## 2022-01-01 RX ORDER — APIXABAN 5 MG/1
TABLET, FILM COATED ORAL
Qty: 60 TABLET | Refills: 0 | Status: SHIPPED | OUTPATIENT
Start: 2022-01-01 | End: 2022-01-01 | Stop reason: SDUPTHER

## 2022-01-01 RX ORDER — APIXABAN 5 MG/1
TABLET, FILM COATED ORAL
Qty: 60 TABLET | Refills: 0 | Status: SHIPPED | OUTPATIENT
Start: 2022-01-01 | End: 2022-01-01

## 2022-01-01 RX ORDER — SODIUM CHLORIDE 9 MG/ML
25 INJECTION, SOLUTION INTRAVENOUS CONTINUOUS
Status: CANCELLED | OUTPATIENT
Start: 2022-06-28

## 2022-01-01 RX ORDER — APIXABAN 5 MG/1
TABLET, FILM COATED ORAL
Qty: 60 TABLET | Refills: 0 | Status: SHIPPED | OUTPATIENT
Start: 2022-01-01

## 2022-01-01 RX ORDER — EPINEPHRINE 1 MG/ML
0.3 INJECTION, SOLUTION, CONCENTRATE INTRAVENOUS AS NEEDED
Status: CANCELLED | OUTPATIENT
Start: 2022-06-21

## 2022-01-01 RX ORDER — HYDROCORTISONE SODIUM SUCCINATE 100 MG/2ML
100 INJECTION, POWDER, FOR SOLUTION INTRAMUSCULAR; INTRAVENOUS AS NEEDED
Status: ACTIVE | OUTPATIENT
Start: 2022-01-01 | End: 2022-01-01

## 2022-01-01 RX ORDER — PROCHLORPERAZINE MALEATE 5 MG
TABLET ORAL
Qty: 30 TABLET | Refills: 1 | Status: SHIPPED | OUTPATIENT
Start: 2022-01-01

## 2022-01-01 RX ORDER — EPINEPHRINE 1 MG/ML
0.3 INJECTION, SOLUTION, CONCENTRATE INTRAVENOUS AS NEEDED
Status: ACTIVE | OUTPATIENT
Start: 2022-01-01 | End: 2022-01-01

## 2022-01-01 RX ORDER — ACETAMINOPHEN 325 MG/1
650 TABLET ORAL AS NEEDED
Status: ACTIVE | OUTPATIENT
Start: 2022-01-01 | End: 2022-01-01

## 2022-01-01 RX ORDER — HEPARIN 100 UNIT/ML
300-500 SYRINGE INTRAVENOUS AS NEEDED
Status: ACTIVE | OUTPATIENT
Start: 2022-01-01 | End: 2022-01-01

## 2022-01-01 RX ORDER — HEPARIN 100 UNIT/ML
300-500 SYRINGE INTRAVENOUS AS NEEDED
Status: CANCELLED
Start: 2022-01-01

## 2022-01-01 RX ORDER — DIPHENHYDRAMINE HYDROCHLORIDE 50 MG/ML
25 INJECTION, SOLUTION INTRAMUSCULAR; INTRAVENOUS AS NEEDED
Status: CANCELLED
Start: 2022-06-21

## 2022-01-01 RX ORDER — GABAPENTIN 300 MG/1
CAPSULE ORAL
Qty: 60 CAPSULE | Refills: 0 | Status: SHIPPED | OUTPATIENT
Start: 2022-01-01 | End: 2022-01-01

## 2022-01-01 RX ORDER — ONDANSETRON 2 MG/ML
8 INJECTION INTRAMUSCULAR; INTRAVENOUS AS NEEDED
Status: DISCONTINUED | OUTPATIENT
Start: 2022-01-01 | End: 2022-01-01 | Stop reason: HOSPADM

## 2022-01-01 RX ORDER — ONDANSETRON 2 MG/ML
8 INJECTION INTRAMUSCULAR; INTRAVENOUS AS NEEDED
Status: CANCELLED | OUTPATIENT
Start: 2022-06-21

## 2022-01-01 RX ORDER — ONDANSETRON 2 MG/ML
8 INJECTION INTRAMUSCULAR; INTRAVENOUS AS NEEDED
Status: CANCELLED | OUTPATIENT
Start: 2022-01-01

## 2022-01-01 RX ORDER — DIPHENHYDRAMINE HYDROCHLORIDE 50 MG/ML
50 INJECTION, SOLUTION INTRAMUSCULAR; INTRAVENOUS AS NEEDED
Status: CANCELLED
Start: 2022-01-01

## 2022-01-01 RX ORDER — SODIUM CHLORIDE 0.9 % (FLUSH) 0.9 %
10 SYRINGE (ML) INJECTION AS NEEDED
Status: CANCELLED | OUTPATIENT
Start: 2022-06-21

## 2022-01-01 RX ORDER — DIPHENHYDRAMINE HYDROCHLORIDE 50 MG/ML
25 INJECTION, SOLUTION INTRAMUSCULAR; INTRAVENOUS AS NEEDED
Status: CANCELLED
Start: 2022-01-01

## 2022-01-01 RX ORDER — ONDANSETRON 2 MG/ML
8 INJECTION INTRAMUSCULAR; INTRAVENOUS ONCE
Status: CANCELLED | OUTPATIENT
Start: 2022-06-28 | End: 2022-06-28

## 2022-01-01 RX ORDER — HEPARIN 100 UNIT/ML
300-500 SYRINGE INTRAVENOUS AS NEEDED
Status: DISCONTINUED | OUTPATIENT
Start: 2022-01-01 | End: 2022-01-01 | Stop reason: HOSPADM

## 2022-01-01 RX ORDER — ALBUTEROL SULFATE 0.83 MG/ML
2.5 SOLUTION RESPIRATORY (INHALATION) AS NEEDED
Status: ACTIVE | OUTPATIENT
Start: 2022-01-01 | End: 2022-01-01

## 2022-01-01 RX ORDER — OXYCODONE HYDROCHLORIDE 5 MG/1
5 TABLET ORAL
Qty: 30 TABLET | Refills: 0 | Status: SHIPPED | OUTPATIENT
Start: 2022-01-01 | End: 2022-06-19

## 2022-01-01 RX ORDER — HEPARIN 100 UNIT/ML
300-500 SYRINGE INTRAVENOUS AS NEEDED
Status: CANCELLED
Start: 2022-06-21

## 2022-01-01 RX ORDER — ONDANSETRON 4 MG/1
4-8 TABLET, ORALLY DISINTEGRATING ORAL
Qty: 60 TABLET | Refills: 1 | Status: SHIPPED | OUTPATIENT
Start: 2022-01-01

## 2022-01-01 RX ORDER — SODIUM CHLORIDE 9 MG/ML
10 INJECTION INTRAMUSCULAR; INTRAVENOUS; SUBCUTANEOUS AS NEEDED
Status: DISCONTINUED | OUTPATIENT
Start: 2022-01-01 | End: 2022-01-01 | Stop reason: HOSPADM

## 2022-01-01 RX ORDER — SODIUM BICARBONATE 1 MEQ/ML
SYRINGE (ML) INTRAVENOUS
Status: COMPLETED | OUTPATIENT
Start: 2022-01-01 | End: 2022-01-01

## 2022-01-01 RX ORDER — AMIODARONE HYDROCHLORIDE 150 MG/3ML
INJECTION, SOLUTION INTRAVENOUS
Status: COMPLETED | OUTPATIENT
Start: 2022-01-01 | End: 2022-01-01

## 2022-01-01 RX ORDER — SODIUM CHLORIDE 9 MG/ML
10 INJECTION INTRAMUSCULAR; INTRAVENOUS; SUBCUTANEOUS AS NEEDED
Status: CANCELLED | OUTPATIENT
Start: 2022-06-21

## 2022-01-01 RX ORDER — CALCIUM CHLORIDE INJECTION 100 MG/ML
INJECTION, SOLUTION INTRAVENOUS
Status: COMPLETED | OUTPATIENT
Start: 2022-01-01 | End: 2022-01-01

## 2022-01-01 RX ORDER — ALBUTEROL SULFATE 0.83 MG/ML
2.5 SOLUTION RESPIRATORY (INHALATION) AS NEEDED
Status: CANCELLED
Start: 2022-06-28

## 2022-01-01 RX ORDER — SODIUM CHLORIDE 0.9 % (FLUSH) 0.9 %
10 SYRINGE (ML) INJECTION AS NEEDED
Status: DISCONTINUED | OUTPATIENT
Start: 2022-01-01 | End: 2022-01-01 | Stop reason: HOSPADM

## 2022-01-01 RX ORDER — ALBUTEROL SULFATE 0.83 MG/ML
2.5 SOLUTION RESPIRATORY (INHALATION) AS NEEDED
Status: CANCELLED
Start: 2022-06-21

## 2022-01-01 RX ORDER — AMLODIPINE BESYLATE 5 MG/1
TABLET ORAL
Qty: 90 TABLET | Refills: 0 | Status: SHIPPED | OUTPATIENT
Start: 2022-01-01 | End: 2022-01-01

## 2022-01-01 RX ORDER — DIPHENHYDRAMINE HYDROCHLORIDE 50 MG/ML
50 INJECTION, SOLUTION INTRAMUSCULAR; INTRAVENOUS AS NEEDED
Status: CANCELLED
Start: 2022-06-21

## 2022-01-01 RX ORDER — CANDESARTAN 4 MG/1
TABLET ORAL
Qty: 90 TABLET | Refills: 0 | Status: SHIPPED | OUTPATIENT
Start: 2022-01-01

## 2022-01-01 RX ORDER — METHYLPREDNISOLONE 4 MG/1
TABLET ORAL
Qty: 1 DOSE PACK | Refills: 0 | Status: SHIPPED | OUTPATIENT
Start: 2022-01-01

## 2022-01-01 RX ORDER — ONDANSETRON 2 MG/ML
8 INJECTION INTRAMUSCULAR; INTRAVENOUS AS NEEDED
Status: CANCELLED | OUTPATIENT
Start: 2022-06-28

## 2022-01-01 RX ORDER — ACETAMINOPHEN 325 MG/1
650 TABLET ORAL AS NEEDED
Status: CANCELLED
Start: 2022-06-28

## 2022-01-01 RX ORDER — SODIUM CHLORIDE 9 MG/ML
25 INJECTION, SOLUTION INTRAVENOUS CONTINUOUS
Status: CANCELLED | OUTPATIENT
Start: 2022-06-21

## 2022-01-01 RX ORDER — GABAPENTIN 300 MG/1
300 CAPSULE ORAL 2 TIMES DAILY
Qty: 60 CAPSULE | Refills: 0 | Status: SHIPPED | OUTPATIENT
Start: 2022-01-01

## 2022-01-01 RX ORDER — ACETAMINOPHEN 325 MG/1
650 TABLET ORAL AS NEEDED
Status: DISCONTINUED | OUTPATIENT
Start: 2022-01-01 | End: 2022-01-01 | Stop reason: HOSPADM

## 2022-01-01 RX ORDER — DIPHENHYDRAMINE HYDROCHLORIDE 50 MG/ML
50 INJECTION, SOLUTION INTRAMUSCULAR; INTRAVENOUS AS NEEDED
Status: ACTIVE | OUTPATIENT
Start: 2022-01-01 | End: 2022-01-01

## 2022-01-01 RX ORDER — HEPARIN 100 UNIT/ML
300-500 SYRINGE INTRAVENOUS AS NEEDED
Status: CANCELLED
Start: 2022-06-28

## 2022-01-01 RX ORDER — DIPHENHYDRAMINE HYDROCHLORIDE 50 MG/ML
25 INJECTION, SOLUTION INTRAMUSCULAR; INTRAVENOUS AS NEEDED
Status: DISCONTINUED | OUTPATIENT
Start: 2022-01-01 | End: 2022-01-01 | Stop reason: HOSPADM

## 2022-01-01 RX ORDER — CANDESARTAN 4 MG/1
TABLET ORAL
Qty: 90 TABLET | Refills: 0 | Status: SHIPPED | OUTPATIENT
Start: 2022-01-01 | End: 2022-01-01

## 2022-01-01 RX ORDER — HYDROCORTISONE SODIUM SUCCINATE 100 MG/2ML
100 INJECTION, POWDER, FOR SOLUTION INTRAMUSCULAR; INTRAVENOUS AS NEEDED
Status: CANCELLED | OUTPATIENT
Start: 2022-06-28

## 2022-01-01 RX ORDER — TRAZODONE HYDROCHLORIDE 50 MG/1
TABLET ORAL
Qty: 60 TABLET | Refills: 0 | Status: SHIPPED | OUTPATIENT
Start: 2022-01-01

## 2022-01-01 RX ORDER — EPINEPHRINE 0.1 MG/ML
INJECTION INTRACARDIAC; INTRAVENOUS
Status: COMPLETED | OUTPATIENT
Start: 2022-01-01 | End: 2022-01-01

## 2022-01-01 RX ORDER — EPINEPHRINE 1 MG/ML
0.3 INJECTION, SOLUTION, CONCENTRATE INTRAVENOUS AS NEEDED
Status: CANCELLED | OUTPATIENT
Start: 2022-06-28

## 2022-01-01 RX ORDER — ACETAMINOPHEN 325 MG/1
650 TABLET ORAL AS NEEDED
Status: CANCELLED
Start: 2022-06-21

## 2022-01-01 RX ADMIN — EPINEPHRINE 1 MG: 0.1 INJECTION INTRACARDIAC; INTRAVENOUS at 22:14

## 2022-01-01 RX ADMIN — SODIUM CHLORIDE 25 ML/HR: 9 INJECTION, SOLUTION INTRAVENOUS at 14:34

## 2022-01-01 RX ADMIN — IOPAMIDOL 100 ML: 755 INJECTION, SOLUTION INTRAVENOUS at 11:49

## 2022-01-01 RX ADMIN — BARIUM SULFATE 900 ML: 20 SUSPENSION ORAL at 13:11

## 2022-01-01 RX ADMIN — SODIUM CHLORIDE 25 ML/HR: 9 INJECTION, SOLUTION INTRAVENOUS at 09:29

## 2022-01-01 RX ADMIN — ONDANSETRON 8 MG: 2 INJECTION, SOLUTION INTRAMUSCULAR; INTRAVENOUS at 10:49

## 2022-01-01 RX ADMIN — EPINEPHRINE 1 MG: 0.1 INJECTION INTRACARDIAC; INTRAVENOUS at 22:17

## 2022-01-01 RX ADMIN — EPINEPHRINE 1 MG: 0.1 INJECTION INTRACARDIAC; INTRAVENOUS at 22:13

## 2022-01-01 RX ADMIN — IOPAMIDOL 100 ML: 755 INJECTION, SOLUTION INTRAVENOUS at 13:11

## 2022-01-01 RX ADMIN — ONDANSETRON 8 MG: 2 INJECTION, SOLUTION INTRAMUSCULAR; INTRAVENOUS at 15:25

## 2022-01-01 RX ADMIN — GEMCITABINE 2300 MG: 38 INJECTION, SOLUTION INTRAVENOUS at 11:20

## 2022-01-01 RX ADMIN — CALCIUM CHLORIDE 1 G: 100 INJECTION, SOLUTION INTRAVENOUS; INTRAVENTRICULAR at 22:17

## 2022-01-01 RX ADMIN — GEMCITABINE 2300 MG: 38 INJECTION, SOLUTION INTRAVENOUS at 15:10

## 2022-01-01 RX ADMIN — SODIUM CHLORIDE 25 ML/HR: 9 INJECTION, SOLUTION INTRAVENOUS at 15:45

## 2022-01-01 RX ADMIN — ONDANSETRON 8 MG: 2 INJECTION, SOLUTION INTRAMUSCULAR; INTRAVENOUS at 09:38

## 2022-01-01 RX ADMIN — EPINEPHRINE 1 MG: 0.1 INJECTION INTRACARDIAC; INTRAVENOUS at 22:20

## 2022-01-01 RX ADMIN — GEMCITABINE 2300 MG: 38 INJECTION, SOLUTION INTRAVENOUS at 16:11

## 2022-01-01 RX ADMIN — BARIUM SULFATE 900 ML: 20 SUSPENSION ORAL at 11:49

## 2022-01-01 RX ADMIN — SODIUM BICARBONATE 50 MEQ: 84 INJECTION, SOLUTION INTRAVENOUS at 22:17

## 2022-01-01 RX ADMIN — SODIUM CHLORIDE, PRESERVATIVE FREE 10 ML: 5 INJECTION INTRAVENOUS at 09:20

## 2022-01-01 RX ADMIN — ONDANSETRON 8 MG: 2 INJECTION, SOLUTION INTRAMUSCULAR; INTRAVENOUS at 14:34

## 2022-01-01 RX ADMIN — EPINEPHRINE 1 MG: 0.1 INJECTION INTRACARDIAC; INTRAVENOUS at 22:23

## 2022-01-01 RX ADMIN — SODIUM CHLORIDE, PRESERVATIVE FREE 10 ML: 5 INJECTION INTRAVENOUS at 10:26

## 2022-01-01 RX ADMIN — AMIODARONE HYDROCHLORIDE 300 MG: 50 INJECTION, SOLUTION INTRAVENOUS at 22:21

## 2022-01-01 RX ADMIN — SODIUM CHLORIDE 25 ML/HR: 9 INJECTION, SOLUTION INTRAVENOUS at 10:43

## 2022-01-01 RX ADMIN — EPINEPHRINE 1 MG: 0.1 INJECTION INTRACARDIAC; INTRAVENOUS at 20:23

## 2022-01-01 RX ADMIN — GEMCITABINE 2300 MG: 38 INJECTION, SOLUTION INTRAVENOUS at 10:13

## 2022-01-01 RX ADMIN — SODIUM CHLORIDE, PRESERVATIVE FREE 10 ML: 5 INJECTION INTRAVENOUS at 15:23

## 2022-01-14 NOTE — TELEPHONE ENCOUNTER
Incoming call from Dr. Estrellita Arreguin of [facility], [phone] to update Provider. Feels patient is doing well of treatment, notes persistent neuropathy, DVT. States patient is also following up with urologist.     Aware of [facility] upcoming CT scans on 1/25/22, based on those he would like to plan for a CT guided biopsy to be done at [facility]. Care Everywhere chart updated. Update to Provider - if questions, please contact him via  at [phone].

## 2022-01-26 NOTE — PROGRESS NOTES
Has changes c/w progressive disease  Send to IR for biopsy as planned  Copy CTs to Dr Ferrara at [facility]

## 2022-01-26 NOTE — PROGRESS NOTES
MA has faxed most recent CT scan from 1/25/22 to [address] with [facility] to fax number: [phone]! Fax confirmation was received back! This was done.   Anny Cross

## 2022-01-27 NOTE — PROGRESS NOTES
[facility] [place] [address] [address], [address], [place]: [phone]  F: [phone]    Reason for Visit:   Claudia Greenwood is a 78 y.o. male who is seen today in office for follow up of metastatic SCC     Treatment History:   · MRI Right Hip 8/5/20: Enlarging right pelvic sidewall adenopathy with adjacent muscular and bony invasion. Degenerative changes of the right hip. Small bony metastatic deposit anterior wall of the right acetabulum  · MRI Lumbar Spine 8/11/20: L3-L4 moderate central spinal canal and right foraminal stenoses. L5-S1 moderate bilateral foraminal stenosis  · 8/20/20 Pelvic Mass, Core biopsy with touch preparation - CYTOLOGIC INTERPRETATION: Pelvic Mass, Core biopsy with touch preparation: Squamous cell carcinoma  · TYPE ID + SCC/H+N vs skin  · PET 9/8/20: Large hypermetabolic right pelvic sidewall lymph node. Lytic lesion right anterior acetabulum is hypermetabolic and compatible with metastatic disease   · XRT to Right Pelvic Mass 10/13/20 - 10/22/20 with Dr. Rodney Mullen  · Pembrolizumab 12/15/20 - Current   · PET 1/12/21: New hypermetabolic pulmonary nodule in the lingula. Resolved left pelvic sidewall lymph node activity and resolved activity corresponding to the lytic lesion in the right anterior acetabulum  · PET 4/22/21:Progression of disease with new soft tissue nodules in the deep pelvis, foci of increased tracer activity in the right pelvic sidewall and right external iliac chain, increase in the size and number of pulmonary metastases, and hypermetabolic bone lesions right hemipelvis  · Carbo/Taxol/Pembro 6/18/21 - Current     History of Present Illness:   Claudia Greenwood is a 78 y.o. male seen today in office for follow up of metastatic SCC primary site unclear PDL1 80% not on any therapy at present. Doing well overall. Feels great overall. Last chemo 10/21. CTs with slight progression in lungs. Pt is going to [facility] for biopsy.    Did cytoscopy yesterday and pt states this was clear. Here with wife today. Last labs 10/21. No fevers/ chills/ chest pain/ SOB/ nausea/ vomiting/diarrhea/ pain/fatigue      Past Medical History:   Diagnosis Date    Arthritis     BPH (benign prostatic hypertrophy) 7/18/2011    Cancer (HCC)     prostate cancer    Chronic pain right    knee    ED (erectile dysfunction)     GERD (gastroesophageal reflux disease)     HTN (hypertension), benign     Metastatic squamous cell carcinoma ([facility] [address] 9/16/2020    OA (osteoarthritis) of knee 7/18/2011    BOTH KNEES    Unspecified essential hypertension 7/18/2011      Past Surgical History:   Procedure Laterality Date    HX ACL RECONSTRUCTION Right     x2    HX APPENDECTOMY  1975    HX GI  1/2015    Drainage of hemorrhoid    HX HERNIA REPAIR      left and right,  ventral    HX HIP REPLACEMENT  2000    left    HX HIP REPLACEMENT  2000    left    HX KNEE ARTHROSCOPY Right     x3    HX MALIGNANT SKIN LESION EXCISION      melanoma - face    HX MOHS PROCEDURES Right     Basal cell behind ear.  HX ORTHOPAEDIC Left 6/2000    HIP REPLACEMENT    HX ORTHOPAEDIC Right 08/05/2014    TKR - Dr. Tali Gottlieb HX PROSTATECTOMY  11/07/2011    prostate removed     HX TONSILLECTOMY      CHILD    IR INJ FORAMIN EPID LUMB ANES/STER SNGL  8/18/2020      Social History     Tobacco Use    Smoking status: Never Smoker    Smokeless tobacco: Never Used   Substance Use Topics    Alcohol use: No      Family History   Problem Relation Age of Onset    Bleeding Prob Father         clotting disorder    Cancer Brother         colon CA 1/2 brother    Thyroid Disease Daughter     Heart Failure Mother     Heart Disease Mother     Anesth Problems Neg Hx      Current Outpatient Medications   Medication Sig    gabapentin (NEURONTIN) 300 mg capsule TAKE 1 CAPSULE BY MOUTH TWICE DAILY.  MG DAILY.     Eliquis 5 mg tablet Take 1 tablet by mouth twice daily    traZODone (DESYREL) 50 mg tablet TAKE 1 TO 2 TABLETS BY MOUTH ONCE DAILY IN THE EVENING AS NEEDED FOR SLEEP    terazosin (HYTRIN) 10 mg capsule Take 1 capsule by mouth once daily    amLODIPine (NORVASC) 5 mg tablet Take 1 tablet by mouth nightly    candesartan (ATACAND) 4 mg tablet Take 1 tablet by mouth nightly    dexAMETHasone (DECADRON) 4 mg tablet Take 2 tabs (8mg) by mouth daily on days 2 and 3 after chemotherapy    ondansetron (ZOFRAN ODT) 4 mg disintegrating tablet Take 1-2 Tabs by mouth every eight (8) hours as needed for Nausea or Vomiting.  calcium carbonate/vitamin D3 (CALTRATE-600 PLUS VITAMIN D3 PO) Take 1 Tab by mouth nightly.  leuprolide acetate (LUPRON DEPOT, 6 MONTH, IM) 1 Each by IntraMUSCular route every 6 months. No current facility-administered medications for this visit. Allergies   Allergen Reactions    Barbiturates Anxiety     Hyper        Review of Systems:  A complete review of systems was obtained, negative except as described above and as reported on ROS sheet scanned into system. Physical Exam:     Visit Vitals  BP (!) 149/82 (BP 1 Location: Right arm, BP Patient Position: Sitting)   Pulse 72   Temp (!) 96.6 °F (35.9 °C) (Temporal)   Ht 6' (1.829 m)   Wt 236 lb 1.6 oz (107.1 kg)   SpO2 95%   BMI 32.02 kg/m²     ECOG PS: 1  General: No distress  Eyes: Anicteric sclerae  HENT: Atraumatic, wearing a mask  Neck: Supple  Resp: CTAB, normal respiratory effort   CV: Regular rate and rhythm  GI: Soft, non tender   Ext: Trace right leg edema, no edema in left leg  MS: Normal gait and station. Digits without clubbing or cyanosis. Skin: No ecchymoses, or petechiae. Normal temperature, turgor, and texture.   Psych: Alert, oriented, appropriate affect, normal judgment/insight  Neuro: Non focal    Results:     Lab Results   Component Value Date/Time    WBC 4.9 10/14/2021 03:43 PM    HGB 10.2 (L) 10/14/2021 03:43 PM    HCT 30.8 (L) 10/14/2021 03:43 PM    PLATELET 153 47/13/1583 03:43 PM    MCV 99.4 (H) 10/14/2021 03:43 PM    ABS. NEUTROPHILS 3.2 10/14/2021 03:43 PM     Lab Results   Component Value Date/Time    Sodium 138 10/14/2021 03:43 PM    Potassium 4.2 10/14/2021 03:43 PM    Chloride 108 10/14/2021 03:43 PM    CO2 24 10/14/2021 03:43 PM    Glucose 100 10/14/2021 03:43 PM    BUN 35 (H) 10/14/2021 03:43 PM    Creatinine 1.81 (H) 10/14/2021 03:43 PM    GFR est AA 45 (L) 10/14/2021 03:43 PM    GFR est non-AA 37 (L) 10/14/2021 03:43 PM    Calcium 8.5 10/14/2021 03:43 PM    Glucose (POC) 110 11/22/2021 02:14 PM    Creatinine (POC) 1.70 (H) 01/25/2022 11:44 AM     Lab Results   Component Value Date/Time    Bilirubin, total 0.5 10/14/2021 03:43 PM    ALT (SGPT) 17 10/14/2021 03:43 PM    Alk. phosphatase 62 10/14/2021 03:43 PM    Protein, total 6.3 (L) 10/14/2021 03:43 PM    Albumin 3.0 (L) 10/14/2021 03:43 PM    Globulin 3.3 10/14/2021 03:43 PM     MRI Right Hip 8/5/20  IMPRESSION:   1. Enlarging right pelvic sidewall adenopathy with adjacent muscular and bony  invasion  2. Degenerative changes of the right hip  3. Small bony metastatic deposit anterior wall of the right acetabulum    MRI Lumbar Spine 8/11/20  IMPRESSION:  1. L3-L4 moderate central spinal canal and right foraminal stenoses. 2. L5-S1 moderate bilateral foraminal stenosis. 8/20/20  Specimen Source   1: Pelvic Mass, Core biopsy with touch preparation   CYTOLOGIC INTERPRETATION:   Pelvic Mass, Core biopsy with touch preparation:   Squamous cell carcinoma    PET 9/8/20  IMPRESSION: Large hypermetabolic right pelvic sidewall lymph node. Lytic lesion  right anterior     PET 1/12/21  IMPRESSION:  New hypermetabolic pulmonary nodule in the lingula.  Resolved left  pelvic sidewall lymph node activity and resolved activity corresponding to the  lytic lesion in the right anterior acetabulum    PET 4/22/21  IMPRESSION:  Progression of disease with new soft tissue nodules in the deep  pelvis, foci of increased tracer activity in the right pelvic sidewall and right  external iliac chain, increase in the size and number of pulmonary metastases,  and hypermetabolic bone lesions right hemipelvis. Records reviewed and summarized above. Pathology report(s) reviewed above. Radiology report(s) reviewed above. Assessment/PLAN:     1) Metastatic Squamous Cell Cancer    EGFR/ROS/ALK negative. PDL1 80% by biopsy of pelvic/hip mas 8/20/20. Path is different than prostate which is adenocarcinoma from 2011. Unclear site of origin of squamous cell. TYPE ID sent and SCC H+N vs skin. Completed XRT to right pelvic mass on 10/22/20. Right hip pain has esolved since completing XRT. He started Pembrolizumab on 12/15/20. He had follow up PET on 1/12/21 that showed new hypermetabolic pulmonary nodule in the lingula, resolved left pelvic sidewall lymph node activity and resolved activity corresponding to the lytic lesion in the right anterior acetabulum. Case reviewed at cancer conference and recommendation was to continue [facility] for now. PET 4/21 showed slight progressive disease in lungs/LAD/bones. Patient was seen at [facility] by Med/Onc and Uro/Onc. D/w [facility] and plan is still same as recommended here. Site of origin of cancer is still unclear but not likely prostate. started Carbo/Taxol/Pembro on 6/18/21. Has been off therapy since 10/21. here today for follow up    Clinically doing well overall. Feels great. CTs with slight progression in lungs off therapy since 10/21. Reviewed with pt and wife today in detail. Pt is being seen at [facility] also. Will fu with Dr Eloisa Perkins and wants to have lung biopsy done there. Agree with plan for biopsy and further molecular testing. If no targetable abnormalities, standard chemo would be option pending path. Pt will let me know when [place] appts set up. Fu here post Pa/ biopsy/ path. Pt / wife agreeable to plan. 2) Recurrent Prostate Cancer   Management per Urology.      3) Hx of Right Hip Pain  Resolved after XRT. 5) HTN/Arthritis/GERD/Cronic Elevated Creatinine   Management per PCP. 6) Chronic Thrombocytopenia. Resolved. Mild and will continue to monitor. 7) Extensive Right Leg DVT  On Eliquis and tolerating well - no bleeding. Recommend indefinite anticoagulation. 8) Psychosocial  Mood good, coping well. He has great support from his wife. SW support as needed. His wife is here today. Call if questions. Follow up in 4 weeks. I appreciate the opportunity to participate in Mr. Beatriz Moulton care.     Signed By: Radha Tristan, DO

## 2022-01-27 NOTE — LETTER
1/28/2022 10:06 AM    Patient:  Alejandro Hardy   YOB: 1945  Date of Visit: 1/27/2022      Dear   No Recipients: Thank you for referring Mr. Katherine Arriola to me for evaluation/treatment. Below are the relevant portions of my assessment and plan of care. If you have questions, please do not hesitate to call me. I look forward to following Mr. Domingo Dalton along with you.         Sincerely,      Fatmata Turner, DO

## 2022-01-27 NOTE — TELEPHONE ENCOUNTER
Called to make follow up appointment and was told my patient that he would be follow up with Pa- So no followup was made.

## 2022-01-27 NOTE — PROGRESS NOTES
Juan José Alejo is a 78 y.o. male  Chief Complaint   Patient presents with    Follow-up     metastatic SCC      1. Have you been to the ER, urgent care clinic since your last visit? Hospitalized since your last visit? No.  2. Have you seen or consulted any other health care providers outside of the [address] [place] [address] since your last visit? Include any pap smears or colon screening. Yes, patient went to have a surgery with Urology and has had CT scan.

## 2022-01-31 NOTE — PROGRESS NOTES
Call to patient, ID verified X 2. Per patient, cystoscopy was performed by Dr. Jerzy Galicia at [facility]. Records request faxed with confirmation of receipt. Patient requested CT C/A/P of 1/25/22 reports [facility] [address] [facility], completed. He will contact [facility] for cysto report to be faxed to [facility].

## 2022-03-18 PROBLEM — Z09 CHEMOTHERAPY FOLLOW-UP EXAMINATION: Status: ACTIVE | Noted: 2021-01-01

## 2022-03-18 PROBLEM — K40.91 RECURRENT LEFT INGUINAL HERNIA: Status: ACTIVE | Noted: 2018-09-24

## 2022-03-18 PROBLEM — M17.12 LOCALIZED OSTEOARTHRITIS OF LEFT KNEE: Status: ACTIVE | Noted: 2020-06-09

## 2022-03-19 PROBLEM — C79.51 BONE METASTASES: Status: ACTIVE | Noted: 2021-01-01

## 2022-03-19 PROBLEM — C78.02 BILATERAL PULMONARY METASTASES: Status: ACTIVE | Noted: 2021-01-01

## 2022-03-19 PROBLEM — L27.0 DRUG-INDUCED SKIN RASH: Status: ACTIVE | Noted: 2021-01-05

## 2022-03-19 PROBLEM — C77.5 PROSTATE CANCER METASTATIC TO INTRAPELVIC LYMPH NODE (HCC): Status: ACTIVE | Noted: 2019-02-12

## 2022-03-19 PROBLEM — Z98.890 HISTORY OF KNEE SURGERY: Status: ACTIVE | Noted: 2021-01-01

## 2022-03-19 PROBLEM — M19.90 ARTHRITIS: Status: ACTIVE | Noted: 2020-12-15

## 2022-03-19 PROBLEM — Z86.718 HISTORY OF DVT (DEEP VEIN THROMBOSIS): Status: ACTIVE | Noted: 2021-01-01

## 2022-03-19 PROBLEM — R79.89 LOW THYROID STIMULATING HORMONE (TSH) LEVEL: Status: ACTIVE | Noted: 2021-04-01

## 2022-03-19 PROBLEM — C61 PROSTATE CANCER METASTATIC TO INTRAPELVIC LYMPH NODE (HCC): Status: ACTIVE | Noted: 2019-02-12

## 2022-03-19 PROBLEM — K21.9 GASTROESOPHAGEAL REFLUX DISEASE WITHOUT ESOPHAGITIS: Status: ACTIVE | Noted: 2020-12-15

## 2022-03-19 PROBLEM — Z51.12 ENCOUNTER FOR ANTINEOPLASTIC IMMUNOTHERAPY: Status: ACTIVE | Noted: 2020-12-15

## 2022-03-19 PROBLEM — C78.01 BILATERAL PULMONARY METASTASES: Status: ACTIVE | Noted: 2021-01-01

## 2022-03-19 PROBLEM — I82.4Z1 ACUTE DEEP VEIN THROMBOSIS (DVT) OF DISTAL VEIN OF RIGHT LOWER EXTREMITY (HCC): Status: ACTIVE | Noted: 2021-01-01

## 2022-03-20 PROBLEM — Z85.46 HISTORY OF PROSTATE CANCER: Status: ACTIVE | Noted: 2020-12-15

## 2022-03-20 PROBLEM — R79.89 ELEVATED SERUM CREATININE: Status: ACTIVE | Noted: 2021-04-01

## 2022-03-20 PROBLEM — D69.6 THROMBOCYTOPENIA (HCC): Status: ACTIVE | Noted: 2021-04-01

## 2022-04-07 NOTE — TELEPHONE ENCOUNTER
Dr. Alisa Lombardo called and left a VM needing to speak with Dr. Zenobia Maddox about mutual PT's treatment plan- please advise a CB# [phone] this is the paging service and they will get a hold of Dr. Rocco Schwarz when you call

## 2022-04-11 NOTE — PROGRESS NOTES
D/w Dr Ramiro Duong at [address] had lung biopsy at [facility] and [facility] again  Had molecular testing and no targetable markers  Next step would be gemzar chemo  Can call pt to set this up when good for him

## 2022-04-21 NOTE — PROGRESS NOTES
Pharmacy Note- Chemotherapy Education    Anant Corona is a  78 y.o.male  diagnosed with squamous cell cancer here today for chemotherapy counseling and consent. Mr. Anne Ford is being treated with gemcitabine. Provided education on gemcitabine and premedications - ondansetron. Side effects of chemotherapy reviewed included s/s infection, anemia, appetite changes, thrombocytopenia, fatigue, hair loss/alopecia, bone pain, skin and nail changes, and diarrhea/constipation. Patient given ways to manage these side effects and when to contact office. [employer] Handout of medications provided to patient. Mr. Anne Ford verbalized understanding of the information presented and all of the patient's questions were answered.     Abran Ospina, PharmD, BCPS, BCOP    For Pharmacy Admin Tracking Only     CPA in place: No   Recommendation Provided To: Patient/Caregiver: 1 via In person     Intervention Accepted By: Patient/Caregiver: 1   Time Spent (min): 20

## 2022-04-21 NOTE — LETTER
4/21/2022    Patient: Gary Mckeon   YOB: 1945   Date of Visit: 4/21/2022     Tomas Bradley MD  [address] [place] [address]  [address] [address] [facility] [address] [id number]  [facility]    Dear Tomas Bradley MD,      Thank you for referring Mr. Aida Rivera to [address] [place] [address] for evaluation. My notes for this consultation are attached. If you have questions, please do not hesitate to call me. I look forward to following your patient along with you.       Sincerely,    Gwen Cat, DO

## 2022-04-21 NOTE — PROGRESS NOTES
[facility] [place] at [place] [address] [address], [address], [place]: [phone]  F: [phone]    Reason for Visit:   Sravanthi Jimenez is a 78 y.o. male who is seen today in office for follow up of metastatic SCC     Treatment History:   · MRI Right Hip 8/5/20: Enlarging right pelvic sidewall adenopathy with adjacent muscular and bony invasion. Degenerative changes of the right hip. Small bony metastatic deposit anterior wall of the right acetabulum  · MRI Lumbar Spine 8/11/20: L3-L4 moderate central spinal canal and right foraminal stenoses. L5-S1 moderate bilateral foraminal stenosis  · 8/20/20 Pelvic Mass, Core biopsy with touch preparation - CYTOLOGIC INTERPRETATION: Pelvic Mass, Core biopsy with touch preparation: Squamous cell carcinoma  · TYPE ID + SCC/H+N vs skin  · PET 9/8/20: Large hypermetabolic right pelvic sidewall lymph node. Lytic lesion right anterior acetabulum is hypermetabolic and compatible with metastatic disease   · XRT to Right Pelvic Mass 10/13/20 - 10/22/20 with Dr. Brit Lindsey  · Pembrolizumab 12/15/20 - Current   · PET 1/12/21: New hypermetabolic pulmonary nodule in the lingula. Resolved left pelvic sidewall lymph node activity and resolved activity corresponding to the lytic lesion in the right anterior acetabulum  · PET 4/22/21:Progression of disease with new soft tissue nodules in the deep pelvis, foci of increased tracer activity in the right pelvic sidewall and right external iliac chain, increase in the size and number of pulmonary metastases, and hypermetabolic bone lesions right hemipelvis  · Carbo/Taxol/Pembro 6/18/21 - Current     History of Present Illness:   Sravanthi Jimenez is a 78 y.o. male seen today in office for follow up of metastatic SCC primary site unclear PDL1 80% not on any therapy at present. Doing well overall. Feels well overall. Last chemo 10/21. Went to [facility] and had biopsy and SCC again.     Talked with Pa and here to set up Gemzar. Needs to have cataract surgery. Wants to do more CTs. CTs 1/22 with slight progression in lungs. Here with wife today. No fevers/ chills/ chest pain/ SOB/ nausea/ vomiting/diarrhea/ pain/fatigue      Past Medical History:   Diagnosis Date    Arthritis     BPH (benign prostatic hypertrophy) 7/18/2011    Cancer (HCC)     prostate cancer    Chronic pain right    knee    ED (erectile dysfunction)     GERD (gastroesophageal reflux disease)     HTN (hypertension), benign     Metastatic squamous cell carcinoma ([facility] 9/16/2020    OA (osteoarthritis) of knee 7/18/2011    BOTH KNEES    Unspecified essential hypertension 7/18/2011      Past Surgical History:   Procedure Laterality Date    HX ACL RECONSTRUCTION Right     x2    HX APPENDECTOMY  1975    HX GI  1/2015    Drainage of hemorrhoid    HX HERNIA REPAIR      left and right,  ventral    HX HIP REPLACEMENT  2000    left    HX HIP REPLACEMENT  2000    left    HX KNEE ARTHROSCOPY Right     x3    HX MALIGNANT SKIN LESION EXCISION      melanoma - face    HX MOHS PROCEDURES Right     Basal cell behind ear.  HX ORTHOPAEDIC Left 6/2000    HIP REPLACEMENT    HX ORTHOPAEDIC Right 08/05/2014    [facility] - Dr. Mitra Spencer HX PROSTATECTOMY  11/07/2011    prostate removed     HX TONSILLECTOMY      CHILD    IR INJ FORAMIN EPID LUMB ANES/STER SNGL  8/18/2020      Social History     Tobacco Use    Smoking status: Never Smoker    Smokeless tobacco: Never Used   Substance Use Topics    Alcohol use: No      Family History   Problem Relation Age of Onset    Bleeding Prob Father         clotting disorder    Cancer Brother         colon CA 1/2 brother    Thyroid Disease Daughter     Heart Failure Mother     Heart Disease Mother     Anesth Problems Neg Hx      Current Outpatient Medications   Medication Sig    apixaban (Eliquis) 5 mg tablet Take 1 Tablet by mouth two (2) times a day.     traZODone (DESYREL) 50 mg tablet TAKE 1 TO 2 TABLETS BY MOUTH ONCE DAILY IN THE EVENING AS NEEDED FOR SLEEP    gabapentin (NEURONTIN) 300 mg capsule TAKE 1 CAPSULE BY MOUTH TWICE DAILY . DO NOT EXCEED 600MG PER 24 HOURS    terazosin (HYTRIN) 10 mg capsule Take 1 capsule by mouth once daily    amLODIPine (NORVASC) 5 mg tablet Take 1 tablet by mouth nightly    candesartan (ATACAND) 4 mg tablet Take 1 tablet by mouth nightly    dexAMETHasone (DECADRON) 4 mg tablet Take 2 tabs (8mg) by mouth daily on days 2 and 3 after chemotherapy    ondansetron (ZOFRAN ODT) 4 mg disintegrating tablet Take 1-2 Tabs by mouth every eight (8) hours as needed for Nausea or Vomiting.  calcium carbonate/vitamin D3 (CALTRATE-600 PLUS VITAMIN D3 PO) Take 1 Tab by mouth nightly.  leuprolide acetate (LUPRON DEPOT, 6 MONTH, IM) 1 Each by IntraMUSCular route every 6 months. No current facility-administered medications for this visit. Allergies   Allergen Reactions    Barbiturates Anxiety     Hyper        Review of Systems:  A complete review of systems was obtained, negative except as described above and as reported on ROS sheet scanned into system. Physical Exam:     Visit Vitals  /80 (BP 1 Location: Left upper arm, BP Patient Position: Sitting)   Pulse 75   Temp 97.6 °F (36.4 °C) (Temporal)   Ht 6' (1.829 m)   Wt 230 lb 6.4 oz (104.5 kg)   SpO2 96%   BMI 31.25 kg/m²     ECOG PS: 1  General: No distress  Eyes: Anicteric sclerae  HENT: Atraumatic, wearing a mask  Neck: Supple  Resp: CTAB, normal respiratory effort   CV: Regular rate and rhythm  GI: Soft, non tender   Ext: Trace right leg edema, no edema in left leg  MS: Normal gait and station. Digits without clubbing or cyanosis. Skin: No ecchymoses, or petechiae. Normal temperature, turgor, and texture.   Psych: Alert, oriented, appropriate affect, normal judgment/insight  Neuro: Non focal    Results:     Lab Results   Component Value Date/Time    WBC 4.9 10/14/2021 03:43 PM    HGB 10.2 (L) 10/14/2021 03:43 PM    HCT 30.8 (L) 10/14/2021 03:43 PM    PLATELET 713 53/31/5021 03:43 PM    MCV 99.4 (H) 10/14/2021 03:43 PM    ABS. NEUTROPHILS 3.2 10/14/2021 03:43 PM     Lab Results   Component Value Date/Time    Sodium 138 10/14/2021 03:43 PM    Potassium 4.2 10/14/2021 03:43 PM    Chloride 108 10/14/2021 03:43 PM    CO2 24 10/14/2021 03:43 PM    Glucose 100 10/14/2021 03:43 PM    BUN 35 (H) 10/14/2021 03:43 PM    Creatinine 1.81 (H) 10/14/2021 03:43 PM    GFR est AA 45 (L) 10/14/2021 03:43 PM    GFR est non-AA 37 (L) 10/14/2021 03:43 PM    Calcium 8.5 10/14/2021 03:43 PM    Glucose (POC) 110 11/22/2021 02:14 PM    Creatinine (POC) 1.70 (H) 01/25/2022 11:44 AM     Lab Results   Component Value Date/Time    Bilirubin, total 0.5 10/14/2021 03:43 PM    ALT (SGPT) 17 10/14/2021 03:43 PM    Alk. phosphatase 62 10/14/2021 03:43 PM    Protein, total 6.3 (L) 10/14/2021 03:43 PM    Albumin 3.0 (L) 10/14/2021 03:43 PM    Globulin 3.3 10/14/2021 03:43 PM     MRI Right Hip 8/5/20  IMPRESSION:   1. Enlarging right pelvic sidewall adenopathy with adjacent muscular and bony  invasion  2. Degenerative changes of the right hip  3. Small bony metastatic deposit anterior wall of the right acetabulum    MRI Lumbar Spine 8/11/20  IMPRESSION:  1. L3-L4 moderate central spinal canal and right foraminal stenoses. 2. L5-S1 moderate bilateral foraminal stenosis. 8/20/20  Specimen Source   1: Pelvic Mass, Core biopsy with touch preparation   CYTOLOGIC INTERPRETATION:   Pelvic Mass, Core biopsy with touch preparation:   Squamous cell carcinoma    PET 9/8/20  IMPRESSION: Large hypermetabolic right pelvic sidewall lymph node. Lytic lesion  right anterior     PET 1/12/21  IMPRESSION:  New hypermetabolic pulmonary nodule in the lingula.  Resolved left  pelvic sidewall lymph node activity and resolved activity corresponding to the  lytic lesion in the right anterior acetabulum    PET 4/22/21  IMPRESSION:  Progression of disease with new soft tissue nodules in the deep  pelvis, foci of increased tracer activity in the right pelvic sidewall and right  external iliac chain, increase in the size and number of pulmonary metastases,  and hypermetabolic bone lesions right hemipelvis. Records reviewed and summarized above. Pathology report(s) reviewed above. Radiology report(s) reviewed above. Assessment/PLAN:     1) Metastatic Squamous Cell Cancer    EGFR/ROS/ALK negative. PDL1 80% by biopsy of pelvic/hip mas 8/20/20. Path is different than prostate which is adenocarcinoma from 2011. Unclear site of origin of squamous cell. TYPE ID sent and SCC H+N vs skin. Completed XRT to right pelvic mass on 10/22/20. Right hip pain has esolved since completing XRT. He started Pembrolizumab on 12/15/20. He had follow up PET on 1/12/21 that showed new hypermetabolic pulmonary nodule in the lingula, resolved left pelvic sidewall lymph node activity and resolved activity corresponding to the lytic lesion in the right anterior acetabulum. Case reviewed at cancer conference and recommendation was to continue [facility] for now. PET 4/21 showed slight progressive disease in lungs/LAD/bones. Patient was seen at [facility] by Med/Onc and Uro/Onc. D/w [facility] and plan is still same as recommended here. Site of origin of cancer is still unclear but not likely prostate. started Carbo/Taxol/Pembro on 6/18/21. Has been off therapy since 10/21. here today for follow up    Clinically doing well overall. Feels well. [facility] Darling CTs with slight progression in lungs off therapy since 10/21. Pt went to [facility] and had biopsy and still SCC. No molecular targets. D/w Dr Regina De La Fuente at [facility]. Plan is for [facility]. We discussed the risks and benefits of Gemcitabine chemotherapy.  Potential side effects include, but are not limited to, nausea, vomiting, diarrhea, constipation, mucositis, taste changes, myelosuppression, infection, fatigue, alopecia, pulmonary toxicity, neuropathy, allergic reactions, infertility, and rarely, death. The patient has consented to beginning chemotherapy. Will do fu CTs. Ordered. Labs at chemo. Pt wants to do cataract surgery first prior to chemo. If CTs stable, then will do cataract surgery first.   If CTs worse, then will go to chemo sooner. Pt / wife agreeable to plan. 2) Recurrent Prostate Cancer   Management per Urology. 3) Hx of Right Hip Pain  Resolved after XRT. 5) HTN/Arthritis/GERD/Cronic Elevated Creatinine   Management per PCP. 6) Chronic Thrombocytopenia. Resolved. Mild and will continue to monitor. 7) Extensive Right Leg DVT  On Eliquis and tolerating well - no bleeding. Recommend indefinite anticoagulation. 8) Psychosocial  Mood good, coping well. He has great support from his wife. SW support as needed. His wife is here today. Call if questions. Follow up in 4 weeks. I appreciate the opportunity to participate in Mr. Naya Lopez care.     Signed By: Brenda Arnold DO

## 2022-04-21 NOTE — PROGRESS NOTES
Michelle Mahmood is a 78 y.o. male  Chief Complaint   Patient presents with    Follow-up      metastatic SCC      1. Have you been to the ER, urgent care clinic since your last visit? Hospitalized since your last visit? No.    2. Have you seen or consulted any other health care providers outside of the [address] [place] [address] since your last visit? Include any pap smears or colon screening.  Yes, patient went to see Sherryle Pluck ([place])

## 2022-04-26 PROBLEM — C80.1 DILATED CARDIOMYOPATHY SECONDARY TO MALIGNANCY (HCC): Status: ACTIVE | Noted: 2022-01-01

## 2022-04-26 PROBLEM — I82.4Z1 ACUTE DEEP VEIN THROMBOSIS (DVT) OF DISTAL VEIN OF RIGHT LOWER EXTREMITY (HCC): Status: RESOLVED | Noted: 2021-01-01 | Resolved: 2022-01-01

## 2022-04-26 PROBLEM — I42.0 DILATED CARDIOMYOPATHY SECONDARY TO MALIGNANCY (HCC): Status: ACTIVE | Noted: 2022-01-01

## 2022-04-26 NOTE — PROGRESS NOTES
Preoperative Evaluation    Date of Exam: 2022    Barbara Perez is a 78 y.o. male (:1945) who presents for preoperative evaluation.    Procedure/Surgery:Bilateral Cataracts  Date of Procedure/Surgery: 2022  Surgeon: Param Murcia MD  Hospital/Surgical Facility: [address] [place] [address]  Primary Physician: Zhang Mancuso MD  Latex Allergy: no    Problem List:     Patient Active Problem List    Diagnosis Date Noted    Dilated cardiomyopathy secondary to malignancy [facility]) 2022    Chemotherapy follow-up examination 10/14/2021    History of DVT (deep vein thrombosis) 10/14/2021    Bilateral pulmonary metastases ([address] 10/14/2021    Bone metastases ([address] 10/14/2021    History of knee surgery 10/14/2021    Elevated serum creatinine 2021    Low thyroid stimulating hormone (TSH) level 2021    Thrombocytopenia ([address] 2021    Drug-induced skin rash 2021    History of prostate cancer 12/15/2020    Encounter for antineoplastic immunotherapy 12/15/2020    Arthritis 12/15/2020    Gastroesophageal reflux disease without esophagitis 12/15/2020    Metastatic squamous cell carcinoma ([address] 2020    Localized osteoarthritis of left knee 2020    Prostate cancer metastatic to intrapelvic lymph node ([address] 2019    Recurrent left inguinal hernia 2018    Advance directive on file 2016    Primary localized osteoarthrosis, lower leg 2014    Prostate cancer ([address] 02/15/2013    Essential hypertension 2011    Benign prostatic hyperplasia 2011    OA (osteoarthritis) of knee 2011     Medical History:     Past Medical History:   Diagnosis Date    Arthritis     BPH (benign prostatic hypertrophy) 2011    Cancer (HCC)     prostate cancer    Chronic pain right    knee    ED (erectile dysfunction)     GERD (gastroesophageal reflux disease)     HTN (hypertension), benign     Metastatic squamous cell carcinoma ([facility] [address] 9/16/2020    OA (osteoarthritis) of knee 7/18/2011    BOTH KNEES    Unspecified essential hypertension 7/18/2011     Allergies: Allergies   Allergen Reactions    Barbiturates Anxiety     Hyper        Medications:     Current Outpatient Medications   Medication Sig    terazosin (HYTRIN) 10 mg capsule Take 1 capsule by mouth once daily    amLODIPine (NORVASC) 5 mg tablet Take 1 tablet by mouth nightly    candesartan (ATACAND) 4 mg tablet Take 1 tablet by mouth nightly    Eliquis 5 mg tablet Take 1 tablet by mouth twice daily    traZODone (DESYREL) 50 mg tablet TAKE 1 TO 2 TABLETS BY MOUTH ONCE DAILY IN THE EVENING AS NEEDED FOR SLEEP    gabapentin (NEURONTIN) 300 mg capsule TAKE 1 CAPSULE BY MOUTH TWICE DAILY . DO NOT EXCEED 600MG PER 24 HOURS (Patient taking differently: Take 300 mg by mouth nightly.)    calcium carbonate/vitamin D3 (CALTRATE-600 PLUS VITAMIN D3 PO) Take 1 Tab by mouth nightly. No current facility-administered medications for this visit. Surgical History:     Past Surgical History:   Procedure Laterality Date    HX ACL RECONSTRUCTION Right     x2    HX APPENDECTOMY  1975    HX GI  1/2015    Drainage of hemorrhoid    HX HERNIA REPAIR      left and right,  ventral    HX HIP REPLACEMENT  2000    left    HX HIP REPLACEMENT  2000    left    HX KNEE ARTHROSCOPY Right     x3    HX MALIGNANT SKIN LESION EXCISION      melanoma - face    HX MOHS PROCEDURES Right     Basal cell behind ear.      HX ORTHOPAEDIC Left 6/2000    HIP REPLACEMENT    HX ORTHOPAEDIC Right 08/05/2014    [facility] - Dr. Roland Rushing HX OTHER SURGICAL  12/2021    Cystoscopy    HX PROSTATECTOMY  11/07/2011    prostate removed     HX TONSILLECTOMY      CHILD    IR BX MEDIASTINUM NEEDLE PERC W IMAGING Left 03/2022    IR INJ FORAMIN EPID LUMB ANES/STER SNGL  8/18/2020     Social History:     Social History     Socioeconomic History    Marital status:    Tobacco Use    Smoking status: Never Smoker    Smokeless tobacco: Never Used   Vaping Use    Vaping Use: Never used   Substance and Sexual Activity    Alcohol use: No    Drug use: No    Sexual activity: Yes     Partners: Female     Birth control/protection: None       Recent use of: DOAC use daily    Tetanus up to date: last tetanus booster within 10 years      Anesthesia Complications: None  History of abnormal bleeding : None  History of Blood Transfusions: no  Health Care Directive or Living Will: yes    REVIEW OF SYSTEMS:  A comprehensive review of systems was negative except for: occasional dizziness, some fatigue. No chest pain or SOB. EXAM:   Visit Vitals  /71   Pulse 67   Temp 97.1 °F (36.2 °C) (Temporal)   Resp 18   Ht 6' (1.829 m)   Wt 230 lb (104.3 kg)   SpO2 97%   BMI 31.19 kg/m²     General appearance - alert, well appearing, and in no distress  Ears - bilateral TM's and external ear canals normal, ceruminosis noted  Mouth - mucous membranes moist, pharynx normal without lesions  Neck - supple, no significant adenopathy  Lymphatics - no palpable lymphadenopathy, no hepatosplenomegaly  Chest - clear to auscultation, no wheezes, rales or rhonchi, symmetric air entry  Heart - normal rate, regular rhythm, normal S1, S2, no murmurs, rubs, clicks or gallops  Abdomen - soft, nontender, nondistended, no masses or organomegaly  Neurological - alert, oriented, normal speech, no focal findings or movement disorder noted  Musculoskeletal - no joint tenderness, deformity or swelling  Extremities - peripheral pulses normal, no pedal edema, no clubbing or cyanosis      IMPRESSION:   None  No contraindications to planned surgery  Diagnoses and all orders for this visit:    1. Senile cataract of left eye, unspecified age-related cataract type - OK for surgery    2. Dilated cardiomyopathy secondary to malignancy (HCC) - stable    3. Thrombocytopenia (HCC) - stable    4. Metastatic squamous cell carcinoma ([facility] - Per oncology    5.  Benign prostatic hyperplasia with nocturia - Post surgery and stable. Advised him to call back or return to office if symptoms worsen/change/persist.  Discussed expected course/resolution/complications of diagnosis in detail with patient. Medication risks/benefits/costs/interactions/alternatives discussed with patient. He was given an after visit summary which includes diagnoses, current medications, & vitals. He expressed understanding with the diagnosis and plan.        Sachin Langford MD   4/26/2022

## 2022-05-02 NOTE — PROGRESS NOTES
LISSETTE Verified. Called pt on mobile phone number listed. Patient was made aware of most recent CT chest report showing slight progressing as expected being off therapy. Patient stated he is ready to start chemo and wants to have his first treatment 1 week from tomorrow.  (May 10th 2022!)  Jacob Samano

## 2022-05-10 PROBLEM — Z51.12 ENCOUNTER FOR ANTINEOPLASTIC IMMUNOTHERAPY: Status: RESOLVED | Noted: 2020-12-15 | Resolved: 2022-01-01

## 2022-05-10 PROBLEM — Z51.11 ENCOUNTER FOR ANTINEOPLASTIC CHEMOTHERAPY: Status: ACTIVE | Noted: 2022-01-01

## 2022-05-10 NOTE — PROGRESS NOTES
Pilar Alvarez is a 78 y.o. male  Chief Complaint   Patient presents with    Chemotherapy     metastatic SCC    1. Have you been to the ER, urgent care clinic since your last visit? Hospitalized since your last visit? No.  2. Have you seen or consulted any other health care providers outside of the [address] [place] [address] since your last visit? Include any pap smears or colon screening. Yes, patient had Cataract Surgery on both eyes.

## 2022-05-10 NOTE — PROGRESS NOTES
[facility] at [address], [address], [place]: [phone]  F: [phone]    Reason for Visit:   Gus Nayak is a 78 y.o. male seen today in office for follow up of Metastatic SCC     Treatment History:   · MRI Right Hip 8/5/20: Enlarging right pelvic sidewall adenopathy with adjacent muscular and bony invasion. Degenerative changes of the right hip. Small bony metastatic deposit anterior wall of the right acetabulum  · MRI Lumbar Spine 8/11/20: L3-L4 moderate central spinal canal and right foraminal stenoses. L5-S1 moderate bilateral foraminal stenosis  · 8/20/20 Pelvic Mass, Core biopsy with touch preparation - CYTOLOGIC INTERPRETATION: Pelvic Mass, Core biopsy with touch preparation: Squamous cell carcinoma  · TYPE ID + SCC/H+N vs skin  · PET 9/8/20: Large hypermetabolic right pelvic sidewall lymph node. Lytic lesion right anterior acetabulum is hypermetabolic and compatible with metastatic disease   · XRT to Right Pelvic Mass 10/13/20 - 10/22/20 with Dr. Vilma Monet  · Pembrolizumab 12/15/20 - 4/1/22  · PET 1/12/21: New hypermetabolic pulmonary nodule in the lingula. Resolved left pelvic sidewall lymph node activity and resolved activity corresponding to the lytic lesion in the right anterior acetabulum  · PET 4/22/21:Progression of disease with new soft tissue nodules in the deep pelvis, foci of increased tracer activity in the right pelvic sidewall and right external iliac chain, increase in the size and number of pulmonary metastases, and hypermetabolic bone lesions right hemipelvis  · Carbo/Taxol/Pembro 6/18/21 - 10/15/21  · PET 9/14/21: Overall improvement in the hypermetabolic activity corresponding to bilateral pulmonary nodules, the pelvic and right pelvic sidewall soft tissue nodules, and osseous metastatic foci.  Pulmonary nodules have decreased in size compared to the prior exam  · PET 11/22/21: Apparent progression of metastatic disease in the pelvis as detailed above. No significant change in bilateral pulmonary nodules, including a dominant 1.3 cm hypermetabolic left upper lobe pulmonary nodule  · CT C/A/P 22: Prior diagnostic chest CTs unavailable. There are multiple pulmonary nodules some which appear to be new in the interval with increased size. Increased size of pelvic mass when compared to the prior CTs and PET CTs. Right iliacus mass is similar to the prior PET/CT although increase in size from the prior CT dated 2019  · CT Guided Biopsy [facility]14[facility]  · [facility] showed no specific site of origin nor a targetable approach   · CT C/A/P 22: Multiple pulmonary nodules many of which have increased in size. There are several new nodules also present. Pelvic mass increased in size. Right iliacus mass with adjacent bony involvement is not significantly changed. However the right posterior acetabular lytic lesion is increased in size  · Gemzar 5/10/22 -     History of Present Illness:   Gina Acosta is a 78 y.o. male seen today in office for follow up of metastatic SCC primary site unclear PDL1 80%. Last chemo was on 10/15/21. He went back to [facility] and had another biopsy that was SCC again. [facility] recommended single agent Gemzar. He had follow up CTs on 22 that showed progression of disease in lungs/pelvic mass of therapy. He is here today for Day 1 Cycle 1 of palliative Gemzar chemotherapy. He reports that he feels well overall today. His appetite is lower than usual and energy levels are a little low. He has had some abdominal pain and alternating periods of diarrhea and constipation. He denies fever, chills, mouth sores, cough, SOB, CP, nausea, and vomiting. CBC and CMP are still pending today. He is ready to start new regimen today. He is here alone today.      Past Medical History:   Diagnosis Date    Arthritis     BPH (benign prostatic hypertrophy) 2011    Cancer [facility])     prostate cancer  Chronic pain right    knee    ED (erectile dysfunction)     GERD (gastroesophageal reflux disease)     HTN (hypertension), benign     Metastatic squamous cell carcinoma (HCC) 9/16/2020    OA (osteoarthritis) of knee 7/18/2011    BOTH KNEES    Unspecified essential hypertension 7/18/2011      Past Surgical History:   Procedure Laterality Date    HX ACL RECONSTRUCTION Right     x2    HX APPENDECTOMY  1975    HX GI  1/2015    Drainage of hemorrhoid    HX HERNIA REPAIR      left and right,  ventral    HX HIP REPLACEMENT  2000    left    HX HIP REPLACEMENT  2000    left    HX KNEE ARTHROSCOPY Right     x3    HX MALIGNANT SKIN LESION EXCISION      melanoma - face    HX MOHS PROCEDURES Right     Basal cell behind ear.      HX ORTHOPAEDIC Left 6/2000    HIP REPLACEMENT    HX ORTHOPAEDIC Right 08/05/2014    [facility] - Dr. Marce Negron HX OTHER SURGICAL  12/2021    Cystoscopy    HX PROSTATECTOMY  11/07/2011    prostate removed     HX TONSILLECTOMY      CHILD    IR BX MEDIASTINUM NEEDLE PERC W IMAGING Left 03/2022    IR INJ FORAMIN EPID LUMB ANES/STER SNGL  8/18/2020      Social History     Tobacco Use    Smoking status: Never Smoker    Smokeless tobacco: Never Used   Substance Use Topics    Alcohol use: No      Family History   Problem Relation Age of Onset    Bleeding Prob Father         clotting disorder    Cancer Brother         colon CA 1/2 brother    Thyroid Disease Daughter     Heart Failure Mother     Heart Disease Mother     Anesth Problems Neg Hx      Current Outpatient Medications   Medication Sig    traZODone (DESYREL) 50 mg tablet TAKE 1 TO 2 TABLETS BY MOUTH ONCE DAILY IN THE EVENING AS NEEDED FOR SLEEP    terazosin (HYTRIN) 10 mg capsule Take 1 capsule by mouth once daily    amLODIPine (NORVASC) 5 mg tablet Take 1 tablet by mouth nightly    candesartan (ATACAND) 4 mg tablet Take 1 tablet by mouth nightly    Eliquis 5 mg tablet Take 1 tablet by mouth twice daily    gabapentin (NEURONTIN) 300 mg capsule TAKE 1 CAPSULE BY MOUTH TWICE DAILY . DO NOT EXCEED 600MG PER 24 HOURS (Patient taking differently: Take 300 mg by mouth nightly.)    calcium carbonate/vitamin D3 (CALTRATE-600 PLUS VITAMIN D3 PO) Take 1 Tab by mouth nightly. No current facility-administered medications for this visit. Allergies   Allergen Reactions    Barbiturates Anxiety     Hyper        Review of Systems:  A complete review of systems was obtained, negative except as described above and as reported on ROS sheet scanned into system. Physical Exam:     Visit Vitals  BP (!) 150/86 (BP 1 Location: Left upper arm, BP Patient Position: Sitting)   Pulse 66   Temp 97.3 °F (36.3 °C) (Temporal)   Ht 6' (1.829 m)   Wt 229 lb 4.8 oz (104 kg)   SpO2 97%   BMI 31.10 kg/m²     ECOG PS: 1  General: No distress  Eyes: Anicteric sclerae  HENT: Atraumatic, wearing a mask  Neck: Supple  Resp: CTAB, normal respiratory effort   CV: Regular rate and rhythm  GI: Soft, non tender   Ext: Trace right leg edema, no edema in left leg  MS: Normal gait and station. Digits without clubbing or cyanosis. Skin: No ecchymoses, or petechiae. Normal temperature, turgor, and texture. Psych: Alert, oriented, appropriate affect, normal judgment/insight  Neuro: Non focal    Results:     Lab Results   Component Value Date/Time    WBC 4.9 10/14/2021 03:43 PM    HGB 10.2 (L) 10/14/2021 03:43 PM    HCT 30.8 (L) 10/14/2021 03:43 PM    PLATELET 751 55/44/4579 03:43 PM    MCV 99.4 (H) 10/14/2021 03:43 PM    ABS.  NEUTROPHILS 3.2 10/14/2021 03:43 PM     Lab Results   Component Value Date/Time    Sodium 138 10/14/2021 03:43 PM    Potassium 4.2 10/14/2021 03:43 PM    Chloride 108 10/14/2021 03:43 PM    CO2 24 10/14/2021 03:43 PM    Glucose 100 10/14/2021 03:43 PM    BUN 35 (H) 10/14/2021 03:43 PM    Creatinine 1.81 (H) 10/14/2021 03:43 PM    GFR est AA 45 (L) 10/14/2021 03:43 PM    GFR est non-AA 37 (L) 10/14/2021 03:43 PM    Calcium 8.5 10/14/2021 03:43 PM    Glucose (POC) 110 11/22/2021 02:14 PM    Creatinine (POC) 1.70 (H) 01/25/2022 11:44 AM     Lab Results   Component Value Date/Time    Bilirubin, total 0.5 10/14/2021 03:43 PM    ALT (SGPT) 17 10/14/2021 03:43 PM    Alk. phosphatase 62 10/14/2021 03:43 PM    Protein, total 6.3 (L) 10/14/2021 03:43 PM    Albumin 3.0 (L) 10/14/2021 03:43 PM    Globulin 3.3 10/14/2021 03:43 PM     MRI Right Hip 8/5/20  IMPRESSION:   1. Enlarging right pelvic sidewall adenopathy with adjacent muscular and bony  invasion  2. Degenerative changes of the right hip  3. Small bony metastatic deposit anterior wall of the right acetabulum    MRI Lumbar Spine 8/11/20  IMPRESSION:  1. L3-L4 moderate central spinal canal and right foraminal stenoses. 2. L5-S1 moderate bilateral foraminal stenosis. 8/20/20  Specimen Source   1: Pelvic Mass, Core biopsy with touch preparation   CYTOLOGIC INTERPRETATION:   Pelvic Mass, Core biopsy with touch preparation:   Squamous cell carcinoma    PET 9/8/20  IMPRESSION: Large hypermetabolic right pelvic sidewall lymph node. Lytic lesion  right anterior     PET 1/12/21  IMPRESSION:  New hypermetabolic pulmonary nodule in the lingula. Resolved left  pelvic sidewall lymph node activity and resolved activity corresponding to the  lytic lesion in the right anterior acetabulum    PET 4/22/21  IMPRESSION:  Progression of disease with new soft tissue nodules in the deep  pelvis, foci of increased tracer activity in the right pelvic sidewall and right  external iliac chain, increase in the size and number of pulmonary metastases,  and hypermetabolic bone lesions right hemipelvis. PET 9/14/21  IMPRESSION:  Overall improvement in the hypermetabolic activity corresponding to  bilateral pulmonary nodules, the pelvic and right pelvic sidewall soft tissue  nodules, and osseous metastatic foci. Pulmonary nodules have decreased in size  compared to the prior exam.    PET 11/22/21  IMPRESSION:  1.  Apparent progression of metastatic disease in the pelvis as detailed above. 2. No significant change in bilateral pulmonary nodules, including a dominant  1.3 cm hypermetabolic left upper lobe pulmonary nodule.     CT C/A/P 1/25/22  IMPRESSION:  1. Prior diagnostic chest CTs unavailable. There are multiple pulmonary nodules  some which appear to be new in the interval with increased size of  representative examples as above. 2.  Increased size of pelvic mass when compared to the prior CTs and PET CTs.  3.  Right iliacus mass is similar to the prior PET/CT although increase in size  from the prior CT dated 2/4/2019    3/14/22  CT Guided Biopsy Lung Nodule 3/14/22 at [facility] showed no specific site of origin nor a targetable approach    CT C/A/P 4/28/22  IMPRESSION:  1.  Multiple pulmonary nodules many of which have increased in size. There are  several new nodules also present. 2.  Pelvic mass increased in size. 3.  Right iliacus mass with adjacent bony involvement is not significantly  changed. However the right posterior acetabular lytic lesion is increased in  Size. Records reviewed and summarized above. Pathology report(s) reviewed above. Radiology report(s) reviewed above. Assessment/PLAN:     1) Metastatic Squamous Cell Cancer - Unclear Primary  EGFR/ROS/ALK negative. PDL1 80% by biopsy of pelvic/hip mass 8/20/20. Path is different than prostate which is adenocarcinoma from 2011. Unclear site of origin of squamous cell. TYPE ID sent and SCC H+N vs skin. Completed XRT to right pelvic mass on 10/22/20. Right hip pain has esolved since completing XRT. He started single agent Pembrolizumab on 12/15/20. He had follow up PET on 1/12/21 that showed new hypermetabolic pulmonary nodule in the lingula, resolved left pelvic sidewall lymph node activity and resolved activity corresponding to the lytic lesion in the right anterior acetabulum.   Case reviewed at cancer conference and recommendation was to continue [facility] for now. PET 4/21 showed slight progressive disease in lungs/LAD/bones. Patient was seen at [facility] by Med/Onc and Uro/Onc. D/w [facility] and plan is still same as recommended here. Site of origin of cancer is still unclear but not likely prostate. Last single agent Pembro was on 4/1/21. He started Carbo/Taxol/Pembro on 6/18/21 and completed 6 cycles on 10/15/21. He has been off therapy since 10/21. CTs 1/25/22 showed progression in lungs/pelvic mass off therapy. Patient went to [facility] and had biopsy and still SCC. No molecular targets. D/w Dr. Venkatesh De Los Santos at [facility]. Recommendation is for single agent Gemzar. CTs 4/28/22 showed progression in lungs/right pelvic mass but has been off therapy since 10/21. Personally reviewed CTs. Discussed CTs in detail with patient. Teaching and consent for Gemzar with PharmD. He is here today for Day 1 Cycle 1 of palliative Gemzar. Patient is clinically stable and doing well overall. Labs (CBC and CMP) reviewed today. Patient is ready to start new regimen today. Follow up in 1 week in [facility]. Follow up in 3 weeks in [facility]/office. Patient agrees with plan. We discussed the risks and benefits of Gemcitabine chemotherapy. Potential side effects include, but are not limited to, nausea, vomiting, diarrhea, constipation, mucositis, taste changes, myelosuppression, infection, fatigue, alopecia, pulmonary toxicity, neuropathy, allergic reactions, infertility, and rarely, death. The patient has consented to beginning chemotherapy. 2) Recurrent Prostate Cancer   Management per Urology. 3) Hx of Right Hip Pain  Resolved after XRT. 5) HTN/Arthritis/GERD/Cronic Elevated Creatinine   Management per PCP. 6) Hx of Mild Chronic Thrombocytopenia - Resolved   Mild and will continue to monitor. 7) Extensive Right Leg DVT  On Eliquis and tolerating well - no bleeding. Recommend indefinite anticoagulation.     8) Management of High Risk Medications - Chemotherapy  No toxicities as patient is starting new regimen today. Labs (CBC and CMP) reviewed today. No dose adjustments needed today. Will monitor for side effects. 9) Psychosocial  Mood good, coping well. He has great support from his wife. SW support as needed. His wife is here today. Call if questions. Follow up in 1 week in [facility]. Follow up in 3 weeks in [facility]/office. I appreciate the opportunity to participate in Mr. Yaa Caceres [facility].     Signed By: Barak Conti NP

## 2022-05-10 NOTE — PROGRESS NOTES
[facility] Chemo Progress Note    1300 Mr. Mariah Benitez Arrived to [facility] for Gemzar (C1D1) ambulatory in stable condition. Assessment and PIV access completed by Glenroy Shetty RN. Labs drawn and sent for processing. Went to provider appointment with Medical Oncology    1520 New PIV established in [facility] per patient preference. PIV with positive blood return. Chemotherapy Flowsheet 5/10/2022   Cycle C1D1   Date 5/10/2022   Drug / Regimen Gemzar   Pre Meds given   Notes given           Mr. Soler's vitals were reviewed. Patient Vitals for the past 12 hrs:   Temp Pulse Resp BP   05/10/22 1600 -- 66 -- (!) 150/72   05/10/22 1302 97.3 °F (36.3 °C) 60 18 (!) 150/86         Lab results were obtained and reviewed. Recent Results (from the past 12 hour(s))   CBC WITH AUTOMATED DIFF    Collection Time: 05/10/22  1:16 PM   Result Value Ref Range    WBC 6.0 4.1 - 11.1 K/uL    RBC 3.74 (L) 4.10 - 5.70 M/uL    HGB 11.8 (L) 12.1 - 17.0 g/dL    HCT 35.9 (L) 36.6 - 50.3 %    MCV 96.0 80.0 - 99.0 FL    MCH 31.6 26.0 - 34.0 PG    MCHC 32.9 30.0 - 36.5 g/dL    RDW 12.4 11.5 - 14.5 %    PLATELET 436 680 - 475 K/uL    MPV 10.0 8.9 - 12.9 FL    NRBC 0.0 0  WBC    ABSOLUTE NRBC 0.00 0.00 - 0.01 K/uL    NEUTROPHILS 73 32 - 75 %    LYMPHOCYTES 13 12 - 49 %    MONOCYTES 10 5 - 13 %    EOSINOPHILS 3 0 - 7 %    BASOPHILS 1 0 - 1 %    IMMATURE GRANULOCYTES 0 0.0 - 0.5 %    ABS. NEUTROPHILS 4.4 1.8 - 8.0 K/UL    ABS. LYMPHOCYTES 0.8 0.8 - 3.5 K/UL    ABS. MONOCYTES 0.6 0.0 - 1.0 K/UL    ABS. EOSINOPHILS 0.2 0.0 - 0.4 K/UL    ABS. BASOPHILS 0.1 0.0 - 0.1 K/UL    ABS. IMM.  GRANS. 0.0 0.00 - 0.04 K/UL    DF AUTOMATED     METABOLIC PANEL, COMPREHENSIVE    Collection Time: 05/10/22  1:16 PM   Result Value Ref Range    Sodium 134 (L) 136 - 145 mmol/L    Potassium 4.0 3.5 - 5.1 mmol/L    Chloride 105 97 - 108 mmol/L    CO2 22 21 - 32 mmol/L    Anion gap 7 5 - 15 mmol/L    Glucose 97 65 - 100 mg/dL    BUN 34 (H) 6 - 20 MG/DL    Creatinine 1.75 (H) 0.70 - 1.30 MG/DL    BUN/Creatinine ratio 19 12 - 20      GFR est AA 46 (L) >60 ml/min/1.73m2    GFR est non-AA 38 (L) >60 ml/min/1.73m2    Calcium 9.5 8.5 - 10.1 MG/DL    Bilirubin, total 0.7 0.2 - 1.0 MG/DL    ALT (SGPT) 12 12 - 78 U/L    AST (SGOT) 13 (L) 15 - 37 U/L    Alk. phosphatase 71 45 - 117 U/L    Protein, total 6.7 6.4 - 8.2 g/dL    Albumin 3.1 (L) 3.5 - 5.0 g/dL    Globulin 3.6 2.0 - 4.0 g/dL    A-G Ratio 0.9 (L) 1.1 - 2.2     HEP B SURFACE AG    Collection Time: 05/10/22  1:16 PM   Result Value Ref Range    Hepatitis B surface Ag <0.10 Index    Hep B surface Ag Interp. Negative NEG     HEP B SURFACE AB    Collection Time: 05/10/22  1:16 PM   Result Value Ref Range    Hepatitis B surface Ab <3.10 mIU/mL    Hep B surface Ab Interp. NONREACTIVE NR         Pre-medications  were administered as ordered and chemotherapy was initiated. Medications Administered     0.9% sodium chloride infusion     Admin Date  05/10/2022 Action  New Bag Dose  25 mL/hr Rate  25 mL/hr Route  IntraVENous Administered By  Israel Klinefelter, RN          gemcitabine (GEMZAR) 2,300 mg in 0.9% sodium chloride 250 mL, overfill volume 25 mL chemo infusion     Admin Date  05/10/2022 Action  New Bag Dose  2,300 mg Rate  200 mL/hr Route  IntraVENous Administered By  Israel Klinefelter, RN           Admin Date  05/10/2022 Action  Restarted Dose   Rate  671 mL/hr Route  IntraVENous Administered By  Israel Klinefelter, RN          ondansetron [facility]) injection 8 mg     Admin Date  05/10/2022 Action  Given Dose  8 mg Route  IntraVENous Administered By  Israel Klinefelter, RN          sodium chloride (NS) flush 10 mL     Admin Date  05/10/2022 Action  Given Dose  10 mL Route  IntraVENous Administered By  Israel Klinefelter, RN              1600 Patient tolerated treatment well. PIV maintained positive blood return throughout treatment. Both IV's removed prior to discharge. Arm wrapped with 2x2 and coban. Patient was discharged from [facility] in stable condition.  Patient aware of next appointment.      Future Appointments   Date Time Provider Americo Segurai   5/16/2022  5:00 PM H3 [facility]   5/17/2022  9:00 AM E3 [facility] [address] H   5/30/2022  5:00 PM H3 [facility]   5/31/2022  2:00 PM F4 [facility] [address] [facility]   5/31/2022  2:15 PM Lashay Puga,  [address] BS AMB   6/6/2022  5:00 PM H3 [facility]   6/7/2022 10:00 AM B1 [facility] [address] [facility] [place] [facility]   6/20/2022  5:00 PM H3 [facility]   6/21/2022 10:00 AM A1 [facility] MED [place] [facility] H   6/27/2022  5:00 PM H3 [facility]   6/28/2022  9:00 AM E3 [facility] MED [place] [facility]   7/12/2022  9:00 AM E3 [facility] Spencer Diaz [address] [place]  May 10, 2022

## 2022-05-17 NOTE — PROGRESS NOTES
[facility] Chemo Progress Note    1 Mr. Jerrica Self Arrived to [address] [place] [address] for Gemzar (C1D8) ambulatory in stable condition. Assessment was completed, no acute issues at this time, no new complaints voiced. PIV established in [facility] with brisk blood return. Labs reviewed from 5/16/2022 - [facility] for treatment. Chemotherapy Flowsheet 5/17/2022   Cycle C1D8   Date 5/17/2022   Drug / Regimen Gemzar   Pre Meds given   Notes given     Mr. Soler's vitals were reviewed. Patient Vitals for the past 12 hrs:   Temp Pulse Resp BP   05/17/22 1046  (!) 58  (!) 142/75   05/17/22 0919 96.8 °F (36 °C) 92 18 121/78       Pre-medications  were administered as ordered and chemotherapy was initiated. Medications Administered     0.9% sodium chloride infusion     Admin Date  05/17/2022 Action  New Bag Dose  25 mL/hr Rate  25 mL/hr Route  IntraVENous Administered By  John Paul Cruz RN          gemcitabine (GEMZAR) 2,300 mg in 0.9% sodium chloride 250 mL, overfill volume 25 mL chemo infusion     Admin Date  05/17/2022 Action  New Bag Dose  2,300 mg Rate  671 mL/hr Route  IntraVENous Administered By  John Paul Cruz RN          ondansetron [facility]) injection 8 mg     Admin Date  05/17/2022 Action  Given Dose  8 mg Route  IntraVENous Administered By  John Paul Cruz RN          sodium chloride (NS) flush 10 mL     Admin Date  05/17/2022 Action  Given Dose  10 mL Route  IntraVENous Administered By  John Paul Cruz RN              2691 Patient tolerated treatment well. PIV maintained positive blood return throughout treatment. PIV removed without difficulty. Arm wrapped with 2x2 and coban. Patient was discharged from [address] [place] [address] in stable condition. Patient aware of next appointment.      Future Appointments   Date Time Provider Americo Redd   5/30/2022  9:30 AM [facility]   5/31/2022  2:00 PM F4 [facility] [address] [place] [address]   5/31/2022  2:15 PM Jacqueline Parra  [address] BS AMB   6/6/2022  5:00 PM [facility]   6/7/2022 10:00 AM B1 [facility] [address] [facility] H   6/20/2022  5:00 PM H3 [facility]   6/21/2022 10:00 AM A1 [facility] [place] [facility]   6/27/2022  5:00 PM H3 [facility]   6/28/2022  9:00 AM E3 [facility] [place] [facility]   7/12/2022  9:00 AM E3 [facility] Spencer Diaz [address] [place], AMANDA  May 17, 2022

## 2022-05-23 NOTE — TELEPHONE ENCOUNTER
Message from pt  Has \"horrible red rash all over my body, both front and back, neck to knees, and it itches like crazy\"  Can someone call and see what's going on?

## 2022-05-23 NOTE — TELEPHONE ENCOUNTER
Call to patient, ID verified X 2. Updated that steroid dose pack sent to [facility] in [place]. He plans to start med tonight, with or without Benadryl for itching and will notify RN if fails to improve in 2-3 days. Update to Provider.

## 2022-05-23 NOTE — TELEPHONE ENCOUNTER
Call to patient, ID verified X 2. [phone]. States rash started after C 1, D 1 but was mild. After C 1, D 8 developed a \"Red nasty looking rash, I'm itching like crazy. \"   Rash starts at neck and runs down back, also on chest. Describes as raised bumps. [place], does not think can send picture through [facility]. Is on chemo off week. Encouraged cortisone cream, oral benadryl + Pepcid, topical Benadryl cream that he can mix 1/2 and 1/2 with Cortisone. States he has also spoken to his [facility] provider, who believes he may need oral steroids. Would like update ASAP. Update to Provider.

## 2022-05-23 NOTE — TELEPHONE ENCOUNTER
Medrol Dosepack sent to [facility] in [place]. Please advise patient to let us know if no improvement in next 2-3 days.

## 2022-05-30 NOTE — PROGRESS NOTES
[facility] Lab Visit:    7441:  Pt arrived ambulatory and in no distress but complains about an upset stomach. Pre-treatment labs drawn peripherally and sent for processing. Departed [facility] ambulatory and in no distress. Visit Vitals  BP (!) 95/56 (BP 1 Location: Right arm, BP Patient Position: At rest)   Pulse 74   Temp 97.6 °F (36.4 °C)   Resp 18     Recent Results (from the past 24 hour(s))   CBC WITH AUTOMATED DIFF    Collection Time: 05/30/22  9:32 AM   Result Value Ref Range    WBC 6.0 4.1 - 11.1 K/uL    RBC 3.22 (L) 4.10 - 5.70 M/uL    HGB 10.0 (L) 12.1 - 17.0 g/dL    HCT 31.0 (L) 36.6 - 50.3 %    MCV 96.3 80.0 - 99.0 FL    MCH 31.1 26.0 - 34.0 PG    MCHC 32.3 30.0 - 36.5 g/dL    RDW 13.0 11.5 - 14.5 %    PLATELET 116 906 - 061 K/uL    MPV 10.4 8.9 - 12.9 FL    NRBC 0.0 0  WBC    ABSOLUTE NRBC 0.00 0.00 - 0.01 K/uL    NEUTROPHILS 73 32 - 75 %    LYMPHOCYTES 10 (L) 12 - 49 %    MONOCYTES 13 5 - 13 %    EOSINOPHILS 2 0 - 7 %    BASOPHILS 0 0 - 1 %    IMMATURE GRANULOCYTES 2 (H) 0.0 - 0.5 %    ABS. NEUTROPHILS 4.4 1.8 - 8.0 K/UL    ABS. LYMPHOCYTES 0.6 (L) 0.8 - 3.5 K/UL    ABS. MONOCYTES 0.8 0.0 - 1.0 K/UL    ABS. EOSINOPHILS 0.1 0.0 - 0.4 K/UL    ABS. BASOPHILS 0.0 0.0 - 0.1 K/UL    ABS. IMM. GRANS. 0.1 (H) 0.00 - 0.04 K/UL    DF SMEAR SCANNED      RBC COMMENTS ANISOCYTOSIS  1+       METABOLIC PANEL, COMPREHENSIVE    Collection Time: 05/30/22  9:32 AM   Result Value Ref Range    Sodium 140 136 - 145 mmol/L    Potassium 3.8 3.5 - 5.1 mmol/L    Chloride 106 97 - 108 mmol/L    CO2 28 21 - 32 mmol/L    Anion gap 6 5 - 15 mmol/L    Glucose 100 65 - 100 mg/dL    BUN 37 (H) 6 - 20 MG/DL    Creatinine 1.80 (H) 0.70 - 1.30 MG/DL    BUN/Creatinine ratio 21 (H) 12 - 20      GFR est AA 45 (L) >60 ml/min/1.73m2    GFR est non-AA 37 (L) >60 ml/min/1.73m2    Calcium 8.9 8.5 - 10.1 MG/DL    Bilirubin, total 0.6 0.2 - 1.0 MG/DL    ALT (SGPT) 46 12 - 78 U/L    AST (SGOT) 23 15 - 37 U/L    Alk.  phosphatase 72 45 - 117 U/L Protein, total 6.1 (L) 6.4 - 8.2 g/dL    Albumin 2.7 (L) 3.5 - 5.0 g/dL    Globulin 3.4 2.0 - 4.0 g/dL    A-G Ratio 0.8 (L) 1.1 - 2.2

## 2022-05-31 PROBLEM — N18.30 CHRONIC RENAL DISEASE, STAGE III (HCC): Status: ACTIVE | Noted: 2022-01-01

## 2022-05-31 NOTE — PROGRESS NOTES
Gayla Gregorio is a 78 y.o. male  Chief Complaint   Patient presents with    Chemotherapy      metastatic SCC      1. Have you been to the ER, urgent care clinic since your last visit? Hospitalized since your last visit? No.    2. Have you seen or consulted any other health care providers outside of the [address] [place] [address] since your last visit? Include any pap smears or colon screening. No.    Patient states he has been dizzy lately and weak, states that he believes his blood pressure pill needs to be changed. Low BP today.

## 2022-05-31 NOTE — PROGRESS NOTES
[facility] [place] [facility] [place] [address] [facility] [address], [place]: [phone]  F: [phone]    Reason for Visit:   Torres Valencia is a 78 y.o. male seen today in office for follow up of Metastatic SCC     Treatment History:   · MRI Right Hip 8/5/20: Enlarging right pelvic sidewall adenopathy with adjacent muscular and bony invasion. Degenerative changes of the right hip. Small bony metastatic deposit anterior wall of the right acetabulum  · MRI Lumbar Spine 8/11/20: L3-L4 moderate central spinal canal and right foraminal stenoses. L5-S1 moderate bilateral foraminal stenosis  · 8/20/20 Pelvic Mass, Core biopsy with touch preparation - CYTOLOGIC INTERPRETATION: Pelvic Mass, Core biopsy with touch preparation: Squamous cell carcinoma  · TYPE ID + SCC/H+N vs skin  · PET 9/8/20: Large hypermetabolic right pelvic sidewall lymph node. Lytic lesion right anterior acetabulum is hypermetabolic and compatible with metastatic disease   · XRT to Right Pelvic Mass 10/13/20 - 10/22/20 with Dr. Beckie Brunner  · Pembrolizumab 12/15/20 - 4/1/22  · PET 1/12/21: New hypermetabolic pulmonary nodule in the lingula. Resolved left pelvic sidewall lymph node activity and resolved activity corresponding to the lytic lesion in the right anterior acetabulum  · PET 4/22/21:Progression of disease with new soft tissue nodules in the deep pelvis, foci of increased tracer activity in the right pelvic sidewall and right external iliac chain, increase in the size and number of pulmonary metastases, and hypermetabolic bone lesions right hemipelvis  · Carbo/Taxol/Pembro 6/18/21 - 10/15/21  · PET 9/14/21: Overall improvement in the hypermetabolic activity corresponding to bilateral pulmonary nodules, the pelvic and right pelvic sidewall soft tissue nodules, and osseous metastatic foci.  Pulmonary nodules have decreased in size compared to the prior exam  · PET 11/22/21: Apparent progression of metastatic disease in the pelvis as detailed above. No significant change in bilateral pulmonary nodules, including a dominant 1.3 cm hypermetabolic left upper lobe pulmonary nodule  · CT C/A/P 22: Prior diagnostic chest CTs unavailable. There are multiple pulmonary nodules some which appear to be new in the interval with increased size. Increased size of pelvic mass when compared to the prior CTs and PET CTs. Right iliacus mass is similar to the prior PET/CT although increase in size from the prior CT dated 2019  · [facility]  · [facility] showed no specific site of origin nor a targetable approach   · CT C/A/P 22: Multiple pulmonary nodules many of which have increased in size. There are several new nodules also present. Pelvic mass increased in size. Right iliacus mass with adjacent bony involvement is not significantly changed. However the right posterior acetabular lytic lesion is increased in size  · Gemzar 1000 mg/m2 5/10/22 - Current     History of Present Illness:   Michelle Mahmood is a 78 y.o. male seen today in office for follow up of metastatic SCC primary site unclear PDL1 80%. Last chemo was on 10/15/21. He went back to [facility] and had another biopsy that was SCC again. [facility] recommended single agent Gemzar. He had follow up CTs on 22 that showed progression of disease in lungs/pelvic mass of therapy. He started single agent palliative Gemzar chemotherapy on 5/10/22. He is here today for Day 1 Cycle 2 of palliative Gemzar chemotherapy. He reports that he feels well overall today. He is tolerating Gemzar well overall thus far with some fatigue. He did develop a skin rash which has resolved with use of Medrol Dose Pack. His appetite is lower than usual and he has lost a few lbs. He has had some abdominal pain that wraps around to his back and alternating periods of diarrhea and constipation. He takes Advil for the pain which does help some.  He denies fever, chills, mouth sores, SOB, CP, nausea, and vomiting. CBC and CMP are still pending today. He is ready for treatment today. His supportive wife is here today. Past Medical History:   Diagnosis Date    Arthritis     BPH (benign prostatic hypertrophy) 7/18/2011    Cancer [facility])     prostate cancer    Chronic pain right    knee    ED (erectile dysfunction)     GERD (gastroesophageal reflux disease)     HTN (hypertension), benign     Metastatic squamous cell carcinoma ([facility] [address] 9/16/2020    OA (osteoarthritis) of knee 7/18/2011    BOTH KNEES    Unspecified essential hypertension 7/18/2011      Past Surgical History:   Procedure Laterality Date    HX ACL RECONSTRUCTION Right     x2    HX APPENDECTOMY  1975    HX GI  1/2015    Drainage of hemorrhoid    HX HERNIA REPAIR      left and right,  ventral    HX HIP REPLACEMENT  2000    left    HX HIP REPLACEMENT  2000    left    HX KNEE ARTHROSCOPY Right     x3    HX MALIGNANT SKIN LESION EXCISION      melanoma - face    HX MOHS PROCEDURES Right     Basal cell behind ear.      HX ORTHOPAEDIC Left 6/2000    HIP REPLACEMENT    HX ORTHOPAEDIC Right 08/05/2014    [facility] HX OTHER SURGICAL  12/2021    Cystoscopy    HX PROSTATECTOMY  11/07/2011    prostate removed     HX TONSILLECTOMY      CHILD    IR BX MEDIASTINUM NEEDLE PERC W IMAGING Left 03/2022    IR INJ FORAMIN EPID LUMB ANES/STER SNGL  8/18/2020      Social History     Tobacco Use    Smoking status: Never Smoker    Smokeless tobacco: Never Used   Substance Use Topics    Alcohol use: No      Family History   Problem Relation Age of Onset    Bleeding Prob Father         clotting disorder    Cancer Brother         colon CA 1/2 brother    Thyroid Disease Daughter     Heart Failure Mother     Heart Disease Mother     Anesth Problems Neg Hx      Current Outpatient Medications   Medication Sig    methylPREDNISolone (MEDROL DOSEPACK) 4 mg tablet Per package instructions    Eliquis 5 mg tablet Take 1 tablet by mouth twice daily    gabapentin (NEURONTIN) 300 mg capsule Take 1 Capsule by mouth two (2) times a day. Max Daily Amount: 600 mg.    ondansetron (ZOFRAN ODT) 4 mg disintegrating tablet Take 1-2 Tablets by mouth every eight (8) hours as needed for Nausea or Vomiting.  prochlorperazine (Compazine) 5 mg tablet Take 1 tab by mouth every 6 hours as needed for nausea or vomiting    traZODone (DESYREL) 50 mg tablet TAKE 1 TO 2 TABLETS BY MOUTH ONCE DAILY IN THE EVENING AS NEEDED FOR SLEEP    terazosin (HYTRIN) 10 mg capsule Take 1 capsule by mouth once daily    amLODIPine (NORVASC) 5 mg tablet Take 1 tablet by mouth nightly    candesartan (ATACAND) 4 mg tablet Take 1 tablet by mouth nightly    calcium carbonate/vitamin D3 (CALTRATE-600 PLUS VITAMIN D3 PO) Take 1 Tab by mouth nightly. No current facility-administered medications for this visit.      Facility-Administered Medications Ordered in Other Visits   Medication Dose Route Frequency    0.9% sodium chloride infusion  25 mL/hr IntraVENous CONTINUOUS    gemcitabine (GEMZAR) 2,300 mg in 0.9% sodium chloride 250 mL, overfill volume 25 mL chemo infusion  1,000 mg/m2 (Treatment Plan Recorded) IntraVENous ONCE    sodium chloride (NS) flush 10 mL  10 mL IntraVENous PRN    0.9% sodium chloride injection 10 mL  10 mL IntraVENous PRN    heparin (porcine) pf 300-500 Units  300-500 Units InterCATHeter PRN    sodium chloride 0.9 % bolus infusion 500 mL  500 mL IntraVENous PRN    diphenhydrAMINE (BENADRYL) injection 25 mg  25 mg IntraVENous PRN    famotidine (PF) (PEPCID) 20 mg in 0.9% sodium chloride 10 mL injection  20 mg IntraVENous PRN    acetaminophen (TYLENOL) tablet 650 mg  650 mg Oral PRN    meperidine (DEMEROL) injection 25 mg  25 mg IntraVENous PRN    ondansetron (ZOFRAN) injection 8 mg  8 mg IntraVENous PRN      Allergies   Allergen Reactions    Barbiturates Anxiety     Hyper        Review of Systems:  A complete review of systems was obtained, negative except as described above and as reported on ROS sheet scanned into system. Physical Exam:     Visit Vitals  BP (!) 94/59 (BP 1 Location: Left upper arm, BP Patient Position: Sitting)   Pulse 80   Temp 97.1 °F (36.2 °C) (Temporal)   Ht 6' (1.829 m)   Wt 224 lb 8 oz (101.8 kg)   SpO2 95%   BMI 30.45 kg/m²     ECOG PS: 1  General: No distress  Eyes: Anicteric sclerae  HENT: Atraumatic, wearing a mask  Neck: Supple  Resp: CTAB, normal respiratory effort   CV: Regular rate and rhythm  GI: Soft, non tender   Ext: Trace right leg edema, no edema in left leg  MS: Normal gait and station. Digits without clubbing or cyanosis. Skin: No ecchymoses, or petechiae. Normal temperature, turgor, and texture. Psych: Alert, oriented, appropriate affect, normal judgment/insight  Neuro: Non focal    Results:     Lab Results   Component Value Date/Time    WBC 6.0 05/30/2022 09:32 AM    HGB 10.0 (L) 05/30/2022 09:32 AM    HCT 31.0 (L) 05/30/2022 09:32 AM    PLATELET 488 62/32/5431 09:32 AM    MCV 96.3 05/30/2022 09:32 AM    ABS. NEUTROPHILS 4.4 05/30/2022 09:32 AM     Lab Results   Component Value Date/Time    Sodium 140 05/30/2022 09:32 AM    Potassium 3.8 05/30/2022 09:32 AM    Chloride 106 05/30/2022 09:32 AM    CO2 28 05/30/2022 09:32 AM    Glucose 100 05/30/2022 09:32 AM    BUN 37 (H) 05/30/2022 09:32 AM    Creatinine 1.80 (H) 05/30/2022 09:32 AM    GFR est AA 45 (L) 05/30/2022 09:32 AM    GFR est non-AA 37 (L) 05/30/2022 09:32 AM    Calcium 8.9 05/30/2022 09:32 AM    Glucose (POC) 110 11/22/2021 02:14 PM    Creatinine (POC) 1.70 (H) 01/25/2022 11:44 AM     Lab Results   Component Value Date/Time    Bilirubin, total 0.6 05/30/2022 09:32 AM    ALT (SGPT) 46 05/30/2022 09:32 AM    Alk. phosphatase 72 05/30/2022 09:32 AM    Protein, total 6.1 (L) 05/30/2022 09:32 AM    Albumin 2.7 (L) 05/30/2022 09:32 AM    Globulin 3.4 05/30/2022 09:32 AM     MRI Right Hip 8/5/20  IMPRESSION:   1.  Enlarging right pelvic sidewall adenopathy with adjacent muscular and bony  invasion  2. Degenerative changes of the right hip  3. Small bony metastatic deposit anterior wall of the right acetabulum    MRI Lumbar Spine 8/11/20  IMPRESSION:  1. L3-L4 moderate central spinal canal and right foraminal stenoses. 2. L5-S1 moderate bilateral foraminal stenosis. 8/20/20  Specimen Source   1: Pelvic Mass, Core biopsy with touch preparation   CYTOLOGIC INTERPRETATION:   Pelvic Mass, Core biopsy with touch preparation:   Squamous cell carcinoma    PET 9/8/20  IMPRESSION: Large hypermetabolic right pelvic sidewall lymph node. Lytic lesion  right anterior     PET 1/12/21  IMPRESSION:  New hypermetabolic pulmonary nodule in the lingula. Resolved left  pelvic sidewall lymph node activity and resolved activity corresponding to the  lytic lesion in the right anterior acetabulum    PET 4/22/21  IMPRESSION:  Progression of disease with new soft tissue nodules in the deep  pelvis, foci of increased tracer activity in the right pelvic sidewall and right  external iliac chain, increase in the size and number of pulmonary metastases,  and hypermetabolic bone lesions right hemipelvis. PET 9/14/21  IMPRESSION:  Overall improvement in the hypermetabolic activity corresponding to  bilateral pulmonary nodules, the pelvic and right pelvic sidewall soft tissue  nodules, and osseous metastatic foci. Pulmonary nodules have decreased in size  compared to the prior exam.    PET 11/22/21  IMPRESSION:  1. Apparent progression of metastatic disease in the pelvis as detailed above. 2. No significant change in bilateral pulmonary nodules, including a dominant  1.3 cm hypermetabolic left upper lobe pulmonary nodule.     CT C/A/P 1/25/22  IMPRESSION:  1. Prior diagnostic chest CTs unavailable. There are multiple pulmonary nodules  some which appear to be new in the interval with increased size of  representative examples as above.   2.  Increased size of pelvic mass when compared to the prior CTs and PET CTs.  3.  Right iliacus mass is similar to the prior PET/CT although increase in size  from the prior CT dated 2/4/2019    3/14/22  CT Guided Biopsy Lung Nodule 3/14/22 at [facility] showed no specific site of origin nor a targetable approach    CT C/A/P 4/28/22  IMPRESSION:  1.  Multiple pulmonary nodules many of which have increased in size. There are  several new nodules also present. 2.  Pelvic mass increased in size. 3.  Right iliacus mass with adjacent bony involvement is not significantly  changed. However the right posterior acetabular lytic lesion is increased in  Size. Records reviewed and summarized above. Pathology report(s) reviewed above. Radiology report(s) reviewed above. Assessment/PLAN:     1) Metastatic Squamous Cell Cancer - Unclear Primary  EGFR/ROS/ALK negative. PDL1 80% by biopsy of pelvic/hip mass 8/20/20. Path is different than prostate which is adenocarcinoma from 2011. Unclear site of origin of squamous cell. TYPE ID sent and SCC H+N vs skin. Completed XRT to right pelvic mass on 10/22/20. Right hip pain has esolved since completing XRT. He started single agent Pembrolizumab on 12/15/20. He had follow up PET on 1/12/21 that showed new hypermetabolic pulmonary nodule in the lingula, resolved left pelvic sidewall lymph node activity and resolved activity corresponding to the lytic lesion in the right anterior acetabulum. Case reviewed at cancer conference and recommendation was to continue [facility] for now. PET 4/21 showed slight progressive disease in lungs/LAD/bones. Patient was seen at [facility] by Med/Onc and Uro/Onc. D/w [facility] and plan is still same as recommended here. Site of origin of cancer is still unclear but not likely prostate. Last single agent Pembro was on 4/1/21. He started Carbo/Taxol/Pembro on 6/18/21 and completed 6 cycles on 10/15/21. He has been off therapy since 10/21.  CTs 1/25/22 showed progression in lungs/pelvic mass off therapy. Patient went to [facility] and had biopsy and still SCC. No molecular targets. D/w Dr. Henrique Hanley at [facility]. Recommendation is for single agent Gemzar. CTs 4/28/22 showed progression in lungs/right pelvic mass but has been off therapy since 10/21. Personally reviewed CTs. Discussed CTs in detail with patient. Teaching and consent for Gemzar with PharmD. He started single agent palliative Gemzar on 5/10/22. He is here today for Day 1 Cycle 2 of palliative Gemzar. He tolerated first cycle okay overall with a rash - resolved with Medrol Dose Pack and fatigue. Patient is clinically stable and doing okay overall. Complaining of lower abdominal pain that wraps around to back. Labs (CBC and CMP) from yesterday reviewed today. Patient is ready for treatment today. Follow up in 1 week in [facility]. Follow up in 3 weeks in [facility]/office. Patient agrees with plan. 2) Recurrent Prostate Cancer   Management per Urology. 3) Hx of Right Hip Pain  Resolved after XRT. 5) HTN/Arthritis/GERD/Cronic Elevated Creatinine   Management per PCP. 6) Hx of Mild Chronic Thrombocytopenia - Resolved   Mild and will continue to monitor. 7) Extensive Right Leg DVT  On Eliquis and tolerating well - no bleeding. Recommend indefinite anticoagulation. 8) Management of High Risk Medications - Chemotherapy  Toxicities include Grade 1-2 Rash - resolved with Medrol Dose Pack, Grade 1 Fatigue. Labs (CBC and CMP) from yesterday reviewed today. No dose adjustments needed today. Will monitor for side effects. 9) Psychosocial  Mood good, coping well. He has great support from his wife. SW support as needed. His wife is here today. Call if questions. Follow up in 1 week in [facility]. Follow up in 3 weeks in [facility]/office. I personally saw and evaluated the patient and performed the key components of medical decision making.  The history, physical exam, and documentation were performed by Praneeth Lux NP. I reviewed and verified the above documentation and modified it as needed. Doing ok overall  Had manageable side effects to chemo with rash that resolved and fatigue and lower abdominal pain. Pt feels this chemo is difficult overall. Pt may want to see GI for his abdominal pain/ will let us know. Will call us if he gets a rash again. Labs reviewed  Proceed with chemo as ordered today. May have to dose reduce next time. Continue monitoring with labs/ scans        I appreciate the opportunity to participate in [facility].     Signed By: Eusebio Steele DO

## 2022-05-31 NOTE — PROGRESS NOTES
Message from pt that short course of steroids cleared up horrible rash  Had treatment yesterday  Will need to monitor.

## 2022-05-31 NOTE — PROGRESS NOTES
[facility] Chemo Progress Note    1515:Mr. Young Younger Arrived to [facility] for  Gemzar (C2D1) ambulatory in stable condition. Assessment was completed. PIV placed to left AC with positive blood return. Went to provider appointment with Medical Oncology. Mr. Ngoc [place]era vitals were reviewed. Patient Vitals for the past 12 hrs:   Temp Pulse Resp BP SpO2   05/31/22 1648  (!) 57  (!) 149/86    05/31/22 1519 97.1 °F (36.2 °C) 80 18 (!) 94/59 95 %     Chemotherapy Flowsheet 5/31/2022   Cycle C2D1   Date 5/31/2022   Drug / Regimen Gemzar   Pre Meds given   Notes given     Medications Administered     0.9% sodium chloride infusion     Admin Date  05/31/2022 Action  New Bag Dose  25 mL/hr Rate  25 mL/hr Route  IntraVENous Administered By  Mary Kate Daley RN          gemcitabine (GEMZAR) 2,300 mg in 0.9% sodium chloride 250 mL, overfill volume 25 mL chemo infusion     Admin Date  05/31/2022 Action  New Bag Dose  2,300 mg Rate  671 mL/hr Route  IntraVENous Administered By  Mary Kate Daley RN          ondansetron [facility]) injection 8 mg     Admin Date  05/31/2022 Action  Given Dose  8 mg Route  IntraVENous Administered By  Mary Kate Daley RN              1997 Patient tolerated treatment well. PIV flushed and removed. 2x2 and coban placed. Patient was discharged in stable condition. Patient is aware of next scheduled [facility] appointment.     Future Appointments   Date Time Provider Americo Redd   6/6/2022  5:00 PM [facility]   6/7/2022 10:00 AM B1 [facility]   6/20/2022  5:00 PM H3 [facility]   6/21/2022 10:00 AM A1 [facility] [place] [facility]   6/21/2022 10:15 AM Grady Carlin  [address] BS AMB   6/27/2022  5:00 PM H3 [facility]   6/28/2022  9:00 AM E3 [facility] [place] [facility]   7/12/2022  9:00 AM E3 [facility] Spencer Diaz [address], RN, RN  May 31, 2022

## 2022-06-06 NOTE — TELEPHONE ENCOUNTER
Call to patient, updated that steroids had been sent to [facility]. Understanding verbalized.

## 2022-06-06 NOTE — TELEPHONE ENCOUNTER
Returned call to patient, [phone], ID verified X 2. Patient had rash after C1, D1 Gemcitabine that increased after C1, D8. Treated with steroid dose pack. Rash resolved. Had C2, D1 Gemcitabine on 5/31/22 and rash has returned. Is apprehensive regarding C2, D8 scheduled for 6/7/22. Would like to know plan for rash management. Update to Provider.

## 2022-06-06 NOTE — TELEPHONE ENCOUNTER
Pt would like you to call him. He prefers to not tell me why he is calling. Only that he wants to only talk to you.     CB# is [phone]

## 2022-06-06 NOTE — PROGRESS NOTES
Message from pt about having a rash again  Please call pt and see whats going on  Infusion tmw  Can do another medrol dose pack if needed

## 2022-06-06 NOTE — TELEPHONE ENCOUNTER
UPDATE:    HIPPA Verified. Called pt on mobile phone number listed. Patient was followed up on his Acupuncture Referral that was put in on 12/16/21. Patient stated he was not aware of this referral put in but is aware that the Palliative Office he fu with has Acupuncture and was made aware per MA that that is exactly where Provider wants him to be or fu at. Patient verbalized understanding and told MA that he will be contacting the Palliative office himself to go forward with this and that MA does not have to do anything. He was very appreciative for reminder/checking in. Acupuncture Referral can be CLOSED on MA's end.   [address]

## 2022-06-07 NOTE — PROGRESS NOTES
[facility] Chemo Progress Note    38 Mr. Mendel Host Arrived to [facility] for Gemzar (C2D8) ambulatory in stable condition. Assessment was completed, no acute issues at this time, no new complaints voiced. IV established in left AC with positive blood return. Labs within parameters for treatment. Chemotherapy Flowsheet 6/7/2022   Cycle C2D8   Date 6/7/2022   Drug / Regimen Gemzar   Pre Meds given   Notes given       Mr. Purcell vitals were reviewed. Patient Vitals for the past 12 hrs:   Temp Pulse Resp BP   06/07/22 1150  60  (!) 145/78   06/07/22 1016 97.4 °F (36.3 °C) 89 16 123/73       Pre-medications  were administered as ordered and chemotherapy was initiated. Medications Administered     0.9% sodium chloride infusion     Admin Date  06/07/2022 Action  New Bag Dose  25 mL/hr Rate  25 mL/hr Route  IntraVENous Administered By  Kaitlyn Jacobs RN          gemcitabine (GEMZAR) 2,300 mg in 0.9% sodium chloride 250 mL, overfill volume 25 mL chemo infusion     Admin Date  06/07/2022 Action  New Bag Dose  2,300 mg Rate  671 mL/hr Route  IntraVENous Administered By  Kaitlyn Jacobs RN          ondansetron [facility]) injection 8 mg     Admin Date  06/07/2022 Action  Given Dose  8 mg Route  IntraVENous Administered By  Kaitlyn Jacobs RN          sodium chloride (NS) flush 10 mL     Admin Date  06/07/2022 Action  Given Dose  10 mL Route  IntraVENous Administered By  Arias Clayton RN              [id number]  Patient tolerated treatment well. PIV maintained positive blood return throughout treatment. PIV flushed and removed. Patient was discharged from [facility] in stable condition. Patient aware of next appointment.      Future Appointments   Date Time Provider Americo Redd   6/20/2022  5:00 PM H3 [facility]   6/21/2022 10:00 AM A1 [facility] MED [place] [facility]   6/21/2022 10:15 AM Vinny Brown, DMITRIY [address] BS AMB   6/27/2022  5:00 PM H3 [facility]   6/28/2022  9:00 AM E3 [facility] [place] [facility]   7/12/2022  9:00 AM [address] Brenda Mcarthur RN  June 7, 2022

## 2022-06-09 PROBLEM — Z51.11 ENCOUNTER FOR ANTINEOPLASTIC CHEMOTHERAPY: Status: RESOLVED | Noted: 2022-01-01 | Resolved: 2022-01-01

## 2022-06-13 NOTE — TELEPHONE ENCOUNTER
Palliative Medicine  Nursing Note  [phone]  Fax [phone]      Telephone Call  Patient Name: Ashwin Bell  YOB: [birth year]        Primary Decision Maker: Franck Ramirez - Spouse - [phone]    Secondary Decision Maker: Ankur Jimenez - Child - [phone]   Advance Care Planning 6/7/2022   Patient's Healthcare Decision Maker is: Named in scanned ACP document   Confirm Advance Directive Yes, on file   Does the patient have other document types -     Call returned to Mr. Debbie Gonzalez. He was last seen by Dr. Debbie Ventura in May of 2021 and her AVS stated they would see him prn. He shared that there have been changes in his disease over the past several months and he has increased pain. He is working with Dr. Gonzales Daily in Oncology and also with Onc at [facility] Dr Henry Orozco. He does have pain medication prescribed to him and feels he will be fine until Thursday to speak with Dr. Kina Vanegas. He lives near [facility] and prefers to be seen there. He is available for either an 0830 or 1230 appt on Thursday with Dr. Kina Vanegas. He does prefer the later time. Discussed that he will be placed on the schedule. He was appreciative. Dr. Kina Vanegas made aware of the above.       Unique Olmos RN  Palliative Medicine

## 2022-06-13 NOTE — TELEPHONE ENCOUNTER
The patient is calling to advise his cancer has progressed. He is in an extreme amount of pain. He would like to speak with Dr. Remigio Tovar.   # [phone]

## 2022-06-16 NOTE — PROGRESS NOTES
Palliative Medicine Office Visit  Palliative Medicine Nurse Check In  [phone]    Patient Name: Leanna Corbett  YOB: 1945      Date of Office Visit: 6/16/2022    Patient states: \"  \"    From Check In Sheet (scanned in Media):  Has a medical provider talked with you about cardiopulmonary resuscitation (CPR)? [x] Yes   [] No   [] Unable to obtain    Nurse reminder to complete or update ACP FlowSheet:    Is ACP on the Problem List?    [x] Yes    [] No  IF ACP Document is ON FILE; Nurse to place ACP on Problem List     Is there an ACP Note in Chart Review/Note? [x] Yes    [] No   If NO: ALERT PROVIDER       Primary Decision MakerSiri Ricardo Spouse - [phone]    Secondary Decision Maker: Cristy Goltz Child - [phone]  Advance Care Planning 6/16/2022   Patient's Healthcare Decision Maker is: Named in scanned ACP document   Confirm Advance Directive Yes, on file   Does the patient have other document types -       Is there anything that we should know about you as a person in order to provide you the best care possible? Have you been to the ER, urgent care clinic since your last visit? [] Yes   [] No   [] Unable to obtain    Have you been hospitalized since your last visit? [] Yes   [] No   [] Unable to obtain    Have you seen or consulted any other health care providers outside of the [address] [place] [address] since your last visit? [] Yes   [] No   [] Unable to obtain    Functional status (describe):         Last BM: 6/16/2022     accessed (date):      Bottle review (for opioid pain medication):  Medication 1:   Date filled:   Directions:   # filled:   # left:   # pills taking per day:  Last dose taken:    Medication 2:   Date filled:   Directions:   # filled:   # left:   # pills taking per day:  Last dose taken:    Medication 3:   Date filled:   Directions:   # filled:   # left:   # pills taking per day:  Last dose taken:    Medication 4:   Date filled: Directions:   # filled:   # left:   # pills taking per day:  Last dose taken:

## 2022-06-16 NOTE — PROGRESS NOTES
Palliative Medicine Outpatient Services  [place]: [phone] ([phone])    Patient Name: Sahil Eduardo  YOB: 1945    Date of Current Visit: 21  Location of Current Visit:    [x] [facility] Office  [] [facility] Office  [] [facility] Office  [] Home  []Synchronous real-time A/V virtual visit    Date of Initial Visit: 21  Referral from: self-referred  Primary Care Physician: Saad Salazar MD      SUMMARY:   Sahil Eduardo is a 78y.o. 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 His first wife, Los Turner, now  from [address] [place] [address], was followed by Dr. Ivania Skinner in [address]. He was diagnosed with prostate cancer in  s/p prostatectomy, currently being treated with Lupron, most recent PSA 0. He was diagnosed in with metastatic SCC in 2020 s/p radiation therapy to right pelvic mass, treated with Keytruda until disease progression noted in 2021. He was seen at [facility] for a 2nd opinion and started chemotherapy here under the care of Dr. Lizeth Webb. His disease progressed in 2022 prompting transition to Gemzar of which he has received 2 cycles. He is pursuing treatment with the goal of prolonging life to enjoy time with his second wife, Paris Jones, and his family. He is deeply appreciate of the support he and his first wife received from Palliative Medicine over the course of her illness in assisting with care decisions, symptom management and, in particular, in supporting him in his role as his wife's primary caregiver. He wishes to have that same support for himself, and also for his wife, as his disease progresses. The patients social history includes: he is  to Paris Jones. They each have children from prior marriages.  He enjoys the outdoors and has waterside property where he and Delfin Ventura frequently visit. Palliative Medicine is addressing the following current patient/family concerns: discussion of personal goals and values informing health care decisions, pain management and symptom control. PALLIATIVE DIAGNOSES:       ICD-10-CM ICD-9-CM    1. Cancer associated pain  G89.3 338.3 oxyCODONE IR (ROXICODONE) 5 mg immediate release tablet      MRI LUMB SPINE W WO CONT   2. Metastatic squamous cell carcinoma involving bone with unknown primary site (HCC)  C79.51 198.5 MRI LUMB SPINE W WO CONT    C80.1 199.1    3. Goals of care, counseling/discussion  Z71.89 V65.49           PLAN:   Patient Instructions     Dear Yue Forde ,    It was a pleasure seeing you today in [address]. If labs or imaging tests have been ordered for you today, please call the office  at [phone] 48 hours after completion to obtain the results. Follow-up and Dispositions    · Return in about 2 weeks (around 2022). Your goal- to be able to enjoy daily activities, quality of life is important to you    Your described symptoms were: Fatigue: 5 Drowsiness: 3   Depression: 0 Pain: 4   Anxiety: 2 Nausea: 0   Anorexia: 7 Dyspnea: 4   Best Well-Bein Constipation: Yes (usually after treatment )   Other Problem (Comment): 0       This is the plan we talked about:    1. Cancer related pain  -you developed pain about 2 months ago  -It is progressively worsening  -you are also having problems with urinating and having bowel movements- they both cause a severe amount of pain  -I am ordering an MRI of your lumbar spine and sacrum today  -I am starting you on oxycodone, take 5-mg as needed every 4 hours, please call us to let us know how this is working next week    2. Constipation  -This is fairly new  -You take Miralax most days lately    3.   Squamous cell carcinoma of unknown origin  -You see Dr. Johanna Cabello at [facility], locally your infusions are managed by Dr. Jorge Bustos last had scans on April 24th which showed progression of your cancer  -You completed two cycles of Gemzar but you have had significant side effects and aren't certain you want to do the 3rd cycle    4. Goals of care  -you have a very good understanding of your disease process  -you are experienced with Hospice care and [facility] house from caring for your first wife  -your quality of life is very important to you, you want to die with dignity and without burdening your family  -You would like to use [address] in the future, once cancer treatment options are exhausted. This is what you have shared with us about Advance Care Planning:      Primary Decision Maker: Juliette Vázquez Spouse - [phone]    Secondary Decision Maker: Leo Amato - Child - [phone]     Advance Care Planning 6/16/2022   Patient's [address] is: Named in scanned ACP document   Confirm Advance Directive Yes, on file   Does the patient have other document types -           The Palliative Medicine Team is here to support you and your family.        Sincerely,      Katie Perez MD and the Palliative Medicine Team       GOALS OF CARE / TREATMENT PREFERENCES:     GOALS OF CARE:  Patient / health care proxy stated goals: See Patient Instructions / Summary    TREATMENT PREFERENCES:   Code Status:  [x] Attempt Resuscitation       [] Do Not Attempt Resuscitation - until further discussed    Advance Care Planning:  [x] The [place] [address] Team has updated the ACP Navigator with Decision Maker and Patient Capacity      Primary Decision MakerScorosa Dumont - [phone]    Secondary Decision Maker: Ady Shun Child - [phone]     Advance Care Planning 6/16/2022   Patient's Healthcare Decision Maker is: Named in scanned ACP document   Confirm Advance Directive Yes, on file   Does the patient have other document types -       Other:  (If patient appropriate for POST, consider using PALLPOST smart phrase here)    The palliative care team has discussed with patient / health care proxy about goals of care / treatment preferences for patient. PRESCRIPTIONS GIVEN:     Medications Ordered Today   Medications    oxyCODONE IR (ROXICODONE) 5 mg immediate release tablet     Sig: Take 1 Tablet by mouth every four (4) hours as needed for Pain for up to 3 days. Max Daily Amount: 30 mg. Dispense:  30 Tablet     Refill:  0           FOLLOW UP:     No future appointments. PHYSICIANS INVOLVED IN CARE:   Patient Care Team:  aRo Rader MD as PCP - General  Rao Rader MD as PCP - [address] [place] [address]  Empaneled Provider  Alicia Tafoya MD as Physician (Urology)  Rajendra Toussaint MD (Ophthalmology)  Waldo Arshad MD (General Surgery)  Clara Carmona MD (Orthopedic Surgery)  Deanna Bridges MD as Physician (Palliative Medicine)  Anoop Carranza DO (Hematology and Oncology)  Delilah Cline MD (Hematology and Oncology)  Cordell Flaherty MD as Physician (Palliative Medicine)       HISTORY:   Reviewed patient-completed ESAS and advance care planning form. Reviewed patient record in prescription monitoring program.    CHIEF COMPLAINT:   Chief Complaint   Patient presents with    New Patient    Pelvic Pain       HPI/SUBJECTIVE:    The patient is: [x] Verbal / [] Nonverbal     Patient reestablishing care in our clinic today. He met Dr. Beau Harris a year ago. At the time did not have active symptoms and plan was made to follow up prn. He wanted to ensure he had a touch point and someone to call in case symptoms did worsen,  He had enjoyed the support from the palliative medicine team when his was wife was sick. He is here today as he has developed pain in his lower abdomen and across his low back over the past 2 months. The pain is severe when he urinates or has a bowel movement, takes him nearly a half hour to recover. It has been progressively worsening. Also concomitantly he has been having a change in his urine stream, feels he is emptying his bladder but with a start/stop pattern. He has a history of prostate cancer and is s/p prostatectomy, so this hasn't been an issue for a long while. He denies sadle anesthesia or numbness / tingling in the lower extremities. He can still ambulate and is driving. He is not taking any opioids right now and has no prescription for such. Has used in the past with surgeries without adverse effect, but would like to be cautious with use. He has a good understanding of his illness. We reviewed his April scans and discussed that they show progression of disease. He has done poorly with Gemzar, two cycles, and is fairly certain that he doesn't want to do the 3rd next week. He has a message in to his oncologist at [facility], who said he will call Dr. Aruna Rogers to discuss and perhaps offer alternatives. He will accept an alternative regimen if expected to extend his life, but not if he will be suffering. It remains very important to him that his symptoms be well controlled. He is ready to start planning for end of life. He expressed that he doesn't want to die at home, he prefers to be at the community hospice house   Where his wife had passed, as it was such a positive experience. We discussed thinking about using Hospice depending upon what the scans that I plan to order today show, and what his oncology team may have to offer. If no reasonable options, he would like Hospice, particularly to ensure his wife Meggan Barrera is supported well too. We did not specifically address his code today considering the lengthy discussion we had about symptoms and overarching goals of care. From IV with Dr. Josiane Cameron on 21:  He came in today to establish care with Palliative Medicine. His first wife, Shauna Moralez,  from a brain tumor several years ago.   Dr. Zenia Lopez helped care for her and was lot of help for him, too. He was diagnosed with prostate cancer in 2011  He had surgery and started Lupron and his PSA has been low since then. He had left knee replacement last June. He went to rehab and things were going well until he started having pain in his.right hip. At first, he thought it might be related to altered gait due to his knee replacement. But it didn't get better so he had a MRI last August which slowed a right pelvic mass. The biopsy showed squamous cell carcinoma. He had a lot of tests but the origin of the cancer was never found. He had 5 dose of radiation therapy which helped with the pain, then started [facility]. He had a PET can in 2/2021 which showed no pelvic lesions but new small spot on his lung. He continued Keytruda, then had another scan which showed progression in the size of the lung lesion and new lung metastases. Dr. Jillian Yoo recommended chemotherapy. He went to [facility] for a 2nd opinion, was seen by Dr. Avani Zuniga, who did additional blood work and requested samples of the original biopsy for further testing  He has planned initiation of chemo tomorrow but this is now on hold pending the results of the work-up at [facility]. He currently feels fairly well. He no longer has right hip pain and and has no pain in other areas. His appetite is good. His energy isn't as good as it used to be but he's doing most of the things he enjoys doing. He feels at peace with his new cancer diagnosis. He knows his cancer is not curable. He hopes to prolong his life with therapy so he can have more time with Harika Corona and their blended family. It's very important to him to have all his affairs in order so Harika Corona and his family won't have to deal with them. He's updated his will and his Advance Medical Directives.   It's also very important to help to have someone who can look at the \"big picture\" as he gets sicker to help him and his wife make decisions. He wants his symptoms to be well-controlled as they occur and it's particularly important to him that Rajesh Aguayo be supported over the course of his illness and dying process. Clinical Pain Assessment (nonverbal scale for nonverbal patients):   [++++ Clinical Pain Assessment++++]  [++++Pain Severity++++]: Pain: 4  [++++Pain Character++++]:  [++++Pain Duration++++]:  [++++Pain Effect++++]:  [++++Pain Factors++++]:  [++++Pain Frequency++++]:  [++++Pain Location++++]:  [++++ Clinical Pain Assessment++++]       FUNCTIONAL ASSESSMENT:     Palliative Performance Scale (PPS):  PPS: 70       PSYCHOSOCIAL/SPIRITUAL SCREENING:     Any spiritual / Hoahaoism concerns:  [] Yes /  [x] No    Caregiver Burnout:  [] Yes /  [x] No /  [] No Caregiver Present      Anticipatory grief assessment:   [x] Normal  / [] Maladaptive       ESAS Anxiety: Anxiety: 2    ESAS Depression: Depression: 0       REVIEW OF SYSTEMS:     The following systems were [x] reviewed / [] unable to be reviewed  Systems: constitutional, ears/nose/mouth/throat, respiratory, gastrointestinal, genitourinary, musculoskeletal, integumentary, neurologic, psychiatric, endocrine. Positive findings noted below. Modified ESAS Completed by: provider   Fatigue: 5 Drowsiness: 3   Depression: 0 Pain: 4   Anxiety: 2 Nausea: 0   Anorexia: 7 Dyspnea: 4   Best Well-Bein Constipation: Yes (usually after treatment )   Other Problem (Comment): 0          PHYSICAL EXAM:     Wt Readings from Last 3 Encounters:   22 216 lb (98 kg)   22 224 lb (101.6 kg)   22 224 lb 8 oz (101.8 kg)     Blood pressure 101/61, pulse 68, temperature 98.1 °F (36.7 °C), temperature source Oral, resp. rate 20, height 6' (1.829 m), weight 216 lb (98 kg), SpO2 97 %.   Last bowel movement: See Nursing Note    Constitutional: age appropriate generally well appearing male; is uncomfortable midway through visit, requires repositioning, more comfortable lying flat on exam table  Eyes: pupils equal, anicteric  ENMT: no nasal discharge, moist mucous membranes  Cardiovascular: regular rhythm, no peripheral edema  Respiratory: breathing not labored, on room air, symmetric bs throughout  Gastrointestinal: soft non-tender, +bowel sounds  Musculoskeletal: no deformity, no tenderness to palpation, spinal column aligned with no tenderness on palpation  Skin: warm, dry  Neurologic: A&Ox4, cognition intact, following commands, moving all extremities  Psychiatric: full affect, not restless or agitated       HISTORY:     Past Medical History:   Diagnosis Date    Arthritis     BPH (benign prostatic hypertrophy) 7/18/2011    Cancer (HCC)     prostate cancer    Chronic pain right    knee    ED (erectile dysfunction)     GERD (gastroesophageal reflux disease)     HTN (hypertension), benign     Metastatic squamous cell carcinoma ([facility] [address] 9/16/2020    OA (osteoarthritis) of knee 7/18/2011    BOTH KNEES    Unspecified essential hypertension 7/18/2011      Past Surgical History:   Procedure Laterality Date    HX ACL RECONSTRUCTION Right     x2    HX APPENDECTOMY  1975    HX GI  1/2015    Drainage of hemorrhoid    HX HERNIA REPAIR      left and right,  ventral    HX HIP REPLACEMENT  2000    left    HX HIP REPLACEMENT  2000    left    HX KNEE ARTHROSCOPY Right     x3    HX MALIGNANT SKIN LESION EXCISION      melanoma - face    HX MOHS PROCEDURES Right     Basal cell behind ear.      HX ORTHOPAEDIC Left 6/2000    HIP REPLACEMENT    HX ORTHOPAEDIC Right 08/05/2014    [facility] - Dr. Nghia Hurtado HX OTHER SURGICAL  12/2021    Cystoscopy    HX PROSTATECTOMY  11/07/2011    prostate removed     HX TONSILLECTOMY      CHILD    IR BX MEDIASTINUM NEEDLE PERC W IMAGING Left 03/2022    IR INJ FORAMIN EPID LUMB ANES/STER SNGL  8/18/2020      Family History   Problem Relation Age of Onset    Bleeding Prob Father         clotting disorder    Cancer Brother         colon CA 1/2 brother    Thyroid Disease Daughter     Heart Failure Mother     Heart Disease Mother     Anesth Problems Neg Hx       History reviewed, no pertinent family history. Social History     Tobacco Use    Smoking status: Never Smoker    Smokeless tobacco: Never Used   Substance Use Topics    Alcohol use: No     Allergies   Allergen Reactions    Barbiturates Anxiety     Hyper        Current Outpatient Medications   Medication Sig    oxyCODONE IR (ROXICODONE) 5 mg immediate release tablet Take 1 Tablet by mouth every four (4) hours as needed for Pain for up to 3 days. Max Daily Amount: 30 mg.    Eliquis 5 mg tablet Take 1 tablet by mouth twice daily    gabapentin (NEURONTIN) 300 mg capsule Take 1 Capsule by mouth two (2) times a day. Max Daily Amount: 600 mg.    ondansetron (ZOFRAN ODT) 4 mg disintegrating tablet Take 1-2 Tablets by mouth every eight (8) hours as needed for Nausea or Vomiting.  prochlorperazine (Compazine) 5 mg tablet Take 1 tab by mouth every 6 hours as needed for nausea or vomiting    traZODone (DESYREL) 50 mg tablet TAKE 1 TO 2 TABLETS BY MOUTH ONCE DAILY IN THE EVENING AS NEEDED FOR SLEEP    terazosin (HYTRIN) 10 mg capsule Take 1 capsule by mouth once daily    candesartan (ATACAND) 4 mg tablet Take 1 tablet by mouth nightly    calcium carbonate/vitamin D3 (CALTRATE-600 PLUS VITAMIN D3 PO) Take 1 Tab by mouth nightly.  methylPREDNISolone (MEDROL DOSEPACK) 4 mg tablet Per package instructions (Patient not taking: Reported on 6/16/2022)     No current facility-administered medications for this visit. MRI Right Hip 8/5/20  IMPRESSION:   1. Enlarging right pelvic sidewall adenopathy with adjacent muscular and bony  invasion  2. Degenerative changes of the right hip  3. Small bony metastatic deposit anterior wall of the right acetabulum     MRI Lumbar Spine 8/11/20  IMPRESSION:  1. L3-L4 moderate central spinal canal and right foraminal stenoses. 2.  L5-S1 moderate bilateral foraminal stenosis.      PET 9/8/20  IMPRESSION: Large hypermetabolic right pelvic sidewall lymph node. Lytic lesion  right anterior      PET 1/12/21  IMPRESSION:  New hypermetabolic pulmonary nodule in the lingula. Resolved left  pelvic sidewall lymph node activity and resolved activity corresponding to the  lytic lesion in the right anterior acetabulum    CT CHEST / ABD PELVIS 4/28/22  1. Multiple pulmonary nodules many of which have increased in size. There are  several new nodules also present.     2.  Pelvic mass increased in size.     3. Right iliacus mass with adjacent bony involvement is not significantly  changed. However the right posterior acetabular lytic lesion is increased in  size.        LAB DATA REVIEWED:     Lab Results   Component Value Date/Time    WBC 2.1 (L) 06/06/2022 05:15 PM    HGB 9.0 (L) 06/06/2022 05:15 PM    PLATELET 873 72/21/7672 05:15 PM     Lab Results   Component Value Date/Time    Sodium 140 05/30/2022 09:32 AM    Potassium 3.8 05/30/2022 09:32 AM    Chloride 106 05/30/2022 09:32 AM    CO2 28 05/30/2022 09:32 AM    BUN 37 (H) 05/30/2022 09:32 AM    Creatinine 1.80 (H) 05/30/2022 09:32 AM    Calcium 8.9 05/30/2022 09:32 AM      Lab Results   Component Value Date/Time    Alk.  phosphatase 72 05/30/2022 09:32 AM    Protein, total 6.1 (L) 05/30/2022 09:32 AM    Albumin 2.7 (L) 05/30/2022 09:32 AM    Globulin 3.4 05/30/2022 09:32 AM     Lab Results   Component Value Date/Time    INR 1.1 07/16/2021 02:14 PM    Prothrombin time 10.4 07/16/2021 02:14 PM      No results found for: IRON, FE, TIBC, IBCT, PSAT, FERR        CONTROLLED SUBSTANCES SAFETY ASSESSMENT (IF ON CONTROLLED SUBSTANCES):     Reviewed opioid safety handout:  [x] Yes   [] No  24 hour opioid dose >150mg morphine equivalent/day:  [] Yes   [x] No  Benzodiazepines:  [] Yes   [x] No  Sleep apnea:  [] Yes   [x] No  Urine Toxicology Testing within last 6 months:  [] Yes   [x] No  History of or new aberrant medication taking behaviors:  [] Yes   [x] No   Has Narcan been prescribed [] Yes   [x] No          Total time: 80m  Counseling / coordination time: 60m  > 50% counseling / coordination?: yes - medical records review; history; physical exam; exploration of personal goals and values; symptom assessment and management options; care plan discussion with Palliative Medicine Team; documentation

## 2022-06-16 NOTE — PATIENT INSTRUCTIONS
Dear Megan Patterson ,    It was a pleasure seeing you today in [address]. If labs or imaging tests have been ordered for you today, please call the office  at [phone] 48 hours after completion to obtain the results. Follow-up and Dispositions    · Return in about 2 weeks (around 2022). Your goal- to be able to enjoy daily activities, quality of life is important to you    Your described symptoms were: Fatigue: 5 Drowsiness: 3   Depression: 0 Pain: 4   Anxiety: 2 Nausea: 0   Anorexia: 7 Dyspnea: 4   Best Well-Bein Constipation: Yes (usually after treatment )   Other Problem (Comment): 0       This is the plan we talked about:    1. Cancer related pain  -you developed pain about 2 months ago  -It is progressively worsening  -you are also having problems with urinating and having bowel movements- they both cause a severe amount of pain  -I am ordering an MRI of your lumbar spine and sacrum today  -I am starting you on oxycodone, take 5-mg as needed every 4 hours, please call us to let us know how this is working next week    2. Constipation  -This is fairly new  -You take Miralax most days lately    3. Squamous cell carcinoma of unknown origin  -You see Dr. Kanchan Aguayo at [facility], locally your infusions are managed by Dr. Myesha Brady last had scans on  which showed progression of your cancer  -You completed two cycles of Gemzar but you have had significant side effects and aren't certain you want to do the 3rd cycle    4. Goals of care  -you have a very good understanding of your disease process  -you are experienced with Hospice care and [facility] house from caring for your first wife  -your quality of life is very important to you, you want to die with dignity and without burdening your family  -You would like to use [facility] in the future, once cancer treatment options are exhausted.         This is what you have shared with us about Advance Care Planning:      Primary Decision Maker: Yenny Montanez Spouse - [phone]    Secondary Decision Maker: Alissa Dejesus Child - [phone]     Advance Care Planning 6/16/2022   Patient's Healthcare Decision Maker is: Named in scanned ACP document   Confirm Advance Directive Yes, on file   Does the patient have other document types -           The Palliative Medicine Team is here to support you and your family.        Sincerely,      Jesus Alberto Dee MD and the Palliative Medicine Team

## 2022-06-17 ENCOUNTER — TELEPHONE (OUTPATIENT)
Dept: ONCOLOGY | Age: 77
End: 2022-06-17

## 2022-06-17 NOTE — TELEPHONE ENCOUNTER
[id number] Patient ID [id number]. Returned call to Ms Joyce Armstrong whom stated \" I wanted to let everyone there know that Oralia Bradley passed away last night. We went to Palliative care after he was complaining of lots of abdominal pain and they gave him some pain medication and wanted to schedule an MRI but, I don't know what happened he passed in his sleep last night and I wanted Dr Jillian Yoo and Brenda Aguirre to know\". Offered our condolences to her and her family and she was appreciative.

## 2022-06-17 NOTE — ED PROVIDER NOTES
History of hypertension, metastatic squamous cell carcinoma, prostate cancer, arthritis, GERD. He presents via EMS status postcardiac arrest.  I spoke with his wife who reports that she found him face down in the stairwell prior to arrival.  She had last seen him approximately 15 minutes prior to that. EMS personnel reports that his wife started CPR at home upon finding him down. Upon their arrival, they found him warm but unresponsive/pulseless/apneic. They continued CPR en route. At one point, they report that he had a faint pulse. However, soon after that, he was noted to be in asystole. They were never able to gain a pulse back after that. He was intubated and received 4 rounds of epinephrine en route. Prior to arrival, they noted his rhythm to be PEA. Past Medical History:   Diagnosis Date    Arthritis     BPH (benign prostatic hypertrophy) 7/18/2011    [facility])     prostate cancer    Chronic pain right    knee    ED (erectile dysfunction)     GERD (gastroesophageal reflux disease)     HTN (hypertension), benign     Metastatic squamous cell carcinoma ([facility] [address] 9/16/2020    OA (osteoarthritis) of knee 7/18/2011    BOTH KNEES    Unspecified essential hypertension 7/18/2011       Past Surgical History:   Procedure Laterality Date    HX ACL RECONSTRUCTION Right     x2    HX APPENDECTOMY  1975    HX GI  1/2015    Drainage of hemorrhoid    HX HERNIA REPAIR      left and right,  ventral    HX HIP REPLACEMENT  2000    left    HX HIP REPLACEMENT  2000    left    HX KNEE ARTHROSCOPY Right     x3    HX MALIGNANT SKIN LESION EXCISION      melanoma - face    HX MOHS PROCEDURES Right     Basal cell behind ear.      HX ORTHOPAEDIC Left 6/2000    HIP REPLACEMENT    HX ORTHOPAEDIC Right 08/05/2014    [facility] - Dr. Roz Spivey [facility]  12/2021    Cystoscopy    HX PROSTATECTOMY  11/07/2011    prostate removed     HX TONSILLECTOMY      CHILD    IR BX MEDIASTINUM NEEDLE PERC W IMAGING Left 03/2022    IR INJ FORAMIN EPID LUMB ANES/STER SNGL  8/18/2020         Family History:   Problem Relation Age of Onset    Bleeding Prob Father         clotting disorder    Cancer Brother         colon CA 1/2 brother    Thyroid Disease Daughter     Heart Failure Mother     Heart Disease Mother     Anesth Problems Neg Hx        Social History     Socioeconomic History    Marital status:      Spouse name: Not on file    Number of children: Not on file    Years of education: Not on file    Highest education level: Not on file   Occupational History    Not on file   Tobacco Use    Smoking status: Never Smoker    Smokeless tobacco: Never Used   Vaping Use    Vaping Use: Never used   Substance and Sexual Activity    Alcohol use: No    Drug use: No    Sexual activity: Yes     Partners: Female     Birth control/protection: None   Other Topics Concern    Not on file   Social History Narrative    Not on file     Social Determinants of Health     Financial Resource Strain:     Difficulty of Paying Living Expenses: Not on file   Food Insecurity:     Worried About Running Out of Food in the Last Year: Not on file    Dmitri of Food in the Last Year: Not on file   Transportation Needs:     Lack of Transportation (Medical): Not on file    Lack of Transportation (Non-Medical):  Not on file   Physical Activity:     Days of Exercise per Week: Not on file    Minutes of Exercise per Session: Not on file   Stress:     Feeling of Stress : Not on file   Social Connections:     Frequency of Communication with Friends and Family: Not on file    Frequency of Social Gatherings with Friends and Family: Not on file    Attends Latter day Services: Not on file    Active Member of Clubs or Organizations: Not on file    Attends Club or Organization Meetings: Not on file    Marital Status: Not on file   Intimate Partner Violence:     Fear of Current or Ex-Partner: Not on file    Emotionally Abused: Not on file    Physically Abused: Not on file    Sexually Abused: Not on file   Housing Stability:     Unable to Pay for Housing in the Last Year: Not on file    Number of Places Lived in the Last Year: Not on file    Unstable Housing in the Last Year: Not on file         ALLERGIES: Barbiturates    Review of Systems   Unable to perform ROS: Acuity of condition       There were no vitals filed for this visit. Physical Exam  Vitals and nursing note reviewed. Constitutional:       Appearance: He is well-developed. Comments: Unresponsive   HENT:      Head: Normocephalic and atraumatic. Eyes:      Conjunctiva/sclera: Conjunctivae normal.   Neck:      Trachea: No tracheal deviation. Cardiovascular:      Comments: CPR in progress. Pulseless. Pulmonary:      Comments: Intubated with respirations given via Ambu bag. No spontaneous respirations. Abdominal:      Palpations: Abdomen is soft. Musculoskeletal:         General: No deformity. Cervical back: Neck supple. Skin:     General: Skin is warm and dry. Neurological:      Comments: Unresponsive          MDM       Procedures    Patient was given multiple more rounds of epi in the ED. CPR was continued throughout with minimal interruptions for pulse check/rhythm checks. He also received calcium, bicarbonate, amiodarone. Defibrillation was done x2 with the monitor appeared to show fine V. fib. Time of death was eventually called at 1 with asystole on the monitor, no spontaneous respirations and no pulse.   Mel Vidal MD  10:50 PM

## 2022-06-17 NOTE — ED TRIAGE NOTES
Patient presents to ED in cardiac arrest, CPR in progress via EMS, patient intubated prior to arrival.

## 2022-06-17 NOTE — TELEPHONE ENCOUNTER
Pt's wife left a message requesting a returned call. She did not say what the call was regarding.     CB# is [phone]

## 2022-06-17 NOTE — PROGRESS NOTES
Spiritual Care Assessment/Progress Note  [facility]      NAME: Amber Danielle      MRN: [id number]  AGE: 78 y.o. SEX: male  Lutheran Affiliation: Protestant   Language: English     6/17/2022     Total Time (in minutes): 113     Spiritual Assessment begun in [address] through conversation with:         []Patient        [x] Family    [] Friend(s)        Reason for Consult: Death, ER     Spiritual beliefs: (Please include comment if needed)     [x] Identifies with a leanne tradition: Protestant (per chart)        [] Supported by a leanne community:            [] Claims no spiritual orientation:           [] Seeking spiritual identity:                [] Adheres to an individual form of spirituality:           [] Not able to assess:                           Identified resources for coping:      [] Prayer                               [] Music                  [] Guided Imagery     [x] Family/friends                 [] Pet visits     [] Devotional reading                         [] Unknown     [] Other                                            Interventions offered during this visit: (See comments for more details)          Family/Friend(s):  Affirmation of emotions/emotional suffering,Catharsis/review of pertinent events in supportive environment,Prayer (assurance of),Normalization of emotional/spiritual concerns     Plan of Care:     [] Support spiritual and/or cultural needs    [] Support AMD and/or advance care planning process      [x] Support grieving process   [] Coordinate Rites and/or Rituals    [] Coordination with community clergy   [] No spiritual needs identified at this time   [] Detailed Plan of Care below (See Comments)  [] Make referral to Music Therapy  [] Make referral to Pet Therapy     [] Make referral to Addiction services  [] Make referral to [facility]  [] Make referral to Spiritual Care Partner  [] No future visits requested        [] Contact Spiritual Care for further referrals     Comments:  responded to page from ED staff requesting support for pt's family following his death. Met pt's wife, sister, and brother-in-law in consult room. Family shared of pt's health concerns and reflected on events of tonight. Escorted wife and brother-in-law to bedside; pt's daughter [facility] arrived later.  offered compassionate presence and assisted with questions regarding next steps. No additional needs expressed by family at this time. Provided contact information for Spiritual Care Services.     Gagandeep Conrad [address] [place] [address]  [phone]

## 2022-06-20 ENCOUNTER — HOSPITAL ENCOUNTER (OUTPATIENT)
Dept: INFUSION THERAPY | Age: 77
End: 2022-06-20

## 2022-06-21 ENCOUNTER — HOSPITAL ENCOUNTER (OUTPATIENT)
Dept: INFUSION THERAPY | Age: 77
End: 2022-06-21

## 2022-06-27 ENCOUNTER — APPOINTMENT (OUTPATIENT)
Dept: INFUSION THERAPY | Age: 77
End: 2022-06-27

## 2022-06-28 ENCOUNTER — APPOINTMENT (OUTPATIENT)
Dept: INFUSION THERAPY | Age: 77
End: 2022-06-28

## 2022-07-04 ENCOUNTER — APPOINTMENT (OUTPATIENT)
Dept: INFUSION THERAPY | Age: 77
End: 2022-07-04

## 2022-07-12 ENCOUNTER — APPOINTMENT (OUTPATIENT)
Dept: INFUSION THERAPY | Age: 77
End: 2022-07-12
